# Patient Record
Sex: MALE | Employment: UNEMPLOYED | URBAN - NONMETROPOLITAN AREA
[De-identification: names, ages, dates, MRNs, and addresses within clinical notes are randomized per-mention and may not be internally consistent; named-entity substitution may affect disease eponyms.]

---

## 2020-09-22 ENCOUNTER — TELEPHONE (OUTPATIENT)
Dept: PREADMISSION TESTING | Age: 60
End: 2020-09-22

## 2020-09-22 NOTE — TELEPHONE ENCOUNTER
Spoke with Pt's wife Josue Atwood, not sure pt can take off work to get PAT testing done, will call office to confirm

## 2020-09-25 ENCOUNTER — PRE-PROCEDURE TELEPHONE (OUTPATIENT)
Dept: PREADMISSION TESTING | Age: 60
End: 2020-09-25

## 2020-10-07 ENCOUNTER — OFFICE VISIT (OUTPATIENT)
Dept: PULMONOLOGY | Age: 60
End: 2020-10-07
Payer: OTHER GOVERNMENT

## 2020-10-07 ENCOUNTER — HOSPITAL ENCOUNTER (OUTPATIENT)
Dept: GENERAL RADIOLOGY | Age: 60
Discharge: HOME | End: 2020-10-09
Payer: OTHER GOVERNMENT

## 2020-10-07 ENCOUNTER — HOSPITAL ENCOUNTER (OUTPATIENT)
Dept: PULMONOLOGY | Age: 60
Discharge: HOME | End: 2020-10-07
Payer: OTHER GOVERNMENT

## 2020-10-07 VITALS
SYSTOLIC BLOOD PRESSURE: 118 MMHG | OXYGEN SATURATION: 96 % | BODY MASS INDEX: 15.71 KG/M2 | DIASTOLIC BLOOD PRESSURE: 72 MMHG | WEIGHT: 116 LBS | HEART RATE: 72 BPM | TEMPERATURE: 98 F | HEIGHT: 72 IN

## 2020-10-07 PROBLEM — J98.4 MIXED RESTRICTIVE AND OBSTRUCTIVE LUNG DISEASE (HCC): Status: ACTIVE | Noted: 2020-10-07

## 2020-10-07 PROBLEM — J43.9 MIXED RESTRICTIVE AND OBSTRUCTIVE LUNG DISEASE (HCC): Status: ACTIVE | Noted: 2020-10-07

## 2020-10-07 PROBLEM — F17.200 SMOKER: Status: ACTIVE | Noted: 2020-10-07

## 2020-10-07 PROBLEM — R59.0 AXILLARY LYMPHADENOPATHY: Status: ACTIVE | Noted: 2020-10-07

## 2020-10-07 PROBLEM — J44.9 STAGE 4 VERY SEVERE COPD BY GOLD CLASSIFICATION (HCC): Status: ACTIVE | Noted: 2020-10-07

## 2020-10-07 PROBLEM — J92.0 CALCIFIED PLEURAL PLAQUE DUE TO ASBESTOS EXPOSURE: Status: ACTIVE | Noted: 2020-10-07

## 2020-10-07 PROBLEM — R59.0 MEDIASTINAL LYMPHADENOPATHY: Status: ACTIVE | Noted: 2020-10-07

## 2020-10-07 PROBLEM — J44.9 MIXED RESTRICTIVE AND OBSTRUCTIVE LUNG DISEASE (HCC): Status: ACTIVE | Noted: 2020-10-07

## 2020-10-07 PROBLEM — R64 CACHEXIA (HCC): Status: ACTIVE | Noted: 2020-10-07

## 2020-10-07 PROCEDURE — 94726 PLETHYSMOGRAPHY LUNG VOLUMES: CPT

## 2020-10-07 PROCEDURE — 99205 OFFICE O/P NEW HI 60 MIN: CPT | Performed by: INTERNAL MEDICINE

## 2020-10-07 PROCEDURE — 94060 EVALUATION OF WHEEZING: CPT

## 2020-10-07 PROCEDURE — 94640 AIRWAY INHALATION TREATMENT: CPT

## 2020-10-07 PROCEDURE — 6370000000 HC RX 637 (ALT 250 FOR IP): Performed by: INTERNAL MEDICINE

## 2020-10-07 PROCEDURE — 94729 DIFFUSING CAPACITY: CPT

## 2020-10-07 PROCEDURE — 71046 X-RAY EXAM CHEST 2 VIEWS: CPT

## 2020-10-07 RX ORDER — ALBUTEROL SULFATE 90 UG/1
2 AEROSOL, METERED RESPIRATORY (INHALATION) EVERY 4 HOURS PRN
COMMUNITY
Start: 2020-08-06 | End: 2020-10-12 | Stop reason: SDUPTHER

## 2020-10-07 RX ORDER — LEVALBUTEROL 1.25 MG/.5ML
1.25 SOLUTION, CONCENTRATE RESPIRATORY (INHALATION) 3 TIMES DAILY
COMMUNITY
End: 2020-10-12 | Stop reason: SDUPTHER

## 2020-10-07 RX ORDER — BUDESONIDE AND FORMOTEROL FUMARATE DIHYDRATE 160; 4.5 UG/1; UG/1
2 AEROSOL RESPIRATORY (INHALATION) 2 TIMES DAILY
COMMUNITY
Start: 2020-08-06 | End: 2020-10-12 | Stop reason: SDUPTHER

## 2020-10-07 RX ORDER — ALBUTEROL SULFATE 90 UG/1
4 AEROSOL, METERED RESPIRATORY (INHALATION) ONCE
Status: COMPLETED | OUTPATIENT
Start: 2020-10-07 | End: 2020-10-07

## 2020-10-07 RX ADMIN — ALBUTEROL SULFATE 4 PUFF: 90 AEROSOL, METERED RESPIRATORY (INHALATION) at 14:15

## 2020-10-07 NOTE — PROGRESS NOTES
REASON FOR THE CONSULTATION:  Shortness of breath  HISTORY OF PRESENT ILLNESS:    Tom Lane is a 63y.o. year old male here for evaluation of shortness of breath. Patient has been seen in the [place] they saw pulmonologist and now has been referred to me for evaluation. Patient has been having progressive shortness of breath after walking. Presently he can walk 50 yards but at a slow pace and if he has to climb up a flight of stairs he has to do it at a slow pace and may have to pace himself he does have cough with sputum mostly in the morning which is clear but no hemoptysis he has lost 10 pounds of weight since 1 year he is a thin cachectic looking man. He has had extensive work-up and scanning done has known history of asbestos pleural plaques with loss of left lung volume with pleuroparenchymal scarring according to him previous PET scans did not show any neoplastic process. I will obtain records of the PET CT scan done in 2017 at [facility]. Recently patient has had blood work done and is under evaluation for CLL due to lymphocytosis. He does have lymphadenopathy that he feels under his axilla and in the groin right side more than the left. 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 CRITERIA MET    []     [address]  []      2. CRITERIA NOT MET   [x]      3. REFUSED                    []        REASON CRITERIA NOT MET     1. SMOKING LESS THAN 30 PY  []      2. AGE LESS THAN 55 or GREATER 77 YEARS  []      3.  QUIT SMOKING 15 YEARS OR GREATER   []      4. RECENT CT WITH IN 11 MONTHS    [x]      5. LIFE EXPECTANCY < 5 YEARS   []      6. SIGNS  AND SYMPTOMS OF LUNG CANCER   []         Immunization   Immunization History   Administered Date(s) Administered    Influenza, Triv, 3 Years and older, IM, PF (Afluria 5yrs and older) 09/29/2020    Pneumococcal Polysaccharide ([id number]) 08/29/2018        Pneumococcal Vaccine     [x] Up to date    [] Indicated   [] Refused  [] Contraindicated       Influenza Vaccine   [x] Up to date    [] Indicated   [] Refused  [] Contraindicated        PAST MEDICAL HISTORY:       Diagnosis Date    Asthma     COPD (chronic obstructive pulmonary disease) ([facility]     Emphysema of lung ([facility]          Family History:   History reviewed. No pertinent family history. SURGICAL HISTORY:   Past Surgical History:   Procedure Laterality Date    SKIN GRAFT Bilateral     lower extremities           SOCIAL AND OCCUPATIONAL HEALTH:      There  No history of TB or TB exposure. There  Yes asbestos ,Nosilica dust exposure. The patient reports  No coal mine, [place], [place], [facility] exposure. History of travel outside the country No  There  is use of   marijuana No   VapingNo  IV heroinNo  Crack cocaineNo  There  Nohot tub exposure. Pets -cats No, dogsNo  Birds No        TOBACCO:   reports that he has been smoking. He has a 45.00 pack-year smoking history. He has never used smokeless tobacco.  ETOH:   reports current alcohol use. ALLERGIES:      Allergies   Allergen Reactions    Morphine Other (See Comments)    Propoxyphene Other (See Comments)     Any for of darvon          Home Meds:   Prior to Admission medications    Medication Sig Start Date End Date Taking?  Authorizing Provider   albuterol sulfate  (90 Base) MCG/ACT inhaler Inhale 2 puffs into the lungs every 4 hours as needed 8/6/20 11/4/20 Yes Historical Provider, MD   budesonide-formoterol (SYMBICORT) 160-4.5 MCG/ACT AERO Inhale 2 puffs into the lungs 2 times daily 8/6/20 11/4/20 Yes Historical Provider, MD   levalbuterol ([place]) 1.25 MG/0.5ML nebulizer solution Inhale 1.25 mg into the lungs 3 times daily   Yes Historical Provider, MD   Multiple Vitamins-Minerals (MULTIVITAMIN ADULT PO) Take 1 tablet by mouth daily   Yes Historical Provider, MD              REVIEW OF SYSTEMS:    CONSTITUTIONAL: Night sweats sweats,  weight loss  EYES:  negative for  double vision, blurred vision, dry eyes, eye discharge and redness  HEENT:  negative for  hearing loss, tinnitus, ear drainage, earaches and nasal congestion  RESPIRATORY:  See hpi  CARDIOVASCULAR:  negative for  chest pain,, palpitations, orthopnea, PND  GASTROINTESTINAL:  negative for nausea, vomiting, change in bowel habits, diarrhea, constipation, abdominal pain, pruritus, abdominal mass and abdominal distention  GENITOURINARY:  negative for frequency, dysuria, nocturia, urinary incontinence and hesitancy  INTEGUMENT  negative for rash, skin lesion(s), dryness, skin color change, changes in lesion, pruritus and changes in hair  HEMATOLOGIC/LYMPHATIC:  , lymphadenopathy,   ALLERGIC/IMMUNOLOGIC:  negative for recurrent infections, urticaria and drug reactions  ENDOCRINE:  negative for heat intolerance, cold intolerance, tremor, weight changes and change in bowel habits  MUSCULOSKELETAL:  negative for  myalgias, arthralgias, pain, joint swelling, stiff joints and decreased range of motion  NEUROLOGICAL:  negative for headaches, dizziness, seizures, memory problems, speech problems, visual disturbance and coordination problems  BEHAVIOR/PSYCH:  negative for poor appetite, increased appetite, decreased sleep, increased sleep, decreased energy level, increased energy level and poor concentration  Skin no rash no dermatitis  Vitals:  /72   Pulse 72   Temp 98 °F (36.7 °C)   Ht 6' (1.829 m)   Wt 116 lb (52.6 kg)   SpO2 96%   BMI 15.73 kg/m²     PHYSICAL EXAM:  General Appearance: Alert, cooperative, no distress, appears older than stated age cachectic   Head:    Normocephalic, without obvious abnormality, atraumatic      Eyes:    PERRL, conjunctiva/corneas clear, EOM's intact   Ears:    Normal  external ear canals, both ears   Nose:   Nares normal, septum midline, mucosa normal, no drainage        or sinus tenderness   Throat:   Lips, mucosa, and tongue normal; teeth and gums normal   Neck:   Supple, symmetrical, trachea midline, no adenopathy;     thyroid:   no carotid-enlarged anterior cervical submandibular lymph nodes not tender really mobile half a centimeter    bruit , no JVD   Back:     Symmetric, no curvature, ROM normal, no CVA tenderness   Lungs:    Prolonged expiratory phase chest expansion decreased on the left side air entry decreased on the left side no rales no rhonchi no dullness no bronchial breath sound   Chest Wall:    No tenderness or deformity      Heart:    Regular rate and rhythm, S1 and S2 normal, no murmur, rub        or gallop no rvh                           Abdomen:                                                 Pulses:                              Skin:                  Lymph nodes:                    Neurologic:                  Soft, non-tender, bowel sounds active all four quadrants,     no masses, no organomegaly         2+ and symmetric all extremities     Skin color, texture, turgor normal, no rashes or lesions       Small right supraclavicular lymph nodes freely mobile not matted nontender    Right axillary lymph node are enlarged nontender freely mobile 1.5 cm left axillary lymph node 5 mm nontender     CNII-XII intact, normal strength 5/5 . Clubbing No  Lower ext edema No  Upper ext edema No                                         Xr Chest (2 Vw)    Result Date: 10/7/2020  Loss of volume of the left hemithorax and mediastinal shift to the left.  Calcified pleural plaques in the left upper lung and lung base, most compatible with provided history of asbestosis. Bibasilar pleural-parenchymal scarring versus small bowel pleural effusions. PFT today shows FEV1 32% predicted with the bronchodilator change of 8% to 35% predicted, forced vital capacity 34% predicted with 1% bronchodilator change, FEV1 FVC ratio is 72    Total lung capacity 51% predicted, basilar volume 87% predicted    Diffusion capacity uncorrected 37% predicted, corrected for alveolar volume 79% predicted    Flow volume 46% predicted    Airway resistance increased      IMPRESSION:     Diagnosis Orders   1. Stage 4 very severe COPD by GOLD classification ([facility] [address]  DME Order for (Specify) as OP   2. Mixed restrictive and obstructive lung disease ([address]  DME Order for (Specify) as OP   3. Calcified pleural plaque due to asbestos exposure  PET CT SKULL BASE TO MID THIGH    DME Order for (Specify) as OP   4. Mediastinal lymphadenopathy  PET CT SKULL BASE TO MID THIGH   5. Axillary lymphadenopathy     6. Smoker     7. Cachexia [facility])          :                PLAN:      PFT from March 2019 reviewed and compared to  today. There is significant decline in patient's FEV1 and FVC which is a combined obstructive and restrictive lung disease. His obstructive disease is due to chronic obstructive pulmonary disease and the restrictive disease is due to left lung volume loss and pleuroparenchymal scarring due to calcific left pleural plaques due to his asbestos exposure. We did walk him in the office lower saturation was 93%   so I will check nocturnal oximetry study on room air. Continue albuterol as needed and if needed use Xopenex in  his nebulizer. He had 8% bronchodilator change so I will continue Symbicort. Since he did not have any prescription at home I will give him a sample and if he tolerates it well I will send a prescription to the [place].     He has bilateral axillary right greater than left ,right supraclavicular and cervical lymphadenopathy along with mediastinal and hilar lymphadenopathy seen on the CT chest with IV contrast.    Patient is seeing a hematologist oncologist Dr Johanna Braden and there is a concern he may have CLL which will explain his lymphadenopathy. I would like to get a PET CT scan to further evaluate the lymphadenopathy and also his left pleuroparenchymal scarring and the pleural plaques. Based on the results the decision will be made to do bronchoscopy with endobronchial ultrasound and biopsy of the mediastinal lymph nodes or he will need pleural biopsy by thoracic surgeon or biopsy of the right axillary lymph node. Patient tells me that he prefers least invasive procedure. I shall see him back after PET CT scan    Answered all the questions that patient and his wife had. I spent more than 60 minutes examining, evaluating, reviewing data and counseling the patient. Greater than 50% of time was spent face-to-face with the patient       Requested Prescriptions      No prescriptions requested or ordered in this encounter       There are no discontinued medications. Jeannie Valenzuela received counseling on the following healthy behaviors: nutrition, exercise and medication adherence    Patient given educational materials : see patient instruction       Discussed use, benefit, and side effects of prescribed medications. Barriers to medication compliance addressed. All patient questions answered. Pt voiced understanding. I hope this updates you on my evaluation and clinical thinking. Thank you for allowing me to participate in his care. Sincerely,    Electronically signed by Terry Joya MD on 10/7/2020 at 4:46 PM       Please note that this chart was generated using voice recognition Dragon dictation software. Although every effort was made to ensure the accuracy of this automated transcription, some errors in transcription may have occurred.

## 2020-10-08 NOTE — PROCEDURES
[address]                 [address] [place] [id number]                               PULMONARY FUNCTION    PATIENT NAME: Maria A Mejia                     :        1960  MED REC NO:   [id number]                             ROOM:  ACCOUNT NO:   [de-identified]                           ADMIT DATE: 10/07/2020  PROVIDER:     Tiffanie Laws    DATE OF PROCEDURE:  10/07/2020    The patient's spirometry shows a flow volume loop, which is severely  restricted. FEV1 is 32% predicted with 8% bronchodilator change to 35%  predicted. FVC is 34% predicted with 1% change to 34% predicted. FEV1/FVC ratio is 72. Lung volumes by body box, total lung capacity is 51% predicted and RV  87% predicted, slow vital capacity 34% predicted. Diffusion capacity uncorrected is decreased to 37% predicted, corrected  to alveolar volume is 79% predicted, alveolar volume is significantly  decreased at 46% predicted and airway resistance is increased. FINAL IMPRESSION:  The study shows a very severe ventilatory dysfunction  of a combined obstructive and restrictive nature. The restrictive  disease is of combined intrapulmonic and extrapulmonic variety. Clinical correlation advised.         Jonathan Maxwell    D: 10/07/2020 16:59:28       T: 10/07/2020 18:00:49     [id number]  Job#: [id number]     Doc#: [id number]    CC:  Iveth Ayers

## 2020-10-12 NOTE — TELEPHONE ENCOUNTER
Patient is needing a refill of his medications. I will call these in to the [place] once approved. Thanks.

## 2020-10-13 RX ORDER — ALBUTEROL SULFATE 90 UG/1
2 AEROSOL, METERED RESPIRATORY (INHALATION) EVERY 4 HOURS PRN
Qty: 1 INHALER | Refills: 3
Start: 2020-10-13 | End: 2020-10-14 | Stop reason: SDUPTHER

## 2020-10-13 RX ORDER — LEVALBUTEROL 1.25 MG/.5ML
1.25 SOLUTION, CONCENTRATE RESPIRATORY (INHALATION) 3 TIMES DAILY
Qty: 90 EACH | Refills: 5
Start: 2020-10-13 | End: 2020-10-14 | Stop reason: SDUPTHER

## 2020-10-13 RX ORDER — BUDESONIDE AND FORMOTEROL FUMARATE DIHYDRATE 160; 4.5 UG/1; UG/1
2 AEROSOL RESPIRATORY (INHALATION) 2 TIMES DAILY
Qty: 1 INHALER | Refills: 5
Start: 2020-10-13 | End: 2020-10-14 | Stop reason: SDUPTHER

## 2020-10-14 RX ORDER — ALBUTEROL SULFATE 90 UG/1
2 AEROSOL, METERED RESPIRATORY (INHALATION) EVERY 4 HOURS PRN
Qty: 1 INHALER | Refills: 3 | Status: SHIPPED | OUTPATIENT
Start: 2020-10-14 | End: 2021-06-16 | Stop reason: SDUPTHER

## 2020-10-14 RX ORDER — BUDESONIDE AND FORMOTEROL FUMARATE DIHYDRATE 160; 4.5 UG/1; UG/1
2 AEROSOL RESPIRATORY (INHALATION) 2 TIMES DAILY
Qty: 1 INHALER | Refills: 5 | Status: SHIPPED | OUTPATIENT
Start: 2020-10-14 | End: 2021-06-16 | Stop reason: SDUPTHER

## 2020-10-14 RX ORDER — LEVALBUTEROL 1.25 MG/.5ML
1.25 SOLUTION, CONCENTRATE RESPIRATORY (INHALATION) 3 TIMES DAILY
Qty: 90 EACH | Refills: 5 | Status: SHIPPED | OUTPATIENT
Start: 2020-10-14 | End: 2021-06-16 | Stop reason: SDUPTHER

## 2020-11-18 ENCOUNTER — OFFICE VISIT (OUTPATIENT)
Dept: PULMONOLOGY | Age: 60
End: 2020-11-18
Payer: OTHER GOVERNMENT

## 2020-11-18 VITALS
OXYGEN SATURATION: 96 % | SYSTOLIC BLOOD PRESSURE: 112 MMHG | TEMPERATURE: 99 F | HEART RATE: 57 BPM | BODY MASS INDEX: 15.63 KG/M2 | WEIGHT: 115.4 LBS | HEIGHT: 72 IN | DIASTOLIC BLOOD PRESSURE: 72 MMHG

## 2020-11-18 PROCEDURE — 99214 OFFICE O/P EST MOD 30 MIN: CPT | Performed by: INTERNAL MEDICINE

## 2020-11-18 NOTE — PROGRESS NOTES
REASON FOR follow-up  Pleural plaques due to asbestos  HISTORY OF PRESENT ILLNESS:    Sushma Fuchs is a 63y.o. year old male   PET CT scan reviewed with patient and his wife. Patient does not want invasive procedures at present. He does not even want a colonoscopy. He is doing well with Symbicort  No hemoptysis      Last visit    here for evaluation of shortness of breath. Patient has been seen in the [place] they saw pulmonologist and now has been referred to me for evaluation. Patient has been having progressive shortness of breath after walking. Presently he can walk 50 yards but at a slow pace and if he has to climb up a flight of stairs he has to do it at a slow pace and may have to pace himself he does have cough with sputum mostly in the morning which is clear but no hemoptysis he has lost 10 pounds of weight since 1 year he is a thin cachectic looking man. He has had extensive work-up and scanning done has known history of asbestos pleural plaques with loss of left lung volume with pleuroparenchymal scarring according to him previous PET scans did not show any neoplastic process. I will obtain records of the PET CT scan done in 2017 at [facility]. Recently patient has had blood work done and is under evaluation for CLL due to lymphocytosis. He does have lymphadenopathy that he feels under his axilla and in the groin right side more than the left. 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 CRITERIA MET    []     [address] []      2. CRITERIA NOT MET   [x]      3. REFUSED                    []        REASON CRITERIA NOT MET     1. SMOKING LESS THAN 30 PY  []      2. AGE LESS THAN 55 or GREATER 77 YEARS  []      3. QUIT SMOKING 15 YEARS OR GREATER   []      4. RECENT CT WITH IN 11 MONTHS    [x]      5. LIFE EXPECTANCY < 5 YEARS   []      6. SIGNS  AND SYMPTOMS OF LUNG CANCER   []         Immunization   Immunization History   Administered Date(s) Administered    Influenza, Triv, 3 Years and older, IM, PF (Afluria 5yrs and older) 09/29/2020    Pneumococcal Polysaccharide ([id number]) 08/29/2018        Pneumococcal Vaccine     [x] Up to date    [] Indicated   [] Refused  [] Contraindicated       Influenza Vaccine   [x] Up to date    [] Indicated   [] Refused  [] Contraindicated        PAST MEDICAL HISTORY:       Diagnosis Date    Asthma     COPD (chronic obstructive pulmonary disease) [facility] [address]     Emphysema of lung ([facility] [address]          Family History:   History reviewed. No pertinent family history. SURGICAL HISTORY:   Past Surgical History:   Procedure Laterality Date    SKIN GRAFT Bilateral     lower extremities           SOCIAL AND OCCUPATIONAL HEALTH:      There  No history of TB or TB exposure. There  Yes asbestos ,[employer] dust exposure. The patient reports  No coal mine, [place], [place], [facility] exposure. History of travel outside the country No  There  is use of   marijuana No   VapingNo  IV heroinNo  Crack cocaineNo  There  Nohot tub exposure. Pets -cats No, dogsNo  Birds No        TOBACCO:   reports that he has been smoking. He has a 45.00 pack-year smoking history. He has never used smokeless tobacco.  ETOH:   reports current alcohol use. ALLERGIES:      Allergies   Allergen Reactions    Morphine Other (See Comments)    Propoxyphene Other (See Comments)     Any for of darvon          Home Meds:   Prior to Admission medications    Medication Sig Start Date End Date Taking?  Authorizing Provider budesonide-formoterol (SYMBICORT) 160-4.5 MCG/ACT AERO Inhale 2 puffs into the lungs 2 times daily 10/14/20 1/12/21 Yes Malachi Rosen, DO   albuterol sulfate  (90 Base) MCG/ACT inhaler Inhale 2 puffs into the lungs every 4 hours as needed for Wheezing or Shortness of Breath  Patient not taking: Reported on 11/18/2020 10/14/20 1/12/21  Manuel Rosen, DO   levalbuterol (XOPENEX) 1.25 MG/0.5ML nebulizer solution Take 0.5 mLs by nebulization 3 times daily  Patient not taking: Reported on 11/18/2020 10/14/20   Junie West, DO   Multiple Vitamins-Minerals (MULTIVITAMIN ADULT PO) Take 1 tablet by mouth daily    Historical Provider, MD              Review of Systems -  General ROS: negative for - chills, fatigue, fever or weight loss  ENT ROS: negative for - headaches, oral lesions or sore throat  Cardiovascular ROS: no chest pain , orthopnea or pnd   Gastrointestinal ROS: no abdominal pain, change in bowel habits, or black or bloody stools  Skin - no rash   Neuro - no blurry vision , no loc . No focal weakness   msk - no jt tenderness or swelling    Vascular - no claudication , rest completed and negative   Lymphatic - complete and negative   Hematology - oncology - complete and negative   Allergy immunology - complete and negative    no burning or hematuria    Vitals:  /72   Pulse 57   Temp 99 °F (37.2 °C)   Ht 6' (1.829 m)   Wt 115 lb 6.4 oz (52.3 kg)   SpO2 96%   BMI 15.65 kg/m²     PHYSICAL EXAM:  Head and neck atraumatic, normocephalic    Lymph nodes axillary lymphadenopathy    Neck-no JVP elevation    Lungs - Prolonged expiratory phase chest expansion decreased on the left side air entry decreased on the left side no rales no rhonchi no dullness no bronchial breath sound  CVS- S1, S2 regular. No S3 no S4, no murmurs    Abdomen-nontender, nondistended. Bowel sounds are present.   No organomegaly    Lower extremity-no edema    Upper extremity-no edema    Neurological-grossly normal cranial nerves. No overt motor deficit                                     Xr Chest (2 Vw)    Result Date: 10/7/2020  Loss of volume of the left hemithorax and mediastinal shift to the left. Calcified pleural plaques in the left upper lung and lung base, most compatible with provided history of asbestosis. Bibasilar pleural-parenchymal scarring versus small bowel pleural effusions. PFT today shows FEV1 32% predicted with the bronchodilator change of 8% to 35% predicted, forced vital capacity 34% predicted with 1% bronchodilator change, FEV1 FVC ratio is 72    Total lung capacity 51% predicted, basilar volume 87% predicted    Diffusion capacity uncorrected 37% predicted, corrected for alveolar volume 79% predicted    Flow volume 46% predicted    Airway resistance increased              IMPRESSION:     Diagnosis Orders   1. Stage 4 very severe COPD by GOLD classification ([address]     2. Mixed restrictive and obstructive lung disease ([address]     3. Calcified pleural plaque due to asbestos exposure  CT CHEST WO CONTRAST   4. Pleural effusion on right - ? BAPE  CT CHEST WO CONTRAST   5. CLL (chronic lymphocytic leukemia) (HCC)  CT CHEST WO CONTRAST        :                PLAN:      Mediastinal  lymph nodes are not PET positive neither the axillary lymph nodes. At present no plan for endobronchial ultrasound bronchoscopy. Defer biopsy of the axillary lymph nodes to oncology    He has only small right loculated pleural effusion which is not seen in 2019. He does show mild PET activity. But the effusion is too small for thoracentesis and patient does not want a pleural biopsy . This could be benign asbestos pleural effusion  Will obtain CT chest in 3 months  If prior to this patient symptoms of chest discomfort or shortness of breath worsen then will get CT chest earlier and if the pleural effusion enlarges will obtain thoracentesis. Patient does not want to get colonoscopy.     Continue Symbicort  Use Xopenex as needed    Follow-up in 3 months    Discussed with patient and his wife in detail  Reviewed imaging in detail      Requested Prescriptions      No prescriptions requested or ordered in this encounter       There are no discontinued medications. Chantale Bravo received counseling on the following healthy behaviors: nutrition, exercise and medication adherence    Patient given educational materials : see patient instruction       Discussed use, benefit, and side effects of prescribed medications. Barriers to medication compliance addressed. All patient questions answered. Pt voiced understanding. I hope this updates you on my evaluation and clinical thinking. Thank you for allowing me to participate in his care. Sincerely,    Electronically signed by Quang Blanco MD on 11/18/2020 at 5:12 PM       Please note that this chart was generated using voice recognition Dragon dictation software. Although every effort was made to ensure the accuracy of this automated transcription, some errors in transcription may have occurred.

## 2021-02-17 ENCOUNTER — OFFICE VISIT (OUTPATIENT)
Dept: PULMONOLOGY | Age: 61
End: 2021-02-17
Payer: OTHER GOVERNMENT

## 2021-02-17 VITALS
WEIGHT: 117 LBS | HEIGHT: 72 IN | OXYGEN SATURATION: 95 % | DIASTOLIC BLOOD PRESSURE: 70 MMHG | HEART RATE: 72 BPM | BODY MASS INDEX: 15.85 KG/M2 | SYSTOLIC BLOOD PRESSURE: 114 MMHG | TEMPERATURE: 99.1 F

## 2021-02-17 DIAGNOSIS — J92.0 CALCIFIED PLEURAL PLAQUE DUE TO ASBESTOS EXPOSURE: ICD-10-CM

## 2021-02-17 DIAGNOSIS — F17.200 SMOKER: ICD-10-CM

## 2021-02-17 DIAGNOSIS — C91.10 CLL (CHRONIC LYMPHOCYTIC LEUKEMIA) (HCC): ICD-10-CM

## 2021-02-17 DIAGNOSIS — J90 PLEURAL EFFUSION ON RIGHT: ICD-10-CM

## 2021-02-17 DIAGNOSIS — R59.0 AXILLARY LYMPHADENOPATHY: ICD-10-CM

## 2021-02-17 DIAGNOSIS — J44.9 STAGE 4 VERY SEVERE COPD BY GOLD CLASSIFICATION (HCC): Primary | ICD-10-CM

## 2021-02-17 DIAGNOSIS — R64 CACHEXIA (HCC): ICD-10-CM

## 2021-02-17 DIAGNOSIS — J98.4 MIXED RESTRICTIVE AND OBSTRUCTIVE LUNG DISEASE (HCC): ICD-10-CM

## 2021-02-17 DIAGNOSIS — R59.0 MEDIASTINAL LYMPHADENOPATHY: ICD-10-CM

## 2021-02-17 DIAGNOSIS — J43.9 MIXED RESTRICTIVE AND OBSTRUCTIVE LUNG DISEASE (HCC): ICD-10-CM

## 2021-02-17 PROCEDURE — 99214 OFFICE O/P EST MOD 30 MIN: CPT | Performed by: INTERNAL MEDICINE

## 2021-02-17 PROCEDURE — 99214 OFFICE O/P EST MOD 30 MIN: CPT

## 2021-02-17 NOTE — PROGRESS NOTES
REASON FOR follow-up  Pleural plaques due to asbestos  HISTORY OF PRESENT ILLNESS:    Ashley Omalley is a 63y.o. year old male   Since last visit patient has been losing weight, his appetite is normal and he eats 3 meals a day and takes 1 Ensure shake a day. Breathing is stable with exertion  Denies purulent sputum or hemoptysis  Compliant with his bronchodilator therapy  CT scan reviewed with patient and his wife    Last visit  PET CT scan reviewed with patient and his wife. Patient does not want invasive procedures at present. He does not even want a colonoscopy. He is doing well with Symbicort  No hemoptysis      Last visit    here for evaluation of shortness of breath. Patient has been seen in the [place] they saw pulmonologist and now has been referred to me for evaluation. Patient has been having progressive shortness of breath after walking. Presently he can walk 50 yards but at a slow pace and if he has to climb up a flight of stairs he has to do it at a slow pace and may have to pace himself he does have cough with sputum mostly in the morning which is clear but no hemoptysis he has lost 10 pounds of weight since 1 year he is a thin cachectic looking man. He has had extensive work-up and scanning done has known history of asbestos pleural plaques with loss of left lung volume with pleuroparenchymal scarring according to him previous PET scans did not show any neoplastic process. I will obtain records of the PET CT scan done in 2017 at [facility]. Recently patient has had blood work done and is under evaluation for CLL due to lymphocytosis. He does have lymphadenopathy that he feels under his axilla and in the groin right side more than the left.     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 He worked with asbestos according to patient he would be covered with asbestos when he would finishes work. Then he worked [employer]    Presently working in car [employer]     [address] [employer]     1. CRITERIA MET    []     [address]  []      2. CRITERIA NOT MET   [x]      3. REFUSED                    []        REASON CRITERIA NOT MET     1. SMOKING LESS THAN 30 PY  []      2. AGE LESS THAN 55 or GREATER 77 YEARS  []      3. QUIT SMOKING 15 YEARS OR GREATER   []      4. RECENT CT WITH IN 11 MONTHS    [x]      5. LIFE EXPECTANCY < 5 YEARS   []      6. SIGNS  AND SYMPTOMS OF LUNG CANCER   []         Immunization   Immunization History   Administered Date(s) Administered    Influenza, Triv, 3 Years and older, IM, PF (Afluria 5yrs and older) 09/29/2020    Pneumococcal Polysaccharide ([id number]) 08/29/2018        Pneumococcal Vaccine     [x] Up to date    [] Indicated   [] Refused  [] Contraindicated       Influenza Vaccine   [x] Up to date    [] Indicated   [] Refused  [] Contraindicated        PAST MEDICAL HISTORY:       Diagnosis Date    Asthma     COPD (chronic obstructive pulmonary disease) [facility] [facility]     Emphysema of lung ([facility] [address] [facility]          Family History:   History reviewed. No pertinent family history. SURGICAL HISTORY:   Past Surgical History:   Procedure Laterality Date    SKIN GRAFT Bilateral     lower extremities           SOCIAL AND OCCUPATIONAL HEALTH:      There  No history of TB or TB exposure. There  Yes asbestos ,[place] dust exposure. The patient reports  No coal mine, [place], [place], [facility] exposure. History of travel outside the country No  There  is use of   marijuana No   VapingNo  IV heroinNo  Crack cocaineNo  There  Nohot tub exposure. Pets -cats No, dogsNo  Birds No        TOBACCO:   reports that he has been smoking. He has a 45.00 pack-year smoking history.  He has never used smokeless tobacco.  ETOH:   reports current alcohol use. ALLERGIES:      Allergies   Allergen Reactions    Morphine Other (See Comments)    Propoxyphene Other (See Comments)     Any for of darvon          Home Meds:   Prior to Admission medications    Medication Sig Start Date End Date Taking? Authorizing Provider   albuterol sulfate  (90 Base) MCG/ACT inhaler Inhale 2 puffs into the lungs every 4 hours as needed for Wheezing or Shortness of Breath 10/14/20 2/17/21 Yes Oleg Shabazz, DO   budesonide-formoterol (SYMBICORT) 160-4.5 MCG/ACT AERO Inhale 2 puffs into the lungs 2 times daily 10/14/20 2/17/21 Yes Malachi Rosen, DO   levalbuterol (XOPENEX) 1.25 MG/0.5ML nebulizer solution Take 0.5 mLs by nebulization 3 times daily 10/14/20  Yes Oleg Shabazz, DO   Multiple Vitamins-Minerals (MULTIVITAMIN ADULT PO) Take 1 tablet by mouth daily   Yes Historical Provider, MD              Review of Systems -  General ROS: Weight loss  ENT ROS: negative for - headaches, oral lesions or sore throat  Cardiovascular ROS: no chest pain , orthopnea or pnd   Gastrointestinal ROS: no abdominal pain, change in bowel habits, or black or bloody stools  Skin - no rash   Neuro - no blurry vision , no loc .  No focal weakness   msk - no jt tenderness or swelling    Vascular - no claudication , rest completed and negative   Lymphatic - complete and negative   Hematology - oncology - complete and negative   Allergy immunology - complete and negative    no burning or hematuria    Vitals:  /70   Pulse 72   Temp 99.1 °F (37.3 °C)   Ht 6' (1.829 m)   Wt 117 lb (53.1 kg)   SpO2 95%   BMI 15.87 kg/m²     PHYSICAL EXAM:  Head and neck atraumatic, normocephalic    Lymph nodes axillary lymphadenopathy    Neck-no JVP elevation    Lungs - Prolonged expiratory phase chest expansion decreased on the left side air entry decreased on the left side no rales no dullness no bronchial breath sound, has expiratory rhonchi bilaterally  CVS- S1, S2 regular. No S3 no S4, no murmurs    Abdomen-nontender, nondistended. Bowel sounds are present. No organomegaly  Clubbing  Lower extremity-no edema    Upper extremity-no edema    Neurological-grossly normal cranial nerves. No overt motor deficit                                     Xr Chest (2 Vw)    Result Date: 10/7/2020  Loss of volume of the left hemithorax and mediastinal shift to the left. Calcified pleural plaques in the left upper lung and lung base, most compatible with provided history of asbestosis. Bibasilar pleural-parenchymal scarring versus small bowel pleural effusions. PFT today shows FEV1 32% predicted with the bronchodilator change of 8% to 35% predicted, forced vital capacity 34% predicted with 1% bronchodilator change, FEV1 FVC ratio is 72    Total lung capacity 51% predicted, basilar volume 87% predicted    Diffusion capacity uncorrected 37% predicted, corrected for alveolar volume 79% predicted    Flow volume 46% predicted    Airway resistance increased              21 CT chest without contrast  IMPRESSION:     1. There is pleural thickening and pleural calcification throughout  the left hemithorax, I clearly related to previous history of asbestos  exposure. Areas of pleural parenchymal scarring in the left  hemithorax, similar to prior study. 2. There are areas of pleural parenchymal scarring in the right apex  and right lung base, similar to prior study. There is pleural  thickening and minimal pleural fluid in the right lower chest.  Findings are similar to previous study. 3. Mediastinal and axillary adenopathy again identified. Findings are  similar to slightly more pronounced compared to previous examination. 4. Other findings as above    Professional Interpretation by [address] [place] [address] Radiology    Electronically Signed    By: Priscila Galicia MD    On: 2/9/2021 12:26 PM  IMPRESSION:     Diagnosis Orders   1. Stage 4 very severe COPD by GOLD classification ([address]     2.  Mixed Although every effort was made to ensure the accuracy of this automated transcription, some errors in transcription may have occurred.

## 2021-06-16 DIAGNOSIS — J44.9 STAGE 4 VERY SEVERE COPD BY GOLD CLASSIFICATION (HCC): ICD-10-CM

## 2021-06-30 RX ORDER — ALBUTEROL SULFATE 90 UG/1
2 AEROSOL, METERED RESPIRATORY (INHALATION) EVERY 4 HOURS PRN
Qty: 1 INHALER | Refills: 3 | Status: SHIPPED | OUTPATIENT
Start: 2021-06-30 | End: 2021-08-25

## 2021-06-30 RX ORDER — LEVALBUTEROL 1.25 MG/.5ML
1.25 SOLUTION, CONCENTRATE RESPIRATORY (INHALATION) 3 TIMES DAILY
Qty: 90 EACH | Refills: 5 | Status: SHIPPED | OUTPATIENT
Start: 2021-06-30 | End: 2021-07-07 | Stop reason: CLARIF

## 2021-06-30 RX ORDER — BUDESONIDE AND FORMOTEROL FUMARATE DIHYDRATE 160; 4.5 UG/1; UG/1
2 AEROSOL RESPIRATORY (INHALATION) 2 TIMES DAILY
Qty: 1 INHALER | Refills: 5 | Status: SHIPPED | OUTPATIENT
Start: 2021-06-30 | End: 2022-06-01

## 2021-07-07 DIAGNOSIS — J44.9 STAGE 4 VERY SEVERE COPD BY GOLD CLASSIFICATION (HCC): Primary | ICD-10-CM

## 2021-07-07 RX ORDER — ALBUTEROL SULFATE 2.5 MG/3ML
2.5 SOLUTION RESPIRATORY (INHALATION) EVERY 6 HOURS PRN
Qty: 120 EACH | Refills: 5 | Status: SHIPPED | OUTPATIENT
Start: 2021-07-07 | End: 2021-08-25 | Stop reason: ALTCHOICE

## 2021-07-15 ENCOUNTER — TELEPHONE (OUTPATIENT)
Dept: PULMONOLOGY | Age: 61
End: 2021-07-15

## 2021-07-15 NOTE — TELEPHONE ENCOUNTER
Deshawn Getting last pft in 2020   Stage 4 copd   With  FEV1 35%predicted. FVC 34% predicted.         total lung capacity is 51% predicted      Diffusion capacity 37% predicted

## 2021-07-15 NOTE — TELEPHONE ENCOUNTER
My chart message:    What percentage  of my lung capacity do I have according to last p.f.t.?thank you!!

## 2021-08-25 ENCOUNTER — OFFICE VISIT (OUTPATIENT)
Dept: PULMONOLOGY | Age: 61
End: 2021-08-25
Payer: OTHER GOVERNMENT

## 2021-08-25 VITALS
DIASTOLIC BLOOD PRESSURE: 72 MMHG | WEIGHT: 111.2 LBS | BODY MASS INDEX: 15.06 KG/M2 | HEIGHT: 72 IN | SYSTOLIC BLOOD PRESSURE: 118 MMHG | TEMPERATURE: 97.2 F | OXYGEN SATURATION: 97 % | HEART RATE: 102 BPM

## 2021-08-25 DIAGNOSIS — J98.4 MIXED RESTRICTIVE AND OBSTRUCTIVE LUNG DISEASE (HCC): ICD-10-CM

## 2021-08-25 DIAGNOSIS — J43.9 MIXED RESTRICTIVE AND OBSTRUCTIVE LUNG DISEASE (HCC): ICD-10-CM

## 2021-08-25 DIAGNOSIS — J44.9 STAGE 4 VERY SEVERE COPD BY GOLD CLASSIFICATION (HCC): Primary | ICD-10-CM

## 2021-08-25 DIAGNOSIS — J90 PLEURAL EFFUSION ON RIGHT: ICD-10-CM

## 2021-08-25 DIAGNOSIS — J92.0 CALCIFIED PLEURAL PLAQUE DUE TO ASBESTOS EXPOSURE: ICD-10-CM

## 2021-08-25 DIAGNOSIS — R59.0 MEDIASTINAL LYMPHADENOPATHY: ICD-10-CM

## 2021-08-25 DIAGNOSIS — C91.10 CLL (CHRONIC LYMPHOCYTIC LEUKEMIA) (HCC): ICD-10-CM

## 2021-08-25 DIAGNOSIS — F17.200 SMOKER: ICD-10-CM

## 2021-08-25 DIAGNOSIS — R64 CACHEXIA (HCC): ICD-10-CM

## 2021-08-25 PROCEDURE — 99214 OFFICE O/P EST MOD 30 MIN: CPT | Performed by: INTERNAL MEDICINE

## 2021-08-25 PROCEDURE — 99211 OFF/OP EST MAY X REQ PHY/QHP: CPT

## 2021-08-25 RX ORDER — ALBUTEROL SULFATE 2.5 MG/3ML
2.5 SOLUTION RESPIRATORY (INHALATION) EVERY 6 HOURS PRN
Qty: 120 EACH | Refills: 5 | Status: ON HOLD | OUTPATIENT
Start: 2021-08-25 | End: 2022-10-18 | Stop reason: HOSPADM

## 2021-08-25 NOTE — PROGRESS NOTES
REASON FOR follow-up  Pleural plaques due to asbestos  HISTORY OF PRESENT ILLNESS:    Alpheus Epley is a 57y.o. year old male   Imaging and report of the CT chest reviewed with patient and his wife. He feels that albuterol MDI does not give him enough bronchodilator effect. Will change to Combivent Respimat. Continue nebulized albuterol and Symbicort. Advised patient to stop smoking. He is decided not to pursue any biopsies. Last visit  Since last visit patient has been losing weight, his appetite is normal and he eats 3 meals a day and takes 1 Ensure shake a day. Breathing is stable with exertion  Denies purulent sputum or hemoptysis  Compliant with his bronchodilator therapy  CT scan reviewed with patient and his wife    Last visit  PET CT scan reviewed with patient and his wife. Patient does not want invasive procedures at present. He does not even want a colonoscopy. He is doing well with Symbicort  No hemoptysis      Last visit    here for evaluation of shortness of breath. Patient has been seen in the [place] they saw pulmonologist and now has been referred to me for evaluation. Patient has been having progressive shortness of breath after walking. Presently he can walk 50 yards but at a slow pace and if he has to climb up a flight of stairs he has to do it at a slow pace and may have to pace himself he does have cough with sputum mostly in the morning which is clear but no hemoptysis he has lost 10 pounds of weight since 1 year he is a thin cachectic looking man. He has had extensive work-up and scanning done has known history of asbestos pleural plaques with loss of left lung volume with pleuroparenchymal scarring according to him previous PET scans did not show any neoplastic process. I will obtain records of the PET CT scan done in 2017 at [facility]. Recently patient has had blood work done and is under evaluation for CLL due to lymphocytosis.   He does have lymphadenopathy that he exposure. History of travel outside the country No  There  is use of   marijuana No   VapingNo  IV heroinNo  Crack cocaineNo  There  Nohot tub exposure. Pets -cats No, dogsNo  Birds No        TOBACCO:   reports that he has been smoking. He has a 45.00 pack-year smoking history. He has never used smokeless tobacco.  ETOH:   reports current alcohol use. ALLERGIES:      Allergies   Allergen Reactions    Morphine Other (See Comments)    Propoxyphene Other (See Comments)     Any for of darvon          Home Meds:   Prior to Admission medications    Medication Sig Start Date End Date Taking? Authorizing Provider   albuterol-ipratropium (COMBIVENT RESPIMAT)  MCG/ACT AERS inhaler Inhale 1 puff into the lungs every 6 hours 8/25/21  Yes Tiffanie Laws MD   budesonide-formoterol (SYMBICORT) 160-4.5 MCG/ACT AERO Inhale 2 puffs into the lungs 2 times daily 6/30/21 9/28/21 Yes Tiffanie Laws MD   Multiple Vitamins-Minerals (MULTIVITAMIN ADULT PO) Take 1 tablet by mouth daily   Yes Historical Provider, MD   albuterol sulfate  (90 Base) MCG/ACT inhaler Inhale 2 puffs into the lungs every 4 hours as needed for Wheezing or Shortness of Breath  Patient not taking: Reported on 8/25/2021 6/30/21 9/28/21  Tiffanie Laws MD              Review of Systems -  General ROS: Weight loss  ENT ROS: negative for - headaches, oral lesions or sore throat  Cardiovascular ROS: no chest pain , orthopnea or pnd   Gastrointestinal ROS: no abdominal pain, change in bowel habits, or black or bloody stools  Skin - no rash   Neuro - no blurry vision , no loc .  No focal weakness   msk - no jt tenderness or swelling    Vascular - no claudication , rest completed and negative   Lymphatic - complete and negative   Hematology - oncology - complete and negative   Allergy immunology - complete and negative    no burning or hematuria    Vitals:  /72   Pulse 102   Temp 97.2 °F (36.2 °C)   Ht 6' (1.829 m)   Wt 111 lb 3.2 oz (50.4 fibrothorax on the left side and mediastinal lymph nodes. Advised him to stop smoking-he smoking 1 pack/day   Continue bronchodilator therapy but will add Combivent instead of albuterol MDI   Continue Symbicort  Continue albuterol nebulized as needed  Follow-up 6 months with virtual visit          Requested Prescriptions     Signed Prescriptions Disp Refills    albuterol-ipratropium (COMBIVENT RESPIMAT)  MCG/ACT AERS inhaler 1 Inhaler 6     Sig: Inhale 1 puff into the lungs every 6 hours       Medications Discontinued During This Encounter   Medication Reason    albuterol (PROVENTIL) (2.5 MG/3ML) 0.083% nebulizer solution Alternate therapy       Chelsey Sheppard received counseling on the following healthy behaviors: nutrition, exercise and medication adherence    Patient given educational materials : see patient instruction       Discussed use, benefit, and side effects of prescribed medications. Barriers to medication compliance addressed. All patient questions answered. Pt voiced understanding. I hope this updates you on my evaluation and clinical thinking. Thank you for allowing me to participate in his care. Sincerely,    Electronically signed by Colby Casey MD on 8/25/2021 at 3:55 PM       Please note that this chart was generated using voice recognition Dragon dictation software. Although every effort was made to ensure the accuracy of this automated transcription, some errors in transcription may have occurred.

## 2021-10-04 ENCOUNTER — TELEPHONE (OUTPATIENT)
Dept: PULMONOLOGY | Age: 61
End: 2021-10-04

## 2021-10-04 NOTE — TELEPHONE ENCOUNTER
Please discontinue my combivent respite medication. seems to take my breath away. will never touch this stuff again. thank you so much.       Last Appt:  8/25/2021  Next Appt:   Visit date not found  Med verified in [address] [place] [address] Rd

## 2022-02-14 ENCOUNTER — TELEPHONE (OUTPATIENT)
Dept: PULMONOLOGY | Age: 62
End: 2022-02-14

## 2022-02-14 NOTE — TELEPHONE ENCOUNTER
Pt had a lung CT scan and another scan performed [facility] on 2/10/22. Please request the imaging for his upcoming appointment. Ordered by Dr. Ed Lui.  mm

## 2022-02-21 ENCOUNTER — TELEPHONE (OUTPATIENT)
Dept: PULMONOLOGY | Age: 62
End: 2022-02-21

## 2022-02-21 NOTE — TELEPHONE ENCOUNTER
Patient canceled his virtual visit with you for this Friday, 2/25. Rescheduled to a later date to see you in office. He did have a CT scan done at another facility. Images are in PACS. Please review and let me know results, thanks.

## 2022-06-01 ENCOUNTER — TELEPHONE (OUTPATIENT)
Dept: PULMONOLOGY | Age: 62
End: 2022-06-01

## 2022-06-01 DIAGNOSIS — R59.0 MEDIASTINAL LYMPHADENOPATHY: ICD-10-CM

## 2022-06-01 DIAGNOSIS — J92.0 CALCIFIED PLEURAL PLAQUE DUE TO ASBESTOS EXPOSURE: ICD-10-CM

## 2022-06-01 DIAGNOSIS — J44.9 STAGE 4 VERY SEVERE COPD BY GOLD CLASSIFICATION (HCC): Primary | ICD-10-CM

## 2022-06-01 DIAGNOSIS — C91.10 CLL (CHRONIC LYMPHOCYTIC LEUKEMIA) (HCC): ICD-10-CM

## 2022-06-01 RX ORDER — PREDNISONE 10 MG/1
10 TABLET ORAL DAILY
Qty: 30 TABLET | Refills: 5 | Status: SHIPPED | OUTPATIENT
Start: 2022-06-01 | End: 2022-07-01

## 2022-06-01 NOTE — TELEPHONE ENCOUNTER
Pt called in stating that he spoke to [facility] and they stated that the authorization of [id number] is good until 08/24/2022. Pt did not wish to reschedule at this time.

## 2022-06-01 NOTE — TELEPHONE ENCOUNTER
Patient was unable to keep his appointment today due to his [address] authorization expiring. Patient has had continuous bouts of bronchitis. He currently is on Dulera 100-5, he is asking if the dosage could be increased. He is also asking about starting and every day low dose steroid. Please print prescriptions and I will fax to pharmacy. Patient had CT chest 2/22 and chest xray 5/16. All images are in PACS, please review and comment on any changes, thanks.

## 2022-06-08 DIAGNOSIS — J44.9 STAGE 4 VERY SEVERE COPD BY GOLD CLASSIFICATION (HCC): Primary | ICD-10-CM

## 2022-06-08 NOTE — TELEPHONE ENCOUNTER
Ct chest chronic changes .  Right hilar lymph node is slightly more enlarged , will need follow up ct chest in 3 months

## 2022-07-05 ENCOUNTER — TELEPHONE (OUTPATIENT)
Dept: PULMONOLOGY | Age: 62
End: 2022-07-05

## 2022-07-19 ENCOUNTER — TELEPHONE (OUTPATIENT)
Dept: PULMONOLOGY | Age: 62
End: 2022-07-19

## 2022-07-19 NOTE — TELEPHONE ENCOUNTER
Pt has seen family  And scans are clear. Pt is currently on doxycycline, and med dose phuong. Pt stated that when he starts coughing he has a lot of pain in left side of chest. Pt stated when he stops coughing that the pain subsides. Pt stated that it hurts to cough. [facility] uses [facility] in [place]. Please advise.

## 2022-07-20 RX ORDER — GUAIFENESIN/DEXTROMETHORPHAN 100-10MG/5
5 SYRUP ORAL 3 TIMES DAILY PRN
Qty: 236 ML | Refills: 1 | Status: SHIPPED | OUTPATIENT
Start: 2022-07-20 | End: 2022-07-30

## 2022-08-01 ENCOUNTER — TELEPHONE (OUTPATIENT)
Dept: PULMONOLOGY | Age: 62
End: 2022-08-01

## 2022-08-01 DIAGNOSIS — J44.1 CHRONIC OBSTRUCTIVE PULMONARY DISEASE WITH ACUTE EXACERBATION (HCC): Primary | ICD-10-CM

## 2022-08-01 NOTE — TELEPHONE ENCOUNTER
Patient call he is coughing and has greenish-yellow mucus. He is having trouble sleeping due to coughing. His general Practioner gave him Prednisone and Doxy. He said this helped while on it but 3 day afterwards his issues returned. He has tried Musinex, aspirin and Ibuprofen. He is wanting something to help him get over this.       [facility]  [place]

## 2022-08-05 ENCOUNTER — TELEPHONE (OUTPATIENT)
Dept: PULMONOLOGY | Age: 62
End: 2022-08-05

## 2022-08-05 DIAGNOSIS — J18.9 PNEUMONIA OF RIGHT LOWER LOBE DUE TO INFECTIOUS ORGANISM: Primary | ICD-10-CM

## 2022-08-05 DIAGNOSIS — J44.1 CHRONIC OBSTRUCTIVE PULMONARY DISEASE WITH ACUTE EXACERBATION (HCC): ICD-10-CM

## 2022-08-05 RX ORDER — LEVOFLOXACIN 750 MG/1
750 TABLET ORAL DAILY
Qty: 7 TABLET | Refills: 0 | Status: SHIPPED | OUTPATIENT
Start: 2022-08-05 | End: 2022-08-12

## 2022-08-05 NOTE — TELEPHONE ENCOUNTER
Dr. Angeles Bhakta patient. See telephone encounter from 8/1. Chest xray report scanned under media, also in care everywhere. Images in PACS. Sputum culture also scanned under media. Looks like culture is poor quality. Patient was seen by PCP Tarun Rogers on 8/2/22. That documentation is also in care everywhere. Please advise, thanks.

## 2022-08-31 ENCOUNTER — OFFICE VISIT (OUTPATIENT)
Dept: PULMONOLOGY | Age: 62
End: 2022-08-31
Payer: OTHER GOVERNMENT

## 2022-08-31 VITALS
HEART RATE: 81 BPM | SYSTOLIC BLOOD PRESSURE: 114 MMHG | HEIGHT: 72 IN | WEIGHT: 106.6 LBS | BODY MASS INDEX: 14.44 KG/M2 | DIASTOLIC BLOOD PRESSURE: 78 MMHG | OXYGEN SATURATION: 96 % | TEMPERATURE: 98 F

## 2022-08-31 DIAGNOSIS — R59.0 MEDIASTINAL LYMPHADENOPATHY: ICD-10-CM

## 2022-08-31 DIAGNOSIS — J92.0 CALCIFIED PLEURAL PLAQUE DUE TO ASBESTOS EXPOSURE: ICD-10-CM

## 2022-08-31 DIAGNOSIS — J44.9 STAGE 4 VERY SEVERE COPD BY GOLD CLASSIFICATION (HCC): Primary | ICD-10-CM

## 2022-08-31 DIAGNOSIS — J98.4 MIXED RESTRICTIVE AND OBSTRUCTIVE LUNG DISEASE (HCC): ICD-10-CM

## 2022-08-31 DIAGNOSIS — Z79.52 ON PREDNISONE THERAPY: ICD-10-CM

## 2022-08-31 DIAGNOSIS — J90 PLEURAL EFFUSION ON RIGHT: ICD-10-CM

## 2022-08-31 DIAGNOSIS — C91.10 CLL (CHRONIC LYMPHOCYTIC LEUKEMIA) (HCC): ICD-10-CM

## 2022-08-31 DIAGNOSIS — R64 CACHEXIA (HCC): ICD-10-CM

## 2022-08-31 DIAGNOSIS — J43.9 MIXED RESTRICTIVE AND OBSTRUCTIVE LUNG DISEASE (HCC): ICD-10-CM

## 2022-08-31 DIAGNOSIS — M54.6 THORACIC SPINE PAIN: ICD-10-CM

## 2022-08-31 PROCEDURE — 99214 OFFICE O/P EST MOD 30 MIN: CPT | Performed by: INTERNAL MEDICINE

## 2022-08-31 RX ORDER — PREDNISONE 10 MG/1
10 TABLET ORAL DAILY
COMMUNITY
End: 2022-08-31 | Stop reason: SDUPTHER

## 2022-08-31 RX ORDER — IBUPROFEN 200 MG
200 TABLET ORAL EVERY 6 HOURS PRN
Status: ON HOLD | COMMUNITY

## 2022-08-31 RX ORDER — PREDNISONE 10 MG/1
20 TABLET ORAL DAILY
Qty: 60 TABLET | Refills: 3 | Status: SHIPPED | OUTPATIENT
Start: 2022-08-31 | End: 2022-09-30

## 2022-08-31 RX ORDER — ACETAMINOPHEN 500 MG
500 TABLET ORAL EVERY 6 HOURS PRN
Status: ON HOLD | COMMUNITY
End: 2022-10-18 | Stop reason: HOSPADM

## 2022-08-31 NOTE — PROGRESS NOTES
REASON FOR follow-up  Pleural plaques due to asbestos  HISTORY OF PRESENT ILLNESS:    Peggy Barbosa is a 60y.o. year old male   Has been having recurrent episodes of bronchitis requiring prednisone and antibiotic. He takes chronic prednisone. He has been losing weight. Main complaint is that he is having pain in his mid thoracic spine which radiates anteriorly and upwards. This worsens with coughing. He does not have hemoptysis. Is on chronic prednisone 10 mg a day. Last visit  Imaging and report of the CT chest reviewed with patient and his wife. He feels that albuterol MDI does not give him enough bronchodilator effect. Will change to Combivent Respimat. Continue nebulized albuterol and Symbicort. Advised patient to stop smoking. He is decided not to pursue any biopsies. Last visit  Since last visit patient has been losing weight, his appetite is normal and he eats 3 meals a day and takes 1 Ensure shake a day. Breathing is stable with exertion  Denies purulent sputum or hemoptysis  Compliant with his bronchodilator therapy  CT scan reviewed with patient and his wife    Last visit  PET CT scan reviewed with patient and his wife. Patient does not want invasive procedures at present. He does not even want a colonoscopy. He is doing well with Symbicort  No hemoptysis      Last visit    here for evaluation of shortness of breath. Patient has been seen in the [place] they saw pulmonologist and now has been referred to me for evaluation. Patient has been having progressive shortness of breath after walking. Presently he can walk 50 yards but at a slow pace and if he has to climb up a flight of stairs he has to do it at a slow pace and may have to pace himself he does have cough with sputum mostly in the morning which is clear but no hemoptysis he has lost 10 pounds of weight since 1 year he is a thin cachectic looking man.     He has had extensive work-up and scanning done has known history of History:   History reviewed. No pertinent family history. SURGICAL HISTORY:   Past Surgical History:   Procedure Laterality Date    SKIN GRAFT Bilateral     lower extremities           SOCIAL AND OCCUPATIONAL HEALTH:      There  No history of TB or TB exposure. There  Yes asbestos ,[place]ilica dust exposure. The patient reports  No coal mine, [place], [place], [facility] exposure. History of travel outside the country No  There  is use of   marijuana No   VapingNo  IV heroinNo  Crack cocaineNo  There  Nohot tub exposure. Pets -cats No, dogsNo  Birds No        TOBACCO:   reports that he has been smoking. He has a 45.00 pack-year smoking history. He has never used smokeless tobacco.  ETOH:   reports current alcohol use. ALLERGIES:      Allergies   Allergen Reactions    Morphine Other (See Comments)    Propoxyphene Other (See Comments)     Any for of darvon          Home Meds:   Prior to Admission medications    Medication Sig Start Date End Date Taking?  Authorizing Provider   acetaminophen (TYLENOL) 500 MG tablet Take 500 mg by mouth every 6 hours as needed for Pain   Yes Historical Provider, MD   ibuprofen (ADVIL;MOTRIN) 200 MG tablet Take 200 mg by mouth every 6 hours as needed for Pain Pt taking 2 pills prn   Yes Historical Provider, MD   predniSONE (DELTASONE) 10 MG tablet Take 2 tablets by mouth daily 8/31/22 9/30/22 Yes Ingrid Caceres MD   mometasone-formoterol (DULERA) 200-5 MCG/ACT inhaler Inhale 2 puffs into the lungs 2 times daily 6/1/22  Yes Ingrid Caceres MD   Multiple Vitamins-Minerals (MULTIVITAMIN ADULT PO) Take 1 tablet by mouth daily   Yes Historical Provider, MD   albuterol (PROVENTIL) (2.5 MG/3ML) 0.083% nebulizer solution Take 3 mLs by nebulization every 6 hours as needed for Wheezing  Patient not taking: Reported on 8/31/2022 8/25/21   Ingrid Caceres MD              Review of Systems -  General ROS: Weight loss  ENT ROS: negative for - headaches, oral lesions or sore throat  Cardiovascular ROS: no chest pain , orthopnea or pnd   Gastrointestinal ROS: no abdominal pain, change in bowel habits, or black or bloody stools  Skin - no rash   Neuro - no blurry vision , no loc . No focal weakness   msk -midthoracic back pain which is constant and radiates anteriorly  Vascular - no claudication , rest completed and negative   Lymphatic - complete and negative   Hematology - oncology - complete and negative   Allergy immunology - complete and negative    no burning or hematuria    Vitals:  /78   Pulse 81   Temp 98 °F (36.7 °C)   Ht 6' (1.829 m)   Wt 106 lb 9.6 oz (48.4 kg)   SpO2 96%   BMI 14.46 kg/m²     PHYSICAL EXAM:  Head and neck atraumatic, normocephalic    Lymph nodes axillary lymphadenopathy    Neck-no JVP elevation    Lungs - Prolonged expiratory phase chest expansion decreased on the left side air entry decreased on the left side no rales no dullness no bronchial breath sound, has expiratory rhonchi bilaterally  Back-no point tenderness of the spine  CVS- S1, S2 regular. No S3 no S4, no murmurs    Abdomen-nontender, nondistended. Bowel sounds are present. No organomegaly  Clubbing  Lower extremity-no edema    Upper extremity-no edema    Neurological-grossly normal cranial nerves. No overt motor deficit                                     Xr Chest (2 Vw)    Result Date: 10/7/2020  Loss of volume of the left hemithorax and mediastinal shift to the left. Calcified pleural plaques in the left upper lung and lung base, most compatible with provided history of asbestosis. Bibasilar pleural-parenchymal scarring versus small bowel pleural effusions.               PFT today shows FEV1 32% predicted with the bronchodilator change of 8% to 35% predicted, forced vital capacity 34% predicted with 1% bronchodilator change, FEV1 FVC ratio is 72    Total lung capacity 51% predicted, basilar volume 87% predicted    Diffusion capacity uncorrected 37% predicted, corrected for alveolar volume 79% predicted    Flow volume 46% predicted    Airway resistance increased                IMPRESSION:     Diagnosis Orders   1. Stage 4 very severe COPD by GOLD classification ([address]  CT CHEST WO CONTRAST    predniSONE (DELTASONE) 10 MG tablet      2. Calcified pleural plaque due to asbestos exposure  CT CHEST WO CONTRAST      3. CLL (chronic lymphocytic leukemia) (HCC)  CT CHEST WO CONTRAST      4. Mediastinal lymphadenopathy  CT CHEST WO CONTRAST      5. Pleural effusion on right - ? BAPE  CT CHEST WO CONTRAST      6. Mixed restrictive and obstructive lung disease ([address]  CT CHEST WO CONTRAST      7. Cachexia ([address]        8. Thoracic spine pain  CT THORACIC SPINE WO CONTRAST      9. On prednisone therapy  predniSONE (DELTASONE) 10 MG tablet           :                PLAN:      Patient is having pain in the mid thoracic spine, which is is constant and worsens with coughing. he is on chronic prednisone. He has severe malnutrition. Both malnutrition and chronic prednisone therapy increase risk of osteoporosis. Will order CT of the spine to evaluate for vertebral fractures. He will benefit from DEXA scan to evaluate for osteoporosis and treatment. Will defer this to his primary physician . Obtain CT chest to evaluate for worsening pleural parenchymal disease due to asbestos as the cause of pain. Continue present bronchodilator therapy with Dulera and albuterol     He does want to increase his prednisone to 20 mg a day understanding that this will worsen his osteoporosis.       Follow-up after above testing     Stop smoking     Discussed with patient and his wife        Requested Prescriptions     Signed Prescriptions Disp Refills    predniSONE (DELTASONE) 10 MG tablet 60 tablet 3     Sig: Take 2 tablets by mouth daily       Medications Discontinued During This Encounter   Medication Reason    predniSONE (DELTASONE) 10 MG tablet REORDER       Luis received counseling on the following healthy behaviors: nutrition, exercise and medication adherence    Patient given educational materials : see patient instruction       Discussed use, benefit, and side effects of prescribed medications. Barriers to medication compliance addressed. All patient questions answered. Pt voiced understanding. I hope this updates you on my evaluation and clinical thinking. Thank you for allowing me to participate in his care. Sincerely,    Electronically signed by Rajesh Garner MD on 8/31/2022 at 4:06 PM       Please note that this chart was generated using voice recognition Dragon dictation software. Although every effort was made to ensure the accuracy of this automated transcription, some errors in transcription may have occurred.

## 2022-09-08 ENCOUNTER — OFFICE VISIT (OUTPATIENT)
Dept: PULMONOLOGY | Age: 62
End: 2022-09-08
Payer: OTHER GOVERNMENT

## 2022-09-08 ENCOUNTER — TELEPHONE (OUTPATIENT)
Dept: ORTHOPEDIC SURGERY | Age: 62
End: 2022-09-08

## 2022-09-08 VITALS
SYSTOLIC BLOOD PRESSURE: 108 MMHG | HEIGHT: 72 IN | HEART RATE: 78 BPM | TEMPERATURE: 98.1 F | OXYGEN SATURATION: 97 % | DIASTOLIC BLOOD PRESSURE: 70 MMHG | BODY MASS INDEX: 14.38 KG/M2 | WEIGHT: 106.2 LBS

## 2022-09-08 DIAGNOSIS — J92.0 CALCIFIED PLEURAL PLAQUE DUE TO ASBESTOS EXPOSURE: ICD-10-CM

## 2022-09-08 DIAGNOSIS — Z79.52 ON PREDNISONE THERAPY: ICD-10-CM

## 2022-09-08 DIAGNOSIS — R59.0 MEDIASTINAL LYMPHADENOPATHY: ICD-10-CM

## 2022-09-08 DIAGNOSIS — C91.10 CLL (CHRONIC LYMPHOCYTIC LEUKEMIA) (HCC): ICD-10-CM

## 2022-09-08 DIAGNOSIS — J43.9 MIXED RESTRICTIVE AND OBSTRUCTIVE LUNG DISEASE (HCC): ICD-10-CM

## 2022-09-08 DIAGNOSIS — J44.9 STAGE 4 VERY SEVERE COPD BY GOLD CLASSIFICATION (HCC): ICD-10-CM

## 2022-09-08 DIAGNOSIS — J90 PLEURAL EFFUSION ON RIGHT: ICD-10-CM

## 2022-09-08 DIAGNOSIS — S22.060A COMPRESSION FRACTURE OF T8 VERTEBRA, INITIAL ENCOUNTER (HCC): Primary | ICD-10-CM

## 2022-09-08 DIAGNOSIS — R64 CACHEXIA (HCC): ICD-10-CM

## 2022-09-08 DIAGNOSIS — J98.4 MIXED RESTRICTIVE AND OBSTRUCTIVE LUNG DISEASE (HCC): ICD-10-CM

## 2022-09-08 PROCEDURE — 99214 OFFICE O/P EST MOD 30 MIN: CPT | Performed by: INTERNAL MEDICINE

## 2022-09-08 PROCEDURE — 99213 OFFICE O/P EST LOW 20 MIN: CPT | Performed by: INTERNAL MEDICINE

## 2022-09-08 RX ORDER — TRAMADOL HYDROCHLORIDE 50 MG/1
50 TABLET ORAL EVERY 6 HOURS PRN
Status: ON HOLD | COMMUNITY
Start: 2022-09-07 | End: 2022-10-18 | Stop reason: HOSPADM

## 2022-09-08 RX ORDER — PSEUDOEPHEDRINE HCL 30 MG
100 TABLET ORAL 3 TIMES DAILY PRN
COMMUNITY
Start: 2022-09-02 | End: 2022-10-02

## 2022-09-08 RX ORDER — TIZANIDINE 2 MG/1
2 TABLET ORAL EVERY 6 HOURS PRN
Status: ON HOLD | COMMUNITY
Start: 2022-09-02 | End: 2022-10-18 | Stop reason: HOSPADM

## 2022-09-08 NOTE — TELEPHONE ENCOUNTER
Pulmonology- Dr. Vicenta Cancino sent over referral for T8 compression fx.  Images are in PACS will you see

## 2022-09-08 NOTE — PROGRESS NOTES
REASON FOR follow-up  Pleural plaques due to asbestos  HISTORY OF PRESENT ILLNESS:    Leeanna Rodriguez is a 60y.o. year old male   Continues to have back pain with anterior radiation. Imaging reviewed with patient and his family  Is scheduled for DEXA scan. Last visit  Has been having recurrent episodes of bronchitis requiring prednisone and antibiotic. He takes chronic prednisone. He has been losing weight. Main complaint is that he is having pain in his mid thoracic spine which radiates anteriorly and upwards. This worsens with coughing. He does not have hemoptysis. Is on chronic prednisone 10 mg a day. Last visit  Imaging and report of the CT chest reviewed with patient and his wife. He feels that albuterol MDI does not give him enough bronchodilator effect. Will change to Combivent Respimat. Continue nebulized albuterol and Symbicort. Advised patient to stop smoking. He is decided not to pursue any biopsies. Last visit  Since last visit patient has been losing weight, his appetite is normal and he eats 3 meals a day and takes 1 Ensure shake a day. Breathing is stable with exertion  Denies purulent sputum or hemoptysis  Compliant with his bronchodilator therapy  CT scan reviewed with patient and his wife    Last visit  PET CT scan reviewed with patient and his wife. Patient does not want invasive procedures at present. He does not even want a colonoscopy. He is doing well with Symbicort  No hemoptysis      Last visit    here for evaluation of shortness of breath. Patient has been seen in the [employer] they saw pulmonologist and now has been referred to me for evaluation. Patient has been having progressive shortness of breath after walking.   Presently he can walk 50 yards but at a slow pace and if he has to climb up a flight of stairs he has to do it at a slow pace and may have to pace himself he does have cough with sputum mostly in the morning which is clear but no hemoptysis he has lost 10 pounds of weight since 1 year he is a thin cachectic looking man. He has had extensive work-up and scanning done has known history of asbestos pleural plaques with loss of left lung volume with pleuroparenchymal scarring according to him previous PET scans did not show any neoplastic process. I will obtain records of the PET CT scan done in 2017 at [facility]. Recently patient has had blood work done and is under evaluation for CLL due to lymphocytosis. He does have lymphadenopathy that he feels under his axilla and in the groin right side more than the left. 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 He worked with asbestos according to patient he would be covered with asbestos when he would finishes work.   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     [address] MET    []     [address]  []      CRITERIA NOT MET   [x]      REFUSED                    []        REASON CRITERIA NOT MET     SMOKING LESS THAN 30 PY  []      AGE LESS THAN 55 or GREATER 76 YEARS  []      [address]   []      RECENT CT WITH IN 11 MONTHS    [x]      LIFE EXPECTANCY < 5 YEARS   []      SIGNS  AND SYMPTOMS OF LUNG CANCER   []         Immunization   Immunization History   Administered Date(s) Administered    COVID-19, J&J, (age 18y+), IM, 0.5 mL 11/08/2021    Influenza, Triv, 3 Years and older, IM, PF (Afluria 5yrs and older) 09/29/2020    Pneumococcal Polysaccharide ([id number]) 08/29/2018        Pneumococcal Vaccine     [x] Up to date    [] Indicated   [] Refused  [] Contraindicated       Influenza Vaccine   [x] Up to date    [] Indicated   [] Refused  [] Contraindicated        PAST MEDICAL HISTORY:       Diagnosis Date    Asthma     COPD (chronic obstructive pulmonary disease) ([facility]     Emphysema of lung ([facility]          Family History:   History reviewed. No pertinent family history. SURGICAL HISTORY:   Past Surgical History:   Procedure Laterality Date    SKIN GRAFT Bilateral     lower extremities           SOCIAL AND OCCUPATIONAL HEALTH:      There  No history of TB or TB exposure. There  Yes asbestos ,[place] dust exposure. The patient reports  No coal mine, [place], [place], [facility] exposure. History of travel outside the country No  There  is use of   marijuana No   VapingNo  IV heroinNo  Crack cocaineNo  There  Nohot tub exposure. Pets -cats No, dogsNo  Birds No        TOBACCO:   reports that he has been smoking cigarettes. He has a 45.00 pack-year smoking history. He has never used smokeless tobacco.  ETOH:   reports current alcohol use. ALLERGIES:      Allergies   Allergen Reactions    Morphine Other (See Comments)    Propoxyphene Other (See Comments)     Any for of darvon          Home Meds:   Prior to Admission medications    Medication Sig Start Date End Date Taking? Authorizing Provider   tiZANidine (ZANAFLEX) 2 MG tablet  9/2/22  Yes Historical Provider, MD   traMADol (ULTRAM) 50 MG tablet Take 50 mg by mouth every 6 hours as needed.  9/7/22 10/7/22 Yes Historical Provider, MD   docusate (COLACE, DULCOLAX) 100 MG CAPS Take 100 mg by mouth 3 times daily as needed 9/2/22 10/2/22 Yes Historical Provider, MD   acetaminophen (TYLENOL) 500 MG tablet Take 500 mg by mouth every 6 hours as needed for Pain   Yes Historical Provider, MD   ibuprofen (ADVIL;MOTRIN) 200 MG tablet Take 200 mg by mouth every 6 hours as needed for Pain Pt taking 2 pills prn   Yes Historical Provider, MD   predniSONE (DELTASONE) 10 MG tablet Take 2 tablets by mouth daily 8/31/22 9/30/22 Yes Caden Greenberg MD   mometasone-formoterol (DULERA) 200-5 MCG/ACT inhaler Inhale 2 puffs into the lungs 2 lung base, most compatible with provided history of asbestosis. Bibasilar pleural-parenchymal scarring versus small bowel pleural effusions. PFT today shows FEV1 32% predicted with the bronchodilator change of 8% to 35% predicted, forced vital capacity 34% predicted with 1% bronchodilator change, FEV1 FVC ratio is 72    Total lung capacity 51% predicted, basilar volume 87% predicted    Diffusion capacity uncorrected 37% predicted, corrected for alveolar volume 79% predicted    Flow volume 46% predicted    Airway resistance increased            In 9/6/2022, CT chest and CT thoracic spine  IMPRESSION:     1. CT chest: Stable chronic pleural-parenchymal scarring with cavitary   formation left lung apex, bilateral pleural thickening and   calcifications, left greater than right, and left hemithorax volume   loss. Stable mediastinal adenopathy. No evidence of progressive   disease. See discussion. 2. CT thoracic spine: New smoothly bordered compression fractures of   the thoracic spine with associated sclerosis and no lytic components,   favor posttraumatic changes or sequela of insufficiency fractures. Consider correlation with a thoracic spine MRI to exclude underlying   lesions. There are also sclerotic changes within the anterolateral   left ninth and 10th ribs in a pattern suggesting old/healing   fractures. Professional Interpretation by [address] [place] [address] Radiology     This report was generated entirely using voice recognition software. If you have any questions or concerns, please contact the facility   radiology department. Electronically Signed     By: Colonel Monroe MD     IMPRESSION:     Diagnosis Orders   1. Compression fracture of T8 vertebra, initial encounter [facility])  [facility] Orthopedic Surgery      2. Stage 4 very severe COPD by GOLD classification ([address]        3. Calcified pleural plaque due to asbestos exposure        4. CLL (chronic lymphocytic leukemia) ([address]        5. Mediastinal lymphadenopathy        6. Pleural effusion on right - ? BAPE        7. Mixed restrictive and obstructive lung disease ([address]        8. Cachexia ([address]        9. On prednisone therapy             :                PLAN:      Midthoracic back pain pain due to compression fracture of T6, T8 and T9 vertebra. Will refer to orthopedic spine surgeon Dr. Landen Amaya for evaluation for kyphoplasty . Follow-up with primary care provider to evaluate for osteoporosis . CT chest shows chronic changes . Continue present bronchodilator therapy . He is on chronic prednisone which predisposes to osteoporosis and compression fracture . unfortunately he has advanced lung disease which is dependent on chronic prednisone . Discussed with patient , his wife and his son        Requested Prescriptions      No prescriptions requested or ordered in this encounter       There are no discontinued medications. Carlos Hanson received counseling on the following healthy behaviors: nutrition, exercise and medication adherence    Patient given educational materials : see patient instruction       Discussed use, benefit, and side effects of prescribed medications. Barriers to medication compliance addressed. All patient questions answered. Pt voiced understanding. I hope this updates you on my evaluation and clinical thinking. Thank you for allowing me to participate in his care. Sincerely,    Electronically signed by Amy Rain MD on 9/8/2022 at 2:16 PM       Please note that this chart was generated using voice recognition Dragon dictation software. Although every effort was made to ensure the accuracy of this automated transcription, some errors in transcription may have occurred.

## 2022-09-09 ENCOUNTER — TELEPHONE (OUTPATIENT)
Dept: PULMONOLOGY | Age: 62
End: 2022-09-09

## 2022-09-09 DIAGNOSIS — S22.060A COMPRESSION FRACTURE OF T8 VERTEBRA, INITIAL ENCOUNTER (HCC): Primary | ICD-10-CM

## 2022-09-09 NOTE — TELEPHONE ENCOUNTER
Rock Osorio from Dr. Jania Johnson office called and stated that Dr. Jania Johnson does not see New Compression Fractures. Pt would need to see Dr. Ananth Taylor in Pain management. Would you be able to place a new referral, so that we could get authorization from the [place]. Please advise.

## 2022-10-03 ENCOUNTER — TELEPHONE (OUTPATIENT)
Dept: PAIN MANAGEMENT | Age: 62
End: 2022-10-03

## 2022-10-03 ENCOUNTER — HOSPITAL ENCOUNTER (INPATIENT)
Age: 62
LOS: 3 days | Discharge: PSYCHIATRIC OR SUBSTANCE USE FACILITY | DRG: 885 | End: 2022-10-06
Attending: INTERNAL MEDICINE | Admitting: INTERNAL MEDICINE
Payer: OTHER GOVERNMENT

## 2022-10-03 PROBLEM — R41.0 DELIRIUM: Status: ACTIVE | Noted: 2022-10-03

## 2022-10-03 PROCEDURE — 93005 ELECTROCARDIOGRAM TRACING: CPT | Performed by: NURSE PRACTITIONER

## 2022-10-03 PROCEDURE — 2060000000 HC ICU INTERMEDIATE R&B

## 2022-10-03 NOTE — TELEPHONE ENCOUNTER
Placed call to Morgan Bell, referral/ at [facility] ED. I asked Maged Russell if she had getton patient's Ortho referral updated to pain mgmt instead since Dr. Clary Hassan will not see pt for compression fractures. Kenyatta stated she received a call from the patient stating he did not want to come here to [facility] for pain mgmt and that he would be going elsewhere. Kenyatta stated to cancel his 10/04/22 appt. I called and spoke with Laura Dai who stated she would cancel appt.

## 2022-10-04 LAB
ANION GAP SERPL CALCULATED.3IONS-SCNC: 7 MMOL/L (ref 9–17)
BUN BLDV-MCNC: 12 MG/DL (ref 8–23)
CALCIUM SERPL-MCNC: 8.7 MG/DL (ref 8.6–10.4)
CHLORIDE BLD-SCNC: 104 MMOL/L (ref 98–107)
CO2: 29 MMOL/L (ref 20–31)
CREAT SERPL-MCNC: 0.52 MG/DL (ref 0.7–1.2)
EKG ATRIAL RATE: 67 BPM
EKG P AXIS: 60 DEGREES
EKG P-R INTERVAL: 136 MS
EKG Q-T INTERVAL: 396 MS
EKG QRS DURATION: 88 MS
EKG QTC CALCULATION (BAZETT): 418 MS
EKG R AXIS: 98 DEGREES
EKG T AXIS: 90 DEGREES
EKG VENTRICULAR RATE: 67 BPM
GFR SERPL CREATININE-BSD FRML MDRD: >60 ML/MIN/1.73M2
GLUCOSE BLD-MCNC: 86 MG/DL (ref 70–99)
HCT VFR BLD CALC: 34 % (ref 41–53)
HEMOGLOBIN: 11.5 G/DL (ref 13.5–17.5)
MCH RBC QN AUTO: 33.3 PG (ref 26–34)
MCHC RBC AUTO-ENTMCNC: 33.9 G/DL (ref 31–37)
MCV RBC AUTO: 98.1 FL (ref 80–100)
PDW BLD-RTO: 13.5 % (ref 11.5–14.9)
PLATELET # BLD: 328 K/UL (ref 150–450)
PMV BLD AUTO: 6.8 FL (ref 6–12)
POTASSIUM SERPL-SCNC: 4.1 MMOL/L (ref 3.7–5.3)
RBC # BLD: 3.47 M/UL (ref 4.5–5.9)
SODIUM BLD-SCNC: 140 MMOL/L (ref 135–144)
TROPONIN, HIGH SENSITIVITY: 14 NG/L (ref 0–22)
WBC # BLD: 19.8 K/UL (ref 3.5–11)

## 2022-10-04 PROCEDURE — 97530 THERAPEUTIC ACTIVITIES: CPT

## 2022-10-04 PROCEDURE — 90792 PSYCH DIAG EVAL W/MED SRVCS: CPT | Performed by: PSYCHIATRY & NEUROLOGY

## 2022-10-04 PROCEDURE — 6370000000 HC RX 637 (ALT 250 FOR IP): Performed by: PSYCHIATRY & NEUROLOGY

## 2022-10-04 PROCEDURE — 6360000002 HC RX W HCPCS: Performed by: NURSE PRACTITIONER

## 2022-10-04 PROCEDURE — 2060000000 HC ICU INTERMEDIATE R&B

## 2022-10-04 PROCEDURE — 94761 N-INVAS EAR/PLS OXIMETRY MLT: CPT

## 2022-10-04 PROCEDURE — 97165 OT EVAL LOW COMPLEX 30 MIN: CPT

## 2022-10-04 PROCEDURE — 6370000000 HC RX 637 (ALT 250 FOR IP): Performed by: NURSE PRACTITIONER

## 2022-10-04 PROCEDURE — 94640 AIRWAY INHALATION TREATMENT: CPT

## 2022-10-04 PROCEDURE — 99223 1ST HOSP IP/OBS HIGH 75: CPT | Performed by: INTERNAL MEDICINE

## 2022-10-04 PROCEDURE — 97161 PT EVAL LOW COMPLEX 20 MIN: CPT

## 2022-10-04 PROCEDURE — 93010 ELECTROCARDIOGRAM REPORT: CPT | Performed by: INTERNAL MEDICINE

## 2022-10-04 PROCEDURE — 2580000003 HC RX 258: Performed by: NURSE PRACTITIONER

## 2022-10-04 PROCEDURE — 6360000002 HC RX W HCPCS: Performed by: PSYCHIATRY & NEUROLOGY

## 2022-10-04 PROCEDURE — 84484 ASSAY OF TROPONIN QUANT: CPT

## 2022-10-04 PROCEDURE — 85027 COMPLETE CBC AUTOMATED: CPT

## 2022-10-04 PROCEDURE — 80048 BASIC METABOLIC PNL TOTAL CA: CPT

## 2022-10-04 PROCEDURE — 36415 COLL VENOUS BLD VENIPUNCTURE: CPT

## 2022-10-04 PROCEDURE — 6360000002 HC RX W HCPCS

## 2022-10-04 RX ORDER — DIAZEPAM ORAL SOLUTION (CONCENTRATE) 5 MG/ML
5 SOLUTION ORAL EVERY 8 HOURS PRN
Status: ON HOLD | COMMUNITY
End: 2022-10-18 | Stop reason: HOSPADM

## 2022-10-04 RX ORDER — BUDESONIDE AND FORMOTEROL FUMARATE DIHYDRATE 160; 4.5 UG/1; UG/1
2 AEROSOL RESPIRATORY (INHALATION) 2 TIMES DAILY
Refills: 5 | Status: DISCONTINUED | OUTPATIENT
Start: 2022-10-04 | End: 2022-10-06 | Stop reason: HOSPADM

## 2022-10-04 RX ORDER — L. ACIDOPHILUS/L.BULGARICUS 1MM CELL
4 TABLET ORAL 3 TIMES DAILY
Status: ON HOLD | COMMUNITY
End: 2022-10-18 | Stop reason: HOSPADM

## 2022-10-04 RX ORDER — ONDANSETRON 2 MG/ML
4 INJECTION INTRAMUSCULAR; INTRAVENOUS EVERY 6 HOURS PRN
Status: DISCONTINUED | OUTPATIENT
Start: 2022-10-04 | End: 2022-10-06 | Stop reason: HOSPADM

## 2022-10-04 RX ORDER — SODIUM CHLORIDE 9 MG/ML
INJECTION, SOLUTION INTRAVENOUS PRN
Status: DISCONTINUED | OUTPATIENT
Start: 2022-10-04 | End: 2022-10-06 | Stop reason: HOSPADM

## 2022-10-04 RX ORDER — ONDANSETRON 4 MG/1
4 TABLET, ORALLY DISINTEGRATING ORAL EVERY 8 HOURS PRN
Status: DISCONTINUED | OUTPATIENT
Start: 2022-10-04 | End: 2022-10-06 | Stop reason: HOSPADM

## 2022-10-04 RX ORDER — DOCUSATE SODIUM 100 MG/1
100 CAPSULE, LIQUID FILLED ORAL 3 TIMES DAILY PRN
Status: DISCONTINUED | OUTPATIENT
Start: 2022-10-04 | End: 2022-10-06 | Stop reason: HOSPADM

## 2022-10-04 RX ORDER — OLANZAPINE 10 MG/1
5 TABLET ORAL 2 TIMES DAILY
Status: DISCONTINUED | OUTPATIENT
Start: 2022-10-04 | End: 2022-10-06 | Stop reason: HOSPADM

## 2022-10-04 RX ORDER — ALBUTEROL SULFATE 90 UG/1
2 AEROSOL, METERED RESPIRATORY (INHALATION) EVERY 4 HOURS PRN
Status: ON HOLD | COMMUNITY

## 2022-10-04 RX ORDER — SODIUM CHLORIDE 0.9 % (FLUSH) 0.9 %
10 SYRINGE (ML) INJECTION PRN
Status: DISCONTINUED | OUTPATIENT
Start: 2022-10-04 | End: 2022-10-06 | Stop reason: HOSPADM

## 2022-10-04 RX ORDER — ALBUTEROL SULFATE 90 UG/1
2 AEROSOL, METERED RESPIRATORY (INHALATION) EVERY 4 HOURS PRN
Status: DISCONTINUED | OUTPATIENT
Start: 2022-10-04 | End: 2022-10-06 | Stop reason: HOSPADM

## 2022-10-04 RX ORDER — LACTOBACILLUS ACIDOPHILUS 500MM CELL
1 CAPSULE ORAL DAILY
Status: ON HOLD | COMMUNITY

## 2022-10-04 RX ORDER — HALOPERIDOL 5 MG/ML
2 INJECTION INTRAMUSCULAR 3 TIMES DAILY PRN
Status: DISCONTINUED | OUTPATIENT
Start: 2022-10-04 | End: 2022-10-06 | Stop reason: HOSPADM

## 2022-10-04 RX ORDER — DIAZEPAM 5 MG/1
5 TABLET ORAL EVERY 8 HOURS PRN
Status: DISCONTINUED | OUTPATIENT
Start: 2022-10-04 | End: 2022-10-04

## 2022-10-04 RX ORDER — DOCUSATE SODIUM 100 MG/1
100 CAPSULE, LIQUID FILLED ORAL 3 TIMES DAILY PRN
Status: ON HOLD | COMMUNITY

## 2022-10-04 RX ORDER — SODIUM CHLORIDE 0.9 % (FLUSH) 0.9 %
5-40 SYRINGE (ML) INJECTION EVERY 12 HOURS SCHEDULED
Status: DISCONTINUED | OUTPATIENT
Start: 2022-10-04 | End: 2022-10-06 | Stop reason: HOSPADM

## 2022-10-04 RX ORDER — TIZANIDINE 2 MG/1
2 TABLET ORAL EVERY 6 HOURS PRN
Status: DISCONTINUED | OUTPATIENT
Start: 2022-10-04 | End: 2022-10-04

## 2022-10-04 RX ORDER — ACETAMINOPHEN 650 MG/1
650 SUPPOSITORY RECTAL EVERY 6 HOURS PRN
Status: DISCONTINUED | OUTPATIENT
Start: 2022-10-04 | End: 2022-10-06 | Stop reason: HOSPADM

## 2022-10-04 RX ORDER — ACETAMINOPHEN 325 MG/1
650 TABLET ORAL EVERY 6 HOURS PRN
Status: DISCONTINUED | OUTPATIENT
Start: 2022-10-04 | End: 2022-10-06 | Stop reason: HOSPADM

## 2022-10-04 RX ORDER — ENOXAPARIN SODIUM 100 MG/ML
40 INJECTION SUBCUTANEOUS DAILY
Status: DISCONTINUED | OUTPATIENT
Start: 2022-10-04 | End: 2022-10-06 | Stop reason: HOSPADM

## 2022-10-04 RX ADMIN — IPRATROPIUM BROMIDE 0.5 MG: 0.5 SOLUTION RESPIRATORY (INHALATION) at 07:05

## 2022-10-04 RX ADMIN — ACETAMINOPHEN 650 MG: 325 TABLET, FILM COATED ORAL at 19:47

## 2022-10-04 RX ADMIN — IPRATROPIUM BROMIDE 0.5 MG: 0.5 SOLUTION RESPIRATORY (INHALATION) at 19:20

## 2022-10-04 RX ADMIN — BUDESONIDE AND FORMOTEROL FUMARATE DIHYDRATE 2 PUFF: 160; 4.5 AEROSOL RESPIRATORY (INHALATION) at 07:15

## 2022-10-04 RX ADMIN — IPRATROPIUM BROMIDE 0.5 MG: 0.5 SOLUTION RESPIRATORY (INHALATION) at 14:59

## 2022-10-04 RX ADMIN — OLANZAPINE 5 MG: 10 TABLET ORAL at 19:46

## 2022-10-04 RX ADMIN — HALOPERIDOL LACTATE 2 MG: 5 INJECTION, SOLUTION INTRAMUSCULAR at 16:05

## 2022-10-04 RX ADMIN — SODIUM CHLORIDE, PRESERVATIVE FREE 10 ML: 5 INJECTION INTRAVENOUS at 09:30

## 2022-10-04 RX ADMIN — TIZANIDINE 2 MG: 2 TABLET ORAL at 05:04

## 2022-10-04 RX ADMIN — IPRATROPIUM BROMIDE 0.5 MG: 0.5 SOLUTION RESPIRATORY (INHALATION) at 11:00

## 2022-10-04 RX ADMIN — DIAZEPAM 5 MG: 5 TABLET ORAL at 02:45

## 2022-10-04 RX ADMIN — BUDESONIDE AND FORMOTEROL FUMARATE DIHYDRATE 2 PUFF: 160; 4.5 AEROSOL RESPIRATORY (INHALATION) at 19:31

## 2022-10-04 RX ADMIN — ENOXAPARIN SODIUM 40 MG: 100 INJECTION SUBCUTANEOUS at 09:31

## 2022-10-04 RX ADMIN — SODIUM CHLORIDE, PRESERVATIVE FREE 10 ML: 5 INJECTION INTRAVENOUS at 19:47

## 2022-10-04 RX ADMIN — DIAZEPAM 5 MG: 5 TABLET ORAL at 11:18

## 2022-10-04 ASSESSMENT — PAIN DESCRIPTION - LOCATION
LOCATION: GENERALIZED
LOCATION: BACK

## 2022-10-04 ASSESSMENT — PAIN SCALES - GENERAL
PAINLEVEL_OUTOF10: 10
PAINLEVEL_OUTOF10: 10

## 2022-10-04 ASSESSMENT — PAIN DESCRIPTION - DESCRIPTORS
DESCRIPTORS: ACHING
DESCRIPTORS: ACHING

## 2022-10-04 ASSESSMENT — PAIN DESCRIPTION - ORIENTATION: ORIENTATION: MID

## 2022-10-04 NOTE — CARE COORDINATION
CASE MANAGEMENT NOTE:    Admission Date:  10/3/2022 Tarah Grayson is a 60 y.o.  male    Admitted for : Delirium [R41.0]    Met with:  Patient and Flores-spouse    PCP:  ELISEO Dennis CNP                                Insurance:  [address] [place] [address] notified of admission  [phone]     Is patient alert and oriented at time of discussion:  Yes    Current Residence/ Living Arrangements:  independently at home             Current Services PTA:  No    Does patient go to outpatient dialysis: No  If yes, location and chair time:   Who is their nephrologist?     Is patient agreeable to VNS: No    Freedom of choice provided:  NA    List of [address] provided: NA    VNS chosen:  NA    DME:  none    Home Oxygen: Yes 2L nasal cannula (out of [place], [place] unsure of supplier) does not feel he needs    Nebulizer: No    CPAP/BIPAP: No    Supplier: N/A    Potential Assistance Needed: No    SNF needed: NA    Freedom of choice and list provided: NA    Pharmacy:  [facility] in [facility]       Is patient currently receiving oral anticoagulation therapy? No    Is the Patient an [facility] with Readmission Risk Score greater than 14%? No  If yes, pt needs a follow up appointment made within 7 days.     Family Members/Caregivers that pt would like involved in their care:    Yes    If yes, list name here:  Anusha Powers    Transportation Provider:  Family             Discharge Plan:  10/4/2022 Denzelrafi Severino; independent at home; wife drives; DME Yes 2L nasal cannula (out of [place], [place] unsure of supplier) does not feel he needs (requested hospital bed, electric wc, ramp-advised to go to [place], refuses, but is working with PCP); VNS- denies due to [place]; will follow for needs//KR               Electronically signed by: Lorrie Khalil RN on 10/4/2022 at 2:04 PM

## 2022-10-04 NOTE — PROGRESS NOTES
10/04/22 1402   Encounter Summary   Encounter Overview/Reason  Attempted Encounter   Service Provided For: Patient not available  (patient with medical staff X2)

## 2022-10-04 NOTE — PROGRESS NOTES
Physical Therapy  Facility/Department: [address] [place] [address]  Physical Therapy Initial Assessment    Name: Helga King  : 1960  MRN: [id number]  Date of Service: 10/4/2022    Discharge Recommendations:  No therapy recommended at discharge          Patient Diagnosis(es): There were no encounter diagnoses. Past Medical History:  has a past medical history of Asthma, COPD (chronic obstructive pulmonary disease) ([facility] [address] and Emphysema of lung ([facility] [address] Past Surgical History:  has a past surgical history that includes Skin graft (Bilateral). Assessment   Assessment: Pt shows deficits in balance and strength both lower extremities and needs assistance for transfers and ambulation and will benefit from PT intervention. Treatment Diagnosis: impaired strength both lower extremities and balance  Specific Instructions for Next Treatment: ther exs ans ther activities as tolerated  Therapy Prognosis: Fair  Decision Making: Medium Complexity  Exam: ROM,MMT, sensations, and functional mobility testing  Clinical Presentation: Pt alert, cooperative and motivated. Barriers to Learning: delirium  Requires PT Follow-Up: Yes  Activity Tolerance  Activity Tolerance: Patient tolerated treatment well     Plan   Physcial Therapy Plan  General Plan: 2-3 times per week  Specific Instructions for Next Treatment: ther exs ans ther activities as tolerated  Current Treatment Recommendations: Strengthening, Balance training, Functional mobility training, Transfer training, Gait training  Safety Devices  Type of Devices:  All fall risk precautions in place, Call light within reach, Gait belt, Patient at risk for falls, Left in bed, Sitter present     Restrictions  Restrictions/Precautions  Restrictions/Precautions: Fall Risk, General Precautions  Required Braces or Orthoses?: No  Implants present? :  (denies)     Subjective   Pain: Pt reported 1/10 pain \"in his heart\"  General  Chart Reviewed: Yes  Patient assessed for rehabilitation services?: Yes  Response To Previous Treatment: Not applicable (PT eval done today)  Family / Caregiver Present: No  Referring Practitioner: Dr Ayah Her  Referral Date : 10/03/22  Diagnosis: Delirium  Follows Commands: Within Functional Limits  General Comment  Comments: Pt seen sitting up in bed         Social/Functional History  Social/Functional History  Lives With: Spouse  Type of Home: House  Home Layout: Two level, Able to Live on Main level with bedroom/bathroom  Home Access: Stairs to enter without rails  Entrance Stairs - Number of Steps: 1  Bathroom Shower/Tub: Tub/Shower unit, Curtain, Shower chair without back  Bathroom Toilet: Standard  Bathroom Equipment: Grab bars in shower  Bathroom Accessibility: Not accessible  Home Equipment: Oxygen (per pt 2 L O2 at all times; \"walking stick- well just a stick\")  Has the patient had two or more falls in the past year or any fall with injury in the past year?:  (One fall feeling lightheaded)  Receives Help From:  (per pt he is independent in ambulation, transfers, ADLs and housekeeping)  ADL Assistance: [address] [place] [address] Avenue: Independent  Homemaking Responsibilities: Yes  Ambulation Assistance: Independent (\"walking stick\" for outdoor use)  Transfer Assistance: Independent  Active : No  Patient's  Info: pt's wife  Mode of Transportation: [facility]  Occupation: Retired  Additional Comments: Pt reported that his wife stays upstairs and does not help with anything. Vision/Hearing  Vision  Vision: Impaired  Vision Exceptions: Wears glasses at all times  Hearing  Hearing: Exceptions to [facility]  Hearing Exceptions: Hard of hearing/hearing concerns (pt feels he has hearing issues)    Cognition   Orientation  Overall Orientation Status: Impaired  Orientation Level: Oriented to person;Disoriented to time;Oriented to place;Oriented to situation  Cognition  Overall Cognitive Status: Exceptions  Following Commands:  Follows one step commands with repetition  Attention Span: Attends with cues to redirect  Cognition Comment: Pt needs redirection     Objective   Heart Rate: 81  Heart Rate Source: Monitor  BP: 130/82  BP Location: Right upper arm  BP Method: Automatic  Patient Position: Semi fowlers  MAP (Calculated): 98  Resp: 16  SpO2: 98 %  O2 Device: None (Room air)              AROM RLE (degrees)  RLE AROM: WFL  AROM LLE (degrees)  LLE AROM : WFL  AROM RUE (degrees)  RUE AROM : WFL  AROM LUE (degrees)  LUE AROM : WFL  Strength RLE  Strength RLE: WFL (grossly 4/5)  Strength LLE  Strength LLE: WFL (grossly 4/5)  Strength RUE  Strength RUE:  (see OT eval for details)  Strength LUE  Strength LUE:  (see OT eval for details)     Sensation  Overall Sensation Status: WFL     Bed mobility  Rolling to Right: Modified independent  Supine to Sit: Modified independent  Sit to Supine: Modified independent  Scooting: Modified independent  Bed Mobility Comments: HOB elevated with use of hand rails. Transfers  Sit to Stand: Stand by assistance  Stand to Sit: Stand by assistance  Ambulation  Surface: Level tile  Device: Hand-Held Assist  Assistance: Contact guard assistance  Gait Deviations: Decreased step length;Decreased step height  Distance: 50  Stairs/Curb  Stairs?: No     Balance  Posture: Fair  Sitting - Static: Fair  Sitting - Dynamic: Fair  Standing - Static: Fair  Standing - Dynamic: Fair                                                             AM-PAC Score  AM-PAC Inpatient Mobility Raw Score : 20 (10/04/22 1459)  AM-PAC Inpatient T-Scale Score : 47.67 (10/04/22 1459)  Mobility Inpatient CMS 0-100% Score: 35.83 (10/04/22 1459)  Mobility Inpatient CMS G-Code Modifier : CJ (10/04/22 1459)                 Goals  Short Term Goals  Time Frame for Short Term Goals: 4 sessions  Short Term Goal 1: Improve strength in both LE to 5-/5  Short Term Goal 2:  Independent sit to stand  Short Term Goal 3: Improve sitting and standing balance to fair +  Long Term Goals  Time Frame for Long Term Goals : 6 sessions  Long Term Goal 1: Ambulate independently 150 ft without use of any assistive device  Patient Goals   Patient Goals : return home       Education  Patient Education  Education Given To: Patient  Education Provided: Role of Therapy;Plan of Care  Education Method: Verbal  Barriers to Learning: Cognition  Education Outcome: Verbalized understanding;Continued education needed      Therapy Time   Individual Concurrent Group Co-treatment   Time In 1042         Time Out 1103         Minutes Scottie Osorio 90, PT

## 2022-10-04 NOTE — PROGRESS NOTES
One on one BHI aid informed writer that the patient keeps repeating the phrase \"The trigger the trigger, Just pull the trigger. \"  April Mcdowell notified and one on one aid to be continued for am shift. Marleny Mccracken CNP notified also of above.

## 2022-10-04 NOTE — PROGRESS NOTES
Patient admitted per cart to room 2103 with ambulance personal.   Patient alert and oriented. VS taken and telemetry applied. Bed alarm set. Patient instructed to call nurse if has to get up.

## 2022-10-04 NOTE — PLAN OF CARE
Problem: Discharge Planning  Goal: Discharge to home or other facility with appropriate resources  Outcome: Progressing     Problem: Safety - Adult  Goal: Free from fall injury  Outcome: Progressing  Note: Patient alert and oriented but inappropriate at times. Bed alarm on. One to one sitter at bedside. Problem: ABCDS Injury Assessment  Goal: Absence of physical injury  Outcome: Progressing     Problem: Skin/Tissue Integrity  Goal: Absence of new skin breakdown  Description: 1. Monitor for areas of redness and/or skin breakdown  2. Assess vascular access sites hourly  3. Every 4-6 hours minimum:  Change oxygen saturation probe site  4. Every 4-6 hours:  If on nasal continuous positive airway pressure, respiratory therapy assess nares and determine need for appliance change or resting period. Outcome: Progressing  Note: Coccyx slightly reddened with intact skin. Able to turn and reposition self. Pt very emaciated. Problem: Respiratory - Adult  Goal: Achieves optimal ventilation and oxygenation  Outcome: Progressing  Note: Patient lung sounds clear but diminished. No cough or shortness of breath noted.

## 2022-10-04 NOTE — PROGRESS NOTES
Psych nurse in charge notified of psych consult through [facility] and read message.

## 2022-10-04 NOTE — PROGRESS NOTES
Pt admits that pt has 2 personalities. \"Obed and Claire Calvin. Russell Santana is the crazy one and Claire Calvin is the nice one. \" \"I was saved yesterday and my good friend had me trust fall. \"

## 2022-10-04 NOTE — CONSULTS
Department of Psychiatry   Psychiatric Assessment      Thank you very much for allowing us to participate in the care of this patient. Reason for Consult: Steroid induced psychosis    HISTORY OF PRESENT ILLNESS:      Patient is a 28-year-old male with no significant psychiatric history admitted from [facility] emergency department for worsening delirium and altered mentation. The patient is exhibiting significant thought disorganization and flight of ideas. Was very tangential during the conversation and was unable to hold any linear conversation. Patient was visibly angry and irritable when approached. I had a extensive conversation with his wife and son who were present with him. Wife mentions that he has no mental health issues however around 3 to 4 weeks ago he broke his back when he coughed heart. Reports that he has 3 broken vertebrae. He was prescribed tramadol in early September and he had a prescription filled for 120 pills of tramadol on 9/9/2022. He also had a prescription for Valium that was filled for 20 pills of 5 mg Valium on 9/29/2022. His wife and son mentioned that he also got multiple doses of prednisone for her COPD exacerbation last week. Mentions that this is an acute change in his mentation and patient has not been sleeping for at least 4 days now. Reports that he is making statements that there is another person living in him named Aleja Jefferson and Aleja Jefferson will kill himself. Family is concerned about his safety. Family mentioned that they took him to the emergency department at least 4 times and he did receive multiple doses of Geodon and Ativan at that time. PSYCHIATRIC HISTORY:  Family reports the patient has no psychiatric history. Denies ever trying any other psychotropic medications in the past or any inpatient hospitalizations.   No previous suicide attempts    Lifetime Psychiatric Review of Systems per family       Obsessions and Compulsions: Denies       June or Hypomania: Denies     Hallucinations: Denies     Panic Attacks:  Denies     Delusions:  Denies     Phobias:  Denies     Trauma: Denies    Prior to Admission medications    Medication Sig Start Date End Date Taking? Authorizing Provider   albuterol sulfate HFA (VENTOLIN HFA) 108 (90 Base) MCG/ACT inhaler Inhale 2 puffs into the lungs every 4 hours as needed for Wheezing   Yes Historical Provider, MD   diazePAM (VALIUM) 5 MG/ML solution Take 5 mg by mouth every 8 hours as needed (anxiety or sleep). Yes Historical Provider, MD   docusate sodium (COLACE) 100 MG capsule Take 100 mg by mouth 3 times daily as needed for Constipation   Yes Historical Provider, MD   ipratropium (ATROVENT) 0.02 % nebulizer solution Take 0.5 mg by nebulization 4 times daily   Yes Historical Provider, MD   lactobacillus acidophilus [facility]) Take 4 tablets by mouth 3 times daily   Yes Historical Provider, MD   Acidophilus Lactobacillus CAPS Take 1 capsule by mouth daily   Yes Historical Provider, MD   tiZANidine (ZANAFLEX) 2 MG tablet Take 2 mg by mouth every 6 hours as needed 9/2/22   Historical Provider, MD   traMADol (ULTRAM) 50 MG tablet Take 50 mg by mouth every 6 hours as needed.  9/7/22 10/7/22  Historical Provider, MD   acetaminophen (TYLENOL) 500 MG tablet Take 500 mg by mouth every 6 hours as needed for Pain    Historical Provider, MD   ibuprofen (ADVIL;MOTRIN) 200 MG tablet Take 200 mg by mouth every 6 hours as needed for Pain Pt taking 2 pills prn    Historical Provider, MD   mometasone-formoterol (DULERA) 200-5 MCG/ACT inhaler Inhale 2 puffs into the lungs 2 times daily 6/1/22   Sierra Choi MD   albuterol (PROVENTIL) (2.5 MG/3ML) 0.083% nebulizer solution Take 3 mLs by nebulization every 6 hours as needed for Wheezing  Patient not taking: No sig reported 8/25/21   Sierra Choi MD   Multiple Vitamins-Minerals (MULTIVITAMIN ADULT PO) Take 1 tablet by mouth daily    Historical Provider, MD        Medications:    Current Facility-Administered Medications: sodium chloride flush 0.9 % injection 5-40 mL, 5-40 mL, IntraVENous, 2 times per day  sodium chloride flush 0.9 % injection 10 mL, 10 mL, IntraVENous, PRN  0.9 % sodium chloride infusion, , IntraVENous, PRN  enoxaparin (LOVENOX) injection 40 mg, 40 mg, SubCUTAneous, Daily  ondansetron (ZOFRAN-ODT) disintegrating tablet 4 mg, 4 mg, Oral, Q8H PRN **OR** ondansetron (ZOFRAN) injection 4 mg, 4 mg, IntraVENous, Q6H PRN  magnesium hydroxide (MILK OF MAGNESIA) 400 MG/5ML suspension 30 mL, 30 mL, Oral, Daily PRN  acetaminophen (TYLENOL) tablet 650 mg, 650 mg, Oral, Q6H PRN **OR** acetaminophen (TYLENOL) suppository 650 mg, 650 mg, Rectal, Q6H PRN  albuterol sulfate HFA (PROVENTIL;VENTOLIN;PROAIR) 108 (90 Base) MCG/ACT inhaler 2 puff, 2 puff, Inhalation, Q4H PRN  docusate sodium (COLACE) capsule 100 mg, 100 mg, Oral, TID PRN  ipratropium (ATROVENT) 0.02 % nebulizer solution 0.5 mg, 0.5 mg, Nebulization, 4x Daily  budesonide-formoterol (SYMBICORT) 160-4.5 MCG/ACT inhaler 2 puff, 2 puff, Inhalation, BID  OLANZapine (ZYPREXA) tablet 5 mg, 5 mg, Oral, BID  haloperidol lactate (HALDOL) injection 2 mg, 2 mg, IntraMUSCular, TID PRN     Past Medical History:        Diagnosis Date    Asthma     COPD (chronic obstructive pulmonary disease) (HCC)     Emphysema of lung (HCC)        Past Surgical History:        Procedure Laterality Date    SKIN GRAFT Bilateral     lower extremities       Allergies: Morphine and Propoxyphene      Social History:    Family reports that he was born and raised in [facility] [facility] [place] [facility] [place] area. They currently live in [place]. He has 2 children. Patient worked in [employer] and is a . He had a leg injury from asbestos working in [employer]. Family is very supportive    SUBSTANCE USE HISTORY: Family denies any substance use issues with him.     Family Medical and Psychiatric History:     No significant psychiatric or substance use history in family        Physical  /82   Pulse

## 2022-10-04 NOTE — PROGRESS NOTES
Adelina Garcia is a 60 y.o. Unavailable / unknown male who presents with No chief complaint on file. and is directly admitted from [address] [place] [address] ED for the management of Delirium. According to physician from [facility] ED, patient was to the ED 4 times last week for altered mental status, racing thoughts and behavior which is out of character for him. Patient was recently treated with oral steroids for COPD/asthma exacerbation and pneumonia. 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 While in ED, patient was treated with Geodon and Ativan, in addition to his home dose of Valium. Patient is being directly admitted to this facility for psychiatric evaluation while being treated for delirium. Symptoms are associated with confusion. Patient only oriented to person and place at this time. Denies fever, chills,  cough, abdominal pain, nausea, vomiting, diarrhea, and urinary symptoms. There are no aggravating or alleviating factors. Symptoms are reported as constant moderate to severe; slowly improving with time. Antipsychotic medications. Patient status inpatient in the Progressive Unit/Step down      2. Disposition 3 days      Consultations:   IP CONSULT TO SOCIAL WORK  IP CONSULT TO PSYCHIATRY    Patient is admitted as inpatient status because of co-morbidities listed above, severity of signs and symptoms as outlined, requirement for current medical therapies and most importantly because of direct risk to patient if care not provided in a hospital setting. Expected length of stay > 48 hours.     ELISEO Mckeon CNP  10/4/2022  5:43 AM

## 2022-10-04 NOTE — H&P
History and Physical Service  [facility] Internal Medicine    HISTORY AND PHYSICAL EXAMINATION            Date:   10/4/2022  Patient name:  Nancy Orozco  MRN:   [id number]  Account:  [de-identified]  YOB: 1960  PCP:    Edil Rodriguez NP, APRN - CNP  Code Status:    Full Code    Chief Complaint:     No chief complaint on file. History Obtained From:     Patient, EMR, nursing staff    HPI     This patient is a 60 y.o. 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 A 10 point review of systems was performed and and negative except as mentioned in HPI  Positive and Negative as described in HPI. Past Medical History:     Past Medical History:   Diagnosis Date    Asthma     COPD (chronic obstructive pulmonary disease) ([facility] [address]     Emphysema of lung ([facility] [address]         Past Surgical History:     Past Surgical History:   Procedure Laterality Date    SKIN GRAFT Bilateral     lower extremities        Medications Prior to Admission:     Prior to Admission medications    Medication Sig Start Date End Date Taking? Authorizing Provider   albuterol sulfate HFA (VENTOLIN HFA) 108 (90 Base) MCG/ACT inhaler Inhale 2 puffs into the lungs every 4 hours as needed for Wheezing   Yes Historical Provider, MD   diazePAM (VALIUM) 5 MG/ML solution Take 5 mg by mouth every 8 hours as needed (anxiety or sleep).    Yes Historical Provider, MD   docusate sodium (COLACE) 100 MG capsule Take 100 mg by mouth 3 times daily as needed for Constipation   Yes Historical Provider, MD   ipratropium (ATROVENT) 0.02 % nebulizer solution Take 0.5 mg by nebulization 4 times daily   Yes Historical Provider, MD   lactobacillus acidophilus [facility]) Take 4 tablets by mouth 3 times daily   Yes Historical Provider, MD   Acidophilus Lactobacillus CAPS Take 1 capsule by mouth daily   Yes Historical Provider, MD   tiZANidine (ZANAFLEX) 2 MG tablet Take 2 mg by mouth every 6 hours as needed 9/2/22   Historical Provider, MD   traMADol (ULTRAM) 50 MG tablet Take 50 mg by mouth every 6 hours as needed. 9/7/22 10/7/22  Historical Provider, MD   acetaminophen (TYLENOL) 500 MG tablet Take 500 mg by mouth every 6 hours as needed for Pain    Historical Provider, MD   ibuprofen (ADVIL;MOTRIN) 200 MG tablet Take 200 mg by mouth every 6 hours as needed for Pain Pt taking 2 pills prn    Historical Provider, MD   mometasone-formoterol (DULERA) 200-5 MCG/ACT inhaler Inhale 2 puffs into the lungs 2 times daily 6/1/22   Lex Camp MD   albuterol (PROVENTIL) (2.5 MG/3ML) 0.083% nebulizer solution Take 3 mLs by nebulization every 6 hours as needed for Wheezing  Patient not taking: No sig reported 8/25/21   Lex Camp MD   Multiple Vitamins-Minerals (MULTIVITAMIN ADULT PO) Take 1 tablet by mouth daily    Historical Provider, MD        Allergies:     Morphine and Propoxyphene    Social History:     Tobacco:    reports that he has been smoking cigarettes. He has a 45.00 pack-year smoking history. He has never used smokeless tobacco.  Alcohol:      reports current alcohol use. Drug Use:  reports no history of drug use. Family History:     History reviewed. No pertinent family history. Physical Exam:   BP (!) 136/99   Pulse 81   Temp 97.5 °F (36.4 °C) (Oral)   Resp 16   Wt 119 lb 0.8 oz (54 kg)   SpO2 97%   BMI 16.15 kg/m²   No results for input(s): POCGLU in the last 72 hours.     General Appearance:  alert, well appearing, and in no acute distress  Mental status: oriented to person, place, and time with normal affect  Head:  normocephalic, atraumatic. Eye: no icterus, redness, pupils equal and reactive, extraocular eye movements intact, conjunctiva clear  Ear: normal external ear, no discharge, hearing intact  Nose:  no drainage noted  Mouth: mucous membranes moist  Neck: supple, no carotid bruits, thyroid not palpable  Lungs: Bilateral equal air entry, clear to ausculation, no wheezing, rales or rhonchi, normal effort  Cardiovascular: normal rate, regular rhythm, no murmur, gallop, rub.   Abdomen: Soft, nontender, nondistended, normal bowel sounds, no hepatomegaly or splenomegaly  Neurologic: There are no new focal motor or sensory deficits, normal muscle tone and bulk, no abnormal sensation, normal speech, cranial nerves II through XII grossly intact  Skin: No gross lesions, rashes, bruising or bleeding on exposed skin area  Extremities:  peripheral pulses palpable, no pedal edema or calf pain with palpation  Psych: Appears delusional, still has racing thoughts    Investigations:      Laboratory Testing:  Recent Results (from the past 24 hour(s))   EKG 12 Lead    Collection Time: 10/03/22 11:51 PM   Result Value Ref Range    Ventricular Rate 67 BPM    Atrial Rate 67 BPM    P-R Interval 136 ms    QRS Duration 88 ms    Q-T Interval 396 ms    QTc Calculation (Bazett) 418 ms    P Axis 60 degrees    R Axis 98 degrees    T Axis 90 degrees   Troponin    Collection Time: 10/04/22  1:34 AM   Result Value Ref Range    Troponin, High Sensitivity 14 0 - 22 ng/L   Basic Metabolic Panel w/ Reflex to MG    Collection Time: 10/04/22  5:27 AM   Result Value Ref Range    Glucose 86 70 - 99 mg/dL    BUN 12 8 - 23 mg/dL    Creatinine 0.52 (L) 0.70 - 1.20 mg/dL    Calcium 8.7 8.6 - 10.4 mg/dL    Sodium 140 135 - 144 mmol/L    Potassium 4.1 3.7 - 5.3 mmol/L    Chloride 104 98 - 107 mmol/L    CO2 29 20 - 31 mmol/L    Anion Gap 7 (L) 9 - 17 mmol/L    Est, Glom Filt Rate >60 >60 mL/min/1.73m2       Recent Labs     10/04/22  0527      K 4.1      CO2 29   BUN 12   CREATININE 0.52*   GLUCOSE 86       Hematology:No results for input(s): WBC, RBC, HGB, HCT, MCV, MCH, MCHC, RDW, PLT, MPV, SEDRATE, CRP, INR, DDIMER, [id number], LABABSO in the last 72 hours. Invalid input(s): PT  Chemistry:  Recent Labs     10/04/22  0527      K 4.1      CO2 29   GLUCOSE 86   BUN 12   CREATININE 0.52*   ANIONGAP 7*   LABGLOM >60   CALCIUM 8.7     No results for input(s): PROT, LABALBU, LABA1C, W7DOWKC, D5PLPLO, FT4, TSH, AST, ALT, LDH, GGT, ALKPHOS, LABGGT, BILITOT, BILIDIR, AMMONIA, AMYLASE, LIPASE, LACTATE, CHOL, HDL, LDLCHOLESTEROL, CHOLHDLRATIO, TRIG, VLDL, IAK11FU, PHENYTOIN, PHENYF, URICACID, POCGLU in the last 72 hours. Imaging/Diagnostics:       No results found.      Current Facility-Administered Medications   Medication Dose Route Frequency Provider Last Rate Last Admin    sodium chloride flush 0.9 % injection 5-40 mL  5-40 mL IntraVENous 2 times per day ELISEO Glez - CNP   10 mL at 10/04/22 0930    sodium chloride flush 0.9 % injection 10 mL  10 mL IntraVENous PRN Faisal Smith APRN - CNP        0.9 % sodium chloride infusion   IntraVENous PRN Faisal Smith, APRN - CNP        enoxaparin (LOVENOX) injection 40 mg  40 mg SubCUTAneous Daily Faisal Smith APRN - CNP   40 mg at 10/04/22 0931    ondansetron (ZOFRAN-ODT) disintegrating tablet 4 mg  4 mg Oral Q8H PRN ELISEO Glez - ARMINDA        Or    ondansetron [facility]) injection 4 mg  4 mg IntraVENous Q6H PRN Faisal Smith, APRN - CNP        magnesium hydroxide (MILK OF MAGNESIA) 400 MG/5ML suspension 30 mL  30 mL Oral Daily PRN Faisal Smith APRN - CNP        acetaminophen (TYLENOL) tablet 650 mg  650 mg Oral Q6H PRN ELISEO Glez - CNP        Or    acetaminophen (TYLENOL) suppository 650 mg  650 mg Rectal Q6H PRN ELISEO Glez - CNP        albuterol sulfate HFA (PROVENTIL;VENTOLIN;PROAIR) 108 (90 Base) MCG/ACT inhaler 2 puff  2 puff Inhalation Q4H PRN Jones Eder, APRN - CNP        diazePAM (VALIUM) tablet 5 mg  5 mg Oral Q8H PRN Jones Eder, APRN - CNP   5 mg at 10/04/22 1118    docusate sodium (COLACE) capsule 100 mg  100 mg Oral TID PRN Jones Eder, APRN - CNP        ipratropium (ATROVENT) 0.02 % nebulizer solution 0.5 mg  0.5 mg Nebulization 4x Daily Jones Eder, APRN - CNP   0.5 mg at 10/04/22 1100    budesonide-formoterol (SYMBICORT) 160-4.5 MCG/ACT inhaler 2 puff  2 puff Inhalation BID Jones Eder, APRN - CNP   2 puff at 10/04/22 0715    tiZANidine (ZANAFLEX) tablet 2 mg  2 mg Oral Q6H PRN Jones Eder, APRN - CNP   2 mg at 10/04/22 0018       Impressions :     1. Principal Problem:    Delirium  Resolved Problems:    * No resolved hospital problems. *        2.  has a past medical history of Asthma, COPD (chronic obstructive pulmonary disease) ([facility] [address] and Emphysema of lung ([facility] [address]      Plans:     77-year-old male admitted for acute psychosis possibly related to recent steroid use psychiatry consulted  Not in COPD exacerbation currently-we will continue home inhaler  Awaiting psych review,  UDS positive only for benzodiazepines at outside hospital, MRI brain unremarkable on 9/30 at outside hospital Per Care Everywhere  Will also check urinalysis    DVT pplx-Lovenox      Philip Espinosa MD  10/4/2022  1:57 PM

## 2022-10-04 NOTE — PROGRESS NOTES
[address]    Occupational Therapy Evaluation  Date: 10/4/22  Patient Name: Natalia Toledo       Room: 1405/8683-00  MRN: [id number]  Account: [de-identified]   : 1960  (60 y.o.) Gender: male     Discharge Recommendations: The patient's needs are being met with no further Occupational Therapy recommended at discharge. Referring Practitioner: ELISEO Glez CNP  Diagnosis: Delirium Additional Pertinent Hx: According to physician from [facility] ED, patient was to the ED 4 times last week for altered mental status, racing thoughts and behavior which is out of character for him. Patient was recently treated with oral steroids for COPD/asthma exacerbation and pneumonia. 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 While in ED, patient was treated with Geodon and Ativan, in addition to his home dose of Valium. Patient is being directly admitted to this facility for psychiatric evaluation while being treated for delirium. Symptoms are associated with confusion. Patient only oriented to person and place at this time. Past Medical History:  has a past medical history of Asthma, COPD (chronic obstructive pulmonary disease) ([address] and Emphysema of lung ([facility] [address] Past Surgical History:   has a past surgical history that includes Skin graft (Bilateral). Restrictions  Restrictions/Precautions  Restrictions/Precautions: Fall Risk;General Precautions  Required Braces or Orthoses?: No  Implants present? :  (denies)      Vitals  Vitals  Heart Rate: 81  Heart Rate Source: Monitor  BP: (!) 136/99  BP Location: Right upper arm  BP Method: Automatic  Patient Position: Semi fowlers  MAP (Calculated): 111.33  Resp: 16  SpO2: 97 %  O2 Device: None (Room air)     Subjective  Subjective: \"When I'm Chris, I noticed that my oxygen seems to be trending.  So I self medicated, and I have beers and cigarettes every day\"  Comments: Okay for OT PT eval and treat per RN. Subjective  Pain: Pt reported 1/10 pain \"in his heart\"      Social/Functional History  Social/Functional History  Lives With: Spouse  Type of Home: House  Home Layout: Two level, Able to Live on Main level with bedroom/bathroom  Home Access: Stairs to enter without rails  Entrance Stairs - Number of Steps: 1  Bathroom Shower/Tub: Tub/Shower unit, Curtain, Shower chair without back  Bathroom Toilet: Standard  Bathroom Equipment: Grab bars in shower  Bathroom Accessibility: Not accessible  Home Equipment: Oxygen (per pt 2 L O2 at all times; \"walking stick- well just a stick\")  Has the patient had two or more falls in the past year or any fall with injury in the past year?:  (One fall feeling lightheaded)  Receives Help From:  (per pt he is independent in ambulation, transfers, ADLs and housekeeping)  ADL Assistance: [address] [place] [address] Avenue: Independent  Homemaking Responsibilities: Yes  Ambulation Assistance: Independent (\"walking stick\" for outdoor use)  Transfer Assistance: Independent  Active : No  Patient's  Info: pt's wife  Mode of Transportation: [facility]  Occupation: Retired  Additional Comments: Pt reported that his wife stays upstairs and does not help with anything. Objective  Cognition  Orientation  Overall Orientation Status: Impaired  Orientation Level: Oriented to place, Oriented to situation, Oriented to person, Disoriented to time (\"I'm a crazy man\")  Cognition  Overall Cognitive Status: Exceptions  Following Commands:  Follows multistep commands with repitition  Attention Span: Attends with cues to redirect  Cognition Comment: Pt pleasant and noted to have tangential speech but easily redirectable   Sensation  Overall Sensation Status: WFL (denied)    Activities of Daily Living  ADL  Feeding: Modified independent   Grooming: Modified independent   UE Bathing: Supervision  LE Bathing: Supervision  UE Dressing: Modified independent   LE Dressing: Modified independent   Toileting: Supervision  Additional Comments: ADL scores based on skilled observation and clinical reasoning, unless otherwise noted. Pt donned B socks, bringing BLEs up to trunk. UE Function  LUE AROM (degrees)  LUE AROM : WFL  Left Hand AROM (degrees)  Left Hand AROM: WFL  Tone LUE  LUE Tone: Normotonic  LUE Strength  Gross LUE Strength: WFL  L Hand General: 4+/5    RUE AROM (degrees)  RUE AROM : WFL  Right Hand AROM (degrees)  Right Hand AROM: WFL  Tone RUE  RUE Tone: Normotonic  RUE Strength  Gross RUE Strength: WFL  R Hand General: 4+/5         Fine Motor Skills/Coordination  Coordination  Movements Are Fluid And Coordinated: Yes                Mobility  Bed Mobility  Bed mobility  Rolling to Right: Modified independent  Supine to Sit: Modified independent  Sit to Supine: Modified independent  Scooting: Modified independent  Bed Mobility Comments: HOB elevated with use of hand rails. Balance  Balance  Sitting Balance: Independent  Standing Balance: Contact guard assistance  Standing Balance  Time: 2-3 minutes  Activity: functional transfers, functional mobility  Comment: no device, no LOB noted    Transfers  Transfers  Sit to stand: Stand by assistance  Stand to sit: Stand by assistance    Functional Mobility  Functional - Mobility Device: No device  Activity:  (Throughout hallway)  Assist Level: Contact guard assistance  Functional Mobility Comments: Pt demonstrated ability to complete household distances- no LOB noted. Requested to return back to room. Assessment  Assessment  Assessment: Pt is independent with self care and demonstrated safe completion of functional mobility. PT to further address mobility.  Discontinue occupational therapy as no skilled services are identified  Prognosis: Good  Decision Making: Low Complexity  Discharge Recommendations: 24 hour supervision or assist    Activity Tolerance  Activity Tolerance: Patient Tolerated treatment well    Safety Devices  Type of Devices:  All fall risk precautions in place, Call light within reach, Gait belt, Patient at risk for falls, Left in bed, Sitter present    Patient Education  Patient Education  Education Given To: Patient  Education Provided: Role of Therapy, Plan of Care  Barriers to Learning: None  Education Outcome: Verbalized understanding, Continued education needed      Functional Outcome Measures  AM-PAC Daily Activity Inpatient   How much help for putting on and taking off regular lower body clothing?: None  How much help for Bathing?: A Little  How much help for Toileting?: A Little  How much help for putting on and taking off regular upper body clothing?: None  How much help for taking care of personal grooming?: None  How much help for eating meals?: None  AM-[facility] Inpatient Daily Activity Raw Score: 22  AM-PAC Inpatient ADL T-Scale Score : 47.1  ADL Inpatient CMS 0-100% Score: 25.8  ADL Inpatient CMS G-Code Modifier : CJ       Goals     Short Term Goals  Time Frame for Short Term Goals: D/C OT    Plan         OT Individual Minutes  OT Individual Minutes  Time In: 1043  Time Out: 1102  Minutes: 19           Electronically signed by John Nicole OT on 10/4/22 at 1:02 PM EDT

## 2022-10-04 NOTE — PROGRESS NOTES
Pt pink slipped by Dr Chu Bird, pt is threatening to leave. Pt rips off telemetry and refuses it to be reapplied. Pt still having flight of ideas. Haldol prn given.

## 2022-10-04 NOTE — PROGRESS NOTES
Comprehensive Nutrition Assessment    Type and Reason for Visit:  Initial, Positive Nutrition Screen (wt loss, poor appetite)    Nutrition Recommendations/Plan:   Will provide Regular diet with Ensure Enlive all trays     Malnutrition Assessment:  Malnutrition Status: At risk for malnutrition (Comment) (10/04/22 7102)    Context:  Acute Illness     Findings of the 6 clinical characteristics of malnutrition:  Energy Intake:  50% or less of estimated energy requirements for 5 or more days  Weight Loss:  No significant weight loss     Body Fat Loss:  Unable to assess     Muscle Mass Loss:  Unable to assess    Fluid Accumulation:  No significant fluid accumulation     Strength:  Not Performed    Nutrition Assessment:    Pt was admitted due to dlirium. Review of wt history suggests pt is at his approximate usual body wt. Pt is now advanced to regular diet. Nutrition Related Findings:    no edema, labs/meds: reviewed, PMH: COPD Wound Type: None       Current Nutrition Intake & Therapies:    Average Meal Intake: Unable to assess     ADULT DIET; Regular    Anthropometric Measures:  Height: 6' (182.9 cm)  Ideal Body Weight (IBW): 178 lbs (81 kg)    Admission Body Weight: 119 lb (54 kg)  Current Body Weight: 119 lb (54 kg), 66.9 % IBW.  Weight Source: Bed Scale  Current BMI (kg/m2): 16.1  Usual Body Weight: 117 lb (53.1 kg) (2/2021)  % Weight Change (Calculated): 1.7                    BMI Categories: Underweight (BMI less than 18.5)    Estimated Daily Nutrient Needs:  Energy Requirements Based On: Formula  Weight Used for Energy Requirements: Current  Energy (kcal/day): Leslie x 1.3= 1800 kcal  Weight Used for Protein Requirements: Current  Protein (g/day): 1.5g/kg= 80 g       Nutrition Diagnosis:   Predicted inadequate energy intake related to psychological cause or life stress as evidenced by poor intake prior to admission    Nutrition Interventions:   Food and/or Nutrient Delivery: Continue Current Diet, Start Oral Nutrition Supplement  Nutrition Education/Counseling: No recommendation at this time  Coordination of Nutrition Care: Continue to monitor while inpatient       Goals:     Goals: PO intake 50% or greater       Nutrition Monitoring and Evaluation:      Food/Nutrient Intake Outcomes: Food and Nutrient Intake, Supplement Intake  Physical Signs/Symptoms Outcomes: Biochemical Data, GI Status, Fluid Status or Edema, Skin, Weight    Discharge Planning:    Continue current diet     Carlos Ramirez, [address] [place],   Contact: [phone]

## 2022-10-05 LAB
ANION GAP SERPL CALCULATED.3IONS-SCNC: 9 MMOL/L (ref 9–17)
BILIRUBIN URINE: NEGATIVE
BUN BLDV-MCNC: 7 MG/DL (ref 8–23)
CALCIUM SERPL-MCNC: 9.1 MG/DL (ref 8.6–10.4)
CHLORIDE BLD-SCNC: 102 MMOL/L (ref 98–107)
CO2: 29 MMOL/L (ref 20–31)
COLOR: YELLOW
COMMENT UA: NORMAL
CREAT SERPL-MCNC: 0.74 MG/DL (ref 0.7–1.2)
GFR SERPL CREATININE-BSD FRML MDRD: >60 ML/MIN/1.73M2
GLUCOSE BLD-MCNC: 136 MG/DL (ref 70–99)
GLUCOSE URINE: NEGATIVE
HCT VFR BLD CALC: 40.6 % (ref 41–53)
HEMOGLOBIN: 13.4 G/DL (ref 13.5–17.5)
KETONES, URINE: NEGATIVE
LEUKOCYTE ESTERASE, URINE: NEGATIVE
MCH RBC QN AUTO: 32.7 PG (ref 26–34)
MCHC RBC AUTO-ENTMCNC: 33.1 G/DL (ref 31–37)
MCV RBC AUTO: 98.8 FL (ref 80–100)
NITRITE, URINE: NEGATIVE
PDW BLD-RTO: 13.5 % (ref 11.5–14.9)
PH UA: 7 (ref 5–8)
PLATELET # BLD: 386 K/UL (ref 150–450)
PMV BLD AUTO: 6.8 FL (ref 6–12)
POTASSIUM SERPL-SCNC: 4.2 MMOL/L (ref 3.7–5.3)
PROTEIN UA: NEGATIVE
RBC # BLD: 4.11 M/UL (ref 4.5–5.9)
SODIUM BLD-SCNC: 140 MMOL/L (ref 135–144)
SPECIFIC GRAVITY UA: 1.01 (ref 1–1.03)
TURBIDITY: CLEAR
URINE HGB: NEGATIVE
UROBILINOGEN, URINE: NORMAL
WBC # BLD: 27 K/UL (ref 3.5–11)

## 2022-10-05 PROCEDURE — 36415 COLL VENOUS BLD VENIPUNCTURE: CPT

## 2022-10-05 PROCEDURE — 80048 BASIC METABOLIC PNL TOTAL CA: CPT

## 2022-10-05 PROCEDURE — 2580000003 HC RX 258: Performed by: NURSE PRACTITIONER

## 2022-10-05 PROCEDURE — 85027 COMPLETE CBC AUTOMATED: CPT

## 2022-10-05 PROCEDURE — 6360000002 HC RX W HCPCS: Performed by: NURSE PRACTITIONER

## 2022-10-05 PROCEDURE — 81003 URINALYSIS AUTO W/O SCOPE: CPT

## 2022-10-05 PROCEDURE — 94761 N-INVAS EAR/PLS OXIMETRY MLT: CPT

## 2022-10-05 PROCEDURE — 97116 GAIT TRAINING THERAPY: CPT

## 2022-10-05 PROCEDURE — 99232 SBSQ HOSP IP/OBS MODERATE 35: CPT | Performed by: PSYCHIATRY & NEUROLOGY

## 2022-10-05 PROCEDURE — 6370000000 HC RX 637 (ALT 250 FOR IP): Performed by: NURSE PRACTITIONER

## 2022-10-05 PROCEDURE — 6370000000 HC RX 637 (ALT 250 FOR IP): Performed by: PSYCHIATRY & NEUROLOGY

## 2022-10-05 PROCEDURE — 94640 AIRWAY INHALATION TREATMENT: CPT

## 2022-10-05 PROCEDURE — 2060000000 HC ICU INTERMEDIATE R&B

## 2022-10-05 PROCEDURE — 97530 THERAPEUTIC ACTIVITIES: CPT

## 2022-10-05 PROCEDURE — 99255 IP/OBS CONSLTJ NEW/EST HI 80: CPT | Performed by: INTERNAL MEDICINE

## 2022-10-05 PROCEDURE — 6360000002 HC RX W HCPCS: Performed by: PSYCHIATRY & NEUROLOGY

## 2022-10-05 PROCEDURE — 99233 SBSQ HOSP IP/OBS HIGH 50: CPT | Performed by: INTERNAL MEDICINE

## 2022-10-05 PROCEDURE — 6370000000 HC RX 637 (ALT 250 FOR IP): Performed by: INTERNAL MEDICINE

## 2022-10-05 RX ORDER — LIDOCAINE 4 G/G
1 PATCH TOPICAL DAILY
Status: DISCONTINUED | OUTPATIENT
Start: 2022-10-05 | End: 2022-10-06 | Stop reason: HOSPADM

## 2022-10-05 RX ADMIN — DOCUSATE SODIUM 100 MG: 100 CAPSULE, LIQUID FILLED ORAL at 21:39

## 2022-10-05 RX ADMIN — IPRATROPIUM BROMIDE 0.5 MG: 0.5 SOLUTION RESPIRATORY (INHALATION) at 21:14

## 2022-10-05 RX ADMIN — IPRATROPIUM BROMIDE 0.5 MG: 0.5 SOLUTION RESPIRATORY (INHALATION) at 07:59

## 2022-10-05 RX ADMIN — SODIUM CHLORIDE, PRESERVATIVE FREE 10 ML: 5 INJECTION INTRAVENOUS at 08:56

## 2022-10-05 RX ADMIN — IPRATROPIUM BROMIDE 0.5 MG: 0.5 SOLUTION RESPIRATORY (INHALATION) at 15:37

## 2022-10-05 RX ADMIN — SODIUM CHLORIDE, PRESERVATIVE FREE 10 ML: 5 INJECTION INTRAVENOUS at 21:35

## 2022-10-05 RX ADMIN — HALOPERIDOL LACTATE 2 MG: 5 INJECTION, SOLUTION INTRAMUSCULAR at 23:22

## 2022-10-05 RX ADMIN — BUDESONIDE AND FORMOTEROL FUMARATE DIHYDRATE 2 PUFF: 160; 4.5 AEROSOL RESPIRATORY (INHALATION) at 21:14

## 2022-10-05 RX ADMIN — OLANZAPINE 5 MG: 10 TABLET ORAL at 08:56

## 2022-10-05 RX ADMIN — ACETAMINOPHEN 650 MG: 325 TABLET, FILM COATED ORAL at 14:10

## 2022-10-05 RX ADMIN — OLANZAPINE 5 MG: 10 TABLET ORAL at 21:39

## 2022-10-05 RX ADMIN — BUDESONIDE AND FORMOTEROL FUMARATE DIHYDRATE 2 PUFF: 160; 4.5 AEROSOL RESPIRATORY (INHALATION) at 07:59

## 2022-10-05 RX ADMIN — IPRATROPIUM BROMIDE 0.5 MG: 0.5 SOLUTION RESPIRATORY (INHALATION) at 11:28

## 2022-10-05 RX ADMIN — ACETAMINOPHEN 650 MG: 325 TABLET, FILM COATED ORAL at 21:39

## 2022-10-05 RX ADMIN — ENOXAPARIN SODIUM 40 MG: 100 INJECTION SUBCUTANEOUS at 08:55

## 2022-10-05 ASSESSMENT — PAIN DESCRIPTION - DESCRIPTORS: DESCRIPTORS: ACHING

## 2022-10-05 ASSESSMENT — PAIN DESCRIPTION - ORIENTATION: ORIENTATION: LEFT;UPPER

## 2022-10-05 ASSESSMENT — PAIN SCALES - GENERAL
PAINLEVEL_OUTOF10: 10
PAINLEVEL_OUTOF10: 1
PAINLEVEL_OUTOF10: 10

## 2022-10-05 ASSESSMENT — PAIN DESCRIPTION - LOCATION: LOCATION: BACK

## 2022-10-05 NOTE — PROGRESS NOTES
Department of Psychiatry  Consult Service  Progress Note     Reason for Consult: Acute delirium versus psychosis    SUBJECTIVE:    Staff reports that patient had good night sleep last night. His thought process is little more clear and less intense during the conversation. Continues to make statements that there is another person named Ghazal Linton living in him and saying that he will die soon. Reports that there is evil within him and he has to say \"go to [facility]\" loud to help with that. Mentions this he will sometimes presents as pain. Continues to be hyperverbal with rapid speech at times during the conversation. Has been compliant on his medications and he did receive Haldol emergency medication yesterday evening at around 4:00. Primary team is waiting for heme-onc to evaluate for the high white count.     OBJECTIVE      Medications  Current Facility-Administered Medications: sodium chloride flush 0.9 % injection 5-40 mL, 5-40 mL, IntraVENous, 2 times per day  sodium chloride flush 0.9 % injection 10 mL, 10 mL, IntraVENous, PRN  0.9 % sodium chloride infusion, , IntraVENous, PRN  enoxaparin (LOVENOX) injection 40 mg, 40 mg, SubCUTAneous, Daily  ondansetron (ZOFRAN-ODT) disintegrating tablet 4 mg, 4 mg, Oral, Q8H PRN **OR** ondansetron (ZOFRAN) injection 4 mg, 4 mg, IntraVENous, Q6H PRN  magnesium hydroxide (MILK OF MAGNESIA) 400 MG/5ML suspension 30 mL, 30 mL, Oral, Daily PRN  acetaminophen (TYLENOL) tablet 650 mg, 650 mg, Oral, Q6H PRN **OR** acetaminophen (TYLENOL) suppository 650 mg, 650 mg, Rectal, Q6H PRN  albuterol sulfate HFA (PROVENTIL;VENTOLIN;PROAIR) 108 (90 Base) MCG/ACT inhaler 2 puff, 2 puff, Inhalation, Q4H PRN  docusate sodium (COLACE) capsule 100 mg, 100 mg, Oral, TID PRN  ipratropium (ATROVENT) 0.02 % nebulizer solution 0.5 mg, 0.5 mg, Nebulization, 4x Daily  budesonide-formoterol (SYMBICORT) 160-4.5 MCG/ACT inhaler 2 puff, 2 puff, Inhalation, BID  OLANZapine (ZYPREXA) tablet 5 mg, 5 mg, Oral, BID  haloperidol lactate (HALDOL) injection 2 mg, 2 mg, IntraMUSCular, TID PRN     Physical  /73   Pulse 91   Temp 97.7 °F (36.5 °C) (Oral)   Resp 18   Ht 6' (1.829 m)   Wt 119 lb 0.8 oz (54 kg)   SpO2 96%   BMI 16.15 kg/m²     Mental Status Examination:    Level of consciousness:  Within normal limits  Appearance: good grooming  Behavior/Motor: Psychomotor retardation  Attitude toward examiner:  cooperative  Speech: Somewhat pressured  Mood: Elevated  Affect: Expansive  Thought processes: Some thought disorganization  Thought content: Paranoid delusions with Bahai preoccupations  Memory: age appropriate  Insight & Judgement: improving  Medication side effects:  denies       ASSESSMENT    Acute psychosis  Rule out delirium secondary to benzodiazepines opiates and steroids    Patient Active Problem List   Diagnosis    Stage 4 very severe COPD by GOLD classification ([address]    Mixed restrictive and obstructive lung disease ([address]    Calcified pleural plaque due to asbestos exposure    Mediastinal lymphadenopathy    Axillary lymphadenopathy    Smoker    Cachexia ([address]    CLL (chronic lymphocytic leukemia) (H[facility])    Pleural effusion on right - ? BAPE    Delirium        PLAN  Can restart Zanaflex to help with severe pain. Continue Zyprexa and as needed Haldol.   Can consider admitting to [facility]     Electronically signed by Adilene Perez MD on 10/5/2022 at 1:01 PM

## 2022-10-05 NOTE — CONSULTS
_                         Today's Date: 10/5/2022  Patient Name: Frank Perdue  Date of admission: 10/3/2022 11:45 PM  Patient's age: 60 y.o., 1960  Admission Dx: Delirium [R41.0]      Requesting Physician: Fabien Up MD    CHIEF COMPLAINT: Delirium. 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 Consult for CLL. History Obtained From:  patient, electronic medical record    HISTORY OF PRESENT ILLNESS:      The patient is a 60 y.o.  male who is admitted to the hospital for further management of altered mental status. Patient had delirium. He has COPD. He was on steroids. It was felt that his symptoms could be related to steroid psychosis. Patient is already feeling much better. We have been consulted for leukocytosis. Patient had history of CLL diagnosed several years back. He had follow-up with oncology in [place]. There was no need for any previous treatment for CLL. Patient denies any fever or night sweats. No weight loss or decreased appetite. No enlarged lymph nodes. No repeated infections. Past Medical History:   has a past medical history of Asthma, COPD (chronic obstructive pulmonary disease) ([facility] [address] and Emphysema of lung ([facility] [address] Past Surgical History:   has a past surgical history that includes Skin graft (Bilateral). Family History: family history is not on file. Social History:   reports that he has been smoking cigarettes. He has a 45.00 pack-year smoking history. He has never used smokeless tobacco. He reports current alcohol use. He reports that he does not use drugs. Medications:    Prior to Admission medications    Medication Sig Start Date End Date Taking?  Authorizing Provider   albuterol sulfate HFA (VENTOLIN HFA) 108 (90 Base) MCG/ACT inhaler Inhale 2 puffs into the lungs every 4 hours as needed for Wheezing   Yes Historical Provider, MD   diazePAM (VALIUM) 5 MG/ML solution Take 5 mg by mouth every 8 hours as needed (anxiety or sleep). Yes Historical Provider, MD   docusate sodium (COLACE) 100 MG capsule Take 100 mg by mouth 3 times daily as needed for Constipation   Yes Historical Provider, MD   ipratropium (ATROVENT) 0.02 % nebulizer solution Take 0.5 mg by nebulization 4 times daily   Yes Historical Provider, MD   lactobacillus acidophilus [facility]) Take 4 tablets by mouth 3 times daily   Yes Historical Provider, MD   Acidophilus Lactobacillus CAPS Take 1 capsule by mouth daily   Yes Historical Provider, MD   tiZANidine (ZANAFLEX) 2 MG tablet Take 2 mg by mouth every 6 hours as needed 9/2/22   Historical Provider, MD   traMADol (ULTRAM) 50 MG tablet Take 50 mg by mouth every 6 hours as needed.  9/7/22 10/7/22  Historical Provider, MD   acetaminophen (TYLENOL) 500 MG tablet Take 500 mg by mouth every 6 hours as needed for Pain    Historical Provider, MD   ibuprofen (ADVIL;MOTRIN) 200 MG tablet Take 200 mg by mouth every 6 hours as needed for Pain Pt taking 2 pills prn    Historical Provider, MD   mometasone-formoterol (DULERA) 200-5 MCG/ACT inhaler Inhale 2 puffs into the lungs 2 times daily 6/1/22   Shahida Joel MD   albuterol (PROVENTIL) (2.5 MG/3ML) 0.083% nebulizer solution Take 3 mLs by nebulization every 6 hours as needed for Wheezing  Patient not taking: No sig reported 8/25/21   Shahida Joel MD   Multiple Vitamins-Minerals (MULTIVITAMIN ADULT PO) Take 1 tablet by mouth daily    Historical Provider, MD     Current Facility-Administered Medications   Medication Dose Route Frequency Provider Last Rate Last Admin    lidocaine 4 % external patch 1 patch  1 patch TransDERmal Daily Amadeo Montano MD   1 patch at 10/05/22 1545    sodium chloride flush 0.9 % injection 5-40 mL  5-40 mL IntraVENous 2 times per day ELISEO Glez CNP   10 mL at 10/05/22 0856    sodium chloride flush 0.9 % injection 10 mL  10 mL IntraVENous PRN ELISEO Glez - CNP        0.9 % sodium chloride infusion   IntraVENous PRN Jazmin Pickard ELISEO Gan CNP        enoxaparin (LOVENOX) injection 40 mg  40 mg SubCUTAneous Daily Jojoe Toño APRN - CNP   40 mg at 10/05/22 0855    ondansetron (ZOFRAN-ODT) disintegrating tablet 4 mg  4 mg Oral Q8H PRN ELISEO Cordero - CNP        Or    [facility]) injection 4 mg  4 mg IntraVENous Q6H PRN Jennifer Lundberg, APRN - CNP        magnesium hydroxide (MILK OF MAGNESIA) 400 MG/5ML suspension 30 mL  30 mL Oral Daily PRN Jennifer Lundberg, APRN - CNP        acetaminophen (TYLENOL) tablet 650 mg  650 mg Oral Q6H PRN Jojoe Leonardoal, APRN - CNP   650 mg at 10/05/22 1410    Or    acetaminophen (TYLENOL) suppository 650 mg  650 mg Rectal Q6H PRN Jennifer Lundberg, APRN - CNP        albuterol sulfate HFA (PROVENTIL;VENTOLIN;PROAIR) 108 (90 Base) MCG/ACT inhaler 2 puff  2 puff Inhalation Q4H PRN Jennifer Lundberg APRN - CNP        docusate sodium (COLACE) capsule 100 mg  100 mg Oral TID PRN ELISEO Cordero - ARMINDA        ipratropium (ATROVENT) 0.02 % nebulizer solution 0.5 mg  0.5 mg Nebulization 4x Daily Jojoe Toño APRN - CNP   0.5 mg at 10/05/22 1537    budesonide-formoterol (SYMBICORT) 160-4.5 MCG/ACT inhaler 2 puff  2 puff Inhalation BID Jennifer Lundberg APRN - CNP   2 puff at 10/05/22 0759    OLANZapine (ZYPREXA) tablet 5 mg  5 mg Oral BID Aníbal Diaz MD   5 mg at 10/05/22 0856    haloperidol lactate (HALDOL) injection 2 mg  2 mg IntraMUSCular TID PRN Aníbal Diaz MD   2 mg at 10/04/22 1605       Allergies:  Morphine and Propoxyphene    REVIEW OF SYSTEMS:      General: Positive for weakness and r fatigue. No unanticipated weight loss or decreased appetite. No fever or chills. Eyes: No blurred vision, eye pain or double vision. Ears: No hearing problems or drainage. No tinnitus. Throat: No sore throat, problems with swallowing or dysphagia. Respiratory: No cough, sputum or hemoptysis. No shortness of breath. No pleuritic chest pain. Cardiovascular: No chest pain, orthopnea or PND.  No lower extremity edema. No palpitation. Gastrointestinal: No problems with swallowing. No abdominal pain or bloating. No nausea or vomiting. No diarrhea or constipation. No GI bleeding. Genitourinary: No dysuria, hematuria, frequency or urgency. Musculoskeletal: No muscle aches or pains. No limitation of movement. No back pain. No gait disturbance, No joint complaints. Dermatologic: No skin rashes or pruritus. No skin lesions or discolorations. Psychiatric: No depression, anxiety, or stress or signs of schizophrenia. Hematologic: No history of bleeding tendency. No bruises or ecchymosis. No history of clotting problems. Infectious disease: No fever, chills or frequent infections. Endocrine: No polydipsia or polyuria. No temperature intolerance. Neurologic: No headaches or dizziness. No weakness or numbness of the extremities. No changes in balance, coordination,  memory, . Allergic/Immunologic: No nasal congestion or hives. No repeated infections.        PHYSICAL EXAM:      /84   Pulse 85   Temp 98 °F (36.7 °C) (Oral)   Resp 16   Ht 6' (1.829 m)   Wt 119 lb 0.8 oz (54 kg)   SpO2 98%   BMI 16.15 kg/m²    Temp (24hrs), Av.8 °F (36.6 °C), Min:97 °F (36.1 °C), Max:98.3 °F (36.8 °C)      General appearance - not in pain or distress  Mental status - alert and oriented  Eyes - pupils equal and reactive, extraocular eye movements intact  Ears - bilateral TM's and external ear canals normal  Nose - normal and patent, no erythema, discharge or polyps  Mouth - mucous membranes moist, pharynx normal without lesions  Neck - supple, no significant adenopathy  Lymphatics - no palpable lymphadenopathy, no hepatosplenomegaly  Chest - clear to auscultation, no wheezes, rales or rhonchi, symmetric air entry  Heart - normal rate, regular rhythm, normal S1, S2, no murmurs, rubs, clicks or gallops  Abdomen - soft, nontender, nondistended, no masses or organomegaly  Neurological - alert, oriented, normal speech, no focal findings or movement disorder noted  Musculoskeletal - no joint tenderness, deformity or swelling  Extremities - peripheral pulses normal, no pedal edema, no clubbing or cyanosis  Skin - normal coloration and turgor, no rashes, no suspicious skin lesions noted           DATA:      Labs:       CBC:   Recent Labs     10/04/22  1445 10/05/22  0645   WBC 19.8* 27.0*   HGB 11.5* 13.4*   HCT 34.0* 40.6*    386     BMP:   Recent Labs     10/04/22  0527 10/05/22  0645    140   K 4.1 4.2   CO2 29 29   BUN 12 7*   CREATININE 0.52* 0.74   LABGLOM >60 >60   GLUCOSE 86 136*     PT/INR: No results for input(s): PROTIME, INR in the last 72 hours. APTT:No results for input(s): APTT in the last 72 hours. LIVER PROFILE:No results for input(s): AST, ALT, LABALBU in the last 72 hours. XR CHEST (2 VW)  Narrative: EXAMINATION:  TWO XRAY VIEWS OF THE CHEST    10/7/2020 2:59 pm    COMPARISON:  None. HISTORY:  ORDERING SYSTEM PROVIDED HISTORY: Chronic obstructive pulmonary disease,  unspecified COPD type ([facility] [address]  TECHNOLOGIST PROVIDED HISTORY:  Reason for Exam: COPD; chronic SOB; hx leukemia and asbestos exposure    FINDINGS:  Cardiac silhouette is normal in size. Small bilateral pleural effusions or  chronic pleural/parenchymal scarring. There is loss of volume within the  left hemithorax with mediastinal shift to the left. Calcified juxtapleural  plaques identified within the left lower lung are probably also in the left  lung apex. There is opacification of the left lung apex. Impression: Loss of volume of the left hemithorax and mediastinal shift to the left. Calcified pleural plaques in the left upper lung and lung base, most  compatible with provided history of asbestosis. Bibasilar pleural-parenchymal scarring versus small bowel pleural effusions.             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 Currently patient is doing fine and is almost back to normal mentation. He was hospitalized because of delirium which was felt to be related to steroid psychosis. Clinically improving. Almost back to baseline. Reviewed records related to CLL. Labs were reviewed and explained. Patient had previous follow-up and monitoring by an oncologist in [facility]. He has stage I CLL and there was no indication for treatment. I reviewed with the patient the results of the labs and management plans for CLL. Still I do not see any indications to start treatment. Recommendations for close monitoring. Labs will be repeated every 3 to 6 months and treatment will be considered for any significant changes. Patient's questions were answered to the best of his satisfaction and he verbalized full understanding and agreement. Thank you for allowing us to participate in the care of this pleasant patient. Discussed with patient and Nurse. Jonatan Arteaga MD, MD                            [address] [place] [address] [facility]                            This note is created with the assistance of a speech recognition program.  While intending to generate a document that actually reflects the content of the visit, the document can still have some errors including those of syntax and sound a like substitutions which may escape proof reading. It such instances, actual meaning can be extrapolated by contextual diversion.

## 2022-10-05 NOTE — PLAN OF CARE
Problem: Discharge Planning  Goal: Discharge to home or other facility with appropriate resources  Outcome: Progressing     Problem: Safety - Adult  Goal: Free from fall injury  Outcome: Progressing  Note: Patient continues with one on one. Flight of ideas and restless at times. Rested quietly after seroquel given. Problem: ABCDS Injury Assessment  Goal: Absence of physical injury  Outcome: Progressing  Note: Bed alarm on. One on one continues. Problem: Skin/Tissue Integrity  Goal: Absence of new skin breakdown  Description: 1. Monitor for areas of redness and/or skin breakdown  2. Assess vascular access sites hourly  3. Every 4-6 hours minimum:  Change oxygen saturation probe site  4. Every 4-6 hours:  If on nasal continuous positive airway pressure, respiratory therapy assess nares and determine need for appliance change or resting period. Outcome: Progressing  Note: Able to turn and reposition self. Problem: Respiratory - Adult  Goal: Achieves optimal ventilation and oxygenation  Outcome: Progressing  Note: Lung sounds clear but decreased. Oxygen sat on room air in low 90s.        Problem: Nutrition Deficit:  Goal: Optimize nutritional status  Outcome: Progressing

## 2022-10-05 NOTE — PLAN OF CARE
Problem: Discharge Planning  Goal: Discharge to home or other facility with appropriate resources  Outcome: Progressing  Flowsheets (Taken 10/5/2022 0855)  Discharge to home or other facility with appropriate resources: Identify barriers to discharge with patient and caregiver     Problem: Safety - Adult  Goal: Free from fall injury  Outcome: Progressing     Problem: ABCDS Injury Assessment  Goal: Absence of physical injury  Outcome: Progressing     Problem: Skin/Tissue Integrity  Goal: Absence of new skin breakdown  Description: 1. Monitor for areas of redness and/or skin breakdown  2. Assess vascular access sites hourly  3. Every 4-6 hours minimum:  Change oxygen saturation probe site  4. Every 4-6 hours:  If on nasal continuous positive airway pressure, respiratory therapy assess nares and determine need for appliance change or resting period.   Outcome: Progressing     Problem: Respiratory - Adult  Goal: Achieves optimal ventilation and oxygenation  Outcome: Progressing  Flowsheets (Taken 10/5/2022 0855)  Achieves optimal ventilation and oxygenation:   Assess for changes in respiratory status   Assess for changes in mentation and behavior   Oxygen supplementation based on oxygen saturation or arterial blood gases   Initiate smoking cessation protocol as indicated   Assess and instruct to report shortness of breath or any respiratory difficulty     Problem: Nutrition Deficit:  Goal: Optimize nutritional status  Outcome: Progressing     Problem: Pain  Goal: Verbalizes/displays adequate comfort level or baseline comfort level  Outcome: Progressing

## 2022-10-05 NOTE — PROGRESS NOTES
History and Physical Service  [facility]      Progress note    Date:   10/5/2022  Patient name:  Jennifer Aguirre  MRN:   [id number]  Account:  [de-identified]  YOB: 1960  PCP:    Ranulfo Meraz NP, APRN - CNP  Code Status:    Full Code    Chief Complaint:     No chief complaint on file. History Obtained From:     Patient, EMR, nursing staff    HPI     This patient is a 60 y.o. Unavailable / unknown malewho presents with  58-year-old male admitted for delirium  Presenting from outside ED where he was seen for AMS, racing thoughts's thought to be secondary to steroids that were given for COPD exacerbation and pneumonia suspected steroid-induced psychosis  Admitted for for psych eval  Denies any fevers chills cough abdominal pain nausea vomiting diarrhea or urinary symptoms        Review of Systems:     Denies any shortness of breath or cough  Denies chest pain or palpitations  Denies abdominal pain, diarrhea vomiting  Denies any new numbness tremors or weakness. A 10 point review of systems was performed and and negative except as mentioned in HPI  Positive and Negative as described in HPI. Past Medical History:     Past Medical History:   Diagnosis Date    Asthma     COPD (chronic obstructive pulmonary disease) ([facility] [address]     Emphysema of lung ([facility] [address]         Past Surgical History:     Past Surgical History:   Procedure Laterality Date    SKIN GRAFT Bilateral     lower extremities        Medications Prior to Admission:     Prior to Admission medications    Medication Sig Start Date End Date Taking? Authorizing Provider   albuterol sulfate HFA (VENTOLIN HFA) 108 (90 Base) MCG/ACT inhaler Inhale 2 puffs into the lungs every 4 hours as needed for Wheezing   Yes Historical Provider, MD   diazePAM (VALIUM) 5 MG/ML solution Take 5 mg by mouth every 8 hours as needed (anxiety or sleep).    Yes Historical Provider, MD   docusate sodium (COLACE) 100 MG capsule Take 100 mg by mouth 3 times daily as needed for Constipation   Yes Historical Provider, MD   ipratropium (ATROVENT) 0.02 % nebulizer solution Take 0.5 mg by nebulization 4 times daily   Yes Historical Provider, MD   lactobacillus acidophilus [facility]) Take 4 tablets by mouth 3 times daily   Yes Historical Provider, MD   Acidophilus Lactobacillus CAPS Take 1 capsule by mouth daily   Yes Historical Provider, MD   tiZANidine (ZANAFLEX) 2 MG tablet Take 2 mg by mouth every 6 hours as needed 9/2/22   Historical Provider, MD   traMADol (ULTRAM) 50 MG tablet Take 50 mg by mouth every 6 hours as needed. 9/7/22 10/7/22  Historical Provider, MD   acetaminophen (TYLENOL) 500 MG tablet Take 500 mg by mouth every 6 hours as needed for Pain    Historical Provider, MD   ibuprofen (ADVIL;MOTRIN) 200 MG tablet Take 200 mg by mouth every 6 hours as needed for Pain Pt taking 2 pills prn    Historical Provider, MD   mometasone-formoterol (DULERA) 200-5 MCG/ACT inhaler Inhale 2 puffs into the lungs 2 times daily 6/1/22   Felicity Ahumada, MD   albuterol (PROVENTIL) (2.5 MG/3ML) 0.083% nebulizer solution Take 3 mLs by nebulization every 6 hours as needed for Wheezing  Patient not taking: No sig reported 8/25/21   Felicity Ahumada, MD   Multiple Vitamins-Minerals (MULTIVITAMIN ADULT PO) Take 1 tablet by mouth daily    Historical Provider, MD        Allergies:     Morphine and Propoxyphene    Social History:     Tobacco:    reports that he has been smoking cigarettes. He has a 45.00 pack-year smoking history. He has never used smokeless tobacco.  Alcohol:      reports current alcohol use. Drug Use:  reports no history of drug use. Family History:     History reviewed. No pertinent family history. Physical Exam:   /73   Pulse 91   Temp 97.7 °F (36.5 °C) (Oral)   Resp 18   Ht 6' (1.829 m)   Wt 119 lb 0.8 oz (54 kg)   SpO2 96%   BMI 16.15 kg/m²   No results for input(s): POCGLU in the last 72 hours.     General Appearance:  alert, well appearing, and in no acute distress  Mental status: oriented to person, place, and time with normal affect  Head:  normocephalic, atraumatic. Eye: no icterus, redness, pupils equal and reactive, extraocular eye movements intact, conjunctiva clear  Ear: normal external ear, no discharge, hearing intact  Nose:  no drainage noted  Mouth: mucous membranes moist  Neck: supple, no carotid bruits, thyroid not palpable  Lungs: Bilateral equal air entry, clear to ausculation, no wheezing, rales or rhonchi, normal effort  Cardiovascular: normal rate, regular rhythm, no murmur, gallop, rub.   Abdomen: Soft, nontender, nondistended, normal bowel sounds, no hepatomegaly or splenomegaly  Neurologic: There are no new focal motor or sensory deficits, normal muscle tone and bulk, no abnormal sensation, normal speech, cranial nerves II through XII grossly intact  Skin: No gross lesions, rashes, bruising or bleeding on exposed skin area  Extremities:  peripheral pulses palpable, no pedal edema or calf pain with palpation  Psych: Appears delusional, still has racing thoughts    Investigations:      Laboratory Testing:  Recent Results (from the past 24 hour(s))   CBC    Collection Time: 10/04/22  2:45 PM   Result Value Ref Range    WBC 19.8 (H) 3.5 - 11.0 k/uL    RBC 3.47 (L) 4.5 - 5.9 m/uL    Hemoglobin 11.5 (L) 13.5 - 17.5 g/dL    Hematocrit 34.0 (L) 41 - 53 %    MCV 98.1 80 - 100 fL    MCH 33.3 26 - 34 pg    MCHC 33.9 31 - 37 g/dL    RDW 13.5 11.5 - 14.9 %    Platelets 791 988 - 550 k/uL    MPV 6.8 6.0 - 12.0 fL   Basic Metabolic Panel w/ Reflex to MG    Collection Time: 10/05/22  6:45 AM   Result Value Ref Range    Glucose 136 (H) 70 - 99 mg/dL    BUN 7 (L) 8 - 23 mg/dL    Creatinine 0.74 0.70 - 1.20 mg/dL    Calcium 9.1 8.6 - 10.4 mg/dL    Sodium 140 135 - 144 mmol/L    Potassium 4.2 3.7 - 5.3 mmol/L    Chloride 102 98 - 107 mmol/L    CO2 29 20 - 31 mmol/L    Anion Gap 9 9 - 17 mmol/L    Est, Glom Filt Rate >60 >60 mL/min/1.73m2 CBC    Collection Time: 10/05/22  6:45 AM   Result Value Ref Range    WBC 27.0 (H) 3.5 - 11.0 k/uL    RBC 4.11 (L) 4.5 - 5.9 m/uL    Hemoglobin 13.4 (L) 13.5 - 17.5 g/dL    Hematocrit 40.6 (L) 41 - 53 %    MCV 98.8 80 - 100 fL    MCH 32.7 26 - 34 pg    MCHC 33.1 31 - 37 g/dL    RDW 13.5 11.5 - 14.9 %    Platelets 227 084 - 804 k/uL    MPV 6.8 6.0 - 12.0 fL       Recent Labs     10/05/22  0645   HGB 13.4*   HCT 40.6*   WBC 27.0*   MCV 98.8      K 4.2      CO2 29   BUN 7*   CREATININE 0.74   GLUCOSE 136*       Hematology:  Recent Labs     10/04/22  1445 10/05/22  0645   WBC 19.8* 27.0*   RBC 3.47* 4.11*   HGB 11.5* 13.4*   HCT 34.0* 40.6*   MCV 98.1 98.8   MCH 33.3 32.7   MCHC 33.9 33.1   RDW 13.5 13.5    386   MPV 6.8 6.8     Chemistry:  Recent Labs     10/04/22  0527 10/05/22  0645    140   K 4.1 4.2    102   CO2 29 29   GLUCOSE 86 136*   BUN 12 7*   CREATININE 0.52* 0.74   ANIONGAP 7* 9   LABGLOM >60 >60   CALCIUM 8.7 9.1     No results for input(s): PROT, LABALBU, LABA1C, K1HQJKZ, G3XLHSJ, FT4, TSH, AST, ALT, LDH, GGT, ALKPHOS, LABGGT, BILITOT, BILIDIR, AMMONIA, AMYLASE, LIPASE, LACTATE, CHOL, HDL, LDLCHOLESTEROL, CHOLHDLRATIO, TRIG, VLDL, CHV31VL, PHENYTOIN, PHENYF, URICACID, POCGLU in the last 72 hours. Imaging/Diagnostics:       No results found.      Current Facility-Administered Medications   Medication Dose Route Frequency Provider Last Rate Last Admin    sodium chloride flush 0.9 % injection 5-40 mL  5-40 mL IntraVENous 2 times per day Melvin Angeles APRN - CNP   10 mL at 10/05/22 0856    sodium chloride flush 0.9 % injection 10 mL  10 mL IntraVENous PRN Donnise Chauncey, APRN - CNP        0.9 % sodium chloride infusion   IntraVENous PRN Donnise Chauncey, APRN - CNP        enoxaparin (LOVENOX) injection 40 mg  40 mg SubCUTAneous Daily Donnise Chauncey, APRN - CNP   40 mg at 10/05/22 0855    ondansetron (ZOFRAN-ODT) disintegrating tablet 4 mg  4 mg Oral Q8H PRN Donthanhe Chauncey, APRN - CNP Or    ondansetron (ZOFRAN) injection 4 mg  4 mg IntraVENous Q6H PRN Kaelyn Close, APRN - CNP        magnesium hydroxide (MILK OF MAGNESIA) 400 MG/5ML suspension 30 mL  30 mL Oral Daily PRN Kaelyn Close, APRN - CNP        acetaminophen (TYLENOL) tablet 650 mg  650 mg Oral Q6H PRN Kaelyn Close, APRN - CNP   650 mg at 10/04/22 1947    Or    acetaminophen (TYLENOL) suppository 650 mg  650 mg Rectal Q6H PRN Kaelyn Close, APRN - CNP        albuterol sulfate HFA (PROVENTIL;VENTOLIN;PROAIR) 108 (90 Base) MCG/ACT inhaler 2 puff  2 puff Inhalation Q4H PRN Kaelyn Close, APRN - CNP        docusate sodium (COLACE) capsule 100 mg  100 mg Oral TID PRN Kaelyn Close, APRN - CNP        ipratropium (ATROVENT) 0.02 % nebulizer solution 0.5 mg  0.5 mg Nebulization 4x Daily Kaelyn Close, APRN - CNP   0.5 mg at 10/05/22 1128    budesonide-formoterol (SYMBICORT) 160-4.5 MCG/ACT inhaler 2 puff  2 puff Inhalation BID Kaelyn Close, APRN - CNP   2 puff at 10/05/22 0759    OLANZapine (ZYPREXA) tablet 5 mg  5 mg Oral BID Nancy Saldaña MD   5 mg at 10/05/22 0856    haloperidol lactate (HALDOL) injection 2 mg  2 mg IntraMUSCular TID PRN Nancy Saldaña MD   2 mg at 10/04/22 1605       Impressions :     1. Principal Problem:    Delirium  Resolved Problems:    * No resolved hospital problems. *        2.  has a past medical history of Asthma, COPD (chronic obstructive pulmonary disease) ([facility] [address] and Emphysema of lung ([facility] [address]      Plans:     74-year-old male admitted for acute psychosis possibly related to recent steroid use psychiatry consulted  Not in COPD exacerbation currently-we will continue home inhaler  Awaiting psych review,  UDS positive only for benzodiazepines at outside hospital, MRI brain unremarkable on 9/30 at outside hospital Per Yajaira  Will also check urinalysis      10/5  Worsening leukocytosis note history of investigation for CLL unclear if he was ever diagnosed or treated we will consult heme-onc Patient is still delusional  Plan to transfer to [facility] once medically cleared awaiting oncology opinion also awaiting UA  Lidocaine patch for back pain  DVT pplx-Lovenox      Jenniffer Rowe MD  10/5/2022  1:49 PM

## 2022-10-05 NOTE — PROGRESS NOTES
Physical Therapy  Facility/Department: [facility] PROGRESSIVE CARE  Daily Treatment Note  NAME: Den Ramos  : 1960  MRN: [id number]    Date of Service: 10/5/2022    Discharge Recommendations:  No therapy recommended at discharge        Patient Diagnosis(es): There were no encounter diagnoses. Assessment   Activity Tolerance: Patient tolerated treatment well     Plan    Physcial Therapy Plan  General Plan: 2-3 times per week  Specific Instructions for Next Treatment: ther exs ans ther activities as tolerated  Current Treatment Recommendations: Strengthening;Balance training;Functional mobility training;Transfer training;Gait training     Restrictions  Restrictions/Precautions  Restrictions/Precautions: Fall Risk, General Precautions  Required Braces or Orthoses?: No  Implants present? :  (denies)     Subjective    Subjective  Subjective: Pt is just getting out of the shower upon arrival. Agreeable to PT. 1:1 present in room assisting pt. Pain: PT denies pain at this time. Orientation  Orientation Level: Oriented to place;Oriented to situation;Oriented to person  Cognition  Overall Cognitive Status: Exceptions  Following Commands: Follows multistep commands with repitition  Attention Span: Attends with cues to redirect  Cognition Comment: Pt pleasant and noted to have tangential speech but easily redirectable     Objective   Vitals  O2 Device: None (Room air)  Bed Mobility Training  Bed Mobility Training: Yes  Overall Level of Assistance: Independent  Rolling: Independent  Supine to Sit: Independent  Sit to Supine: Independent  Scooting: Independent  Balance  Sitting: Without support  Standing: Without support  Transfer Training  Transfer Training: Yes  Sit to Stand: Independent  Stand to Sit: Independent  Gait Training  Gait Training: Yes  Gait  Overall Level of Assistance: Supervision (\"I sometimes need my walking stick at home\". )  Interventions: Safety awareness training;Verbal cues  Speed/Penelope: Fluctuations (pt fluctuates sharla purposeful.)  Swing Pattern: Left symmetrical;Right symmetrical  Gait Abnormalities:  (Decrease foot clearance and heel strike.)  Distance (ft): 150 Feet (x2)  Assistive Device:  (no AD; Occasionally unsteady, however no LOB noted. )  Rail Use:  (with and without R rail)  Stairs - Level of Assistance: Contact-guard assistance (Stair training completed per pt request. Pt states \"I have 15 old farm stairs to go up at home\". )  Number of Stairs Trained: 20 (Pt demos an alternating gait pattern. Cues for safety with fair carryover. x1 LOB d/t decrease foot clearance. Pt demos purposful fluctuations in sharla. Pt's HR is 114 post stair training. Edu on proper breathing technique with good carryover.)                 -[facility] Mobility Inpatient   How much difficulty turning over in bed?: None  How much difficulty sitting down on / standing up from a chair with arms?: None  How much difficulty moving from lying on back to sitting on side of bed?: None  How much help from another person moving to and from a bed to a chair?: None  How much help from another person needed to walk in hospital room?: A Little  How much help from another person for climbing 3-5 steps with a railing?: A Little  -[facility] Inpatient Mobility Raw Score : 22  AM-PAC Inpatient T-Scale Score : 53.28  Mobility Inpatient CMS 0-100% Score: 20.91  Mobility Inpatient CMS G-Code Modifier : CJ    Goals  Short Term Goals  Time Frame for Short Term Goals: 4 sessions  Short Term Goal 1: Improve strength in both LE to 5-/5  Short Term Goal 2:  Independent sit to stand  Short Term Goal 3: Improve sitting and standing balance to fair +  Long Term Goals  Time Frame for Long Term Goals : 6 sessions  Long Term Goal 1: Ambulate independently 150 ft without use of any assistive device  Patient Goals   Patient Goals : return home    Education  Patient Education  Education Given To: Patient  Education Provided: Role of Therapy;Plan of Care  Education Provided Comments: Edu on pursed lip breathing, the benefits of continued ther ex, to complete with staff assistance at this time to increase safety/awareness, and other safety awareness techniques.   Education Method: Verbal  Barriers to Learning: Cognition  Education Outcome: Verbalized understanding;Continued education needed    Therapy Time   Individual Concurrent Group Co-treatment   Time In 1449         Time Out 1523         Minutes [id number] [place], [place]

## 2022-10-05 NOTE — CARE COORDINATION
ONGOING DISCHARGE PLAN:    Patient is alert and oriented x4. Spoke with patient regarding discharge plan and patient confirms that plan is still to discharge to [facility] when medically cleared   Hemoc consult  Restart Zanaflex   Prn Haldol      Will continue to follow for additional discharge needs.     Electronically signed by Charbel Gupta RN on 10/5/2022 at 3:34 PM

## 2022-10-06 ENCOUNTER — HOSPITAL ENCOUNTER (INPATIENT)
Age: 62
LOS: 12 days | Discharge: HOME | DRG: 885 | End: 2022-10-18
Attending: PSYCHIATRY & NEUROLOGY | Admitting: PSYCHIATRY & NEUROLOGY
Payer: OTHER GOVERNMENT

## 2022-10-06 VITALS
RESPIRATION RATE: 18 BRPM | WEIGHT: 120.15 LBS | HEART RATE: 79 BPM | OXYGEN SATURATION: 95 % | HEIGHT: 72 IN | BODY MASS INDEX: 16.27 KG/M2 | TEMPERATURE: 97.7 F | DIASTOLIC BLOOD PRESSURE: 85 MMHG | SYSTOLIC BLOOD PRESSURE: 118 MMHG

## 2022-10-06 LAB
ANION GAP SERPL CALCULATED.3IONS-SCNC: 8 MMOL/L (ref 9–17)
BILIRUBIN URINE: NEGATIVE
BUN BLDV-MCNC: 12 MG/DL (ref 8–23)
CALCIUM SERPL-MCNC: 9.2 MG/DL (ref 8.6–10.4)
CHLORIDE BLD-SCNC: 101 MMOL/L (ref 98–107)
CO2: 31 MMOL/L (ref 20–31)
COLOR: YELLOW
COMMENT UA: NORMAL
CREAT SERPL-MCNC: 0.63 MG/DL (ref 0.7–1.2)
GFR SERPL CREATININE-BSD FRML MDRD: >60 ML/MIN/1.73M2
GLUCOSE BLD-MCNC: 99 MG/DL (ref 70–99)
GLUCOSE URINE: NEGATIVE
HCT VFR BLD CALC: 34.4 % (ref 41–53)
HEMOGLOBIN: 11.4 G/DL (ref 13.5–17.5)
KETONES, URINE: NEGATIVE
LEUKOCYTE ESTERASE, URINE: NEGATIVE
MCH RBC QN AUTO: 32.8 PG (ref 26–34)
MCHC RBC AUTO-ENTMCNC: 33.2 G/DL (ref 31–37)
MCV RBC AUTO: 98.6 FL (ref 80–100)
NITRITE, URINE: NEGATIVE
PDW BLD-RTO: 13.5 % (ref 11.5–14.9)
PH UA: 7 (ref 5–8)
PLATELET # BLD: 335 K/UL (ref 150–450)
PMV BLD AUTO: 6.8 FL (ref 6–12)
POTASSIUM SERPL-SCNC: 4 MMOL/L (ref 3.7–5.3)
PROTEIN UA: NEGATIVE
RBC # BLD: 3.49 M/UL (ref 4.5–5.9)
SODIUM BLD-SCNC: 140 MMOL/L (ref 135–144)
SPECIFIC GRAVITY UA: 1.01 (ref 1–1.03)
TURBIDITY: CLEAR
URINE HGB: NEGATIVE
UROBILINOGEN, URINE: NORMAL
WBC # BLD: 17.3 K/UL (ref 3.5–11)

## 2022-10-06 PROCEDURE — 94640 AIRWAY INHALATION TREATMENT: CPT

## 2022-10-06 PROCEDURE — 6370000000 HC RX 637 (ALT 250 FOR IP): Performed by: PSYCHIATRY & NEUROLOGY

## 2022-10-06 PROCEDURE — 6370000000 HC RX 637 (ALT 250 FOR IP): Performed by: INTERNAL MEDICINE

## 2022-10-06 PROCEDURE — 99232 SBSQ HOSP IP/OBS MODERATE 35: CPT | Performed by: INTERNAL MEDICINE

## 2022-10-06 PROCEDURE — 85027 COMPLETE CBC AUTOMATED: CPT

## 2022-10-06 PROCEDURE — 6360000002 HC RX W HCPCS: Performed by: PSYCHIATRY & NEUROLOGY

## 2022-10-06 PROCEDURE — 36415 COLL VENOUS BLD VENIPUNCTURE: CPT

## 2022-10-06 PROCEDURE — 6360000002 HC RX W HCPCS: Performed by: NURSE PRACTITIONER

## 2022-10-06 PROCEDURE — 6370000000 HC RX 637 (ALT 250 FOR IP): Performed by: NURSE PRACTITIONER

## 2022-10-06 PROCEDURE — 1240000000 HC EMOTIONAL WELLNESS R&B

## 2022-10-06 PROCEDURE — 94761 N-INVAS EAR/PLS OXIMETRY MLT: CPT

## 2022-10-06 PROCEDURE — 99223 1ST HOSP IP/OBS HIGH 75: CPT | Performed by: INTERNAL MEDICINE

## 2022-10-06 PROCEDURE — 81003 URINALYSIS AUTO W/O SCOPE: CPT

## 2022-10-06 PROCEDURE — 99239 HOSP IP/OBS DSCHRG MGMT >30: CPT | Performed by: INTERNAL MEDICINE

## 2022-10-06 PROCEDURE — 80048 BASIC METABOLIC PNL TOTAL CA: CPT

## 2022-10-06 PROCEDURE — 2580000003 HC RX 258: Performed by: NURSE PRACTITIONER

## 2022-10-06 RX ORDER — BUDESONIDE AND FORMOTEROL FUMARATE DIHYDRATE 160; 4.5 UG/1; UG/1
2 AEROSOL RESPIRATORY (INHALATION) 2 TIMES DAILY
Status: DISCONTINUED | OUTPATIENT
Start: 2022-10-06 | End: 2022-10-18 | Stop reason: HOSPADM

## 2022-10-06 RX ORDER — HYDROXYZINE 50 MG/1
50 TABLET, FILM COATED ORAL 3 TIMES DAILY PRN
Status: DISCONTINUED | OUTPATIENT
Start: 2022-10-06 | End: 2022-10-18 | Stop reason: HOSPADM

## 2022-10-06 RX ORDER — ACETAMINOPHEN 325 MG/1
650 TABLET ORAL EVERY 6 HOURS PRN
Status: DISCONTINUED | OUTPATIENT
Start: 2022-10-06 | End: 2022-10-18 | Stop reason: HOSPADM

## 2022-10-06 RX ORDER — DIPHENHYDRAMINE HYDROCHLORIDE 50 MG/ML
50 INJECTION INTRAMUSCULAR; INTRAVENOUS EVERY 6 HOURS PRN
Status: DISCONTINUED | OUTPATIENT
Start: 2022-10-06 | End: 2022-10-18 | Stop reason: HOSPADM

## 2022-10-06 RX ORDER — LACTOBACILLUS RHAMNOSUS GG 10B CELL
1 CAPSULE ORAL DAILY
Status: DISCONTINUED | OUTPATIENT
Start: 2022-10-06 | End: 2022-10-18 | Stop reason: HOSPADM

## 2022-10-06 RX ORDER — HALOPERIDOL 5 MG/ML
5 INJECTION INTRAMUSCULAR EVERY 6 HOURS PRN
Status: DISCONTINUED | OUTPATIENT
Start: 2022-10-06 | End: 2022-10-18 | Stop reason: HOSPADM

## 2022-10-06 RX ORDER — LIDOCAINE 4 G/G
1 PATCH TOPICAL DAILY
Status: DISCONTINUED | OUTPATIENT
Start: 2022-10-07 | End: 2022-10-18 | Stop reason: HOSPADM

## 2022-10-06 RX ORDER — LIDOCAINE 4 G/G
1 PATCH TOPICAL DAILY
Status: CANCELLED | OUTPATIENT
Start: 2022-10-07

## 2022-10-06 RX ORDER — TRAZODONE HYDROCHLORIDE 50 MG/1
50 TABLET ORAL NIGHTLY PRN
Status: DISCONTINUED | OUTPATIENT
Start: 2022-10-06 | End: 2022-10-17

## 2022-10-06 RX ORDER — HALOPERIDOL 5 MG
5 TABLET ORAL EVERY 6 HOURS PRN
Status: DISCONTINUED | OUTPATIENT
Start: 2022-10-06 | End: 2022-10-18 | Stop reason: HOSPADM

## 2022-10-06 RX ORDER — BUDESONIDE AND FORMOTEROL FUMARATE DIHYDRATE 160; 4.5 UG/1; UG/1
2 AEROSOL RESPIRATORY (INHALATION) 2 TIMES DAILY
Status: CANCELLED | OUTPATIENT
Start: 2022-10-06

## 2022-10-06 RX ORDER — MAGNESIUM HYDROXIDE/ALUMINUM HYDROXICE/SIMETHICONE 120; 1200; 1200 MG/30ML; MG/30ML; MG/30ML
30 SUSPENSION ORAL EVERY 6 HOURS PRN
Status: DISCONTINUED | OUTPATIENT
Start: 2022-10-06 | End: 2022-10-18 | Stop reason: HOSPADM

## 2022-10-06 RX ORDER — OLANZAPINE 5 MG/1
5 TABLET ORAL 2 TIMES DAILY
Status: DISCONTINUED | OUTPATIENT
Start: 2022-10-06 | End: 2022-10-08

## 2022-10-06 RX ORDER — ALBUTEROL SULFATE 90 UG/1
2 AEROSOL, METERED RESPIRATORY (INHALATION) EVERY 4 HOURS PRN
Status: CANCELLED | OUTPATIENT
Start: 2022-10-06

## 2022-10-06 RX ORDER — DOCUSATE SODIUM 100 MG/1
100 CAPSULE, LIQUID FILLED ORAL 3 TIMES DAILY PRN
Status: DISCONTINUED | OUTPATIENT
Start: 2022-10-06 | End: 2022-10-18 | Stop reason: HOSPADM

## 2022-10-06 RX ORDER — IBUPROFEN 200 MG
200 TABLET ORAL EVERY 6 HOURS PRN
Status: DISCONTINUED | OUTPATIENT
Start: 2022-10-06 | End: 2022-10-18 | Stop reason: HOSPADM

## 2022-10-06 RX ORDER — ALBUTEROL SULFATE 90 UG/1
2 AEROSOL, METERED RESPIRATORY (INHALATION) EVERY 4 HOURS PRN
Status: DISCONTINUED | OUTPATIENT
Start: 2022-10-06 | End: 2022-10-18 | Stop reason: HOSPADM

## 2022-10-06 RX ORDER — POLYETHYLENE GLYCOL 3350 17 G/17G
17 POWDER, FOR SOLUTION ORAL DAILY PRN
Status: DISCONTINUED | OUTPATIENT
Start: 2022-10-06 | End: 2022-10-18 | Stop reason: HOSPADM

## 2022-10-06 RX ORDER — OLANZAPINE 10 MG/1
5 TABLET ORAL 2 TIMES DAILY
Status: CANCELLED | OUTPATIENT
Start: 2022-10-06

## 2022-10-06 RX ADMIN — IPRATROPIUM BROMIDE 0.5 MG: 0.5 SOLUTION RESPIRATORY (INHALATION) at 06:49

## 2022-10-06 RX ADMIN — IPRATROPIUM BROMIDE 0.5 MG: 0.5 SOLUTION RESPIRATORY (INHALATION) at 15:58

## 2022-10-06 RX ADMIN — IPRATROPIUM BROMIDE 0.5 MG: 0.5 SOLUTION RESPIRATORY (INHALATION) at 10:37

## 2022-10-06 RX ADMIN — BUDESONIDE AND FORMOTEROL FUMARATE DIHYDRATE 2 PUFF: 160; 4.5 AEROSOL RESPIRATORY (INHALATION) at 07:00

## 2022-10-06 RX ADMIN — BUDESONIDE AND FORMOTEROL FUMARATE DIHYDRATE 2 PUFF: 160; 4.5 AEROSOL RESPIRATORY (INHALATION) at 21:17

## 2022-10-06 RX ADMIN — ENOXAPARIN SODIUM 40 MG: 100 INJECTION SUBCUTANEOUS at 08:47

## 2022-10-06 RX ADMIN — ACETAMINOPHEN 650 MG: 325 TABLET, FILM COATED ORAL at 04:51

## 2022-10-06 RX ADMIN — TRAZODONE HYDROCHLORIDE 50 MG: 50 TABLET ORAL at 21:17

## 2022-10-06 RX ADMIN — OLANZAPINE 5 MG: 10 TABLET ORAL at 08:45

## 2022-10-06 RX ADMIN — SODIUM CHLORIDE, PRESERVATIVE FREE 10 ML: 5 INJECTION INTRAVENOUS at 08:47

## 2022-10-06 RX ADMIN — HYDROXYZINE HYDROCHLORIDE 50 MG: 50 TABLET ORAL at 21:17

## 2022-10-06 RX ADMIN — OLANZAPINE 5 MG: 5 TABLET, FILM COATED ORAL at 21:17

## 2022-10-06 RX ADMIN — HALOPERIDOL LACTATE 5 MG: 5 INJECTION, SOLUTION INTRAMUSCULAR at 21:16

## 2022-10-06 RX ADMIN — IBUPROFEN 200 MG: 200 TABLET, FILM COATED ORAL at 21:17

## 2022-10-06 RX ADMIN — DIPHENHYDRAMINE HYDROCHLORIDE 50 MG: 50 INJECTION, SOLUTION INTRAMUSCULAR; INTRAVENOUS at 21:16

## 2022-10-06 ASSESSMENT — SLEEP AND FATIGUE QUESTIONNAIRES
DO YOU HAVE DIFFICULTY SLEEPING: YES
SLEEP PATTERN: DIFFICULTY FALLING ASLEEP;DISTURBED/INTERRUPTED SLEEP;RESTLESSNESS
DO YOU USE A SLEEP AID: NO
AVERAGE NUMBER OF SLEEP HOURS: 4

## 2022-10-06 ASSESSMENT — PAIN SCALES - GENERAL
PAINLEVEL_OUTOF10: 0
PAINLEVEL_OUTOF10: 7

## 2022-10-06 ASSESSMENT — LIFESTYLE VARIABLES
HOW OFTEN DO YOU HAVE A DRINK CONTAINING ALCOHOL: 4 OR MORE TIMES A WEEK
HOW MANY STANDARD DRINKS CONTAINING ALCOHOL DO YOU HAVE ON A TYPICAL DAY: 5 OR 6

## 2022-10-06 NOTE — PROGRESS NOTES
*Patient was very excited about learning more about God    *Patient would go to one topic and then go to another    *Patient stated that he will go to different churches with his friends when he gets discharged    *Patient insisted on going to the [address] [place] [address] with writer and he had a sitting  in the room and could not go at this time, he agreed that they would ask the Doctor and get permission, he did not want to get the staff person in trouble. *Spiritual Care will follow as needed       10/06/22 1146   Encounter Summary   Encounter Overview/Reason  Spiritual/Emotional Needs   Service Provided For: Patient   Referral/Consult From: Patient;Nurse   Support System Friends/neighbors   Last Encounter  10/06/22   Complexity of Encounter Moderate   Begin Time 1100   End Time  1115   Total Time Calculated 15 min   Spiritual/Emotional needs   Type Spiritual Distress   Assessment/Intervention/Outcome   Assessment Anxious; Coping; Hopeful   Intervention Active listening;Discussed belief system/Evangelical practices/baron;Discussed relationship with God;Nurtured Hope;Prayer (assurance of)/[place];Sustaining Presence/Ministry of presence   Outcome Receptive; Less anxious, Less agitated;Expressed Gratitude;Expressed feelings, needs, and concerns;Engaged in conversation; Connection/Belonging; Acceptance

## 2022-10-06 NOTE — PROGRESS NOTES
Family called to receive update on Pt. Writer updated caller and answered all questions and concerns.

## 2022-10-06 NOTE — H&P
[facility] Internal Medicine  Sheron Pichardo MD; Jas Vo MD; Bony Andre MD; MD Adelina Boston MD; MD FREDDIE [facility] Internal Medicine   [facility]    HISTORY AND PHYSICAL EXAMINATION            Date:   10/6/2022  Patient name:  Jennifer Aguirre  Date of admission:  10/6/2022  4:47 PM  MRN:   [id number]  Account:  [de-identified]  YOB: 1960  PCP:    Ranulfo Meraz NP, APRN - CNP  Room:   [address] [place] [address]  Code Status:    Full Code    Chief Complaint:     No chief complaint on file. COPD    History Obtained From:     Patient medical record nursing staff    History of Present Illness:     Jennifer Aguirre is a 60 y.o. Unavailable / unknown male who presents with No chief complaint on file. and is admitted to the hospital for the management of <principal problem not specified>. 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 Authorizing Provider   albuterol sulfate HFA (VENTOLIN HFA) 108 (90 Base) MCG/ACT inhaler Inhale 2 puffs into the lungs every 4 hours as needed for Wheezing    Historical Provider, MD   diazePAM (VALIUM) 5 MG/ML solution Take 5 mg by mouth every 8 hours as needed (anxiety or sleep).     Historical Provider, MD docusate sodium (COLACE) 100 MG capsule Take 100 mg by mouth 3 times daily as needed for Constipation    Historical Provider, MD   ipratropium (ATROVENT) 0.02 % nebulizer solution Take 0.5 mg by nebulization 4 times daily    Historical Provider, MD   lactobacillus acidophilus [facility]) Take 4 tablets by mouth 3 times daily    Historical Provider, MD   Acidophilus Lactobacillus CAPS Take 1 capsule by mouth daily    Historical Provider, MD   tiZANidine (ZANAFLEX) 2 MG tablet Take 2 mg by mouth every 6 hours as needed 9/2/22   Historical Provider, MD   traMADol (ULTRAM) 50 MG tablet Take 50 mg by mouth every 6 hours as needed. 9/7/22 10/7/22  Historical Provider, MD   acetaminophen (TYLENOL) 500 MG tablet Take 500 mg by mouth every 6 hours as needed for Pain    Historical Provider, MD   ibuprofen (ADVIL;MOTRIN) 200 MG tablet Take 200 mg by mouth every 6 hours as needed for Pain Pt taking 2 pills prn    Historical Provider, MD   mometasone-formoterol (DULERA) 200-5 MCG/ACT inhaler Inhale 2 puffs into the lungs 2 times daily 6/1/22   Michele Kitchen MD   albuterol (PROVENTIL) (2.5 MG/3ML) 0.083% nebulizer solution Take 3 mLs by nebulization every 6 hours as needed for Wheezing  Patient not taking: No sig reported 8/25/21   Michele Kitchen MD   Multiple Vitamins-Minerals (MULTIVITAMIN ADULT PO) Take 1 tablet by mouth daily    Historical Provider, MD        Allergies:     Prednisone, Morphine, and Propoxyphene    Social History:     Tobacco:    reports that he has been smoking cigarettes. He has a 45.00 pack-year smoking history. He has never used smokeless tobacco.  Alcohol:      reports current alcohol use. Drug Use:  reports no history of drug use. Family History:     No family history on file. Review of Systems:     Positive and Negative as described in HPI.     CONSTITUTIONAL:  negative for fevers, chills, sweats, fatigue, weight loss  HEENT:  negative for vision, hearing changes, runny nose, throat pain  RESPIRATORY:  negative for shortness of breath, cough, congestion, wheezing  CARDIOVASCULAR:  negative for chest pain, palpitations  GASTROINTESTINAL:  negative for nausea, vomiting, diarrhea, constipation, change in bowel habits, abdominal pain   GENITOURINARY:  negative for difficulty of urination, burning with urination, frequency   INTEGUMENT:  negative for rash, skin lesions, easy bruising   HEMATOLOGIC/LYMPHATIC:  negative for swelling/edema   ALLERGIC/IMMUNOLOGIC:  negative for urticaria , itching  ENDOCRINE:  negative increase in drinking, increase in urination, hot or cold intolerance  MUSCULOSKELETAL:  negative joint pains, muscle aches, swelling of joints  NEUROLOGICAL:  negative for headaches, dizziness, lightheadedness, numbness, pain, tingling extremities      Physical Exam:   There were no vitals taken for this visit. Temp (24hrs), Av.9 °F (36.6 °C), Min:97.4 °F (36.3 °C), Max:98.3 °F (36.8 °C)    No results for input(s): POCGLU in the last 72 hours.   No intake or output data in the 24 hours ending 10/06/22 1720  Severe protein calorie malnutrition  Very low body fat  Significant muscle wasting noted COPD  General Appearance: alert, well appearing, and in no acute distress  Mental status: oriented to person, place, and time  Head: normocephalic, atraumatic  Eye: no icterus, redness, pupils equal and reactive, extraocular eye movements intact, conjunctiva clear  Ear: normal external ear, no discharge, hearing intact  Nose: no drainage noted  Mouth: mucous membranes moist  Neck: supple, no carotid bruits, thyroid not palpable  Lungs: Bilateral equal air entry, clear to ausculation, no wheezing, rales or rhonchi, normal effort  Cardiovascular: normal rate, regular rhythm, no murmur, gallop, rub  Abdomen: Soft, nontender, nondistended, normal bowel sounds, no hepatomegaly or splenomegaly  Neurologic: There are no new focal motor or sensory deficits, normal muscle tone and bulk, no abnormal sensation, normal speech, cranial nerves II through XII grossly intact  Skin: No gross lesions, rashes, bruising or bleeding on exposed skin area  Extremities: peripheral pulses palpable, no pedal edema or calf pain with palpation      Investigations:      Laboratory Testing:  Recent Results (from the past 24 hour(s))   Basic Metabolic Panel w/ Reflex to MG    Collection Time: 10/06/22  5:15 AM   Result Value Ref Range    Glucose 99 70 - 99 mg/dL    BUN 12 8 - 23 mg/dL    Creatinine 0.63 (L) 0.70 - 1.20 mg/dL    Est, Glom Filt Rate >60 >60 mL/min/1.73m2    Calcium 9.2 8.6 - 10.4 mg/dL    Sodium 140 135 - 144 mmol/L    Potassium 4.0 3.7 - 5.3 mmol/L    Chloride 101 98 - 107 mmol/L    CO2 31 20 - 31 mmol/L    Anion Gap 8 (L) 9 - 17 mmol/L   CBC    Collection Time: 10/06/22  5:15 AM   Result Value Ref Range    WBC 17.3 (H) 3.5 - 11.0 k/uL    RBC 3.49 (L) 4.5 - 5.9 m/uL    Hemoglobin 11.4 (L) 13.5 - 17.5 g/dL    Hematocrit 34.4 (L) 41 - 53 %    MCV 98.6 80 - 100 fL    MCH 32.8 26 - 34 pg    MCHC 33.2 31 - 37 g/dL    RDW 13.5 11.5 - 14.9 %    Platelets 204 090 - 939 k/uL    MPV 6.8 6.0 - 12.0 fL   Urinalysis    Collection Time: 10/06/22  6:37 AM   Result Value Ref Range    Color, UA Yellow Yellow    Turbidity UA Clear Clear    Glucose, Ur NEGATIVE NEGATIVE    Bilirubin Urine NEGATIVE NEGATIVE    Ketones, Urine NEGATIVE NEGATIVE    Specific Gravity, UA 1.010 1.000 - 1.030    Urine Hgb NEGATIVE NEGATIVE    pH, UA 7.0 5.0 - 8.0    Protein, UA NEGATIVE NEGATIVE    Urobilinogen, Urine Normal Normal    Nitrite, Urine NEGATIVE NEGATIVE    Leukocyte Esterase, Urine NEGATIVE NEGATIVE    Urinalysis Comments       Microscopic exam not performed based on chemical results unless requested in original order. Imaging/Diagnostics:  No results found.     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 Jordan Parrish, [facility], APRN - CNP    Please note that this chart was generated using voice recognition Dragon dictation software. Although every effort was made to ensure the accuracy of this automated transcription, some errors in transcription may have occurred.

## 2022-10-06 NOTE — DISCHARGE SUMMARY
[facility] Internal Medicine    Discharge Summary     Patient ID: Cecelia Essex  :  1960   MRN: [id number]     ACCOUNT:  [de-identified]   Patient's PCP: Sameera Slaughter NP, APRN - CNP  Admit Date: 10/3/2022   Discharge Date: 10/6/22  Length of Stay: 3  Code Status:  Full Code  Admitting Physician: Adams Barrios MD  Discharge Physician: Holley Shone, MD     Active Discharge Diagnoses:     Primary Problem  Delirium      [place] Problems    Diagnosis Date Noted    Delirium [R41.0] 10/03/2022     Priority: Medium       Admission Condition:  fair     Discharged Condition: fair    Hospital Stay:     Hospital Course:  Cecelia Essex is a 60 y.o. male who was admitted for the management of Delirium , presented to ER with No chief complaint on file. 50-year-old male admitted for delirium  Presenting from outside ED where he was seen for AMS, racing thoughts's thought to be secondary to steroids that were given for COPD exacerbation and pneumonia suspected steroid-induced psychosis.   Patient also had significant amounts of Flexeril tramadol and Valium in the few days prior to presentation in addition to antipsychotics that he was given in ER when he presented with agitation  Admitted for for psych eval  Denies any fevers chills cough abdominal pain nausea vomiting diarrhea or urinary symptoms  UDS positive only for benzodiazepines at outside hospital, MRI brain unremarkable on  at outside hospital Per Care Everywhere  Urinalysis negative for infection  Significant leukocytosis-known CLL-heme-onc consulted-for monitoring only not for treatment    Patient was discharged to [facility]    Significant therapeutic interventions: As above    Significant Diagnostic Studies:   Labs / Micro:    Lab Results   Component Value Date    WBC 17.3 (H) 10/06/2022    HGB 11.4 (L) 10/06/2022    HCT 34.4 (L) 10/06/2022    MCV 98.6 10/06/2022     10/06/2022          Lab Results   Component Value Date/Time     10/06/2022 05:15 AM    K 4.0 10/06/2022 05:15 AM     10/06/2022 05:15 AM    CO2 31 10/06/2022 05:15 AM    BUN 12 10/06/2022 05:15 AM    CREATININE 0.63 10/06/2022 05:15 AM    GLUCOSE 99 10/06/2022 05:15 AM    CALCIUM 9.2 10/06/2022 05:15 AM          Radiology:    No results found. Consultations:    Consults:     Final Specialist Recommendations/Findings:   IP CONSULT TO SOCIAL WORK  IP CONSULT TO PSYCHIATRY  IP CONSULT TO HEM/ONC      The patient was seen and examined on day of discharge and this discharge summary is in conjunction with any daily progress note from day of discharge. Discharge plan:     Disposition: bhi      Instructions to Patient: Keep follow-up appointments      Requiring Further Evaluation/Follow Up POST HOSPITALIZATION/Incidental Findings:     Physician Follow Up:     No follow-up provider specified.      Activity: Resume as directed    Discharge Medications:      Medication List        ASK your doctor about these medications      acetaminophen 500 MG tablet  Commonly known as: TYLENOL     * Ventolin  (90 Base) MCG/ACT inhaler  Generic drug: albuterol sulfate HFA     * albuterol (2.5 MG/3ML) 0.083% nebulizer solution  Commonly known as: PROVENTIL  Take 3 mLs by nebulization every 6 hours as needed for Wheezing     diazePAM 5 MG/ML solution  Commonly known as: VALIUM     docusate sodium 100 MG capsule  Commonly known as: COLACE     ibuprofen 200 MG tablet  Commonly known as: ADVIL;MOTRIN     ipratropium 0.02 % nebulizer solution  Commonly known as: ATROVENT     * lactobacillus acidophilus     * Acidophilus Lactobacillus Caps     mometasone-formoterol 200-5 MCG/ACT inhaler  Commonly known as: Dulera  Inhale 2 puffs into the lungs 2 times daily     MULTIVITAMIN ADULT PO     tiZANidine 2 MG tablet  Commonly known as: ZANAFLEX     traMADol 50 MG tablet  Commonly known as: ULTRAM           * This list has 4 medication(s) that are the same as other medications prescribed for you. Read the directions carefully, and ask your doctor or other care provider to review them with you. Time Spent on discharge is  35 mins in patient examination, evaluation, counseling as well as medication reconciliation, prescriptions for required medications, discharge plan and follow up. Electronically signed by   Holley Shone, MD  10/6/2022  12:13 PM      Thank you Dr. Sameera Slaughter NP, APRN - CNP for the opportunity to be involved in this patient's care.

## 2022-10-06 NOTE — PROGRESS NOTES
_                         Today's Date: 10/6/2022  Patient Name: Den Ramos  Date of admission: 10/3/2022 11:45 PM  Patient's age: 60 y.o., 1960  Admission Dx: Delirium [R41.0]      Requesting Physician: Bibiana Conrad MD    CHIEF COMPLAINT: Delirium. 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 Consult for CLL. SUBJECTIVE:  . Patient was seen and examined. Clinically stable. No events overnight. Mentally stable. No confusion. No agitation. No fever. No other events. BRIEF CASE HISTORY:    The patient is a 60 y.o.  male who is admitted to the hospital for further management of altered mental status. Patient had delirium. He has COPD. He was on steroids. It was felt that his symptoms could be related to steroid psychosis. Patient is already feeling much better. We have been consulted for leukocytosis. Patient had history of CLL diagnosed several years back. He had follow-up with oncology in [facility]. There was no need for any previous treatment for CLL. Patient denies any fever or night sweats. No weight loss or decreased appetite. No enlarged lymph nodes. No repeated infections. Past Medical History:   has a past medical history of Asthma, COPD (chronic obstructive pulmonary disease) ([address] and Emphysema of lung ([address] Past Surgical History:   has a past surgical history that includes Skin graft (Bilateral). Family History: family history is not on file. Social History:   reports that he has been smoking cigarettes. He has a 45.00 pack-year smoking history. He has never used smokeless tobacco. He reports current alcohol use. He reports that he does not use drugs. Medications:    Prior to Admission medications    Medication Sig Start Date End Date Taking?  Authorizing Provider   albuterol sulfate HFA (VENTOLIN HFA) 108 (90 Base) MCG/ACT inhaler Inhale 2 puffs into the lungs every 4 hours as needed for Wheezing   Yes Historical Provider, MD   diazePAM (VALIUM) 5 MG/ML solution Take 5 mg by mouth every 8 hours as needed (anxiety or sleep). Yes Historical Provider, MD   docusate sodium (COLACE) 100 MG capsule Take 100 mg by mouth 3 times daily as needed for Constipation   Yes Historical Provider, MD   ipratropium (ATROVENT) 0.02 % nebulizer solution Take 0.5 mg by nebulization 4 times daily   Yes Historical Provider, MD   lactobacillus acidophilus [facility]) Take 4 tablets by mouth 3 times daily   Yes Historical Provider, MD   Acidophilus Lactobacillus CAPS Take 1 capsule by mouth daily   Yes Historical Provider, MD   tiZANidine (ZANAFLEX) 2 MG tablet Take 2 mg by mouth every 6 hours as needed 9/2/22   Historical Provider, MD   traMADol (ULTRAM) 50 MG tablet Take 50 mg by mouth every 6 hours as needed. 9/7/22 10/7/22  Historical Provider, MD   acetaminophen (TYLENOL) 500 MG tablet Take 500 mg by mouth every 6 hours as needed for Pain    Historical Provider, MD   ibuprofen (ADVIL;MOTRIN) 200 MG tablet Take 200 mg by mouth every 6 hours as needed for Pain Pt taking 2 pills prn    Historical Provider, MD   mometasone-formoterol (DULERA) 200-5 MCG/ACT inhaler Inhale 2 puffs into the lungs 2 times daily 6/1/22   Chetan Farmer MD   albuterol (PROVENTIL) (2.5 MG/3ML) 0.083% nebulizer solution Take 3 mLs by nebulization every 6 hours as needed for Wheezing  Patient not taking: No sig reported 8/25/21   Chetan Farmer MD   Multiple Vitamins-Minerals (MULTIVITAMIN ADULT PO) Take 1 tablet by mouth daily    Historical Provider, MD     No current facility-administered medications for this encounter. No current outpatient medications on file.      Facility-Administered Medications Ordered in Other Encounters   Medication Dose Route Frequency Provider Last Rate Last Admin    albuterol sulfate HFA (PROVENTIL;VENTOLIN;PROAIR) 108 (90 Base) MCG/ACT inhaler 2 puff  2 puff Inhalation Q4H PRN Arlette Conroy MD        budesonide-formoterol [facility]) 160-4.5 MCG/ACT inhaler 2 puff  2 puff Inhalation BID Manuel Alejo MD        ipratropium (ATROVENT) 0.02 % nebulizer solution 0.5 mg  0.5 mg Nebulization 4x Daily Manuel Alejo MD        Flaco Hutchins ON 10/7/2022] lidocaine 4 % external patch 1 patch  1 patch TransDERmal Daily Manuel Alejo MD        OLANZapine (ZYPREXA) tablet 5 mg  5 mg Oral BID Manuel Alejo MD        lactobacillus (CULTURELLE) capsule 1 capsule  1 capsule Oral Daily Brett Antoinette Birch MD        docusate sodium (COLACE) capsule 100 mg  100 mg Oral TID PRN Will Peralta MD        ibuprofen (ADVIL;MOTRIN) tablet 200 mg  200 mg Oral Q6H PRN Brett Birch MD           Allergies:  Prednisone, Morphine, and Propoxyphene    REVIEW OF SYSTEMS:      General: Positive for weakness and r fatigue. No unanticipated weight loss or decreased appetite. No fever or chills. Eyes: No blurred vision, eye pain or double vision. Ears: No hearing problems or drainage. No tinnitus. Throat: No sore throat, problems with swallowing or dysphagia. Respiratory: No cough, sputum or hemoptysis. No shortness of breath. No pleuritic chest pain. Cardiovascular: No chest pain, orthopnea or PND. No lower extremity edema. No palpitation. Gastrointestinal: No problems with swallowing. No abdominal pain or bloating. No nausea or vomiting. No diarrhea or constipation. No GI bleeding. Genitourinary: No dysuria, hematuria, frequency or urgency. Musculoskeletal: No muscle aches or pains. No limitation of movement. No back pain. No gait disturbance, No joint complaints. Dermatologic: No skin rashes or pruritus. No skin lesions or discolorations. Psychiatric: No depression, anxiety, or stress or signs of schizophrenia. Hematologic: No history of bleeding tendency. No bruises or ecchymosis. No history of clotting problems. Infectious disease: No fever, chills or frequent infections. Endocrine: No polydipsia or polyuria.  No temperature intolerance. Neurologic: No headaches or dizziness. No weakness or numbness of the extremities. No changes in balance, coordination,  memory, . Allergic/Immunologic: No nasal congestion or hives. No repeated infections. PHYSICAL EXAM:      /85   Pulse 79   Temp 97.7 °F (36.5 °C) (Oral)   Resp 18   Ht 6' (1.829 m)   Wt 120 lb 2.4 oz (54.5 kg)   SpO2 95%   BMI 16.30 kg/m²    Temp (24hrs), Av.7 °F (36.5 °C), Min:97.4 °F (36.3 °C), Max:98.1 °F (36.7 °C)      General appearance - not in pain or distress  Mental status - alert and oriented  Eyes - pupils equal and reactive, extraocular eye movements intact  Ears - bilateral TM's and external ear canals normal  Nose - normal and patent, no erythema, discharge or polyps  Mouth - mucous membranes moist, pharynx normal without lesions  Neck - supple, no significant adenopathy  Lymphatics - no palpable lymphadenopathy, no hepatosplenomegaly  Chest - clear to auscultation, no wheezes, rales or rhonchi, symmetric air entry  Heart - normal rate, regular rhythm, normal S1, S2, no murmurs, rubs, clicks or gallops  Abdomen - soft, nontender, nondistended, no masses or organomegaly  Neurological - alert, oriented, normal speech, no focal findings or movement disorder noted  Musculoskeletal - no joint tenderness, deformity or swelling  Extremities - peripheral pulses normal, no pedal edema, no clubbing or cyanosis  Skin - normal coloration and turgor, no rashes, no suspicious skin lesions noted           DATA:      Labs:       CBC:   Recent Labs     10/05/22  0645 10/06/22  0515   WBC 27.0* 17.3*   HGB 13.4* 11.4*   HCT 40.6* 34.4*    335     BMP:   Recent Labs     10/05/22  0645 10/06/22  0515    140   K 4.2 4.0   CO2 29 31   BUN 7* 12   CREATININE 0.74 0.63*   LABGLOM >60 >60   GLUCOSE 136* 99     PT/INR: No results for input(s): PROTIME, INR in the last 72 hours. APTT:No results for input(s): APTT in the last 72 hours.   LIVER PROFILE:No results for input(s): AST, ALT, LABALBU in the last 72 hours. XR CHEST (2 VW)  Narrative: EXAMINATION:  TWO XRAY VIEWS OF THE CHEST    10/7/2020 2:59 pm    COMPARISON:  None. HISTORY:  ORDERING SYSTEM PROVIDED HISTORY: Chronic obstructive pulmonary disease,  unspecified COPD type ([address]  TECHNOLOGIST PROVIDED HISTORY:  Reason for Exam: COPD; chronic SOB; hx leukemia and asbestos exposure    FINDINGS:  Cardiac silhouette is normal in size. Small bilateral pleural effusions or  chronic pleural/parenchymal scarring. There is loss of volume within the  left hemithorax with mediastinal shift to the left. Calcified juxtapleural  plaques identified within the left lower lung are probably also in the left  lung apex. There is opacification of the left lung apex. Impression: Loss of volume of the left hemithorax and mediastinal shift to the left. Calcified pleural plaques in the left upper lung and lung base, most  compatible with provided history of asbestosis. Bibasilar pleural-parenchymal scarring versus small bowel pleural effusions. 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 Currently patient is doing fine and is almost back to normal mentation. He was hospitalized because of delirium which was felt to be related to steroid psychosis. Clinically improving. Almost back to baseline. Reviewed records related to CLL. Labs were reviewed and explained. Patient had previous follow-up and monitoring by an oncologist in [facility]. He has stage I CLL and there was no indication for treatment. I reviewed with the patient the results of the labs and management plans for CLL.   Still I do not see any indications to start treatment. Recommendations for close monitoring. Labs will be repeated every 3 to 6 months and treatment will be considered for any significant changes. Patient will be discharged today. He will be followed as outpatient. Altagracia Delvalle MD, MD                            [address] [place] [address] [facility]                            This note is created with the assistance of a speech recognition program.  While intending to generate a document that actually reflects the content of the visit, the document can still have some errors including those of syntax and sound a like substitutions which may escape proof reading. It such instances, actual meaning can be extrapolated by contextual diversion.

## 2022-10-06 NOTE — PLAN OF CARE
Problem: Discharge Planning  Goal: Discharge to home or other facility with appropriate resources  10/6/2022 0430 by Rick Cullen RN  Outcome: Progressing     Problem: Safety - Adult  Goal: Free from fall injury  10/6/2022 0430 by Rick Cullen RN  Outcome: Progressing     Problem: Skin/Tissue Integrity  Goal: Absence of new skin breakdown  Description: 1. Monitor for areas of redness and/or skin breakdown  2. Assess vascular access sites hourly  3. Every 4-6 hours minimum:  Change oxygen saturation probe site  4. Every 4-6 hours:  If on nasal continuous positive airway pressure, respiratory therapy assess nares and determine need for appliance change or resting period.   10/6/2022 0430 by Rick Cullen RN  Outcome: Progressing     Problem: Respiratory - Adult  Goal: Achieves optimal ventilation and oxygenation  10/6/2022 0430 by Rick Cullen RN  Outcome: Progressing     Problem: Pain  Goal: Verbalizes/displays adequate comfort level or baseline comfort level  10/6/2022 0430 by Rick Cullen RN  Outcome: Progressing

## 2022-10-06 NOTE — PROGRESS NOTES
Chart amended to reflect the patient has and had a , not a constant observer.  1:1 for unpredictable behaviors

## 2022-10-06 NOTE — BH NOTE
[address] [facility]  Admission Note     Admission Type:   Admission Type: Involuntary    Reason for admission:  Reason for Admission: delierum related to steroid psychosis      Addictive Behavior:   Addictive Behavior  In the Past 3 Months, Have You Felt or Has Someone Told You That You Have a Problem With  : None    Medical Problems:   Past Medical History:   Diagnosis Date    Asthma     COPD (chronic obstructive pulmonary disease) ([facility])     Emphysema of lung ([facility])        Status EXAM:  Mental Status and Behavioral Exam  Normal: No  Level of Assistance: Independent/Self  Facial Expression: Exaggerated  Affect: Congruent  Level of Consciousness: Alert  Frequency of Checks: 4 times per hour, close  Mood:Normal: No  Mood: Anxious  Motor Activity:Normal: Yes  Eye Contact: Good  Observed Behavior: Cooperative, Friendly, Preoccupied  Sexual Misconduct History: Current - no  Preception: [place] to person, [place] to time, [place] to place, [place] to situation  Attention:Normal: No  Attention: Unable to concentrate  Thought Processes: Circumstantial  Thought Content:Normal: No  Thought Content: Delusions, Ideas of influence  Depression Symptoms: Impaired concentration  Anxiety Symptoms: Generalized  June Symptoms: Flight of ideas, Less need to sleep  Hallucinations: Auditory (comment), Visual (comment)  Delusions: Yes  Delusions: Controlling  Memory:Normal: Yes  Insight and Judgment: No  Insight and Judgment: Poor judgment, Poor insight    Tobacco Screening:  Practical Counseling, on admission, dillon X, if applicable and completed (first 3 are required if patient doesn't refuse):             ( X) Recognizing danger situations (included triggers and roadblocks)                    ( X) Coping skills (new ways to manage stress,relaxation techniques, changing routine, distraction)                                                           ( X) Basic information about quitting (benefits of quitting, techniques in how to quit, available resources  ( ) Referral for counseling faxed to [facility]                                                                                                                   ( X) Patient refused counseling  ( ) Patient has not smoked in the last 30 days    Metabolic Screening:    No results found for: LABA1C    No results found for: CHOL  No results found for: TRIG  No results found for: HDL  No components found for: LDLCAL  No results found for: LABVLDL      Body mass index is 15.93 kg/m². BP Readings from Last 2 Encounters:   10/06/22 126/88   10/06/22 118/85           Pt admitted with followings belongings:  Dental Appliances: None  Vision - Corrective Lenses: Eyeglasses  Hearing Aid: None  Jewelry: None  Body Piercings Removed: N/A  Clothing: Pants, Shirt, Undergarments, Socks, Slippers, Jacket/Coat  Other Valuables: At home    Patient admitted to room 107 bed 1 Stepdown Unit at 1625 pm.   Patient changed into hospital attire, scanned with metal detector. Food and beverages provided to patient. Patient currently denies thoughts of self harm.            Ernesto Banda RN

## 2022-10-06 NOTE — BH NOTE
Patient given tobacco quitline number 97060448148 at this time, refusing to call at this time, states \" I just dont want to quit now\"- patient given information as to the dangers of long term tobacco use. Continue to reinforce the importance of tobacco cessation.

## 2022-10-06 NOTE — FLOWSHEET NOTE
Patient discharged to [facility], IV discontinued. Report called to [facility]. Taken per wheelchair with all belongings to  [facility] per staff.

## 2022-10-07 PROBLEM — F23 ACUTE PSYCHOSIS (HCC): Status: ACTIVE | Noted: 2022-10-07

## 2022-10-07 PROBLEM — E43 SEVERE MALNUTRITION (HCC): Status: ACTIVE | Noted: 2022-10-07

## 2022-10-07 PROCEDURE — 6370000000 HC RX 637 (ALT 250 FOR IP): Performed by: INTERNAL MEDICINE

## 2022-10-07 PROCEDURE — 6360000002 HC RX W HCPCS: Performed by: INTERNAL MEDICINE

## 2022-10-07 PROCEDURE — 99223 1ST HOSP IP/OBS HIGH 75: CPT | Performed by: PSYCHIATRY & NEUROLOGY

## 2022-10-07 PROCEDURE — APPSS60 APP SPLIT SHARED TIME 46-60 MINUTES: Performed by: NURSE PRACTITIONER

## 2022-10-07 PROCEDURE — 6370000000 HC RX 637 (ALT 250 FOR IP): Performed by: PSYCHIATRY & NEUROLOGY

## 2022-10-07 PROCEDURE — 94640 AIRWAY INHALATION TREATMENT: CPT

## 2022-10-07 PROCEDURE — 1240000000 HC EMOTIONAL WELLNESS R&B

## 2022-10-07 RX ADMIN — BUDESONIDE AND FORMOTEROL FUMARATE DIHYDRATE 2 PUFF: 160; 4.5 AEROSOL RESPIRATORY (INHALATION) at 08:31

## 2022-10-07 RX ADMIN — OLANZAPINE 5 MG: 5 TABLET, FILM COATED ORAL at 08:32

## 2022-10-07 RX ADMIN — ACETAMINOPHEN 650 MG: 325 TABLET, FILM COATED ORAL at 15:16

## 2022-10-07 RX ADMIN — Medication 1 CAPSULE: at 08:31

## 2022-10-07 RX ADMIN — HALOPERIDOL 5 MG: 5 TABLET ORAL at 19:51

## 2022-10-07 RX ADMIN — NICOTINE POLACRILEX 2 MG: 2 GUM, CHEWING BUCCAL at 11:49

## 2022-10-07 RX ADMIN — HALOPERIDOL 5 MG: 5 TABLET ORAL at 15:16

## 2022-10-07 RX ADMIN — IBUPROFEN 200 MG: 200 TABLET, FILM COATED ORAL at 19:51

## 2022-10-07 RX ADMIN — HYDROXYZINE HYDROCHLORIDE 50 MG: 50 TABLET ORAL at 19:50

## 2022-10-07 RX ADMIN — TRAZODONE HYDROCHLORIDE 50 MG: 50 TABLET ORAL at 19:50

## 2022-10-07 RX ADMIN — BUDESONIDE AND FORMOTEROL FUMARATE DIHYDRATE 2 PUFF: 160; 4.5 AEROSOL RESPIRATORY (INHALATION) at 19:50

## 2022-10-07 RX ADMIN — OLANZAPINE 5 MG: 5 TABLET, FILM COATED ORAL at 19:51

## 2022-10-07 RX ADMIN — ACETAMINOPHEN 650 MG: 325 TABLET, FILM COATED ORAL at 22:19

## 2022-10-07 RX ADMIN — HYDROXYZINE HYDROCHLORIDE 50 MG: 50 TABLET ORAL at 08:31

## 2022-10-07 RX ADMIN — DOCUSATE SODIUM 100 MG: 100 CAPSULE, LIQUID FILLED ORAL at 08:34

## 2022-10-07 RX ADMIN — IPRATROPIUM BROMIDE 0.5 MG: 0.5 SOLUTION RESPIRATORY (INHALATION) at 21:03

## 2022-10-07 RX ADMIN — DOCUSATE SODIUM 100 MG: 100 CAPSULE, LIQUID FILLED ORAL at 20:58

## 2022-10-07 RX ADMIN — POLYETHYLENE GLYCOL 3350 17 G: 17 POWDER, FOR SOLUTION ORAL at 20:58

## 2022-10-07 ASSESSMENT — PAIN - FUNCTIONAL ASSESSMENT
PAIN_FUNCTIONAL_ASSESSMENT: ACTIVITIES ARE NOT PREVENTED
PAIN_FUNCTIONAL_ASSESSMENT: ACTIVITIES ARE NOT PREVENTED

## 2022-10-07 ASSESSMENT — PAIN DESCRIPTION - ONSET: ONSET: ON-GOING

## 2022-10-07 ASSESSMENT — PAIN DESCRIPTION - DESCRIPTORS
DESCRIPTORS: SPASM
DESCRIPTORS: ACHING;DISCOMFORT
DESCRIPTORS: ACHING;DISCOMFORT;SORE;TENDER

## 2022-10-07 ASSESSMENT — PAIN SCALES - GENERAL
PAINLEVEL_OUTOF10: 3
PAINLEVEL_OUTOF10: 5
PAINLEVEL_OUTOF10: 6

## 2022-10-07 ASSESSMENT — PAIN DESCRIPTION - LOCATION
LOCATION: GENERALIZED
LOCATION: GENERALIZED
LOCATION: LEG

## 2022-10-07 ASSESSMENT — PAIN DESCRIPTION - FREQUENCY: FREQUENCY: CONTINUOUS

## 2022-10-07 ASSESSMENT — PAIN DESCRIPTION - ORIENTATION: ORIENTATION: RIGHT;LEFT

## 2022-10-07 ASSESSMENT — PAIN DESCRIPTION - PAIN TYPE: TYPE: CHRONIC PAIN

## 2022-10-07 NOTE — PROGRESS NOTES
Patient redirectable but continues to approach desk with demands to fill out grievances if needs weren't met. Patient encouraged to get rest and was accepting. Safety checks maintained. Will continue to monitor and provide support and reassurance as needed.

## 2022-10-07 NOTE — BH NOTE
[id number]  Initial Interdisciplinary Treatment Plan NO      Original treatment plan Date & Time: 10/7/22 0900    Admission Type:  Admission Type:  Involuntary    Reason for admission:   Reason for Admission: delierum related to steroid psychosis    Estimated Length of Stay:  5-7days  Estimated Discharge Date: to be determined by physician    PATIENT STRENGTHS:  Patient Strengths:   Patient Strengths and Limitations:   Addictive Behavior: Addictive Behavior  In the Past 3 Months, Have You Felt or Has Someone Told You That You Have a Problem With  : None  Medical Problems:  Past Medical History:   Diagnosis Date    Asthma     COPD (chronic obstructive pulmonary disease) ([facility] [address]     Emphysema of lung ([facility]      Status EXAM:Mental Status and Behavioral Exam  Normal: No  Level of Assistance: Independent/Self  Facial Expression: Hostile, Exaggerated  Affect: Inappropriate  Level of Consciousness: Alert  Frequency of Checks: 4 times per hour, close  Mood:Normal: No  Mood: Anxious, Labile, Irritable  Motor Activity:Normal: No  Motor Activity: Increased  Eye Contact: Fair  Observed Behavior: Preoccupied, Compulsive, Agitated  Sexual Misconduct History: Current - no  Preception: [place] to person, [place] to time, [place] to place, [place] to situation  Attention:Normal: No  Attention: Distractible, Unable to concentrate  Thought Processes: Circumstantial  Thought Content:Normal: No  Thought Content: Delusions, Ideas of influence, Preoccupations  Depression Symptoms: Impaired concentration, Increased irritability  Anxiety Symptoms: Generalized  June Symptoms: Flight of ideas, Increased energy, Labile, Poor judgment  Hallucinations: Unable to assess  Delusions: Yes  Delusions: Controlling, Paranoid, Somatic  Memory:Normal: Yes  Insight and Judgment: No  Insight and Judgment: Poor judgment, Poor insight, Unrealistic    EDUCATION:   Learner Progress Toward Treatment Goals: reviewed group plans and strategies for care    Method:group therapy, medication compliance, individualized assessments and care planning    Outcome: needs reinforcement    PATIENT GOALS: to save as many people as I can    PLAN/TREATMENT RECOMMENDATIONS:     continue group therapy , medications compliance, goal setting, individualized assessments and care, continue to monitor pt on unit      SHORT-TERM GOALS:   Time frame for Short-Term Goals: 5-7 days    LONG-TERM GOALS:  Time frame for Long-Term Goals: 6 months  Members Present in Team Meeting: See Signature Sheet    Shahrzad Hawkins RN

## 2022-10-07 NOTE — PLAN OF CARE
Problem: Safety - Adult  Goal: Free from fall injury  Outcome: Progressing  Flowsheets (Taken 10/7/2022 1740)  Free From Fall Injury: Instruct family/caregiver on patient safety  Note: Patient remains free from falls and verbalizes understanding of individual fall risks. Patient is wearing non-skid footwear and encouraged to see out staff for any assistance needed. Problem: ABCDS Injury Assessment  Goal: Absence of physical injury  Outcome: Progressing  Note: No injury noted this shift. Safe environment maintained. Safety checks continued Q 15 minutes and intermittently. Problem: Confusion  Goal: Confusion, delirium, dementia, or psychosis is improved or at baseline  Description: INTERVENTIONS:  1. Assess for possible contributors to thought disturbance, including medications, impaired vision or hearing, underlying metabolic abnormalities, dehydration, psychiatric diagnoses, and notify attending LIP  2. [place] high risk fall precautions, as indicated  3. Provide frequent short contacts to provide reality reorientation, refocusing and direction  4. Decrease environmental stimuli, including noise as appropriate  5. Monitor and intervene to maintain adequate nutrition, hydration, elimination, sleep and activity  6. If unable to ensure safety without constant attention obtain sitter and review sitter guidelines with assigned personnel  7. Initiate Psychosocial CNS and Spiritual Care consult, as indicated  Outcome: Not Progressing  Flowsheets (Taken 10/7/2022 1740)  Effect of thought disturbance (confusion, delirium, dementia, or psychosis) are managed with adequate functional status:   Provide frequent short contacts to provide reality reorientation, refocusing and direction   [place] high risk fall precautions, as indicated  Note: Patient denies any thoughts of harm to self or others and denies hallucinations. Patient is tangential, with flight of ideas and is religiously preoccupied.  Patient is impulsive and labile. Patient was given PRN Haldol 5mg PO x1 this shift with effective results. Patient is compliant with meds other than his breathing treatments and eating meals. Safe environment maintained. Safety checks continued Q 15 minutes and intermittently. Problem: Confusion  Goal: Confusion, delirium, dementia, or psychosis is improved or at baseline  Description: INTERVENTIONS:  1. Assess for possible contributors to thought disturbance, including medications, impaired vision or hearing, underlying metabolic abnormalities, dehydration, psychiatric diagnoses, and notify attending LIP  2. [place] high risk fall precautions, as indicated  3. Provide frequent short contacts to provide reality reorientation, refocusing and direction  4. Decrease environmental stimuli, including noise as appropriate  5. Monitor and intervene to maintain adequate nutrition, hydration, elimination, sleep and activity  6. If unable to ensure safety without constant attention obtain sitter and review sitter guidelines with assigned personnel  7. Initiate Psychosocial CNS and Spiritual Care consult, as indicated  Outcome: Not Progressing  Flowsheets (Taken 10/7/2022 1740)  Effect of thought disturbance (confusion, delirium, dementia, or psychosis) are managed with adequate functional status:   Provide frequent short contacts to provide reality reorientation, refocusing and direction   [place] high risk fall precautions, as indicated  Note: Patient denies any thoughts of harm to self or others and denies hallucinations. Patient is tangential, with flight of ideas and is religiously preoccupied. Patient is impulsive and labile. Patient was given PRN Haldol 5mg PO x1 this shift with effective results. Patient is compliant with meds other than his breathing treatments and eating meals. Safe environment maintained. Safety checks continued Q 15 minutes and intermittently.

## 2022-10-07 NOTE — PROGRESS NOTES
Behavioral Services  Medicare Certification Upon Admission    I certify that this patient's inpatient psychiatric hospital admission is medically necessary for:    [x] (1) Treatment which could reasonably be expected to improve this patient's condition,       [x] (2) Or for diagnostic study;     AND     [x](2) The inpatient psychiatric services are provided while the individual is under the care of a physician and are included in the individualized plan of care.     Estimated length of stay/service 2-9 days    Plan for post-hospital care -outpatient care    Electronically signed by Cecilio Gregg MD on 10/7/2022 at 10:21 AM

## 2022-10-07 NOTE — GROUP NOTE
Group Therapy Note    Date: 10/7/2022    Group Start Time: 0800  Group End Time: 0820  Group Topic: Community Meeting    BRAYDEN Banda RN        Group Therapy Note    Attendees: 7    Patient attended and participated in [employer] group. Patient identified goals for the day. Staff will continue to monitor.          Signature:  Ernesto Banda RN
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
## 2022-10-07 NOTE — PROGRESS NOTES
Pt is agitated as evidence by pacing, fidgeting, shaking fists at staff, cursing, demanding his needs be met immediately. Staff attempted to find and relieve the distress by talking to pt, refocusing on new activity, offering suggestions, checking for undiagnosed pain, etc. Pt offered prn IM medications (haldol and Benadryl) and pt was accepting. Pt currently in behavioral control. Patient redirectable at this time. Safety checks maintained. Will continue to monitor and provide support and reassurance as needed.

## 2022-10-07 NOTE — BH NOTE
Emergency Medication Follow-Up Note:    PRN medication of Haldol 5mg PO was effective as evidence by Patient regain behavioral control, absence of behavior and socializing with peers. Patient denies medication side effects. Will continue to monitor and provide support as needed.

## 2022-10-07 NOTE — PROGRESS NOTES
Nutrition Diagnosis:   Severe malnutrition related to inadequate protein-energy intake as evidenced by Criteria as identified in malnutrition assessment    Nutrition Interventions:   Food and/or Nutrient Delivery: Continue Current Diet, Continue Oral Nutrition Supplement  Nutrition Education/Counseling: No recommendation at this time  Coordination of Nutrition Care: Continue to monitor while inpatient       Goals:     Goals: PO intake 75% or greater       Nutrition Monitoring and Evaluation:      Food/Nutrient Intake Outcomes: Food and Nutrient Intake, Supplement Intake  Physical Signs/Symptoms Outcomes: Biochemical Data, GI Status, Fluid Status or Edema, Skin, Weight    Discharge Planning:    Continue current diet     Remi Pena, [address] [place] [address],   Contact: [phone]

## 2022-10-07 NOTE — CARE COORDINATION
BHI Biopsychosocial Assessment    Current Level of Psychosocial Functioning     Independent   Dependent    Minimal Assist  x    Comments:    Psychosocial High Risk Factors (check all that apply)    Unable to obtain meds   Chronic illness/pain  x  Substance abuse   Lack of Family Support   Financial stress   Isolation   Inadequate Community Resources x  Suicide attempt(s)  Not taking medications   Victim of crime   Developmental Delay  Unable to manage personal needs    Age 71 or older   Homeless  No transportation   Readmission within 30 days  Unemployment  Traumatic Event    Comments:   Psychiatric Advanced Directives:  Not known. Wife is supportive    Family to [facility] in Treatment:   Libby West [phone]. Patient gave verbal permission for contact and she provided information for this assessment. Sexual Orientation:     to wife for 44 years. Patient Strengths:  Housing, supportive wife, [place] connection, family support. Patient Barriers:   Recent mental status changes, physical health concerns. Opiate Education Provided:    N/A based on information provided at this time. CMHC/mental health history:  None per wife. Plan of Care   medication management, group/individual therapies, family meetings, psycho -education, treatment team meetings to assist with stabilization    Initial Discharge Plan:    Stabilization of symptoms, comply with treatment plan recommendations. Clinical Summary:    Information for assessment provided by wife Hakeem West per verbal permission from patient. Patient identified that the reason that he is here is that he is \"crazy\". He is quick to tell writer that he has not seen Dot Mortimer since he got here. Information that he provided is not reliable at times to writer, therefore information obtained from wife. Patient states he and his wife do have some relationship issues, she denies this states that get along well.    Patient reports that he has a history of using drugs and alcohol and wife identified patient may drink 1-2 beers a day but no history of drug use. Patient had been admitted to the hospital after he had presented to [facility] ED 4 times in the past week for altered mental status. He had altered mental status, confusion with racing thoughts. He was admitted medically cleared and then transferred to [facility]. Patient had broken vertabra about 3-4 weeks ago when coughing. According to patients wife, this is when he began to experience mental decompensation. He had been identifying that \"[employer]\" lived in him and that there is evil in him and he has been religiously pre occupied. Wife identified that the patient is very even tempered and never agitated or angry and this behavior is new to him. She also identified that his great grand son Toya John is a source of happiness for him and they have a good relationship. Patient needs a medical bed at home but the [place] has been difficult to deal with in order to get one. The plan is for the patient to return at home at discharge. Wife does not like to drive in the city and patients son has been bringing her to visit the patient.

## 2022-10-07 NOTE — BH NOTE
Emergency PRN Medication Administration Note:      Patient is Agitated and Disruptive as evidence by yelling and cursing at peers and staff and invading peers space. Staff attempted to find and relieve the distress by Talking to patient, Offering suggestions, Checking for undiagnosed pain, and Administer PRN medications Patient is currently  accepted PRN medications. Medication Administered as prescribed: Haldol 5mg PO. Patient Tolerated medication administration. Will continue to monitor, offer support, and reassess.

## 2022-10-07 NOTE — PROGRESS NOTES
RT ASSESSMENT TREATMENT GOALS    []Pt Goal: Pt will identify 1-2 positive coping skills by time of discharge. []Pt Goal: Pt will identify 1-2 positive aspects of self by time of discharge. [x]Pt Goal: Pt will remain on task/topic for 15-30 minutes during group by time of discharge. []Pt Goal: Pt will identify 1-2 aspects of relapse prevention plan by time of discharge. [x]Pt Goal: Pt will join in harmonious conversation with peers 1-2 times per group by time of discharge. []Pt Goal: Pt will identify 1-2 new leisure interests by time of discharge. []Pt Goal: Pt will not voice any delusional content by time of discharge.

## 2022-10-07 NOTE — PLAN OF CARE
Problem: ABCDS Injury Assessment  Goal: Absence of physical injury  Outcome: Progressing     Problem: Anxiety  Goal: Will report anxiety at manageable levels  Description: INTERVENTIONS:  1. Administer medication as ordered  2. Teach and rehearse alternative coping skills  3. Provide emotional support with 1:1 interaction with staff  Outcome: Progressing   Patient is out in dayroom and frequently at desk with complaints and concerns. Patient cursing at staff frequently and demanding needs be met immediately. Patient following peers and acting inappropriately because he is \"healing them\". Patient was medication compliant after receiving IM as needed medications and patient verbalized no concerns. Safety checks maintained. Will continue to monitor and provide support and reassurance as needed. Problem: Coping  Goal: Pt/Family able to verbalize concerns and demonstrate effective coping strategies  Description: INTERVENTIONS:  1. Assist patient/family to identify coping skills, available support systems and cultural and spiritual values  2. Provide emotional support, including active listening and acknowledgement of concerns of patient and caregivers  3. Reduce environmental stimuli, as able  4. Instruct patient/family in relaxation techniques, as appropriate  5. Assess for spiritual pain/suffering and initiate Spiritual Care, Psychosocial Clinical Specialist consults as needed  Outcome: Progressing     Problem: Confusion  Goal: Confusion, delirium, dementia, or psychosis is improved or at baseline  Description: INTERVENTIONS:  1. Assess for possible contributors to thought disturbance, including medications, impaired vision or hearing, underlying metabolic abnormalities, dehydration, psychiatric diagnoses, and notify attending LIP  2. [place] high risk fall precautions, as indicated  3. Provide frequent short contacts to provide reality reorientation, refocusing and direction  4.  Decrease environmental stimuli, including noise as appropriate  5. Monitor and intervene to maintain adequate nutrition, hydration, elimination, sleep and activity  6. If unable to ensure safety without constant attention obtain sitter and review sitter guidelines with assigned personnel  7.  Initiate Psychosocial CNS and Spiritual Care consult, as indicated  Outcome: Progressing

## 2022-10-08 PROCEDURE — 6360000002 HC RX W HCPCS: Performed by: PSYCHIATRY & NEUROLOGY

## 2022-10-08 PROCEDURE — 6370000000 HC RX 637 (ALT 250 FOR IP): Performed by: PSYCHIATRY & NEUROLOGY

## 2022-10-08 PROCEDURE — APPSS30 APP SPLIT SHARED TIME 16-30 MINUTES: Performed by: NURSE PRACTITIONER

## 2022-10-08 PROCEDURE — 6360000002 HC RX W HCPCS: Performed by: NURSE PRACTITIONER

## 2022-10-08 PROCEDURE — 99232 SBSQ HOSP IP/OBS MODERATE 35: CPT | Performed by: INTERNAL MEDICINE

## 2022-10-08 PROCEDURE — 1240000000 HC EMOTIONAL WELLNESS R&B

## 2022-10-08 PROCEDURE — 99232 SBSQ HOSP IP/OBS MODERATE 35: CPT | Performed by: PSYCHIATRY & NEUROLOGY

## 2022-10-08 PROCEDURE — 6370000000 HC RX 637 (ALT 250 FOR IP): Performed by: INTERNAL MEDICINE

## 2022-10-08 PROCEDURE — 2580000003 HC RX 258: Performed by: NURSE PRACTITIONER

## 2022-10-08 RX ORDER — OLANZAPINE 7.5 MG/1
7.5 TABLET ORAL 2 TIMES DAILY
Status: DISCONTINUED | OUTPATIENT
Start: 2022-10-08 | End: 2022-10-09

## 2022-10-08 RX ADMIN — WATER 10 MG: 1 INJECTION INTRAMUSCULAR; INTRAVENOUS; SUBCUTANEOUS at 14:12

## 2022-10-08 RX ADMIN — NICOTINE POLACRILEX 2 MG: 2 GUM, CHEWING BUCCAL at 11:05

## 2022-10-08 RX ADMIN — HALOPERIDOL 5 MG: 5 TABLET ORAL at 12:22

## 2022-10-08 RX ADMIN — TRAZODONE HYDROCHLORIDE 50 MG: 50 TABLET ORAL at 21:00

## 2022-10-08 RX ADMIN — ACETAMINOPHEN 650 MG: 325 TABLET, FILM COATED ORAL at 21:00

## 2022-10-08 RX ADMIN — ALUMINUM HYDROXIDE, MAGNESIUM HYDROXIDE, AND SIMETHICONE 30 ML: 200; 200; 20 SUSPENSION ORAL at 17:17

## 2022-10-08 RX ADMIN — BUDESONIDE AND FORMOTEROL FUMARATE DIHYDRATE 2 PUFF: 160; 4.5 AEROSOL RESPIRATORY (INHALATION) at 21:00

## 2022-10-08 RX ADMIN — DIPHENHYDRAMINE HYDROCHLORIDE 50 MG: 50 INJECTION, SOLUTION INTRAMUSCULAR; INTRAVENOUS at 00:00

## 2022-10-08 RX ADMIN — HALOPERIDOL LACTATE 5 MG: 5 INJECTION, SOLUTION INTRAMUSCULAR at 00:00

## 2022-10-08 RX ADMIN — HYDROXYZINE HYDROCHLORIDE 50 MG: 50 TABLET ORAL at 11:05

## 2022-10-08 RX ADMIN — OLANZAPINE 7.5 MG: 7.5 TABLET, FILM COATED ORAL at 21:00

## 2022-10-08 RX ADMIN — BUDESONIDE AND FORMOTEROL FUMARATE DIHYDRATE 2 PUFF: 160; 4.5 AEROSOL RESPIRATORY (INHALATION) at 08:42

## 2022-10-08 RX ADMIN — OLANZAPINE 5 MG: 5 TABLET, FILM COATED ORAL at 08:41

## 2022-10-08 RX ADMIN — Medication 1 CAPSULE: at 08:41

## 2022-10-08 ASSESSMENT — PAIN SCALES - GENERAL
PAINLEVEL_OUTOF10: 3
PAINLEVEL_OUTOF10: 0

## 2022-10-08 ASSESSMENT — PAIN DESCRIPTION - LOCATION
LOCATION: ABDOMEN
LOCATION: GENERALIZED

## 2022-10-08 ASSESSMENT — PAIN DESCRIPTION - DESCRIPTORS
DESCRIPTORS: DISCOMFORT
DESCRIPTORS: ACHING

## 2022-10-08 ASSESSMENT — PAIN DESCRIPTION - ORIENTATION: ORIENTATION: INNER;ANTERIOR

## 2022-10-08 NOTE — BH NOTE
Atarax 50mg PRN oral ineffective as evidence by increased pacing,  agitation, anxiousness, and inability to sit still. Unable to redirect.   Will offer Haldol 5mg PRN oral.

## 2022-10-08 NOTE — PROGRESS NOTES
Daily Progress Note  10/8/2022    Patient Name: Shadia Man    CHIEF COMPLAINT: Acute psychosis         SUBJECTIVE:   Patient was seen for follow-up assessment today. He has been medication adherent but continues to require emergency medications for agitation and restlessness in which he is unable to be redirected. Patient received IM Haldol and Benadryl at midnight today, oral Haldol at 1222 today, then received IM Geodon 10 mg at 2:12 PM due to hyperactivity, complaints of racing thoughts, and agitation. Patient needed constant redirection and reminders of appropriate behavior. He has been intrusive with peers. Medication this afternoon had a therapeutic effect and he was able to rest in his room briefly but continued to get up out of bed and presented with unsteady gait. Patient's room was moved closer to nurses' station for increased fall risk. Patient was standing in the day area on approach then walked over to the TV area and sat down. He was initially irritable and held up a sign at writer that said \"shut up! \". He presented with rapid, pressured speech. He was agreeable to relocate conversation to privacy of his room. Patient presents with grandiose delusions and states that he feels like he is a prophet, \"like Arthur\". He described arranging a \"last supper\" in the day area last night and was recruiting other patients to be like his disciples. Patient was hyperverbal with flight of ideas. He reports getting about 1 hour of sleep last night. He is discharge focused and believes that he will be discharged today. Writer attempts to tell him that there is no plan for his discharge at this time and he needs more time for medication adjustments and therapeutic milieu. Patient is denying homicidal and suicidal ideation. He is evasive regarding perceptual disturbances.  At this time patient has yet to demonstrate stability and warrants further hospitalization for safety and stabilization. Appetite:  [] Normal/Adequate/Unchanged  [] Increased  [x] Decreased      Sleep:       [] Normal/Adequate/Unchanged  [] Fair  [x] Poor      Group Attendance:   [x] Yes  [] Selectively    [] No    Medication Side Effects: Somnolence         Mental Status Exam  Level of consciousness: Alert and awake   Appearance: Appropriate attire for setting, seated on bed, with fair  grooming and hygiene   Behavior/Motor: Approachable, psychomotor agitation  Attitude toward examiner: Cooperative, easily distracted, good eye contact   Speech: rapid, hyperverbal, pressured, and high productivity   Mood: Anxious  Affect: Mood congruent  Thought processes: rapid, overabundance of ideas, flight of ideas, and perservative   Thought content: Denies homicidal ideation  Suicidal Ideation: Denies suicidal ideation  Delusions: Paranoid, bizarre, grandiose  Perceptual Disturbance: Possible auditory and visual hallucinations  Cognition: Oriented to self, location, time, and situation  Memory: intact  Insight & Judgement: poor     Data   height is 5' 10\" (1.778 m) and weight is 111 lb (50.3 kg). His oral temperature is 97.4 °F (36.3 °C). His blood pressure is 120/75 and his pulse is 77. His respiration is 14 and oxygen saturation is 96%. Labs:   No visits with results within 2 Day(s) from this visit.    Latest known visit with results is:   Admission on 10/03/2022, Discharged on 10/06/2022   Component Date Value Ref Range Status    Ventricular Rate 10/03/2022 67  BPM Final    Atrial Rate 10/03/2022 67  BPM Final    P-R Interval 10/03/2022 136  ms Final    QRS Duration 10/03/2022 88  ms Final    Q-T Interval 10/03/2022 396  ms Final    QTc Calculation (Bazett) 10/03/2022 418  ms Final    P Axis 10/03/2022 60  degrees Final    R Axis 10/03/2022 98  degrees Final    T Axis 10/03/2022 90  degrees Final    Troponin, High Sensitivity 10/04/2022 14  0 - 22 ng/L Final    Comment:       High Sensitivity Troponin values cannot be compared with other Troponin methodologies. Patients with high levels of Biotin oral intake (i.e >5mg/day) may have falsely decreased   Troponin levels. Samples collected within 8 hours of biotin intake may require additional   information for diagnosis. Glucose 10/04/2022 86  70 - 99 mg/dL Final    BUN 10/04/2022 12  8 - 23 mg/dL Final    Creatinine 10/04/2022 0.52 (A)  0.70 - 1.20 mg/dL Final    Calcium 10/04/2022 8.7  8.6 - 10.4 mg/dL Final    Sodium 10/04/2022 140  135 - 144 mmol/L Final    Potassium 10/04/2022 4.1  3.7 - 5.3 mmol/L Final    Chloride 10/04/2022 104  98 - 107 mmol/L Final    CO2 10/04/2022 29  20 - 31 mmol/L Final    Anion Gap 10/04/2022 7 (A)  9 - 17 mmol/L Final    Est, Glom Filt Rate 10/04/2022 >60  >60 mL/min/1.73m2 Final    Comment:       Effective Oct 3, 2022        These results are not intended for use in patients <25years of age. eGFR results are calculated without a race factor using the 2021 CKD-EPI equation. Careful clinical correlation is recommended, particularly when comparing to results   calculated using previous equations. The CKD-EPI equation is less accurate in patients with extremes of muscle mass, extra-renal   metabolism of creatine, excessive creatine ingestion, or following therapy that affects   renal tubular secretion.       WBC 10/04/2022 19.8 (A)  3.5 - 11.0 k/uL Final    RBC 10/04/2022 3.47 (A)  4.5 - 5.9 m/uL Final    Hemoglobin 10/04/2022 11.5 (A)  13.5 - 17.5 g/dL Final    Hematocrit 10/04/2022 34.0 (A)  41 - 53 % Final    MCV 10/04/2022 98.1  80 - 100 fL Final    MCH 10/04/2022 33.3  26 - 34 pg Final    MCHC 10/04/2022 33.9  31 - 37 g/dL Final    RDW 10/04/2022 13.5  11.5 - 14.9 % Final    Platelets 10/41/0679 328  150 - 450 k/uL Final    MPV 10/04/2022 6.8  6.0 - 12.0 fL Final    Color, UA 10/05/2022 Yellow  Yellow Final    Turbidity UA 10/05/2022 Clear  Clear Final    Glucose, Ur 10/05/2022 NEGATIVE  NEGATIVE Final    Bilirubin Urine 10/05/2022 NEGATIVE  NEGATIVE Final    Ketones, Urine 10/05/2022 NEGATIVE  NEGATIVE Final    Specific Gravity, UA 10/05/2022 1.014  1.000 - 1.030 Final    Urine Hgb 10/05/2022 NEGATIVE  NEGATIVE Final    pH, UA 10/05/2022 7.0  5.0 - 8.0 Final    Protein, UA 10/05/2022 NEGATIVE  NEGATIVE Final    Urobilinogen, Urine 10/05/2022 Normal  Normal Final    Nitrite, Urine 10/05/2022 NEGATIVE  NEGATIVE Final    Leukocyte Esterase, Urine 10/05/2022 NEGATIVE  NEGATIVE Final    Urinalysis Comments 10/05/2022 Microscopic exam not performed based on chemical results unless requested in original order. Final    Glucose 10/05/2022 136 (A)  70 - 99 mg/dL Final    BUN 10/05/2022 7 (A)  8 - 23 mg/dL Final    Creatinine 10/05/2022 0.74  0.70 - 1.20 mg/dL Final    Calcium 10/05/2022 9.1  8.6 - 10.4 mg/dL Final    Sodium 10/05/2022 140  135 - 144 mmol/L Final    Potassium 10/05/2022 4.2  3.7 - 5.3 mmol/L Final    Chloride 10/05/2022 102  98 - 107 mmol/L Final    CO2 10/05/2022 29  20 - 31 mmol/L Final    Anion Gap 10/05/2022 9  9 - 17 mmol/L Final    Est, Glom Filt Rate 10/05/2022 >60  >60 mL/min/1.73m2 Final    Comment:       Effective Oct 3, 2022        These results are not intended for use in patients <25years of age. eGFR results are calculated without a race factor using the 2021 CKD-EPI equation. Careful clinical correlation is recommended, particularly when comparing to results   calculated using previous equations. The CKD-EPI equation is less accurate in patients with extremes of muscle mass, extra-renal   metabolism of creatine, excessive creatine ingestion, or following therapy that affects   renal tubular secretion.       WBC 10/05/2022 27.0 (A)  3.5 - 11.0 k/uL Final    RBC 10/05/2022 4.11 (A)  4.5 - 5.9 m/uL Final    Hemoglobin 10/05/2022 13.4 (A)  13.5 - 17.5 g/dL Final    Hematocrit 10/05/2022 40.6 (A)  41 - 53 % Final    MCV 10/05/2022 98.8  80 - 100 fL Final    MCH 10/05/2022 32.7  26 - 34 pg Final    MCHC 10/05/2022 33.1  31 - 37 g/dL Final    RDW 10/05/2022 13.5  11.5 - 14.9 % Final    Platelets 85/15/6887 386  150 - 450 k/uL Final    MPV 10/05/2022 6.8  6.0 - 12.0 fL Final    Glucose 10/06/2022 99  70 - 99 mg/dL Final    BUN 10/06/2022 12  8 - 23 mg/dL Final    Creatinine 10/06/2022 0.63 (A)  0.70 - 1.20 mg/dL Final    Est, Glom Filt Rate 10/06/2022 >60  >60 mL/min/1.73m2 Final    Comment:       Effective Oct 3, 2022        These results are not intended for use in patients <25years of age. eGFR results are calculated without a race factor using the 2021 CKD-EPI equation. Careful clinical correlation is recommended, particularly when comparing to results   calculated using previous equations. The CKD-EPI equation is less accurate in patients with extremes of muscle mass, extra-renal   metabolism of creatine, excessive creatine ingestion, or following therapy that affects   renal tubular secretion.       Calcium 10/06/2022 9.2  8.6 - 10.4 mg/dL Final    Sodium 10/06/2022 140  135 - 144 mmol/L Final    Potassium 10/06/2022 4.0  3.7 - 5.3 mmol/L Final    Chloride 10/06/2022 101  98 - 107 mmol/L Final    CO2 10/06/2022 31  20 - 31 mmol/L Final    Anion Gap 10/06/2022 8 (A)  9 - 17 mmol/L Final    WBC 10/06/2022 17.3 (A)  3.5 - 11.0 k/uL Final    RBC 10/06/2022 3.49 (A)  4.5 - 5.9 m/uL Final    Hemoglobin 10/06/2022 11.4 (A)  13.5 - 17.5 g/dL Final    Hematocrit 10/06/2022 34.4 (A)  41 - 53 % Final    MCV 10/06/2022 98.6  80 - 100 fL Final    MCH 10/06/2022 32.8  26 - 34 pg Final    MCHC 10/06/2022 33.2  31 - 37 g/dL Final    RDW 10/06/2022 13.5  11.5 - 14.9 % Final    Platelets 14/07/1703 335  150 - 450 k/uL Final    MPV 10/06/2022 6.8  6.0 - 12.0 fL Final    Color, UA 10/06/2022 Yellow  Yellow Final    Turbidity UA 10/06/2022 Clear  Clear Final    Glucose, Ur 10/06/2022 NEGATIVE  NEGATIVE Final    Bilirubin Urine 10/06/2022 NEGATIVE  NEGATIVE Final    Ketones, Urine 10/06/2022 NEGATIVE  NEGATIVE Final Specific Gravity, UA 10/06/2022 1.010  1.000 - 1.030 Final    Urine Hgb 10/06/2022 NEGATIVE  NEGATIVE Final    pH, UA 10/06/2022 7.0  5.0 - 8.0 Final    Protein, UA 10/06/2022 NEGATIVE  NEGATIVE Final    Urobilinogen, Urine 10/06/2022 Normal  Normal Final    Nitrite, Urine 10/06/2022 NEGATIVE  NEGATIVE Final    Leukocyte Esterase, Urine 10/06/2022 NEGATIVE  NEGATIVE Final    Urinalysis Comments 10/06/2022 Microscopic exam not performed based on chemical results unless requested in original order. Final         Reviewed patient's current plan of care and vital signs with nursing staff.     Labs reviewed: [x] Yes  Last EKG in EMR reviewed: [x] Yes    Medications  Current Facility-Administered Medications: ipratropium (ATROVENT) 0.02 % nebulizer solution 0.5 mg, 0.5 mg, Nebulization, 4x Daily PRN  albuterol sulfate HFA (PROVENTIL;VENTOLIN;PROAIR) 108 (90 Base) MCG/ACT inhaler 2 puff, 2 puff, Inhalation, Q4H PRN  budesonide-formoterol (SYMBICORT) 160-4.5 MCG/ACT inhaler 2 puff, 2 puff, Inhalation, BID  lidocaine 4 % external patch 1 patch, 1 patch, TransDERmal, Daily  OLANZapine (ZYPREXA) tablet 5 mg, 5 mg, Oral, BID  lactobacillus (CULTURELLE) capsule 1 capsule, 1 capsule, Oral, Daily  docusate sodium (COLACE) capsule 100 mg, 100 mg, Oral, TID PRN  ibuprofen (ADVIL;MOTRIN) tablet 200 mg, 200 mg, Oral, Q6H PRN  acetaminophen (TYLENOL) tablet 650 mg, 650 mg, Oral, Q6H PRN  hydrOXYzine HCl (ATARAX) tablet 50 mg, 50 mg, Oral, TID PRN  traZODone (DESYREL) tablet 50 mg, 50 mg, Oral, Nightly PRN  polyethylene glycol (GLYCOLAX) packet 17 g, 17 g, Oral, Daily PRN  aluminum & magnesium hydroxide-simethicone (MAALOX) 200-200-20 MG/5ML suspension 30 mL, 30 mL, Oral, Q6H PRN  nicotine polacrilex (NICORETTE) gum 2 mg, 2 mg, Oral, Q2H PRN  haloperidol lactate (HALDOL) injection 5 mg, 5 mg, IntraMUSCular, Q6H PRN **AND** diphenhydrAMINE (BENADRYL) injection 50 mg, 50 mg, IntraMUSCular, Q6H PRN  haloperidol (HALDOL) tablet 5 mg, 5 mg, Oral, Q6H PRN    ASSESSMENT  Acute psychosis ([facility] [address]         HANDOFF  Patient symptoms: Show no improvement  Ordered now dose, 1 time of Geodon 10 mg IM for agitation per discussion with attending physician  Medications as determined by attending physician  Encourage participation in groups and milieu. Probable discharge is to be determined by MD    Electronically signed by ELISEO Mccurdy CNP on 10/8/2022 at 3:54 PM    **This report has been created using voice recognition software. It may contain minor errors which are inherent in voice recognition technology. **   I independently saw and evaluated the patient. I reviewed the nurse practioner's documentation above. Any additional comments or changes to the    documentation are stated below otherwise agree with assessment.      -I have discussed the patient with the nurse practitioner and with the patient's nurse. He has been agitated and has not responded well to as needed medication. IM Geodon was ordered. The patient was seen at bedside. He is somnolent. He is mumbling in his speech been broken. PLAN the patient's Zydis has been increased to 7.5 mg twice daily  Attempt to develop insight  Expected Length of Stay is 5-9 days. Psycho-education conducted. Supportive Therapy conducted.   Follow-up daily while on inpatient unit    Electronically signed by Javier Low MD on 10/8/22 at 5:35 PM EDT

## 2022-10-08 NOTE — GROUP NOTE
Group Therapy Note    Date: 10/7/2022    Group Start Time: 2000  Group End Time: 2030  Group Topic: Wrap-Up    BRAYDEN Soto        Group Therapy Note    Attendees: 6       Patient's Goal:  arguing with my wife brought me here    Notes:  I need to save souls    Status After Intervention:  Unchanged    Participation Level: Interactive and Monopolizing    Participation Quality: Inappropriate and Intrusive      Speech:  pressured and loud      Thought Process/Content: Flight of ideas  Perseverating      Affective Functioning: Exaggerated      Mood: anxious      Level of consciousness:  Alert and Preoccupied      Response to Learning: Resistant      Endings: None Reported    Modes of Intervention: Problem-solving      Discipline Responsible: Behavorial Health Tech      Signature:  Karin Soto

## 2022-10-08 NOTE — PROGRESS NOTES
[facility] Internal Medicine  Marleny Roman MD; Ramo Astorga MD; Devon Simon MD; MD Amadeo Oshea MD; MD FREDDIE [facility] Internal Medicine   [facility]    HISTORY AND PHYSICAL EXAMINATION            Date:   10/8/2022  Patient name:  Natalia Toledo  Date of admission:  10/6/2022  4:47 PM  MRN:   [id number]  Account:  [de-identified]  YOB: 1960  PCP:    Keri Douglas NP, APRN - CNP  Room:   [address] [place] [address]  Code Status:    Full Code    Chief Complaint:     No chief complaint on file. COPD    History Obtained From:     Patient medical record nursing staff    History of Present Illness:     Natalia Toledo is a 60 y.o. Unavailable / unknown male who presents with No chief complaint on file. and is admitted to the hospital for the management of Acute [facility] 74-year-old gentleman with a history of for 2pack a day smoking history with severe COPD patient claims stage IV on home oxygen as needed  Was found in public with acute psychosis admitted to medical floor for possible steroid-induced psychosis which she was given for COPD treatment  Patient denies any chest pain no dyspnea      Past Medical History:     Past Medical History:   Diagnosis Date    Asthma     COPD (chronic obstructive pulmonary disease) (HCC)     Emphysema of lung ([facility] [address]         Past Surgical History:     Past Surgical History:   Procedure Laterality Date    SKIN GRAFT Bilateral     lower extremities        Medications Prior to Admission:     Prior to Admission medications    Medication Sig Start Date End Date Taking? Authorizing Provider   albuterol sulfate HFA (VENTOLIN HFA) 108 (90 Base) MCG/ACT inhaler Inhale 2 puffs into the lungs every 4 hours as needed for Wheezing    Historical Provider, MD   diazePAM (VALIUM) 5 MG/ML solution Take 5 mg by mouth every 8 hours as needed (anxiety or sleep).     Historical Provider, MD   docusate sodium (COLACE) 100 MG capsule Take 100 mg by mouth 3 times daily as needed for Constipation    Historical Provider, MD   ipratropium (ATROVENT) 0.02 % nebulizer solution Take 0.5 mg by nebulization 4 times daily    Historical Provider, MD   lactobacillus acidophilus [facility]) Take 4 tablets by mouth 3 times daily    Historical Provider, MD   Acidophilus Lactobacillus CAPS Take 1 capsule by mouth daily    Historical Provider, MD   tiZANidine (ZANAFLEX) 2 MG tablet Take 2 mg by mouth every 6 hours as needed 9/2/22   Historical Provider, MD   acetaminophen (TYLENOL) 500 MG tablet Take 500 mg by mouth every 6 hours as needed for Pain    Historical Provider, MD   ibuprofen (ADVIL;MOTRIN) 200 MG tablet Take 200 mg by mouth every 6 hours as needed for Pain Pt taking 2 pills prn    Historical Provider, MD   mometasone-formoterol (DULERA) 200-5 MCG/ACT inhaler Inhale 2 puffs into the lungs 2 times daily 6/1/22   Manjula Engel MD   albuterol (PROVENTIL) (2.5 MG/3ML) 0.083% nebulizer solution Take 3 mLs by nebulization every 6 hours as needed for Wheezing  Patient not taking: No sig reported 8/25/21   Manjula Engel MD   Multiple Vitamins-Minerals (MULTIVITAMIN ADULT PO) Take 1 tablet by mouth daily    Historical Provider, MD        Allergies:     Prednisone, Morphine, and Propoxyphene    Social History:     Tobacco:    reports that he has been smoking cigarettes. He has a 45.00 pack-year smoking history. He has never used smokeless tobacco.  Alcohol:      reports current alcohol use. Drug Use:  reports no history of drug use. Family History:     No family history on file. Review of Systems:     Positive and Negative as described in HPI.     CONSTITUTIONAL:  negative for fevers, chills, sweats, fatigue, weight loss  HEENT:  negative for vision, hearing changes, runny nose, throat pain  RESPIRATORY:  negative for shortness of breath, cough, congestion, wheezing  CARDIOVASCULAR:  negative for chest pain, palpitations  GASTROINTESTINAL:  negative for nausea, vomiting, diarrhea, constipation, change in bowel habits, abdominal pain   GENITOURINARY:  negative for difficulty of urination, burning with urination, frequency   INTEGUMENT:  negative for rash, skin lesions, easy bruising   HEMATOLOGIC/LYMPHATIC:  negative for swelling/edema   ALLERGIC/IMMUNOLOGIC:  negative for urticaria , itching  ENDOCRINE:  negative increase in drinking, increase in urination, hot or cold intolerance  MUSCULOSKELETAL:  negative joint pains, muscle aches, swelling of joints  NEUROLOGICAL:  negative for headaches, dizziness, lightheadedness, numbness, pain, tingling extremities      Physical Exam:   /75   Pulse 77   Temp 97.4 °F (36.3 °C) (Oral)   Resp 14   Ht 5' 10\" (1.778 m)   Wt 111 lb (50.3 kg)   SpO2 96%   BMI 15.93 kg/m²   Temp (24hrs), Av.4 °F (36.3 °C), Min:97.4 °F (36.3 °C), Max:97.4 °F (36.3 °C)    No results for input(s): POCGLU in the last 72 hours.   No intake or output data in the 24 hours ending 10/08/22 1339  Severe protein calorie malnutrition  Very low body fat  Significant muscle wasting noted COPD  General Appearance: alert, well appearing, and in no acute distress  Mental status: oriented to person, place, and time  Head: normocephalic, atraumatic  Eye: no icterus, redness, pupils equal and reactive, extraocular eye movements intact, conjunctiva clear  Ear: normal external ear, no discharge, hearing intact  Nose: no drainage noted  Mouth: mucous membranes moist  Neck: supple, no carotid bruits, thyroid not palpable  Lungs: Bilateral equal air entry, clear to ausculation, no wheezing, rales or rhonchi, normal effort  Cardiovascular: normal rate, regular rhythm, no murmur, gallop, rub  Abdomen: Soft, nontender, nondistended, normal bowel sounds, no hepatomegaly or splenomegaly  Neurologic: There are no new focal motor or sensory deficits, normal muscle tone and bulk, no abnormal sensation, normal speech, cranial nerves II through XII grossly intact  Skin: No gross lesions, rashes, bruising or bleeding on exposed skin area  Extremities: peripheral pulses palpable, no pedal edema or calf pain with palpation      Investigations:      Laboratory Testing:  No results found for this or any previous visit (from the past 24 hour(s)). Imaging/Diagnostics:  No results found. Assessment :      Hospital Problems             Last Modified POA    * (Principal) Acute psychosis ([facility] [address] 10/7/2022 Yes    Severe malnutrition ([facility] [address] 10/7/2022 Yes     Plan:     69-year-old gentleman  Severe protein calorie malnutrition BMI 16.3 is  Severe COPD Gold stage IV per patient  On home oxygen as needed  History of asbestosis  Active smoker 2 packs a day  Nicotine patch  Was admitted to medical floor with acute psychosis thought to be due to steroid-induced psychosis for COPD  Dietary consult for severe malnutrition  Start Symbicort 160/4.5  As needed albuterol              Jonatan Arellano MD  10/8/2022  1:39 PM    Copy sent to Dr. Lieutenant Shields, [facility], APRN - CNP    Please note that this chart was generated using voice recognition Dragon dictation software. Although every effort was made to ensure the accuracy of this automated transcription, some errors in transcription may have occurred.

## 2022-10-08 NOTE — BH NOTE
Haldol 5mg PO PRN given for increased agitation AEB pacing, racing thoughts and needs constant redirection. Patient states \"I feel panicky\". Patient was offerded1:1 talk time, quiet time and diversions. Patient is accepting of medication.

## 2022-10-08 NOTE — BH NOTE
Patient in room, laying in bed resting quietly without evidence of restlessness or agitation. Geodon 10 mg in 0.5ml sterile water IM effective.

## 2022-10-08 NOTE — BH NOTE
Haldol 5mg oral PRN ineffective as evidence by patient is unable to sit still, patient continues to have racing thoughts, agitation, and continues to need constant redirection.

## 2022-10-08 NOTE — PROGRESS NOTES
IM Haldol and Benadryl effective. Patient redirectable and attempting to rest in bed. Safety checks maintained for patient safety. Will continue to monitor and provide support and reassurance as needed.

## 2022-10-08 NOTE — BH NOTE
One time dose Geodon 10mg in  sterile water 0.5 mL IM ordered as previous medical interventions has been ineffective as patient continues being restless, anxious, irritable, pacing, and unable to be redirected. Patient accepting of dose.  Administer IM in right ventrogluteal.

## 2022-10-08 NOTE — PLAN OF CARE
Problem: Anxiety  Goal: Will report anxiety at manageable levels  Description: INTERVENTIONS:  1. Administer medication as ordered  2. Teach and rehearse alternative coping skills  3. Provide emotional support with 1:1 interaction with staff  Outcome: Progressing     Problem: Confusion  Goal: Confusion, delirium, dementia, or psychosis is improved or at baseline  Description: INTERVENTIONS:  1. Assess for possible contributors to thought disturbance, including medications, impaired vision or hearing, underlying metabolic abnormalities, dehydration, psychiatric diagnoses, and notify attending LIP  2. [place] high risk fall precautions, as indicated  3. Provide frequent short contacts to provide reality reorientation, refocusing and direction  4. Decrease environmental stimuli, including noise as appropriate  5. Monitor and intervene to maintain adequate nutrition, hydration, elimination, sleep and activity  6. If unable to ensure safety without constant attention obtain sitter and review sitter guidelines with assigned personnel  7. Initiate Psychosocial CNS and Spiritual Care consult, as indicated  10/7/2022 2120 by Abner Navarro  Outcome: Adequate for Discharge  10/7/2022 1740 by Evelyn James LPN  Outcome: Not Progressing  Flowsheets (Taken 10/7/2022 1740)  Effect of thought disturbance (confusion, delirium, dementia, or psychosis) are managed with adequate functional status:   Provide frequent short contacts to provide reality reorientation, refocusing and direction   [place] high risk fall precautions, as indicated  Note: Patient denies any thoughts of harm to self or others and denies hallucinations. Patient is tangential, with flight of ideas and is religiously preoccupied. Patient is impulsive and labile. Patient was given PRN Haldol 5mg PO x1 this shift with effective results. Patient is compliant with meds other than his breathing treatments and eating meals. Safe environment maintained.  Safety checks continued Q 15 minutes and intermittently. Pt is oriented to person place, accepts orientation to time, & situation. Pt remains anxious & hyper-verbal. Pt has numerous request & needs appropriate limits & redirection to which he is cooperative. Pt lacks insight into his behaviors. Problem: Coping  Goal: Pt/Family able to verbalize concerns and demonstrate effective coping strategies  Description: INTERVENTIONS:  1. Assist patient/family to identify coping skills, available support systems and cultural and spiritual values  2. Provide emotional support, including active listening and acknowledgement of concerns of patient and caregivers  3. Reduce environmental stimuli, as able  4. Instruct patient/family in relaxation techniques, as appropriate  5. Assess for spiritual pain/suffering and initiate Spiritual Care, Psychosocial Clinical Specialist consults as needed  Outcome: Progressing  Pt attended group & was inappropriate relating his stressors. Pt was profane describing his issues with his wife. . pt does redirect. Pt is loud & makes derogatory comments regarding women. Pt is closed to suggestions on positive coping skills. He becomes religiously preoccupied. Problem: Pain  Goal: Verbalizes/displays adequate comfort level or baseline comfort level  Outcome: Progressing  Pt is preoccupied with c/o pain all over. Med seeking regarding his pain medication    Problem: Safety - Adult  Goal: Free from fall injury  10/7/2022 2120 by Kena Yoo  Outcome: Adequate for Discharge  Problem: ABCDS Injury Assessment  Goal: Absence of physical injury  10/7/2022 2120 by Kena Yoo  Outcome: Adequate for Discharge  Pt remains safe & free from falls & physical injury. He has a steady gait. Problem: Nutrition Deficit:  Goal: Optimize nutritional status  Outcome: Adequate for Discharge  Pt is compliant iw medicine, & has a good appetite.

## 2022-10-08 NOTE — PLAN OF CARE
Problem: Safety - Adult  Goal: Free from fall injury  10/8/2022 1014 by Shane Dunbar LPN  Outcome: Progressing  Flowsheets (Taken 10/8/2022 1014)  Free From Fall Injury: Instruct family/caregiver on patient safety  Note: Patient is pleasantly confused and disorientated and needs redirection. Patient denies thoughts of harming self and others. Patient denies auditory and visual hallucinations. Patient is depressed, anxious, and tearful at times. Emotional support, reorientation and redirection provided. Patient denies pain/discomfort. Patient has scattered bilateral bruising and skin tears on arms. Dressing applied to open areas on left arms. Cleansed with soap and water and applied dressing. Zero redness/drainage noted. Non-skid socks on patients feet. Patient is observed walking throughout unit with a slow and steady gait. Patient has been out in the dayroom watching television. Every 15 minute safety checks maintained. Problem: ABCDS Injury Assessment  Goal: Absence of physical injury  10/8/2022 1014 by Shane Dunbar LPN  Outcome: Progressing  Note: Patient is pleasantly confused and disorientated and needs redirection. Patient denies thoughts of harming self and others. Patient denies auditory and visual hallucinations. Patient is depressed, anxious, and tearful at times. Emotional support, reorientation and redirection provided. Patient denies pain/discomfort. Patient has scattered bilateral bruising and skin tears on arms. Dressing applied to open areas on left arms. Cleansed with soap and water and applied dressing. Zero redness/drainage noted. Non-skid socks on patients feet. Patient is observed walking throughout unit with a slow and steady gait. Patient has been out in the dayroom watching television. Every 15 minute safety checks maintained. Problem: Anxiety  Goal: Will report anxiety at manageable levels  Description: INTERVENTIONS:  1. Administer medication as ordered  2.  Teach and rehearse alternative coping skills  3. Provide emotional support with 1:1 interaction with staff  10/8/2022 1014 by Percy Mcclure LPN  Outcome: Progressing  Flowsheets (Taken 10/8/2022 1014)  Will report anxiety at manageable levels:   Administer medication as ordered   Provide emotional support with 1:1 interaction with staff  Note: Patient is pleasantly confused and disorientated and needs redirection. Patient denies thoughts of harming self and others. Patient denies auditory and visual hallucinations. Patient is depressed, anxious, and tearful at times. Emotional support, reorientation and redirection provided. Patient denies pain/discomfort. Patient has scattered bilateral bruising and skin tears on arms. Dressing applied to open areas on left arms. Cleansed with soap and water and applied dressing. Zero redness/drainage noted. Non-skid socks on patients feet. Patient is observed walking throughout unit with a slow and steady gait. Patient has been out in the dayroom watching television. Every 15 minute safety checks maintained. Problem: Coping  Goal: Pt/Family able to verbalize concerns and demonstrate effective coping strategies  Description: INTERVENTIONS:  1. Assist patient/family to identify coping skills, available support systems and cultural and spiritual values  2. Provide emotional support, including active listening and acknowledgement of concerns of patient and caregivers  3. Reduce environmental stimuli, as able  4. Instruct patient/family in relaxation techniques, as appropriate  5.  Assess for spiritual pain/suffering and initiate Spiritual Care, Psychosocial Clinical Specialist consults as needed  10/8/2022 1014 by Percy Mcclure LPN  Outcome: Progressing  Flowsheets (Taken 10/8/2022 1014)  Patient/family able to verbalize anxieties, fears, and concerns, and demonstrate effective coping: Assist patient/family to identify coping skills, available support systems and cultural and spiritual values  Note: Patient is pleasantly confused and disorientated and needs redirection. Patient denies thoughts of harming self and others. Patient denies auditory and visual hallucinations. Patient is depressed, anxious, and tearful at times. Emotional support, reorientation and redirection provided. Patient denies pain/discomfort. Patient has scattered bilateral bruising and skin tears on arms. Dressing applied to open areas on left arms. Cleansed with soap and water and applied dressing. Zero redness/drainage noted. Non-skid socks on patients feet. Patient is observed walking throughout unit with a slow and steady gait. Patient has been out in the dayroom watching television. Every 15 minute safety checks maintained. Problem: Nutrition Deficit:  Goal: Optimize nutritional status  10/8/2022 1014 by Bailey Dias LPN  Outcome: Progressing  Note: Patient is pleasantly confused and disorientated and needs redirection. Patient denies thoughts of harming self and others. Patient denies auditory and visual hallucinations. Patient is depressed, anxious, and tearful at times. Emotional support, reorientation and redirection provided. Patient denies pain/discomfort. Patient has scattered bilateral bruising and skin tears on arms. Dressing applied to open areas on left arms. Cleansed with soap and water and applied dressing. Zero redness/drainage noted. Non-skid socks on patients feet. Patient is observed walking throughout unit with a slow and steady gait. Patient has been out in the dayroom watching television. Every 15 minute safety checks maintained.       Problem: Pain  Goal: Verbalizes/displays adequate comfort level or baseline comfort level  10/8/2022 1014 by Bailey Dias LPN  Outcome: Progressing  Flowsheets (Taken 10/8/2022 1014)  Verbalizes/displays adequate comfort level or baseline comfort level:   Assess pain using appropriate pain scale   Encourage patient to monitor pain and request assistance  Note: Patient is pleasantly confused and disorientated and needs redirection. Patient denies thoughts of harming self and others. Patient denies auditory and visual hallucinations. Patient is depressed, anxious, and tearful at times. Emotional support, reorientation and redirection provided. Patient denies pain/discomfort. Patient has scattered bilateral bruising and skin tears on arms. Dressing applied to open areas on left arms. Cleansed with soap and water and applied dressing. Zero redness/drainage noted. Non-skid socks on patients feet. Patient is observed walking throughout unit with a slow and steady gait. Patient has been out in the dayroom watching television. Every 15 minute safety checks maintained.

## 2022-10-08 NOTE — PROGRESS NOTES
Pt is agitated as evidence by pacing, approaching nurse's station constantly after being redirected to room by staff, making inappropriate comments. Staff attempted to find and relieve the distress by talking to pt, refocusing on new activity, offering suggestions, and checking for undiagnosed pain. Pt offered IM Haldol and Benadryl. Pt is currently in room awake. Safety checks maintained. Will continue to monitor and provide support and reassurance as needed.

## 2022-10-08 NOTE — BH NOTE
Patient reports to nurses station and states, \"I'm having a panic attack, I'm having a panic attack. \"  Writer sitting next to patient providing emotional support and distraction. Writer instructs patient to take slow and steady deep breaths. Panic attack last apporoximately one minute. Writer offers Atarax 50mg PRN oral and nicorette gum to patient. Patient accepts. Will reassess.

## 2022-10-08 NOTE — BH NOTE
Patient gait is unsteady and continues to get up without assistance. Patient moved to  close nurse station to Room 101-2 and  Line of [place] Initiated Per Physician order.

## 2022-10-09 PROBLEM — J43.1 PANLOBULAR EMPHYSEMA (HCC): Status: ACTIVE | Noted: 2022-10-09

## 2022-10-09 PROCEDURE — 6370000000 HC RX 637 (ALT 250 FOR IP): Performed by: PSYCHIATRY & NEUROLOGY

## 2022-10-09 PROCEDURE — 6370000000 HC RX 637 (ALT 250 FOR IP): Performed by: INTERNAL MEDICINE

## 2022-10-09 PROCEDURE — APPSS30 APP SPLIT SHARED TIME 16-30 MINUTES: Performed by: NURSE PRACTITIONER

## 2022-10-09 PROCEDURE — 99232 SBSQ HOSP IP/OBS MODERATE 35: CPT | Performed by: PSYCHIATRY & NEUROLOGY

## 2022-10-09 PROCEDURE — 99232 SBSQ HOSP IP/OBS MODERATE 35: CPT | Performed by: INTERNAL MEDICINE

## 2022-10-09 PROCEDURE — 1240000000 HC EMOTIONAL WELLNESS R&B

## 2022-10-09 RX ORDER — OLANZAPINE 10 MG/1
10 TABLET ORAL 2 TIMES DAILY
Status: DISCONTINUED | OUTPATIENT
Start: 2022-10-09 | End: 2022-10-11

## 2022-10-09 RX ADMIN — NICOTINE POLACRILEX 2 MG: 2 GUM, CHEWING BUCCAL at 17:01

## 2022-10-09 RX ADMIN — DOCUSATE SODIUM 100 MG: 100 CAPSULE, LIQUID FILLED ORAL at 21:39

## 2022-10-09 RX ADMIN — HYDROXYZINE HYDROCHLORIDE 50 MG: 50 TABLET ORAL at 21:39

## 2022-10-09 RX ADMIN — OLANZAPINE 10 MG: 10 TABLET, FILM COATED ORAL at 21:39

## 2022-10-09 RX ADMIN — HYDROXYZINE HYDROCHLORIDE 50 MG: 50 TABLET ORAL at 01:55

## 2022-10-09 RX ADMIN — BUDESONIDE AND FORMOTEROL FUMARATE DIHYDRATE 2 PUFF: 160; 4.5 AEROSOL RESPIRATORY (INHALATION) at 09:00

## 2022-10-09 RX ADMIN — Medication 1 CAPSULE: at 09:00

## 2022-10-09 RX ADMIN — TRAZODONE HYDROCHLORIDE 50 MG: 50 TABLET ORAL at 21:39

## 2022-10-09 RX ADMIN — NICOTINE POLACRILEX 2 MG: 2 GUM, CHEWING BUCCAL at 09:01

## 2022-10-09 RX ADMIN — ACETAMINOPHEN 650 MG: 325 TABLET, FILM COATED ORAL at 01:55

## 2022-10-09 RX ADMIN — NICOTINE POLACRILEX 2 MG: 2 GUM, CHEWING BUCCAL at 22:17

## 2022-10-09 RX ADMIN — OLANZAPINE 7.5 MG: 7.5 TABLET, FILM COATED ORAL at 09:01

## 2022-10-09 ASSESSMENT — PAIN SCALES - GENERAL
PAINLEVEL_OUTOF10: 10
PAINLEVEL_OUTOF10: 3

## 2022-10-09 ASSESSMENT — PAIN DESCRIPTION - LOCATION
LOCATION: GENERALIZED
LOCATION: GENERALIZED

## 2022-10-09 ASSESSMENT — PAIN - FUNCTIONAL ASSESSMENT: PAIN_FUNCTIONAL_ASSESSMENT: ACTIVITIES ARE NOT PREVENTED

## 2022-10-09 ASSESSMENT — PAIN DESCRIPTION - DESCRIPTORS: DESCRIPTORS: ACHING

## 2022-10-09 NOTE — CARE COORDINATION
SOCIAL SERVICE NOTE: FRANCES speaks with Pt wife Anitra Meng on this date to schedule a family meeting as she and PT requested. [phone]. FRANCES scheduled family meeting for Tuesday October 11th at 3:30 pm with PT wife Anitra Meng and she reports that her son would also like to come to the meeting. Anitra Meng reports that the plan is for PT to still return home at discharge if possible . Anitra Meng reports that PT has not had home health care services in the home in the past. She reports that Oxygen was recently set up in their home through [address] [place] [address] [phone]. Anitra Meng reports that she has been attempting to work with the [place] to get PT a hospital bed  and wheel chair for use in  their home for the last 3 months, but has not been successful. Anitra Meng reports that pt was going to the [place] in [facility], but since it is so far away from their home, he was given approval to follow up with medical care through [facility] [address], [address] [place] [address], [phone]. Pt wife reports that she came to visit pt yesterday and he asked her to leave after she was there for approximately 5 minutes. She reports that she is unable to visit pt again until Tuesday and reports she will arrive at 3:30 to meet with SW for meeting prior to visitation.

## 2022-10-09 NOTE — PROGRESS NOTES
Daily Progress Note  10/9/2022    Patient Name: Bethany Guzmán    CHIEF COMPLAINT: Acute psychosis         SUBJECTIVE:   Patient was seen for follow-up assessment today. He has been medication adherent and has not required any emergency medications today. Nursing staff report he continues to require frequent redirection and is very preoccupied with rapid thoughts and ideas. Patient was resting in bed on approach and was fairly calm at time of assessment. He woke up to engage in conversation with writer. Patient is reporting that he is sleeping a little better and he is thankful for the rest.  Patient is asked about his visit with his wife yesterday when he reports that it did not go well because he accused her of \"just being there for Obed\" and that at one point he told her to leave. He mentions having \"multiple personalities\". He continues to present with grandiose delusions and describes himself as a Savior and that he has \"saved 10 people already today\". He then stated \"I will say what ever I need to say to get out of here\". Patient is denying suicidal and homicidal ideation. He reports that his mood is decent but he is frustrated with ongoing admission. He expresses wanting to get better yet has no insight into his illness. Patient has yet to demonstrate sustained stability and it is not yet appropriate for him to be discharged to community and therefore warrants further hospitalization for safety and stabilization.     Appetite:  [] Normal/Adequate/Unchanged  [] Increased  [x] Decreased      Sleep:       [] Normal/Adequate/Unchanged  [x] Fair  [] Poor      Group Attendance:   [x] Yes  [] Selectively    [] No    Medication Side Effects: Somnolence         Mental Status Exam  Level of consciousness: Alert and awake, somnolent initially  Appearance: Appropriate attire for setting, resting in bed, with good grooming and hygiene   Behavior/Motor: Approachable, calm  Attitude toward examiner: Cooperative, easily distracted, good eye contact   Speech: A little less rapid, hyperverbal, less pressured, today and high productivity   Mood: Less anxious  Affect: Mood congruent  Thought processes: rapid, overabundance of ideas, flight of ideas, and perservative   Thought content: Denies homicidal ideation  Suicidal Ideation: Denies suicidal ideation  Delusions: Paranoid, bizarre, grandiose, no improvement  Perceptual Disturbance: Possible auditory and visual hallucinations  Cognition: Oriented to self, location, time, and situation  Memory: intact  Insight & Judgement: poor     Data   height is 5' 10\" (1.778 m) and weight is 111 lb (50.3 kg). His oral temperature is 97.5 °F (36.4 °C). His blood pressure is 114/79 and his pulse is 75. His respiration is 14 and oxygen saturation is 96%. Labs:   No visits with results within 2 Day(s) from this visit. Latest known visit with results is:   Admission on 10/03/2022, Discharged on 10/06/2022   Component Date Value Ref Range Status    Ventricular Rate 10/03/2022 67  BPM Final    Atrial Rate 10/03/2022 67  BPM Final    P-R Interval 10/03/2022 136  ms Final    QRS Duration 10/03/2022 88  ms Final    Q-T Interval 10/03/2022 396  ms Final    QTc Calculation (Bazett) 10/03/2022 418  ms Final    P Axis 10/03/2022 60  degrees Final    R Axis 10/03/2022 98  degrees Final    T Axis 10/03/2022 90  degrees Final    Troponin, High Sensitivity 10/04/2022 14  0 - 22 ng/L Final    Comment:       High Sensitivity Troponin values cannot be compared with other Troponin methodologies. Patients with high levels of Biotin oral intake (i.e >5mg/day) may have falsely decreased   Troponin levels. Samples collected within 8 hours of biotin intake may require additional   information for diagnosis.       Glucose 10/04/2022 86  70 - 99 mg/dL Final    BUN 10/04/2022 12  8 - 23 mg/dL Final    Creatinine 10/04/2022 0.52 (A)  0.70 - 1.20 mg/dL Final    Calcium 10/04/2022 8.7  8.6 - 10.4 mg/dL Final Sodium 10/04/2022 140  135 - 144 mmol/L Final    Potassium 10/04/2022 4.1  3.7 - 5.3 mmol/L Final    Chloride 10/04/2022 104  98 - 107 mmol/L Final    CO2 10/04/2022 29  20 - 31 mmol/L Final    Anion Gap 10/04/2022 7 (A)  9 - 17 mmol/L Final    Est, Glom Filt Rate 10/04/2022 >60  >60 mL/min/1.73m2 Final    Comment:       Effective Oct 3, 2022        These results are not intended for use in patients <25years of age. eGFR results are calculated without a race factor using the 2021 CKD-EPI equation. Careful clinical correlation is recommended, particularly when comparing to results   calculated using previous equations. The CKD-EPI equation is less accurate in patients with extremes of muscle mass, extra-renal   metabolism of creatine, excessive creatine ingestion, or following therapy that affects   renal tubular secretion.       WBC 10/04/2022 19.8 (A)  3.5 - 11.0 k/uL Final    RBC 10/04/2022 3.47 (A)  4.5 - 5.9 m/uL Final    Hemoglobin 10/04/2022 11.5 (A)  13.5 - 17.5 g/dL Final    Hematocrit 10/04/2022 34.0 (A)  41 - 53 % Final    MCV 10/04/2022 98.1  80 - 100 fL Final    MCH 10/04/2022 33.3  26 - 34 pg Final    MCHC 10/04/2022 33.9  31 - 37 g/dL Final    RDW 10/04/2022 13.5  11.5 - 14.9 % Final    Platelets 59/17/8093 328  150 - 450 k/uL Final    MPV 10/04/2022 6.8  6.0 - 12.0 fL Final    Color, UA 10/05/2022 Yellow  Yellow Final    Turbidity UA 10/05/2022 Clear  Clear Final    Glucose, Ur 10/05/2022 NEGATIVE  NEGATIVE Final    Bilirubin Urine 10/05/2022 NEGATIVE  NEGATIVE Final    Ketones, Urine 10/05/2022 NEGATIVE  NEGATIVE Final    Specific Gravity, UA 10/05/2022 1.014  1.000 - 1.030 Final    Urine Hgb 10/05/2022 NEGATIVE  NEGATIVE Final    pH, UA 10/05/2022 7.0  5.0 - 8.0 Final    Protein, UA 10/05/2022 NEGATIVE  NEGATIVE Final    Urobilinogen, Urine 10/05/2022 Normal  Normal Final    Nitrite, Urine 10/05/2022 NEGATIVE  NEGATIVE Final    Leukocyte Esterase, Urine 10/05/2022 NEGATIVE  NEGATIVE Final Urinalysis Comments 10/05/2022 Microscopic exam not performed based on chemical results unless requested in original order. Final    Glucose 10/05/2022 136 (A)  70 - 99 mg/dL Final    BUN 10/05/2022 7 (A)  8 - 23 mg/dL Final    Creatinine 10/05/2022 0.74  0.70 - 1.20 mg/dL Final    Calcium 10/05/2022 9.1  8.6 - 10.4 mg/dL Final    Sodium 10/05/2022 140  135 - 144 mmol/L Final    Potassium 10/05/2022 4.2  3.7 - 5.3 mmol/L Final    Chloride 10/05/2022 102  98 - 107 mmol/L Final    CO2 10/05/2022 29  20 - 31 mmol/L Final    Anion Gap 10/05/2022 9  9 - 17 mmol/L Final    Est, Glom Filt Rate 10/05/2022 >60  >60 mL/min/1.73m2 Final    Comment:       Effective Oct 3, 2022        These results are not intended for use in patients <25years of age. eGFR results are calculated without a race factor using the 2021 CKD-EPI equation. Careful clinical correlation is recommended, particularly when comparing to results   calculated using previous equations. The CKD-EPI equation is less accurate in patients with extremes of muscle mass, extra-renal   metabolism of creatine, excessive creatine ingestion, or following therapy that affects   renal tubular secretion.       WBC 10/05/2022 27.0 (A)  3.5 - 11.0 k/uL Final    RBC 10/05/2022 4.11 (A)  4.5 - 5.9 m/uL Final    Hemoglobin 10/05/2022 13.4 (A)  13.5 - 17.5 g/dL Final    Hematocrit 10/05/2022 40.6 (A)  41 - 53 % Final    MCV 10/05/2022 98.8  80 - 100 fL Final    MCH 10/05/2022 32.7  26 - 34 pg Final    MCHC 10/05/2022 33.1  31 - 37 g/dL Final    RDW 10/05/2022 13.5  11.5 - 14.9 % Final    Platelets 79/80/3557 386  150 - 450 k/uL Final    MPV 10/05/2022 6.8  6.0 - 12.0 fL Final    Glucose 10/06/2022 99  70 - 99 mg/dL Final    BUN 10/06/2022 12  8 - 23 mg/dL Final    Creatinine 10/06/2022 0.63 (A)  0.70 - 1.20 mg/dL Final    Est, Glom Filt Rate 10/06/2022 >60  >60 mL/min/1.73m2 Final    Comment:       Effective Oct 3, 2022        These results are not intended for use in patients <25years of age. eGFR results are calculated without a race factor using the 2021 CKD-EPI equation. Careful clinical correlation is recommended, particularly when comparing to results   calculated using previous equations. The CKD-EPI equation is less accurate in patients with extremes of muscle mass, extra-renal   metabolism of creatine, excessive creatine ingestion, or following therapy that affects   renal tubular secretion.       Calcium 10/06/2022 9.2  8.6 - 10.4 mg/dL Final    Sodium 10/06/2022 140  135 - 144 mmol/L Final    Potassium 10/06/2022 4.0  3.7 - 5.3 mmol/L Final    Chloride 10/06/2022 101  98 - 107 mmol/L Final    CO2 10/06/2022 31  20 - 31 mmol/L Final    Anion Gap 10/06/2022 8 (A)  9 - 17 mmol/L Final    WBC 10/06/2022 17.3 (A)  3.5 - 11.0 k/uL Final    RBC 10/06/2022 3.49 (A)  4.5 - 5.9 m/uL Final    Hemoglobin 10/06/2022 11.4 (A)  13.5 - 17.5 g/dL Final    Hematocrit 10/06/2022 34.4 (A)  41 - 53 % Final    MCV 10/06/2022 98.6  80 - 100 fL Final    MCH 10/06/2022 32.8  26 - 34 pg Final    MCHC 10/06/2022 33.2  31 - 37 g/dL Final    RDW 10/06/2022 13.5  11.5 - 14.9 % Final    Platelets 11/77/1792 335  150 - 450 k/uL Final    MPV 10/06/2022 6.8  6.0 - 12.0 fL Final    Color, UA 10/06/2022 Yellow  Yellow Final    Turbidity UA 10/06/2022 Clear  Clear Final    Glucose, Ur 10/06/2022 NEGATIVE  NEGATIVE Final    Bilirubin Urine 10/06/2022 NEGATIVE  NEGATIVE Final    Ketones, Urine 10/06/2022 NEGATIVE  NEGATIVE Final    Specific Gravity, UA 10/06/2022 1.010  1.000 - 1.030 Final    Urine Hgb 10/06/2022 NEGATIVE  NEGATIVE Final    pH, UA 10/06/2022 7.0  5.0 - 8.0 Final    Protein, UA 10/06/2022 NEGATIVE  NEGATIVE Final    Urobilinogen, Urine 10/06/2022 Normal  Normal Final    Nitrite, Urine 10/06/2022 NEGATIVE  NEGATIVE Final    Leukocyte Esterase, Urine 10/06/2022 NEGATIVE  NEGATIVE Final    Urinalysis Comments 10/06/2022 Microscopic exam not performed based on chemical results unless requested in original order. Final         Reviewed patient's current plan of care and vital signs with nursing staff. Labs reviewed: [x] Yes  Last EKG in EMR reviewed: [x] Yes    Medications  Current Facility-Administered Medications: ipratropium (ATROVENT) 0.02 % nebulizer solution 0.5 mg, 0.5 mg, Nebulization, 4x Daily PRN  OLANZapine (ZYPREXA) tablet 7.5 mg, 7.5 mg, Oral, BID  albuterol sulfate HFA (PROVENTIL;VENTOLIN;PROAIR) 108 (90 Base) MCG/ACT inhaler 2 puff, 2 puff, Inhalation, Q4H PRN  budesonide-formoterol (SYMBICORT) 160-4.5 MCG/ACT inhaler 2 puff, 2 puff, Inhalation, BID  lidocaine 4 % external patch 1 patch, 1 patch, TransDERmal, Daily  lactobacillus (CULTURELLE) capsule 1 capsule, 1 capsule, Oral, Daily  docusate sodium (COLACE) capsule 100 mg, 100 mg, Oral, TID PRN  ibuprofen (ADVIL;MOTRIN) tablet 200 mg, 200 mg, Oral, Q6H PRN  acetaminophen (TYLENOL) tablet 650 mg, 650 mg, Oral, Q6H PRN  hydrOXYzine HCl (ATARAX) tablet 50 mg, 50 mg, Oral, TID PRN  traZODone (DESYREL) tablet 50 mg, 50 mg, Oral, Nightly PRN  polyethylene glycol (GLYCOLAX) packet 17 g, 17 g, Oral, Daily PRN  aluminum & magnesium hydroxide-simethicone (MAALOX) 200-200-20 MG/5ML suspension 30 mL, 30 mL, Oral, Q6H PRN  nicotine polacrilex (NICORETTE) gum 2 mg, 2 mg, Oral, Q2H PRN  haloperidol lactate (HALDOL) injection 5 mg, 5 mg, IntraMUSCular, Q6H PRN **AND** diphenhydrAMINE (BENADRYL) injection 50 mg, 50 mg, IntraMUSCular, Q6H PRN  haloperidol (HALDOL) tablet 5 mg, 5 mg, Oral, Q6H PRN    ASSESSMENT  Acute psychosis (HCC)         HANDOFF  Patient symptoms: Showing modest improvement   Medications as determined by attending physician  Encourage participation in groups and milieu. Probable discharge is to be determined by MD    Electronically signed by ELISEO Howell CNP on 10/9/2022 at 2:05 PM    **This report has been created using voice recognition software.  It may contain minor errors which are inherent in voice recognition technology. **   I independently saw and evaluated the patient. I reviewed the nurse practioner's documentation above. Any additional comments or changes to the    documentation are stated below otherwise agree with assessment. The patient remains grandiose. He is religiously preoccupied and believes he has been staying stable. He acknowledges that he was not doing well but believes he is perfectly fine now. The patient's olanzapine has been increased to 10 mg twice daily      PLAN  Medications as noted above  Attempt to develop insight  Expected Length of Stay is 3-9 days. Psycho-education conducted. Supportive Therapy conducted.   Follow-up daily while on inpatient unit    Electronically signed by Roberto Sandra MD on 10/9/22 at 6:11 PM EDT

## 2022-10-09 NOTE — PROGRESS NOTES
[facility]  Jasper Sheets MD; Terese Hernandez MD; Blayne Rodriges MD; Clarke Capers, MD Brendolyn Rinne, MD; MD FREDDIE [facility]   [facility]    HISTORY AND PHYSICAL EXAMINATION            Date:   10/9/2022  Patient name:  Rosemary Diaz  Date of admission:  10/6/2022  4:47 PM  MRN:   [id number]  Account:  [de-identified]  YOB: 1960  PCP:    Orlando Deutsch NP, APRN - CNP  Room:   [address] [place] [address]  Code Status:    Full Code    Chief Complaint:     No chief complaint on file. COPD    History Obtained From:     Patient medical record nursing staff    History of Present Illness:     Rosemary Diaz is a 60 y.o. Unavailable / unknown male who presents with No chief complaint on file. and is admitted to the hospital for the management of Acute psychosis ([facility] 58-year-old gentleman with a history of for 2pack a day smoking history with severe COPD patient claims stage IV on home oxygen as needed  Was found in public with acute psychosis admitted to medical floor for possible steroid-induced psychosis which she was given for COPD treatment  Patient denies any chest pain no dyspnea      Past Medical History:     Past Medical History:   Diagnosis Date    Asthma     COPD (chronic obstructive pulmonary disease) (HCC)     Emphysema of lung ([facility]         Past Surgical History:     Past Surgical History:   Procedure Laterality Date    SKIN GRAFT Bilateral     lower extremities        Medications Prior to Admission:     Prior to Admission medications    Medication Sig Start Date End Date Taking? Authorizing Provider   albuterol sulfate HFA (VENTOLIN HFA) 108 (90 Base) MCG/ACT inhaler Inhale 2 puffs into the lungs every 4 hours as needed for Wheezing    Historical Provider, MD   diazePAM (VALIUM) 5 MG/ML solution Take 5 mg by mouth every 8 hours as needed (anxiety or sleep).     Historical Provider, MD   docusate sodium (COLACE) 100 MG capsule Take 100 mg by mouth 3 times daily as needed for Constipation    Historical Provider, MD   ipratropium (ATROVENT) 0.02 % nebulizer solution Take 0.5 mg by nebulization 4 times daily    Historical Provider, MD   lactobacillus acidophilus [facility]) Take 4 tablets by mouth 3 times daily    Historical Provider, MD   Acidophilus Lactobacillus CAPS Take 1 capsule by mouth daily    Historical Provider, MD   tiZANidine (ZANAFLEX) 2 MG tablet Take 2 mg by mouth every 6 hours as needed 9/2/22   Historical Provider, MD   acetaminophen (TYLENOL) 500 MG tablet Take 500 mg by mouth every 6 hours as needed for Pain    Historical Provider, MD   ibuprofen (ADVIL;MOTRIN) 200 MG tablet Take 200 mg by mouth every 6 hours as needed for Pain Pt taking 2 pills prn    Historical Provider, MD   mometasone-formoterol (DULERA) 200-5 MCG/ACT inhaler Inhale 2 puffs into the lungs 2 times daily 6/1/22   Jeane Armenta MD   albuterol (PROVENTIL) (2.5 MG/3ML) 0.083% nebulizer solution Take 3 mLs by nebulization every 6 hours as needed for Wheezing  Patient not taking: No sig reported 8/25/21   Jeane Armenta MD   Multiple Vitamins-Minerals (MULTIVITAMIN ADULT PO) Take 1 tablet by mouth daily    Historical Provider, MD        Allergies:     Prednisone, Morphine, and Propoxyphene    Social History:     Tobacco:    reports that he has been smoking cigarettes. He has a 45.00 pack-year smoking history. He has never used smokeless tobacco.  Alcohol:      reports current alcohol use. Drug Use:  reports no history of drug use. Family History:     No family history on file. Review of Systems:     Positive and Negative as described in HPI.     CONSTITUTIONAL:  negative for fevers, chills, sweats, fatigue, weight loss  HEENT:  negative for vision, hearing changes, runny nose, throat pain  RESPIRATORY:  negative for shortness of breath, cough, congestion, wheezing  CARDIOVASCULAR:  negative for chest pain, palpitations  GASTROINTESTINAL:  negative for nausea, vomiting, diarrhea, constipation, change in bowel habits, abdominal pain   GENITOURINARY:  negative for difficulty of urination, burning with urination, frequency   INTEGUMENT:  negative for rash, skin lesions, easy bruising   HEMATOLOGIC/LYMPHATIC:  negative for swelling/edema   ALLERGIC/IMMUNOLOGIC:  negative for urticaria , itching  ENDOCRINE:  negative increase in drinking, increase in urination, hot or cold intolerance  MUSCULOSKELETAL:  negative joint pains, muscle aches, swelling of joints  NEUROLOGICAL:  negative for headaches, dizziness, lightheadedness, numbness, pain, tingling extremities      Physical Exam:   /79   Pulse 75   Temp 97.5 °F (36.4 °C) (Oral)   Resp 14   Ht 5' 10\" (1.778 m)   Wt 111 lb (50.3 kg)   SpO2 96%   BMI 15.93 kg/m²   Temp (24hrs), Av.8 °F (36.6 °C), Min:97.5 °F (36.4 °C), Max:98.1 °F (36.7 °C)    No results for input(s): POCGLU in the last 72 hours.   No intake or output data in the 24 hours ending 10/09/22 1324  Severe protein calorie malnutrition  Very low body fat  Significant muscle wasting noted COPD  General Appearance: alert, well appearing, and in no acute distress  Mental status: oriented to person, place, and time  Head: normocephalic, atraumatic  Eye: no icterus, redness, pupils equal and reactive, extraocular eye movements intact, conjunctiva clear  Ear: normal external ear, no discharge, hearing intact  Nose: no drainage noted  Mouth: mucous membranes moist  Neck: supple, no carotid bruits, thyroid not palpable  Lungs: Bilateral equal air entry, clear to ausculation, no wheezing, rales or rhonchi, normal effort  Cardiovascular: normal rate, regular rhythm, no murmur, gallop, rub  Abdomen: Soft, nontender, nondistended, normal bowel sounds, no hepatomegaly or splenomegaly  Neurologic: There are no new focal motor or sensory deficits, normal muscle tone and bulk, no abnormal sensation, normal speech, cranial nerves II through XII grossly intact  Skin: No gross lesions, rashes, bruising or bleeding on exposed skin area  Extremities: peripheral pulses palpable, no pedal edema or calf pain with palpation      Investigations:      Laboratory Testing:  No results found for this or any previous visit (from the past 24 hour(s)). Imaging/Diagnostics:  No results found. Assessment :      Hospital Problems             Last Modified POA    * (Principal) Acute psychosis ([facility] 10/7/2022 Yes    Severe malnutrition ([facility] 10/7/2022 Yes     Plan:     79-year-old gentleman  Severe protein calorie malnutrition BMI 16.3 is  Severe COPD Gold stage IV per patient  On home oxygen as needed  History of asbestosis  Active smoker 2 packs a day  Nicotine patch  Was admitted to medical floor with acute psychosis thought to be due to steroid-induced psychosis for COPD  Dietary consult for severe malnutrition  Start Symbicort 160/4.5  As needed albuterol                Salma Wallace MD  10/9/2022  1:24 PM    Copy sent to Dr. Haydee Blake, [facility], APRN - CNP    Please note that this chart was generated using voice recognition Dragon dictation software. Although every effort was made to ensure the accuracy of this automated transcription, some errors in transcription may have occurred.

## 2022-10-09 NOTE — PLAN OF CARE
Problem: Safety - Adult  Goal: Free from fall injury  10/9/2022 1103 by Kerry Hennessy LPN  Outcome: Progressing  Flowsheets (Taken 10/8/2022 1014)  Free From Fall Injury: Instruct family/caregiver on patient safety  Note: Patient is alert and orientated to self and place, but is disorientated to time and situation. Patient is pleasantly confused, but is  acceptable of redirection. Patient is depressed and anxious. Patient denies thoughts of harming self or others. Patient denies auditory and visual hallucination. Patient is delusional and manic as he is Roman Catholic focused as patient states, Shaheed Alarcon is saving people and has 9 different souls. \" Patient is discharged focused. Line of sight in place, fall precautions in place, and 15 minute safety checks in place. Patient is medication compliant. Problem: ABCDS Injury Assessment  Goal: Absence of physical injury  10/9/2022 1103 by Kerry Hennessy LPN  Outcome: Progressing  Note: Patient is alert and orientated to self and place, but is disorientated to time and situation. Patient is pleasantly confused, but is  acceptable of redirection. Patient is depressed and anxious. Patient denies thoughts of harming self or others. Patient denies auditory and visual hallucination. Patient is delusional and manic as he is Roman Catholic focused as patient states, Shaheed Alarcon is saving people and has 9 different souls. \" Patient is discharged focused. Line of sight in place, fall precautions in place, and 15 minute safety checks in place. Patient is medication compliant. Problem: Anxiety  Goal: Will report anxiety at manageable levels  Description: INTERVENTIONS:  1. Administer medication as ordered  2. Teach and rehearse alternative coping skills  3.  Provide emotional support with 1:1 interaction with staff  10/9/2022 1103 by Kerry Hennessy LPN  Outcome: Progressing  Flowsheets (Taken 10/9/2022 1103)  Will report anxiety at manageable levels: Provide emotional support with 1:1 interaction with staff  Note: Patient is alert and orientated to self and place, but is disorientated to time and situation. Patient is pleasantly confused, but is  acceptable of redirection. Patient is depressed and anxious. Patient denies thoughts of harming self or others. Patient denies auditory and visual hallucination. Patient is delusional and manic as he is Latter day focused as patient statesKey is saving people and has 9 different souls. \" Patient is discharged focused. Line of sight in place, fall precautions in place, and 15 minute safety checks in place. Patient is medication compliant. Problem: Coping  Goal: Pt/Family able to verbalize concerns and demonstrate effective coping strategies  Description: INTERVENTIONS:  1. Assist patient/family to identify coping skills, available support systems and cultural and spiritual values  2. Provide emotional support, including active listening and acknowledgement of concerns of patient and caregivers  3. Reduce environmental stimuli, as able  4. Instruct patient/family in relaxation techniques, as appropriate  5. Assess for spiritual pain/suffering and initiate Spiritual Care, Psychosocial Clinical Specialist consults as needed  10/9/2022 1103 by Davion Jalloh LPN  Outcome: Progressing  Flowsheets (Taken 10/9/2022 1103)  Patient/family able to verbalize anxieties, fears, and concerns, and demonstrate effective coping: Assist patient/family to identify coping skills, available support systems and cultural and spiritual values  Note: Patient is alert and orientated to self and place, but is disorientated to time and situation. Patient is pleasantly confused, but is  acceptable of redirection. Patient is depressed and anxious. Patient denies thoughts of harming self or others. Patient denies auditory and visual hallucination. Patient is delusional and manic as he is Latter day focused as patient states, Key Jaramillo is saving people and has 9 different souls. \" Patient is discharged focused. Line of sight in place, fall precautions in place, and 15 minute safety checks in place. Patient is medication compliant. Problem: Confusion  Goal: Confusion, delirium, dementia, or psychosis is improved or at baseline  Description: INTERVENTIONS:  1. Assess for possible contributors to thought disturbance, including medications, impaired vision or hearing, underlying metabolic abnormalities, dehydration, psychiatric diagnoses, and notify attending LIP  2. [place] high risk fall precautions, as indicated  3. Provide frequent short contacts to provide reality reorientation, refocusing and direction  4. Decrease environmental stimuli, including noise as appropriate  5. Monitor and intervene to maintain adequate nutrition, hydration, elimination, sleep and activity  6. If unable to ensure safety without constant attention obtain sitter and review sitter guidelines with assigned personnel  7. Initiate Psychosocial CNS and Spiritual Care consult, as indicated  10/9/2022 1103 by Sterling Morales LPN  Outcome: Progressing  Flowsheets (Taken 10/9/2022 1103)  Effect of thought disturbance (confusion, delirium, dementia, or psychosis) are managed with adequate functional status: Decrease environmental stimuli, including noise as appropriate  Note: Patient is alert and orientated to self and place, but is disorientated to time and situation. Patient is pleasantly confused, but is  acceptable of redirection. Patient is depressed and anxious. Patient denies thoughts of harming self or others. Patient denies auditory and visual hallucination. Patient is delusional and manic as he is Jainism focused as patient states, Alex Pierson is saving people and has 9 different souls. \" Patient is discharged focused. Line of sight in place, fall precautions in place, and 15 minute safety checks in place. Patient is medication compliant.       Problem: Nutrition Deficit:  Goal: Optimize nutritional status  10/9/2022 1103 by Марина Bhatia LPN  Outcome: Progressing  Flowsheets (Taken 10/9/2022 1103)  Nutrient intake appropriate for improving, restoring, or maintaining nutritional needs: Assess nutritional status and recommend course of action  Note: Patient is alert and orientated to self and place, but is disorientated to time and situation. Patient is pleasantly confused, but is  acceptable of redirection. Patient is depressed and anxious. Patient denies thoughts of harming self or others. Patient denies auditory and visual hallucination. Patient is delusional and manic as he is Jehovah's witness focused as patient states, Dianna Hansen is saving people and has 9 different souls. \" Patient is discharged focused. Line of sight in place, fall precautions in place, and 15 minute safety checks in place. Patient is medication compliant.

## 2022-10-09 NOTE — PROGRESS NOTES
LOS NOTE:  Up in dayroom with steady gait. Irritable but receptive to support/reassurance & redirection. Continue increased monitoring for safety.

## 2022-10-09 NOTE — GROUP NOTE
Group Therapy Note    Date: 10/9/2022    Group Start Time: 0900  Group End Time: 0915  Group Topic: Group Documentation    [facility]    Hanane Garcia, RN        Group Therapy Note    Attendees: 4/12    Community Meeting Group Note        Date: October 9, 2022 Start Time: 9am  End Time:  9:15am      Number of Participants in Group & Unit Census:  4/12    Topic: Goals group    Goal of Group: To establish attainable daily goal(s)      Comments:     Patient did not participate in [employer] group, despite staff encouragement and explanation of benefits. Patient remain seclusive to self. Q15 minute safety checks maintained for patient safety and will continue to encourage patient to attend unit programming.

## 2022-10-09 NOTE — PLAN OF CARE
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  Estimated Length of Stay:  5-7 days  Estimated Discharge Date Update:   to be determined by physician    PATIENT STRENGTHS:  Patient Strengths:   Patient Strengths and Limitations:Limitations: Multiple barriers to leisure interests, Unrealistic self-view, Inappropriate/potentially harmful leisure interests, Difficulty problem solving/relies on others to help solve problems, External locus of control  Addictive Behavior:Addictive Behavior  In the Past 3 Months, Have You Felt or Has Someone Told You That You Have a Problem With  : None  Medical Problems:  Past Medical History:   Diagnosis Date    Asthma     COPD (chronic obstructive pulmonary disease) ([facility] [address]     [facility]        Risk:  Fall Risk   Leroy Scale Leroy Scale Score: 21  BVC    Change in scores:  No Changes to plan of Care:  No    Status EXAM:   Mental Status and Behavioral Exam  Normal: No  Level of Assistance: Independent/Self  Facial Expression: Brightened  Affect: Congruent  Level of Consciousness: Alert  Frequency of Checks: 4 times per hour, close  Mood:Normal: No  Mood: Depressed, Anxious  Motor Activity:Normal: Yes  Motor Activity: Decreased  Eye Contact: Good  Observed Behavior: Impulsive, Cooperative, Friendly, Preoccupied  Sexual Misconduct History: Current - no  Preception: [place] to situation, [place] to time  Attention:Normal: No  Attention: Distractible  Thought Processes: Circumstantial, Flight of ideas, Tangential  Thought Content:Normal: No  Thought Content: Delusions, Paranoia, Preoccupations  Depression Symptoms: Impaired concentration, Increased irritability  Anxiety Symptoms: Generalized  June Symptoms: Flight of ideas, Grandiosity, Labile, Poor judgment  Hallucinations: None  Delusions: Yes  Delusions: Grandeur, Paranoid, Quaker  Memory:Normal: No  Memory: Confabulation  Insight and Judgment: No  Insight and Judgment: Poor judgment, Poor insight, Unrealistic    Daily Assessment Last Entry:                Daily Nutrition (WDL): Within Defined Limits  Level of Assistance: Independent/Self    Patient Monitoring:  Frequency of Checks: 4 times per hour, close    Psychiatric Symptoms:   Depression Symptoms  Depression Symptoms: Impaired concentration, Increased irritability  Anxiety Symptoms  Anxiety Symptoms: Generalized  June Symptoms  June Symptoms: Flight of ideas, Grandiosity, Labile, Poor judgment          Suicide Risk CSSR-S:  1) Within the past month, have you wished you were dead or wished you could go to sleep and not wake up? : No  2) Have you actually had any thoughts of killing yourself? : No  6) Have you ever done anything, started to do anything, or prepared to do anything to end your life?: No  Change in Result:   Change in Plan of care:        EDUCATION:   Learner Progress Toward Treatment Goals:   Reviewed results and recommendations of this team, Reviewed group plan and strategies, Reviewed signs, symptoms and risk of self harm and violent behavior, Reviewed goals and plan of care    Method:  small group, individual verbal education    Outcome:   Verbalized by patient but needs reinforcement to obtain goals    PATIENT GOALS:  Short term:  Patient unable to participate in treatment team  Long term:  Patient unable to participate in treatment team    PLAN/TREATMENT RECOMMENDATIONS UPDATE:  continue with group therapies, increased socialization, continue planning for after discharge goals, continue with medication compliance    SHORT-TERM GOALS UPDATE:   Time frame for Short-Term Goals:  5-7 days    LONG-TERM GOALS UPDATE:   Time frame for Long-Term Goals:  6 months    Members Present in Team Meeting:   See signature sheet  David Snell RN

## 2022-10-09 NOTE — PLAN OF CARE
Problem: Safety - Adult  Goal: Free from fall injury  10/8/2022 2133 by aPrish Frost RN  Outcome: Progressing   Gait is steady, no falls noted. Problem: ABCDS Injury Assessment  Goal: Absence of physical injury  10/8/2022 2133 by Parish Frost RN  Outcome: Progressing   No new injuries noted. Problem: Anxiety  Goal: Will report anxiety at manageable levels  Description: INTERVENTIONS:  1. Administer medication as ordered  2. Teach and rehearse alternative coping skills  3. Provide emotional support with 1:1 interaction with staff  10/8/2022 2133 by Parish Frost RN  Outcome: Progressing   Anxiety persists but responds to support/reassurance. Problem: Coping  Goal: Pt/Family able to verbalize concerns and demonstrate effective coping strategies  Description: INTERVENTIONS:  1. Assist patient/family to identify coping skills, available support systems and cultural and spiritual values  2. Provide emotional support, including active listening and acknowledgement of concerns of patient and caregivers  3. Reduce environmental stimuli, as able  4. Instruct patient/family in relaxation techniques, as appropriate  5. Assess for spiritual pain/suffering and initiate Spiritual Care, Psychosocial Clinical Specialist consults as needed  10/8/2022 2133 by Parish Frost RN  Outcome: Progressing   Does not relate coping strategy. Hyperverbal with delusional thoughts without +effect from injection of doubt. Problem: Confusion  Goal: Confusion, delirium, dementia, or psychosis is improved or at baseline  Description: INTERVENTIONS:  1. Assess for possible contributors to thought disturbance, including medications, impaired vision or hearing, underlying metabolic abnormalities, dehydration, psychiatric diagnoses, and notify attending LIP  2. [place] high risk fall precautions, as indicated  3. Provide frequent short contacts to provide reality reorientation, refocusing and direction  4.  Decrease environmental stimuli, including noise as appropriate  5. Monitor and intervene to maintain adequate nutrition, hydration, elimination, sleep and activity  6. If unable to ensure safety without constant attention obtain sitter and review sitter guidelines with assigned personnel  7. Initiate Psychosocial CNS and Spiritual Care consult, as indicated  10/8/2022 2133 by Sandra Fajardo RN  Outcome: Progressing    Hyperverbal with delusional thoughts without +effect from injection of doubt. Problem: Nutrition Deficit:  Goal: Optimize nutritional status  10/8/2022 2133 by Sandra Fajardo RN  Outcome: Progressing   Ate snack/drank Ensure+  Problem: Pain  Goal: Verbalizes/displays adequate comfort level or baseline comfort level  10/8/2022 2133 by Sandra Fajardo RN  Outcome: Progressing   Satisfied with current interventions.

## 2022-10-10 PROCEDURE — 6370000000 HC RX 637 (ALT 250 FOR IP): Performed by: PSYCHIATRY & NEUROLOGY

## 2022-10-10 PROCEDURE — 6370000000 HC RX 637 (ALT 250 FOR IP): Performed by: INTERNAL MEDICINE

## 2022-10-10 PROCEDURE — 6360000002 HC RX W HCPCS: Performed by: PSYCHIATRY & NEUROLOGY

## 2022-10-10 PROCEDURE — 1240000000 HC EMOTIONAL WELLNESS R&B

## 2022-10-10 PROCEDURE — APPSS30 APP SPLIT SHARED TIME 16-30 MINUTES

## 2022-10-10 PROCEDURE — 99232 SBSQ HOSP IP/OBS MODERATE 35: CPT | Performed by: PSYCHIATRY & NEUROLOGY

## 2022-10-10 RX ADMIN — TRAZODONE HYDROCHLORIDE 50 MG: 50 TABLET ORAL at 21:54

## 2022-10-10 RX ADMIN — BUDESONIDE AND FORMOTEROL FUMARATE DIHYDRATE 2 PUFF: 160; 4.5 AEROSOL RESPIRATORY (INHALATION) at 21:53

## 2022-10-10 RX ADMIN — DIPHENHYDRAMINE HYDROCHLORIDE 50 MG: 50 INJECTION, SOLUTION INTRAMUSCULAR; INTRAVENOUS at 01:18

## 2022-10-10 RX ADMIN — DOCUSATE SODIUM 100 MG: 100 CAPSULE, LIQUID FILLED ORAL at 08:49

## 2022-10-10 RX ADMIN — Medication 1 CAPSULE: at 08:44

## 2022-10-10 RX ADMIN — BUDESONIDE AND FORMOTEROL FUMARATE DIHYDRATE 2 PUFF: 160; 4.5 AEROSOL RESPIRATORY (INHALATION) at 08:45

## 2022-10-10 RX ADMIN — OLANZAPINE 10 MG: 10 TABLET, FILM COATED ORAL at 08:44

## 2022-10-10 RX ADMIN — HALOPERIDOL LACTATE 5 MG: 5 INJECTION, SOLUTION INTRAMUSCULAR at 01:18

## 2022-10-10 RX ADMIN — HYDROXYZINE HYDROCHLORIDE 50 MG: 50 TABLET ORAL at 21:54

## 2022-10-10 RX ADMIN — HALOPERIDOL 5 MG: 5 TABLET ORAL at 14:28

## 2022-10-10 RX ADMIN — OLANZAPINE 10 MG: 10 TABLET, FILM COATED ORAL at 21:54

## 2022-10-10 ASSESSMENT — PAIN DESCRIPTION - LOCATION: LOCATION: GENERALIZED

## 2022-10-10 ASSESSMENT — PAIN SCALES - GENERAL: PAINLEVEL_OUTOF10: 3

## 2022-10-10 NOTE — BH NOTE
Im prns given as ordered  at this time. Pt is continuously putting up and down his bed rails and climbing   over them, coming out into the craig and demanding to be touched . Pt states touch me just , touch me.  Unable to be redirected and is accepting of medications without issue

## 2022-10-10 NOTE — PLAN OF CARE
Problem: Safety - Adult  Goal: Free from fall injury  10/10/2022 0015 by Jona Way LPN  Outcome: Progressing     Problem: ABCDS Injury Assessment  Goal: Absence of physical injury  10/10/2022 0015 by Jona Way LPN  Outcome: Progressing     Problem: Anxiety  Goal: Will report anxiety at manageable levels  Description: INTERVENTIONS:  1. Administer medication as ordered  2. Teach and rehearse alternative coping skills  3. Provide emotional support with 1:1 interaction with staff  10/10/2022 0015 by Jona Way LPN  Outcome: Progressing  Flowsheets (Taken 10/9/2022 1210 by Saintclair Rounds, LPN)  Will report anxiety at manageable levels:   Teach and rehearse alternative coping skills   Provide emotional support with 1:1 interaction with staff   Administer medication as ordered   Reports continued feelings of anxiety. Remains free from fall or of injury. pt calls wife and tells her to pick him up because he is discharged.

## 2022-10-10 NOTE — BH NOTE
Emergency Medication Follow-Up Note:    PRN medication of Haldol 5 mg oral was effective as evidence by Patient regain behavioral control, absence of behavior warranting emergency medication, socializing with peers). Patient denies medication side effects. Will continue to monitor and provide support as needed.

## 2022-10-10 NOTE — PROGRESS NOTES
Daily Progress Note  10/10/2022    Patient Name: Mundo Kenney    CHIEF COMPLAINT:  Acute psychosis          SUBJECTIVE:      Patient is seen today for a follow up assessment and on room. Patient reports having some anxiety and depression due to family planning on visiting from today. Is compliant with scheduled medication. He received emergency medicine including Benadryl 50 mg IM, and Haldol 5 mg IM due to increased agitation. Per EMR documentation, \"Pt is continuously putting up and down his bed rails and climbing   over them, coming out into the craig and demanding to be touched . Pt states touch me just , touch me. Unable to be redirected and is accepting of medications without issue\". Patient has been in behavioral control ever since. Patient continues to display grandiose delusions and is be religiously preoccupied. He refers himself as \"the Savior \" and he reports \"saving 18 souls so far\". He is discharge focused and reports improvement in auditory hallucinations, she states, \"I'm just much more aware of life and spirit of God\". Patient continues to engage in group programing. He is bizarre and tearful which he mentions being joyful tears as he talks about grandmother who he hopes to have seen him doing the work of God. He shows no insight to own mental illness. Patient is not appropriate for lower level of care due to decompensation and patient warrants further hospitalization due to safety and stabilization.     Appetite:  [] Adequate/Unchanged  [x] Increased  [] Decreased      Sleep:       [] Adequate/Unchanged  [x] Fair  [] Poor      Group Attendance on Unit:   [x] Yes   [] Selectively    [] No    Compliant with scheduled medications: [x] Yes  [] No    Received emergency medications in past 24 hrs: [x] Yes   [] No    Medication Side Effects: Denies          Mental Status Exam  Level of consciousness: Alert and awake   Appearance: Appropriate attire for setting, seated in chair, with good  grooming and hygiene   Behavior/Motor: Approachable, engages with interviewer  Attitude toward examiner: Cooperative, easily distracted, good eye contact  Speech: Somewhat rapid speech, hyperverbal  Mood: Patient reports \"great\"  Affect: Mood congruent  Thought processes: rapid, flight of ideas, and perservative   Thought content: Denies homicidal ideation  Suicidal Ideation: Denies suicidal ideations, contracts for safety on the unit. Delusions: Paranoid, bizarre, grandiose  Perceptual Disturbance:  Patient reports improvement in auditory hallucinations  Cognition: Oriented to self,, location,, and time, and general situation  Memory: intact  Insight: poor   Judgement: poor     Data   height is 5' 10\" (1.778 m) and weight is 111 lb (50.3 kg). His oral temperature is 97.9 °F (36.6 °C). His blood pressure is 106/79 and his pulse is 90. His respiration is 14 and oxygen saturation is 96%. Labs:   No visits with results within 2 Day(s) from this visit. Latest known visit with results is:   Admission on 10/03/2022, Discharged on 10/06/2022   Component Date Value Ref Range Status    Ventricular Rate 10/03/2022 67  BPM Final    Atrial Rate 10/03/2022 67  BPM Final    P-R Interval 10/03/2022 136  ms Final    QRS Duration 10/03/2022 88  ms Final    Q-T Interval 10/03/2022 396  ms Final    QTc Calculation (Bazett) 10/03/2022 418  ms Final    P Axis 10/03/2022 60  degrees Final    R Axis 10/03/2022 98  degrees Final    T Axis 10/03/2022 90  degrees Final    Troponin, High Sensitivity 10/04/2022 14  0 - 22 ng/L Final    Comment:       High Sensitivity Troponin values cannot be compared with other Troponin methodologies. Patients with high levels of Biotin oral intake (i.e >5mg/day) may have falsely decreased   Troponin levels. Samples collected within 8 hours of biotin intake may require additional   information for diagnosis.       Glucose 10/04/2022 86  70 - 99 mg/dL Final    BUN 10/04/2022 12  8 - 23 mg/dL Final Creatinine 10/04/2022 0.52 (A)  0.70 - 1.20 mg/dL Final    Calcium 10/04/2022 8.7  8.6 - 10.4 mg/dL Final    Sodium 10/04/2022 140  135 - 144 mmol/L Final    Potassium 10/04/2022 4.1  3.7 - 5.3 mmol/L Final    Chloride 10/04/2022 104  98 - 107 mmol/L Final    CO2 10/04/2022 29  20 - 31 mmol/L Final    Anion Gap 10/04/2022 7 (A)  9 - 17 mmol/L Final    Est, Glom Filt Rate 10/04/2022 >60  >60 mL/min/1.73m2 Final    Comment:       Effective Oct 3, 2022        These results are not intended for use in patients <25years of age. eGFR results are calculated without a race factor using the 2021 CKD-EPI equation. Careful clinical correlation is recommended, particularly when comparing to results   calculated using previous equations. The CKD-EPI equation is less accurate in patients with extremes of muscle mass, extra-renal   metabolism of creatine, excessive creatine ingestion, or following therapy that affects   renal tubular secretion.       WBC 10/04/2022 19.8 (A)  3.5 - 11.0 k/uL Final    RBC 10/04/2022 3.47 (A)  4.5 - 5.9 m/uL Final    Hemoglobin 10/04/2022 11.5 (A)  13.5 - 17.5 g/dL Final    Hematocrit 10/04/2022 34.0 (A)  41 - 53 % Final    MCV 10/04/2022 98.1  80 - 100 fL Final    MCH 10/04/2022 33.3  26 - 34 pg Final    MCHC 10/04/2022 33.9  31 - 37 g/dL Final    RDW 10/04/2022 13.5  11.5 - 14.9 % Final    Platelets 83/04/2811 328  150 - 450 k/uL Final    MPV 10/04/2022 6.8  6.0 - 12.0 fL Final    Color, UA 10/05/2022 Yellow  Yellow Final    Turbidity UA 10/05/2022 Clear  Clear Final    Glucose, Ur 10/05/2022 NEGATIVE  NEGATIVE Final    Bilirubin Urine 10/05/2022 NEGATIVE  NEGATIVE Final    Ketones, Urine 10/05/2022 NEGATIVE  NEGATIVE Final    Specific Gravity, UA 10/05/2022 1.014  1.000 - 1.030 Final    Urine Hgb 10/05/2022 NEGATIVE  NEGATIVE Final    pH, UA 10/05/2022 7.0  5.0 - 8.0 Final    Protein, UA 10/05/2022 NEGATIVE  NEGATIVE Final    Urobilinogen, Urine 10/05/2022 Normal  Normal Final    Nitrite, Urine 10/05/2022 NEGATIVE  NEGATIVE Final    Leukocyte Esterase, Urine 10/05/2022 NEGATIVE  NEGATIVE Final    Urinalysis Comments 10/05/2022 Microscopic exam not performed based on chemical results unless requested in original order. Final    Glucose 10/05/2022 136 (A)  70 - 99 mg/dL Final    BUN 10/05/2022 7 (A)  8 - 23 mg/dL Final    Creatinine 10/05/2022 0.74  0.70 - 1.20 mg/dL Final    Calcium 10/05/2022 9.1  8.6 - 10.4 mg/dL Final    Sodium 10/05/2022 140  135 - 144 mmol/L Final    Potassium 10/05/2022 4.2  3.7 - 5.3 mmol/L Final    Chloride 10/05/2022 102  98 - 107 mmol/L Final    CO2 10/05/2022 29  20 - 31 mmol/L Final    Anion Gap 10/05/2022 9  9 - 17 mmol/L Final    Est, Glom Filt Rate 10/05/2022 >60  >60 mL/min/1.73m2 Final    Comment:       Effective Oct 3, 2022        These results are not intended for use in patients <25years of age. eGFR results are calculated without a race factor using the 2021 CKD-EPI equation. Careful clinical correlation is recommended, particularly when comparing to results   calculated using previous equations. The CKD-EPI equation is less accurate in patients with extremes of muscle mass, extra-renal   metabolism of creatine, excessive creatine ingestion, or following therapy that affects   renal tubular secretion.       WBC 10/05/2022 27.0 (A)  3.5 - 11.0 k/uL Final    RBC 10/05/2022 4.11 (A)  4.5 - 5.9 m/uL Final    Hemoglobin 10/05/2022 13.4 (A)  13.5 - 17.5 g/dL Final    Hematocrit 10/05/2022 40.6 (A)  41 - 53 % Final    MCV 10/05/2022 98.8  80 - 100 fL Final    MCH 10/05/2022 32.7  26 - 34 pg Final    MCHC 10/05/2022 33.1  31 - 37 g/dL Final    RDW 10/05/2022 13.5  11.5 - 14.9 % Final    Platelets 75/60/2783 386  150 - 450 k/uL Final    MPV 10/05/2022 6.8  6.0 - 12.0 fL Final    Glucose 10/06/2022 99  70 - 99 mg/dL Final    BUN 10/06/2022 12  8 - 23 mg/dL Final    Creatinine 10/06/2022 0.63 (A)  0.70 - 1.20 mg/dL Final    Est, Glom Filt Rate 10/06/2022 >60  >60 mL/min/1.73m2 Final    Comment:       Effective Oct 3, 2022        These results are not intended for use in patients <25years of age. eGFR results are calculated without a race factor using the 2021 CKD-EPI equation. Careful clinical correlation is recommended, particularly when comparing to results   calculated using previous equations. The CKD-EPI equation is less accurate in patients with extremes of muscle mass, extra-renal   metabolism of creatine, excessive creatine ingestion, or following therapy that affects   renal tubular secretion.       Calcium 10/06/2022 9.2  8.6 - 10.4 mg/dL Final    Sodium 10/06/2022 140  135 - 144 mmol/L Final    Potassium 10/06/2022 4.0  3.7 - 5.3 mmol/L Final    Chloride 10/06/2022 101  98 - 107 mmol/L Final    CO2 10/06/2022 31  20 - 31 mmol/L Final    Anion Gap 10/06/2022 8 (A)  9 - 17 mmol/L Final    WBC 10/06/2022 17.3 (A)  3.5 - 11.0 k/uL Final    RBC 10/06/2022 3.49 (A)  4.5 - 5.9 m/uL Final    Hemoglobin 10/06/2022 11.4 (A)  13.5 - 17.5 g/dL Final    Hematocrit 10/06/2022 34.4 (A)  41 - 53 % Final    MCV 10/06/2022 98.6  80 - 100 fL Final    MCH 10/06/2022 32.8  26 - 34 pg Final    MCHC 10/06/2022 33.2  31 - 37 g/dL Final    RDW 10/06/2022 13.5  11.5 - 14.9 % Final    Platelets 66/71/9968 335  150 - 450 k/uL Final    MPV 10/06/2022 6.8  6.0 - 12.0 fL Final    Color, UA 10/06/2022 Yellow  Yellow Final    Turbidity UA 10/06/2022 Clear  Clear Final    Glucose, Ur 10/06/2022 NEGATIVE  NEGATIVE Final    Bilirubin Urine 10/06/2022 NEGATIVE  NEGATIVE Final    Ketones, Urine 10/06/2022 NEGATIVE  NEGATIVE Final    Specific Gravity, UA 10/06/2022 1.010  1.000 - 1.030 Final    Urine Hgb 10/06/2022 NEGATIVE  NEGATIVE Final    pH, UA 10/06/2022 7.0  5.0 - 8.0 Final    Protein, UA 10/06/2022 NEGATIVE  NEGATIVE Final    Urobilinogen, Urine 10/06/2022 Normal  Normal Final    Nitrite, Urine 10/06/2022 NEGATIVE  NEGATIVE Final    Leukocyte Esterase, Urine 10/06/2022 NEGATIVE NEGATIVE Final    Urinalysis Comments 10/06/2022 Microscopic exam not performed based on chemical results unless requested in original order. Final         Reviewed patient's current plan of care and vital signs with nursing staff. Labs reviewed: [x] Yes    Medications  Current Facility-Administered Medications: OLANZapine (ZYPREXA) tablet 10 mg, 10 mg, Oral, BID  ipratropium (ATROVENT) 0.02 % nebulizer solution 0.5 mg, 0.5 mg, Nebulization, 4x Daily PRN  albuterol sulfate HFA (PROVENTIL;VENTOLIN;PROAIR) 108 (90 Base) MCG/ACT inhaler 2 puff, 2 puff, Inhalation, Q4H PRN  budesonide-formoterol (SYMBICORT) 160-4.5 MCG/ACT inhaler 2 puff, 2 puff, Inhalation, BID  lidocaine 4 % external patch 1 patch, 1 patch, TransDERmal, Daily  lactobacillus (CULTURELLE) capsule 1 capsule, 1 capsule, Oral, Daily  docusate sodium (COLACE) capsule 100 mg, 100 mg, Oral, TID PRN  ibuprofen (ADVIL;MOTRIN) tablet 200 mg, 200 mg, Oral, Q6H PRN  acetaminophen (TYLENOL) tablet 650 mg, 650 mg, Oral, Q6H PRN  hydrOXYzine HCl (ATARAX) tablet 50 mg, 50 mg, Oral, TID PRN  traZODone (DESYREL) tablet 50 mg, 50 mg, Oral, Nightly PRN  polyethylene glycol (GLYCOLAX) packet 17 g, 17 g, Oral, Daily PRN  aluminum & magnesium hydroxide-simethicone (MAALOX) 200-200-20 MG/5ML suspension 30 mL, 30 mL, Oral, Q6H PRN  nicotine polacrilex (NICORETTE) gum 2 mg, 2 mg, Oral, Q2H PRN  haloperidol lactate (HALDOL) injection 5 mg, 5 mg, IntraMUSCular, Q6H PRN **AND** diphenhydrAMINE (BENADRYL) injection 50 mg, 50 mg, IntraMUSCular, Q6H PRN  haloperidol (HALDOL) tablet 5 mg, 5 mg, Oral, Q6H PRN    ASSESSMENT  Acute psychosis (HCC)         HANDOFF  Patient symptoms are: Unstable  Medications as determined by attending physician: Olanzapine recently increased to 10BID, continue to monitor. Encourage participation in groups and milieu.   Probable discharge is to be determined by MD    Electronically signed by ELISEO Love CNP on 10/10/2022 at 8:46 AM    **This report has been created using voice recognition software. It may contain minor errors which are inherent in voice recognition technology. **   I independently saw and evaluated the patient. I reviewed the nurse practioner's documentation above. Any additional comments or changes to the    documentation are stated below otherwise agree with assessment. Patient continues to be grandiose. He continues to be religiously preoccupied. He believes he is failing school. Noted that he has required as needed injectable medications yesterday. We will continue with the current dose of olanzapine 10 mg twice daily      PLAN  Medications as noted above  Attempt to develop insight  Expected Length of Stay is 3-5 days. Psycho-education conducted. Supportive Therapy conducted.   Follow-up daily while on inpatient unit    Electronically signed by Cecilio Gregg MD on 10/10/22 at 7:07 PM EDT

## 2022-10-10 NOTE — GROUP NOTE
Group Therapy Note    Date: 10/10/2022    Group Start Time: 1100  Group End Time: 7414  Group Topic: Cognitive Skills    BRAYDEN Teran, CTRS        Group Therapy Note    Attendees: 6/12       Patient's Goal:  To increase cognitive thinking through decision making, concentration and focus to task. To increase turn taking abilities and communication with peers    Notes:  Patient attended group and actively participated in the task at hand. Patient was pleasant throughout group. Patient was focused on talking about \"Luis the archangel\". Patient was able to be redirected to focus on group. Patient communicated with peers.     Status After Intervention:  Improved    Participation Level: Interactive    Participation Quality: Attentive      Speech:  loud      Thought Process/Content: Flight of ideas      Affective Functioning: Flat      Mood: elevated      Level of consciousness:  Alert and Attentive      Response to Learning: Progressing to goal      Endings: None Reported    Modes of Intervention: Socialization, Problem-solving, and Reality-testing      Discipline Responsible: Psychoeducational Specialist      Signature:  Jing Rodriguez

## 2022-10-10 NOTE — PROGRESS NOTES
10/10/22 1324   Encounter Summary   Encounter Overview/Reason  Spiritual/Emotional Needs   Service Provided For: Patient   Referral/Consult From: Rounding   Last Encounter  10/10/22   Complexity of Encounter Moderate   Spiritual/Emotional needs   Type Spiritual Support   Behavioral Health    Type  Initial Encounter   Assessment/Intervention/Outcome   Assessment Anxious; Coping; Interrupted family processes   Intervention Active listening;Nurtured Hope;Prayer (assurance of)/[place];Sustaining Presence/Ministry of presence   Outcome Receptive; Expressed Gratitude;Engaged in conversation

## 2022-10-10 NOTE — PLAN OF CARE
Problem: Safety - Adult  Goal: Free from fall injury  Outcome: Progressing  Note: Patient remains free from falls. Safety checks maintained every 15 minutes and as needed      Problem: Anxiety  Goal: Will report anxiety at manageable levels  Description: INTERVENTIONS:  1. Administer medication as ordered  2. Teach and rehearse alternative coping skills  3. Provide emotional support with 1:1 interaction with staff  Outcome: Progressing  Note: Patient denies suicidal/homicidal ideations. Patient agreeable to seek out staff should thoughts of self harm/harming others should arise. Patient denies hallucinations. Patient is positive for anxiety, reporting an 8/10, patient is somewhat redirectable. Patient became agitated and required PRN Haldol with good effect. Patient is positive for depression, manageable. Patient is medication compliant. Patient is discharge focused. Comfort and reassurance provided. Safety checks maintained every 15 minutes and as needed.

## 2022-10-10 NOTE — BH NOTE
Emergency PRN Medication Administration Note:      Patient is Agitated as evidence by patient stated he was going to lose it, pt exhibited increased irratability and impatience. Staff attempted to find and relieve the distress by Talking to patient Patient is currently  continuing to escalate, Pt accepted PRN medications). Medication Administered as prescribed:Haldol 5 mg oral. Patient Tolerated medication administration. Will continue to monitor, offer support, and reassess.

## 2022-10-10 NOTE — PROGRESS NOTES
Pharmacy Med Education Group Note    Date: 10/10/22  Start Time: 1330  End Time: 3157    Number Participants in Group:  6    Goal:  Patient will demonstrate an understanding of the medications intended purpose and possible adverse effects  Topic: [place] for Pharmacy Med Ed Group    Discipline Responsible:     OT  AT  [facility].  RT     X Other       Participation Level:     None  Minimal      X Active Listener    X Interactive    Monopolizing         Participation Quality:    X Appropriate  Inappropriate     X       Attentive        Intrusive          Sharing        Resistant          Supportive        Lethargic       Affective:     X Congruent  Incongruent  Blunted  Flat    Constricted  Anxious  Elated  Angry    Labile  Depressed  Other         Cognitive:    X Alert  Oriented PPTP     Concentration   X G  F  P   Attention Span   X G  F  P   Short-Term Memory   X G  F  P   Long-Term Memory  G  F  P   ProblemSolving/  Decision Making  G  F  P   Ability to Process  Information   X G  F  P      Contributing Factors             Delusional             Hallucinating             Flight of Ideas             Other:       Modes of Intervention:    X Education   X Support  Exploration    Clarifying  Problem Solving  Confrontation    Socialization  Limit Setting  Reality Testing    Activity  Movement  Media    Other:            Response to Learning:    X Able to verbalize current knowledge/experience    Able to verbalize/acknowledge new learning    Able to retain information    Capable of insight    Able to change behavior    Progressing to goal    Other:        Comments:  Pt needed redirecting. Pt had questions as well as other comments about both related and unrelated topics.

## 2022-10-10 NOTE — GROUP NOTE
Group Therapy Note    Date: 10/10/2022    Group Start Time: 0900  Group End Time: 0920  Group Topic: Community Meeting    BRAYDEN Monsalve        Group Therapy Note    Attendees: 5/12       Patient's Goal:  \" Say God's name and save others\"    Notes:  controlled    Status After Intervention:  Unchanged    Participation Level: Active Listener    Participation Quality: Appropriate      Speech:  normal      Thought Process/Content: Logical      Affective Functioning: Congruent      Mood: anxious      Level of consciousness:  Alert      Response to Learning: Able to verbalize current knowledge/experience and Progressing to goal      Endings: None Reported    Modes of Intervention: Education, Support, and Socialization      Discipline Responsible: Behavorial Health Tech      Signature:   Phiilp Monsalve

## 2022-10-11 PROCEDURE — 6370000000 HC RX 637 (ALT 250 FOR IP): Performed by: INTERNAL MEDICINE

## 2022-10-11 PROCEDURE — 99232 SBSQ HOSP IP/OBS MODERATE 35: CPT | Performed by: PSYCHIATRY & NEUROLOGY

## 2022-10-11 PROCEDURE — 1240000000 HC EMOTIONAL WELLNESS R&B

## 2022-10-11 PROCEDURE — APPSS30 APP SPLIT SHARED TIME 16-30 MINUTES: Performed by: PSYCHIATRY & NEUROLOGY

## 2022-10-11 PROCEDURE — 6370000000 HC RX 637 (ALT 250 FOR IP): Performed by: PSYCHIATRY & NEUROLOGY

## 2022-10-11 RX ADMIN — Medication 1 CAPSULE: at 08:30

## 2022-10-11 RX ADMIN — ACETAMINOPHEN 650 MG: 325 TABLET, FILM COATED ORAL at 17:48

## 2022-10-11 RX ADMIN — BUDESONIDE AND FORMOTEROL FUMARATE DIHYDRATE 2 PUFF: 160; 4.5 AEROSOL RESPIRATORY (INHALATION) at 08:30

## 2022-10-11 RX ADMIN — OLANZAPINE 10 MG: 10 TABLET, FILM COATED ORAL at 08:30

## 2022-10-11 ASSESSMENT — PAIN DESCRIPTION - LOCATION: LOCATION: NOSE

## 2022-10-11 ASSESSMENT — PAIN SCALES - GENERAL: PAINLEVEL_OUTOF10: 10

## 2022-10-11 NOTE — PROGRESS NOTES
10/11/22 1518   Encounter Summary   Encounter Overview/Reason  Spiritual/Emotional Needs   Service Provided For: Patient   Referral/Consult From: Phillip   Last Encounter  10/11/22   Complexity of Encounter Moderate   Begin Time 0230   End Time  0300   Total Time Calculated 30 min   Spiritual/Emotional needs   Type Spiritual Support   Behavioral Health    Type  Spirituality Group   Assessment/Intervention/Outcome   Assessment Calm   Intervention Active listening   Outcome Receptive; Expressed Gratitude;Engaged in conversation

## 2022-10-11 NOTE — PLAN OF CARE
Problem: Safety - Adult  Goal: Free from fall injury  10/10/2022 2052 by Em Yeager RN  Outcome: Progressing   Gait is steady, no falls noted. Problem: ABCDS Injury Assessment  Goal: Absence of physical injury  Outcome: Progressing   No injuries noted. Problem: Anxiety  Goal: Will report anxiety at manageable levels  Description: INTERVENTIONS:  1. Administer medication as ordered  2. Teach and rehearse alternative coping skills  3. Provide emotional support with 1:1 interaction with staff  10/10/2022 2052 by Em Yeager RN  Outcome: Progressing   Mood is labile being extremely anxious/agitated & then pleasant/cooperative. Problem: Coping  Goal: Pt/Family able to verbalize concerns and demonstrate effective coping strategies  Description: INTERVENTIONS:  1. Assist patient/family to identify coping skills, available support systems and cultural and spiritual values  2. Provide emotional support, including active listening and acknowledgement of concerns of patient and caregivers  3. Reduce environmental stimuli, as able  4. Instruct patient/family in relaxation techniques, as appropriate  5. Assess for spiritual pain/suffering and initiate Spiritual Care, Psychosocial Clinical Specialist consults as needed  Outcome: Progressing   Does well with support/reassurance/limits but unable/unwilling to share coping plan. Problem: Confusion  Goal: Confusion, delirium, dementia, or psychosis is improved or at baseline  Description: INTERVENTIONS:  1. Assess for possible contributors to thought disturbance, including medications, impaired vision or hearing, underlying metabolic abnormalities, dehydration, psychiatric diagnoses, and notify attending LIP  2. [place] high risk fall precautions, as indicated  3. Provide frequent short contacts to provide reality reorientation, refocusing and direction  4. Decrease environmental stimuli, including noise as appropriate  5.  Monitor and intervene to maintain adequate nutrition, hydration, elimination, sleep and activity  6. If unable to ensure safety without constant attention obtain sitter and review sitter guidelines with assigned personnel  7. Initiate Psychosocial CNS and Spiritual Care consult, as indicated  Outcome: Progressing   Continues to be delusional with L[employer]-day preoccupation. Problem: Nutrition Deficit:  Goal: Optimize nutritional status  Outcome: Progressing   Eating well with Ensure supplements.

## 2022-10-11 NOTE — GROUP NOTE
Group Therapy Note    Date: 10/10/2022    Group Start Time: 2000  Group End Time: 2045  Group Topic: Wrap-Up    BRAYDEN Richardson        Group Therapy Note    Attendees: 10       Patient's Goal:  20    Notes:  confused, talks off topic    Status After Intervention:  Unchanged    Participation Level: Monopolizing    Participation Quality: Intrusive and Resistant      Speech:  pressured      Thought Process/Content: Flight of ideas      Affective Functioning: Exaggerated      Mood: anxious      Level of consciousness:  Alert and Preoccupied      Response to Learning: Resistant      Endings: None Reported    Modes of Intervention: Problem-solving      Discipline Responsible: Behavorial Health Tech      Signature:  Tonya Hemphlil

## 2022-10-11 NOTE — GROUP NOTE
Group Therapy Note    Date: 10/11/2022    Group Start Time: 8764  Group End Time: 0744  Group Topic: Psychotherapy    [facility] BHJACK A    MARY Payne, PEEWEE        Group Therapy Note    Attendees: 6/12       Patient was offered group therapy today but declined to participate despite encouragement from staff. 1:1 was offered.       Signature:  MARY Payne, PEEWEE

## 2022-10-11 NOTE — PLAN OF CARE
Problem: Pain  Goal: Verbalizes/displays adequate comfort level or baseline comfort level  Outcome: Progressing  Flowsheets (Taken 10/11/2022 1048)  Verbalizes/displays adequate comfort level or baseline comfort level:   Encourage patient to monitor pain and request assistance   Assess pain using appropriate pain scale  Note: Patient denies pain at this time. Patient agreeable to seek out staff for pain control as needed. Problem: Anxiety  Goal: Will report anxiety at manageable levels  Description: INTERVENTIONS:  1. Administer medication as ordered  2. Teach and rehearse alternative coping skills  3. Provide emotional support with 1:1 interaction with staff  10/11/2022 1048 by Yisel Mcduffie LPN  Outcome: Progressing  Note:   Patient denies suicidal/homicidal ideations. Patient agreeable to seek out staff should thoughts of self harm/harming others should arise. Patient denies hallucinations. Patient is positive for anxiety, reporting an 8/10, patient is somewhat redirectable. Patient is positive for depression, manageable. Patient is medication compliant. Patient is discharge focused. Comfort and reassurance provided. Safety checks maintained every 15 minutes and as needed.

## 2022-10-11 NOTE — CARE COORDINATION
SOCIAL SERVICE NOTE: SW meets with Pt wife Sai Payne and son Meghan Trotter on this date and then PT was brought into family meeting to discuss discharge planning. Pt is linked with the [facility], but follows up with [facility] at [address] [place] [address] [phone].

## 2022-10-11 NOTE — GROUP NOTE
Group Therapy Note    Date: 10/11/2022    Group Start Time: 1330  Group End Time: 7846  Group Topic: Recreational    BRAYDEN DE LOS SANTOS    Washington Sacks        Group Therapy Note    Attendees: 6/11   Patient's Goal:  Patients shared music and engaged in card games and conversations with goals to increase socialization; demonstrate positive use of time; Normalization of the environment    Notes:  Paulo attended group, sitting in front of tv, requesting and singing along to music. Often talking to peers about music and expressing enjoying the songs they requested. Continuously stated how good he felt being able to listen to music    Status After Intervention:  Improved    Participation Level:  Active Listener and Interactive    Participation Quality: Appropriate and Attentive      Speech:  normal      Thought Process/Content: Logical  Linear      Affective Functioning: Congruent      Mood: Elevated      Level of consciousness:  Alert and Attentive      Response to Learning: Able to verbalize current knowledge/experience and Progressing to goal      Endings: None Reported    Modes of Intervention: Socialization, Activity, and Reality-testing      Discipline Responsible: Psychoeducational Specialist      Signature:  Washington Sacks
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
## 2022-10-12 PROCEDURE — 1240000000 HC EMOTIONAL WELLNESS R&B

## 2022-10-12 PROCEDURE — 6370000000 HC RX 637 (ALT 250 FOR IP): Performed by: PSYCHIATRY & NEUROLOGY

## 2022-10-12 PROCEDURE — 99232 SBSQ HOSP IP/OBS MODERATE 35: CPT | Performed by: PSYCHIATRY & NEUROLOGY

## 2022-10-12 PROCEDURE — 6370000000 HC RX 637 (ALT 250 FOR IP): Performed by: INTERNAL MEDICINE

## 2022-10-12 PROCEDURE — APPSS30 APP SPLIT SHARED TIME 16-30 MINUTES: Performed by: PSYCHIATRY & NEUROLOGY

## 2022-10-12 PROCEDURE — 6360000002 HC RX W HCPCS: Performed by: PSYCHIATRY & NEUROLOGY

## 2022-10-12 RX ADMIN — HALOPERIDOL LACTATE 5 MG: 5 INJECTION, SOLUTION INTRAMUSCULAR at 15:36

## 2022-10-12 RX ADMIN — OLANZAPINE 12.5 MG: 7.5 TABLET, FILM COATED ORAL at 08:54

## 2022-10-12 RX ADMIN — HALOPERIDOL 5 MG: 5 TABLET ORAL at 12:18

## 2022-10-12 RX ADMIN — DIPHENHYDRAMINE HYDROCHLORIDE 50 MG: 50 INJECTION, SOLUTION INTRAMUSCULAR; INTRAVENOUS at 15:36

## 2022-10-12 RX ADMIN — BUDESONIDE AND FORMOTEROL FUMARATE DIHYDRATE 2 PUFF: 160; 4.5 AEROSOL RESPIRATORY (INHALATION) at 09:04

## 2022-10-12 RX ADMIN — HYDROXYZINE HYDROCHLORIDE 50 MG: 50 TABLET ORAL at 10:00

## 2022-10-12 RX ADMIN — Medication 1 CAPSULE: at 08:54

## 2022-10-12 ASSESSMENT — PAIN SCALES - GENERAL: PAINLEVEL_OUTOF10: 0

## 2022-10-12 NOTE — PLAN OF CARE
Problem: Safety - Adult  Goal: Free from fall injury  10/12/2022 0947 by Lexi Montoya RN  Outcome: Progressing     Problem: Nutrition Deficit:  Goal: Optimize nutritional status  10/12/2022 0947 by Lexi Montoya RN  Outcome: Progressing     Problem: Anxiety  Goal: Will report anxiety at manageable levels  Description: INTERVENTIONS:  1. Administer medication as ordered  2. Teach and rehearse alternative coping skills  3. Provide emotional support with 1:1 interaction with staff  10/12/2022 0947 by Lexi Montoya RN  Outcome: Progressing   Pt remains free from falls and is aware of own abilities. Pt is wearing non-slip socks and room is free from clutter. Pt is eating meals appropriately. Pt is intrusive at times and requires frequent redirection to remain in own personal space. Pt reports auditory hallucinations stating \"It is the voice of God\". Pt is medication compliant. Pt. Remains on q15 min checks and frequent spontaneous checks throughout shift. Pt. Safety maintained.

## 2022-10-12 NOTE — BH NOTE
PRN medication of Atarax 50 mg tablet PO was effective aeb Pt is less anxious and not requiring frequent redirection currently.
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
## 2022-10-12 NOTE — BH NOTE
Emergency PRN Medication Administration Note:      Patient is Agitated and Disruptive as evidence by pacing, intrusive to staff/peers, and slamming bedroom door. Pt upset his wife is not visiting today. Staff attempted to find and relieve the distress by Talking to patient, Refocusing on new activity, Offering suggestions, and Administer PRN medications Patient accepted PRN medications. Medication Administered as prescribed: Haldol 5 mg injection IM and Benadryl 50 mg injection IM. Patient Tolerated medication administration. Will continue to monitor, offer support, and reassess.

## 2022-10-12 NOTE — GROUP NOTE
Group Therapy Note    Date: 10/11/2022    Group Start Time: 2030  Group End Time: 2050  Group Topic: Relaxation    BRAYDEN Fowler RN        Group Therapy Note    Attendees: 8/10       Status After Intervention:  Improved    Participation Level:  Active Listener    Participation Quality: Appropriate      Speech:  normal      Thought Process/Content: Delusional      Affective Functioning: Exaggerated      Mood: elevated      Level of consciousness:  Alert      Response to Learning: Able to verbalize current knowledge/experience      Endings: None Reported    Modes of Intervention: Education      Discipline Responsible: Behavorial Health Tech      Signature:  Archie Fowler RN

## 2022-10-12 NOTE — GROUP NOTE
Group Therapy Note    Date: 10/12/2022    Group Start Time: 1400  Group End Time: 1520  Group Topic: Recreational    [email or web address]        Group Therapy Note    Attendees: 6/8       Patient's Goal:   Open recreation group where patients were offered opportunities to share and listen to music, engage in art, and engage in games. Patient goals to increase sense of community; demonstrate positive use of time; Increase socialization    Notes:  Patient joined peers at game table during last 15 minutes of group. Patient was becoming agitated and swearing (appeared to be indirect and not towards any specific situation occurring) but redirectable. Engaged in game and receptive to peer assisting patient in the game. Immediately upon group ending, as this writer was cleaning. Patient agitation began to increase and was repeatedly stating \"enough. I've had enough\" in an angry voice and unreceptive when this writer attempted to speak with patient 1:1    Status After Intervention:  Unchanged    Participation Level:  Active Listener and Interactive    Participation Quality: Inappropriate      Speech:  normal      Thought Process/Content: Difficult to assess at this time      Affective Functioning: Congruent      Mood: irritable      Level of consciousness:  Alert      Response to Learning: Able to change behavior and Progressing to goal      Endings: None Reported    Modes of Intervention: Socialization, Activity, Media, and Reality-testing      Discipline Responsible: Psychoeducational Specialist      Signature:  Shashi Vaughn

## 2022-10-12 NOTE — GROUP NOTE
Group Therapy Note    Date: 10/12/2022    Group Start Time: 0900  Group End Time: 0930  Group Topic: Community Meeting    BRAYDEN Edwards        Group Therapy Note    Attendees: 7/11       Patient's Goal:  \" Silence\"     Notes:  Restless, intrusive    Status After Intervention:  Decompensated    Participation Level: Monopolizing    Participation Quality: Intrusive      Speech:  normal      Thought Process/Content: Logical      Affective Functioning: Congruent      Mood: anxious and irritable      Level of consciousness:  Oriented x4      Response to Learning: Able to verbalize current knowledge/experience      Endings: None Reported    Modes of Intervention: Education, Support, and Socialization      Discipline Responsible: Behavorial Health Tech      Signature:   Zulma Edwards

## 2022-10-12 NOTE — PLAN OF CARE
Problem: Safety - Adult  Goal: Free from fall injury  Outcome: Progressing     Gait is steady, no falls noted. Problem: ABCDS Injury Assessment  Goal: Absence of physical injury  Outcome: Progressing   No injuries noted. Problem: Anxiety  Goal: Will report anxiety at manageable levels  Description: INTERVENTIONS:  1. Administer medication as ordered  2. Teach and rehearse alternative coping skills  3. Provide emotional support with 1:1 interaction with staff  10/11/2022 2206 by Kanchan Leos RN  Outcome: Progressing   States his anxiety is manageable. Problem: Coping  Goal: Pt/Family able to verbalize concerns and demonstrate effective coping strategies  Description: INTERVENTIONS:  1. Assist patient/family to identify coping skills, available support systems and cultural and spiritual values  2. Provide emotional support, including active listening and acknowledgement of concerns of patient and caregivers  3. Reduce environmental stimuli, as able  4. Instruct patient/family in relaxation techniques, as appropriate  5. Assess for spiritual pain/suffering and initiate Spiritual Care, Psychosocial Clinical Specialist consults as needed  Outcome: Progressing   Denies current concerns. Does not share coping strategy. Problem: Confusion  Goal: Confusion, delirium, dementia, or psychosis is improved or at baseline  Description: INTERVENTIONS:  1. Assess for possible contributors to thought disturbance, including medications, impaired vision or hearing, underlying metabolic abnormalities, dehydration, psychiatric diagnoses, and notify attending LIP  2. [place] high risk fall precautions, as indicated  3. Provide frequent short contacts to provide reality reorientation, refocusing and direction  4. Decrease environmental stimuli, including noise as appropriate  5. Monitor and intervene to maintain adequate nutrition, hydration, elimination, sleep and activity  6.  If unable to ensure safety without constant attention obtain sitter and review sitter guidelines with assigned personnel  7. Initiate Psychosocial CNS and Spiritual Care consult, as indicated  Outcome: Progressing   Thoughts are still racing with grandiose delusions & he is hyperverbal but less agitated tonight. Problem: Nutrition Deficit:  Goal: Optimize nutritional status  Outcome: Progressing   Ate snack/drank Ensure+  Problem: Pain  Goal: Verbalizes/displays adequate comfort level or baseline comfort level  10/11/2022 2206 by Marissa Elam RN  Outcome: Progressing   Denies discomfort.

## 2022-10-12 NOTE — PROGRESS NOTES
Daily Progress Note  10/12/2022    Patient Name: Victoriano Chavira    CHIEF COMPLAINT: Acute psychosis         SUBJECTIVE:    Nursing staff report the patient has maintained medication adherence and has required emergency medications including Haldol 5 mg at [id number] this afternoon due to intrusive behaviors. He is agreeable to assessment the privacy of his room where he drawn substantial notations across the wall in an arch like fashion making reference to \"the superior officers \"and noting that the cause of his difficulty is \"[place] \". Patient reports that he did not sleep throughout the night and wrote consistently. He estimates 1 hour of sleep only. He is disorganized in thought and unable to participate in a meaningful conversation. At times he is pacing and requires redirection regarding personal boundaries. Patient has limited insight into his mental health symptoms an at this time, the patient is not appropriate for a lower level of care. There is risk of decompensation and patient warrants further hospitalization for safety and stabilization. Appetite:  [] Normal/Adequate/Unchanged  [x] Increased-patient continues to utilize Ensure nutritional supplementation [] Decreased      Sleep:       [] Normal/Adequate/showing improvement [] Fair  [x] Poor      Group Attendance on Unit:   [] Yes  [] Selectively    [x] No    Medication Side Effects: Patient denies any medication side effects at the time of assessment.          Mental Status Exam     Level of consciousness: Alert and awake   Appearance: Appropriate attire for setting, seated in chair, with good  grooming and hygiene   Behavior/Motor: Approachable, bizarre and requires redirection, psychomotor agitation  Attitude toward examiner: Somewhat cooperative, easily distracted, intense eye contact  Speech: rapid speech, hyperverbal  Mood: Patient reports \"terrible\"  Affect: Congruent, anxious  Thought processes: rapid, flight of ideas, and perservative Thought content: Denies homicidal ideation  Suicidal Ideation: Denies suicidal ideations, contracts for safety on the unit. Delusions: Paranoid, bizarre, grandiose  Perceptual Disturbance:  Patient reports improvement in auditory hallucinations  Cognition: Oriented to self and general situation  Memory: intact  Insight: poor   Judgement: poor   Data   height is 5' 10\" (1.778 m) and weight is 111 lb (50.3 kg). His oral temperature is 97.7 °F (36.5 °C). His blood pressure is 114/78 and his pulse is 75. His respiration is 14 and oxygen saturation is 96%. Labs:   No visits with results within 2 Day(s) from this visit. Latest known visit with results is:   Admission on 10/03/2022, Discharged on 10/06/2022   Component Date Value Ref Range Status    Ventricular Rate 10/03/2022 67  BPM Final    Atrial Rate 10/03/2022 67  BPM Final    P-R Interval 10/03/2022 136  ms Final    QRS Duration 10/03/2022 88  ms Final    Q-T Interval 10/03/2022 396  ms Final    QTc Calculation (Bazett) 10/03/2022 418  ms Final    P Axis 10/03/2022 60  degrees Final    R Axis 10/03/2022 98  degrees Final    T Axis 10/03/2022 90  degrees Final    Troponin, High Sensitivity 10/04/2022 14  0 - 22 ng/L Final    Comment:       High Sensitivity Troponin values cannot be compared with other Troponin methodologies. Patients with high levels of Biotin oral intake (i.e >5mg/day) may have falsely decreased   Troponin levels. Samples collected within 8 hours of biotin intake may require additional   information for diagnosis.       Glucose 10/04/2022 86  70 - 99 mg/dL Final    BUN 10/04/2022 12  8 - 23 mg/dL Final    Creatinine 10/04/2022 0.52 (A)  0.70 - 1.20 mg/dL Final    Calcium 10/04/2022 8.7  8.6 - 10.4 mg/dL Final    Sodium 10/04/2022 140  135 - 144 mmol/L Final    Potassium 10/04/2022 4.1  3.7 - 5.3 mmol/L Final    Chloride 10/04/2022 104  98 - 107 mmol/L Final    CO2 10/04/2022 29  20 - 31 mmol/L Final    Anion Gap 10/04/2022 7 (A)  9 - 17 mmol/L Final    Est, Glom Filt Rate 10/04/2022 >60  >60 mL/min/1.73m2 Final    Comment:       Effective Oct 3, 2022        These results are not intended for use in patients <25years of age. eGFR results are calculated without a race factor using the 2021 CKD-EPI equation. Careful clinical correlation is recommended, particularly when comparing to results   calculated using previous equations. The CKD-EPI equation is less accurate in patients with extremes of muscle mass, extra-renal   metabolism of creatine, excessive creatine ingestion, or following therapy that affects   renal tubular secretion. WBC 10/04/2022 19.8 (A)  3.5 - 11.0 k/uL Final    RBC 10/04/2022 3.47 (A)  4.5 - 5.9 m/uL Final    Hemoglobin 10/04/2022 11.5 (A)  13.5 - 17.5 g/dL Final    Hematocrit 10/04/2022 34.0 (A)  41 - 53 % Final    MCV 10/04/2022 98.1  80 - 100 fL Final    MCH 10/04/2022 33.3  26 - 34 pg Final    MCHC 10/04/2022 33.9  31 - 37 g/dL Final    RDW 10/04/2022 13.5  11.5 - 14.9 % Final    Platelets 57/60/5863 328  150 - 450 k/uL Final    MPV 10/04/2022 6.8  6.0 - 12.0 fL Final    Color, UA 10/05/2022 Yellow  Yellow Final    Turbidity UA 10/05/2022 Clear  Clear Final    Glucose, Ur 10/05/2022 NEGATIVE  NEGATIVE Final    Bilirubin Urine 10/05/2022 NEGATIVE  NEGATIVE Final    Ketones, Urine 10/05/2022 NEGATIVE  NEGATIVE Final    Specific Gravity, UA 10/05/2022 1.014  1.000 - 1.030 Final    Urine Hgb 10/05/2022 NEGATIVE  NEGATIVE Final    pH, UA 10/05/2022 7.0  5.0 - 8.0 Final    Protein, UA 10/05/2022 NEGATIVE  NEGATIVE Final    Urobilinogen, Urine 10/05/2022 Normal  Normal Final    Nitrite, Urine 10/05/2022 NEGATIVE  NEGATIVE Final    Leukocyte Esterase, Urine 10/05/2022 NEGATIVE  NEGATIVE Final    Urinalysis Comments 10/05/2022 Microscopic exam not performed based on chemical results unless requested in original order.    Final    Glucose 10/05/2022 136 (A)  70 - 99 mg/dL Final    BUN 10/05/2022 7 (A)  8 - 23 mg/dL Final    Creatinine 10/05/2022 0.74  0.70 - 1.20 mg/dL Final    Calcium 10/05/2022 9.1  8.6 - 10.4 mg/dL Final    Sodium 10/05/2022 140  135 - 144 mmol/L Final    Potassium 10/05/2022 4.2  3.7 - 5.3 mmol/L Final    Chloride 10/05/2022 102  98 - 107 mmol/L Final    CO2 10/05/2022 29  20 - 31 mmol/L Final    Anion Gap 10/05/2022 9  9 - 17 mmol/L Final    Est, Glom Filt Rate 10/05/2022 >60  >60 mL/min/1.73m2 Final    Comment:       Effective Oct 3, 2022        These results are not intended for use in patients <25years of age. eGFR results are calculated without a race factor using the 2021 CKD-EPI equation. Careful clinical correlation is recommended, particularly when comparing to results   calculated using previous equations. The CKD-EPI equation is less accurate in patients with extremes of muscle mass, extra-renal   metabolism of creatine, excessive creatine ingestion, or following therapy that affects   renal tubular secretion. WBC 10/05/2022 27.0 (A)  3.5 - 11.0 k/uL Final    RBC 10/05/2022 4.11 (A)  4.5 - 5.9 m/uL Final    Hemoglobin 10/05/2022 13.4 (A)  13.5 - 17.5 g/dL Final    Hematocrit 10/05/2022 40.6 (A)  41 - 53 % Final    MCV 10/05/2022 98.8  80 - 100 fL Final    MCH 10/05/2022 32.7  26 - 34 pg Final    MCHC 10/05/2022 33.1  31 - 37 g/dL Final    RDW 10/05/2022 13.5  11.5 - 14.9 % Final    Platelets 87/73/9121 386  150 - 450 k/uL Final    MPV 10/05/2022 6.8  6.0 - 12.0 fL Final    Glucose 10/06/2022 99  70 - 99 mg/dL Final    BUN 10/06/2022 12  8 - 23 mg/dL Final    Creatinine 10/06/2022 0.63 (A)  0.70 - 1.20 mg/dL Final    Est, Glom Filt Rate 10/06/2022 >60  >60 mL/min/1.73m2 Final    Comment:       Effective Oct 3, 2022        These results are not intended for use in patients <25years of age. eGFR results are calculated without a race factor using the 2021 CKD-EPI equation.   Careful clinical correlation is recommended, particularly when comparing to results   calculated using previous equations. The CKD-EPI equation is less accurate in patients with extremes of muscle mass, extra-renal   metabolism of creatine, excessive creatine ingestion, or following therapy that affects   renal tubular secretion. Calcium 10/06/2022 9.2  8.6 - 10.4 mg/dL Final    Sodium 10/06/2022 140  135 - 144 mmol/L Final    Potassium 10/06/2022 4.0  3.7 - 5.3 mmol/L Final    Chloride 10/06/2022 101  98 - 107 mmol/L Final    CO2 10/06/2022 31  20 - 31 mmol/L Final    Anion Gap 10/06/2022 8 (A)  9 - 17 mmol/L Final    WBC 10/06/2022 17.3 (A)  3.5 - 11.0 k/uL Final    RBC 10/06/2022 3.49 (A)  4.5 - 5.9 m/uL Final    Hemoglobin 10/06/2022 11.4 (A)  13.5 - 17.5 g/dL Final    Hematocrit 10/06/2022 34.4 (A)  41 - 53 % Final    MCV 10/06/2022 98.6  80 - 100 fL Final    MCH 10/06/2022 32.8  26 - 34 pg Final    MCHC 10/06/2022 33.2  31 - 37 g/dL Final    RDW 10/06/2022 13.5  11.5 - 14.9 % Final    Platelets 94/55/1542 335  150 - 450 k/uL Final    MPV 10/06/2022 6.8  6.0 - 12.0 fL Final    Color, UA 10/06/2022 Yellow  Yellow Final    Turbidity UA 10/06/2022 Clear  Clear Final    Glucose, Ur 10/06/2022 NEGATIVE  NEGATIVE Final    Bilirubin Urine 10/06/2022 NEGATIVE  NEGATIVE Final    Ketones, Urine 10/06/2022 NEGATIVE  NEGATIVE Final    Specific Gravity, UA 10/06/2022 1.010  1.000 - 1.030 Final    Urine Hgb 10/06/2022 NEGATIVE  NEGATIVE Final    pH, UA 10/06/2022 7.0  5.0 - 8.0 Final    Protein, UA 10/06/2022 NEGATIVE  NEGATIVE Final    Urobilinogen, Urine 10/06/2022 Normal  Normal Final    Nitrite, Urine 10/06/2022 NEGATIVE  NEGATIVE Final    Leukocyte Esterase, Urine 10/06/2022 NEGATIVE  NEGATIVE Final    Urinalysis Comments 10/06/2022 Microscopic exam not performed based on chemical results unless requested in original order. Final         Reviewed patient's current plan of care and vital signs with nursing staff.     Labs reviewed: [x] Yes  Last EKG in EMR reviewed: [x] Yes  QTc: 418    Medications  Current Facility-Administered Medications: OLANZapine (ZYPREXA) tablet 12.5 mg, 12.5 mg, Oral, BID  ipratropium (ATROVENT) 0.02 % nebulizer solution 0.5 mg, 0.5 mg, Nebulization, 4x Daily PRN  albuterol sulfate HFA (PROVENTIL;VENTOLIN;PROAIR) 108 (90 Base) MCG/ACT inhaler 2 puff, 2 puff, Inhalation, Q4H PRN  budesonide-formoterol (SYMBICORT) 160-4.5 MCG/ACT inhaler 2 puff, 2 puff, Inhalation, BID  lidocaine 4 % external patch 1 patch, 1 patch, TransDERmal, Daily  lactobacillus (CULTURELLE) capsule 1 capsule, 1 capsule, Oral, Daily  docusate sodium (COLACE) capsule 100 mg, 100 mg, Oral, TID PRN  ibuprofen (ADVIL;MOTRIN) tablet 200 mg, 200 mg, Oral, Q6H PRN  acetaminophen (TYLENOL) tablet 650 mg, 650 mg, Oral, Q6H PRN  hydrOXYzine HCl (ATARAX) tablet 50 mg, 50 mg, Oral, TID PRN  traZODone (DESYREL) tablet 50 mg, 50 mg, Oral, Nightly PRN  polyethylene glycol (GLYCOLAX) packet 17 g, 17 g, Oral, Daily PRN  aluminum & magnesium hydroxide-simethicone (MAALOX) 200-200-20 MG/5ML suspension 30 mL, 30 mL, Oral, Q6H PRN  nicotine polacrilex (NICORETTE) gum 2 mg, 2 mg, Oral, Q2H PRN  haloperidol lactate (HALDOL) injection 5 mg, 5 mg, IntraMUSCular, Q6H PRN **AND** diphenhydrAMINE (BENADRYL) injection 50 mg, 50 mg, IntraMUSCular, Q6H PRN  haloperidol (HALDOL) tablet 5 mg, 5 mg, Oral, Q6H PRN    ASSESSMENT  Acute psychosis (HCC)         HANDOFF  Patient symptoms remain unstable as he has required emergency medications this afternoon which warrants his current care on psychiatric stepdown unit  Medications as determined by attending physician  Encourage participation in groups and milieu. Probable discharge is to be determined by MD    Electronically signed by ELISEO Bazan CNP on 10/12/2022 at 1:55 PM    **This report has been created using voice recognition software. It may contain minor errors which are inherent in voice recognition technology. **    I independently saw and evaluated the patient.   I reviewed the nurse practioner's documentation above. Any additional comments or changes to the    documentation are stated below otherwise agree with assessment. The patient's mood remained euphoric. He is sleeping very little at night. He has continued to require as needed medications. His olanzapine is at a total dose of 25 mg daily. The patient is reluctant to increase it and we will hold the dose at this level today    Expected length of stay is indeterminate      PLAN  Medications as noted above  Attempt to develop insight   Psycho-education conducted. Supportive Therapy conducted.   Follow-up daily while on inpatient unit    Electronically signed by Merlin Adie, MD on 10/12/22 at 8:20 PM EDT        Electronically signed by Merlin Adie, MD on 10/12/2022 at 8:20 PM

## 2022-10-12 NOTE — BH NOTE
Emergency Medication Follow-Up Note:    PRN medication of  Haldol 5 mg injection IM and Ativan 2 mg injection IM was effective as evidence by Patient able to regain behavioral control. Patient denies medication side effects. Will continue to monitor and provide support as needed.

## 2022-10-12 NOTE — GROUP NOTE
Group Therapy Note    Date: 10/12/2022    Group Start Time: 1100  Group End Time: 1150  Group Topic: Music Therapy    BRAYDEN DE LOS SANTOS    Arbutus Simmonds        Group Therapy Note    Attendees: 9/10       Patient's Goal:  Patients shared music and analyzed songs for themes, then shared general advice with peers based on the theme of their song. Goals to engage in peer support;increase sense of community; Increase socialization; Increase self-expression    Notes:  Patient was pleasant, but frequently requesting music in the middle of others songs, and required redirection. Asked patient for advice relating to his song. Patient repeatedly stated \"head like a hole. \"  Asked patient what this statement meant and patient just repeated the statement multiple times without clarification. Attempted to ask patient in a different way, asking him Parag Larson was your song about\" and patient just continued to make the statement \"head like a hole. \" (Of note Head Like Yenni Merchant is the name of a different song but patient refused to provide clarifications).  Switched seats multiple times during group    Status After Intervention:  Unchanged    Participation Level: Monopolizing    Participation Quality: Sharing and Intrusive      Speech:  normal      Thought Process/Content: Flight of ideas      Affective Functioning: Congruent      Mood: dysphoric      Level of consciousness:  Alert and Attentive      Response to Learning: Resistant      Endings: None Reported    Modes of Intervention: Support, Socialization, Exploration, Activity, Media, and Reality-testing      Discipline Responsible: Psychoeducational Specialist      Signature:  Arbutus Simmonds

## 2022-10-12 NOTE — BH NOTE
Emergency PRN Medication Administration Note:      Patient is Agitated and Disruptive as evidence by restlessness, physical intrusiveness to peers and pushing a peer around in a chair. Staff attempted to find and relieve the distress by Talking to patient, Refocusing on new activity, Offering suggestions, and Administer PRN medications Patient accepted PRN medications. Medication Administered as prescribed:Haldol 5 mg tablet PO. Patient Tolerated medication administration. Will continue to monitor, offer support, and reassess.

## 2022-10-13 PROCEDURE — 6370000000 HC RX 637 (ALT 250 FOR IP): Performed by: PSYCHIATRY & NEUROLOGY

## 2022-10-13 PROCEDURE — 1240000000 HC EMOTIONAL WELLNESS R&B

## 2022-10-13 PROCEDURE — 6370000000 HC RX 637 (ALT 250 FOR IP): Performed by: INTERNAL MEDICINE

## 2022-10-13 PROCEDURE — 99232 SBSQ HOSP IP/OBS MODERATE 35: CPT | Performed by: PSYCHIATRY & NEUROLOGY

## 2022-10-13 RX ADMIN — ACETAMINOPHEN 650 MG: 325 TABLET, FILM COATED ORAL at 21:17

## 2022-10-13 RX ADMIN — BUDESONIDE AND FORMOTEROL FUMARATE DIHYDRATE 2 PUFF: 160; 4.5 AEROSOL RESPIRATORY (INHALATION) at 08:51

## 2022-10-13 RX ADMIN — OLANZAPINE 12.5 MG: 7.5 TABLET, FILM COATED ORAL at 08:51

## 2022-10-13 RX ADMIN — BUDESONIDE AND FORMOTEROL FUMARATE DIHYDRATE 2 PUFF: 160; 4.5 AEROSOL RESPIRATORY (INHALATION) at 20:40

## 2022-10-13 RX ADMIN — HYDROXYZINE HYDROCHLORIDE 50 MG: 50 TABLET ORAL at 20:37

## 2022-10-13 RX ADMIN — HYDROXYZINE HYDROCHLORIDE 50 MG: 50 TABLET ORAL at 00:45

## 2022-10-13 RX ADMIN — DOCUSATE SODIUM 100 MG: 100 CAPSULE, LIQUID FILLED ORAL at 14:26

## 2022-10-13 RX ADMIN — OLANZAPINE 12.5 MG: 7.5 TABLET, FILM COATED ORAL at 00:43

## 2022-10-13 RX ADMIN — DOCUSATE SODIUM 100 MG: 100 CAPSULE, LIQUID FILLED ORAL at 08:56

## 2022-10-13 RX ADMIN — Medication 1 CAPSULE: at 08:51

## 2022-10-13 RX ADMIN — TRAZODONE HYDROCHLORIDE 50 MG: 50 TABLET ORAL at 20:37

## 2022-10-13 RX ADMIN — HYDROXYZINE HYDROCHLORIDE 50 MG: 50 TABLET ORAL at 08:56

## 2022-10-13 RX ADMIN — OLANZAPINE 12.5 MG: 7.5 TABLET, FILM COATED ORAL at 20:37

## 2022-10-13 RX ADMIN — TRAZODONE HYDROCHLORIDE 50 MG: 50 TABLET ORAL at 00:45

## 2022-10-13 RX ADMIN — HYDROXYZINE HYDROCHLORIDE 50 MG: 50 TABLET ORAL at 14:26

## 2022-10-13 ASSESSMENT — PAIN SCALES - GENERAL
PAINLEVEL_OUTOF10: 3
PAINLEVEL_OUTOF10: 10

## 2022-10-13 ASSESSMENT — PAIN SCALES - WONG BAKER: WONGBAKER_NUMERICALRESPONSE: 2

## 2022-10-13 ASSESSMENT — PAIN DESCRIPTION - LOCATION: LOCATION: BACK

## 2022-10-13 ASSESSMENT — PAIN DESCRIPTION - DESCRIPTORS: DESCRIPTORS: ACHING
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
## 2022-10-13 NOTE — PROGRESS NOTES
10/13/22 1519   Encounter Summary   Encounter Overview/Reason  Spiritual/Emotional Needs   Service Provided For: Patient   Referral/Consult From: Phillip   Last Encounter  10/13/22   Complexity of Encounter Moderate   Begin Time 1330   End Time  1400   Total Time Calculated 30 min   Spiritual/Emotional needs   Type Spiritual Support   Behavioral Health    Type  [facility]   Assessment/Intervention/Outcome   Assessment Calm   Intervention Active listening;Sustaining Presence/Ministry of presence;Prayer (assurance of)/[place];Nurtured Hope   Outcome Receptive; Expressed Gratitude;Engaged in conversation

## 2022-10-13 NOTE — PROGRESS NOTES
Daily Progress Note  10/13/2022    Patient Name: Mundo Kenney    CHIEF COMPLAINT: Acute psychosis         SUBJECTIVE:    Nursing staff report the patient has maintained medication adherence and has not required emergency medications since yesterday afternoon and evening. He reports improved sleep pattern and is more logical and able to maintain linear conversation. Per nursing team he has maintained personal boundaries throughout the day and has requested transfer to a unit where he may look out the window. Patient has greater insight into his mental health symptoms. He denies any current questions or concerns. The patient is not appropriate for a lower level of care although showing modest improvement from yesterday. There is risk of decompensation and patient warrants further hospitalization for safety and stabilization. Appetite:  [] Normal/Adequate/Unchanged  [x] Increased-patient continues to utilize Ensure nutritional supplementation [] Decreased      Sleep:       [] Normal/Adequate/showing improvement [] Fair  [x] Poor      Group Attendance on Unit:   [] Yes  [] Selectively    [x] No    Medication Side Effects: Patient denies any medication side effects at the time of assessment. Mental Status Exam     Level of consciousness: Alert and awake   Appearance: Appropriate attire for setting, seated on bed, with good  grooming and hygiene   Behavior/Motor: Approachable, pleasant, no psychomotor abnormalities  Attitude toward examiner:  cooperative, attentive, good eye contact  Speech: Normal rate volume and tone  Mood: Patient reports \"I slept, better\"  Affect: Congruent  Thought processes: Linear, remains perseverative  Thought content: Denies homicidal ideation  Suicidal Ideation: Denies suicidal ideations, contracts for safety on the unit.    Delusions: Less paranoid, bizarre, grandiose  Perceptual Disturbance:  Patient reports improvement in auditory hallucinations  Cognition: Oriented to self and general situation  Memory: intact  Insight: poor   Judgement: poor   Data   height is 5' 10\" (1.778 m) and weight is 111 lb (50.3 kg). His oral temperature is 98 °F (36.7 °C). His blood pressure is 98/63 and his pulse is 76. His respiration is 14 and oxygen saturation is 96%. Labs:   No visits with results within 2 Day(s) from this visit. Latest known visit with results is:   Admission on 10/03/2022, Discharged on 10/06/2022   Component Date Value Ref Range Status    Ventricular Rate 10/03/2022 67  BPM Final    Atrial Rate 10/03/2022 67  BPM Final    P-R Interval 10/03/2022 136  ms Final    QRS Duration 10/03/2022 88  ms Final    Q-T Interval 10/03/2022 396  ms Final    QTc Calculation (Bazett) 10/03/2022 418  ms Final    P Axis 10/03/2022 60  degrees Final    R Axis 10/03/2022 98  degrees Final    T Axis 10/03/2022 90  degrees Final    Troponin, High Sensitivity 10/04/2022 14  0 - 22 ng/L Final    Comment:       High Sensitivity Troponin values cannot be compared with other Troponin methodologies. Patients with high levels of Biotin oral intake (i.e >5mg/day) may have falsely decreased   Troponin levels. Samples collected within 8 hours of biotin intake may require additional   information for diagnosis. Glucose 10/04/2022 86  70 - 99 mg/dL Final    BUN 10/04/2022 12  8 - 23 mg/dL Final    Creatinine 10/04/2022 0.52 (A)  0.70 - 1.20 mg/dL Final    Calcium 10/04/2022 8.7  8.6 - 10.4 mg/dL Final    Sodium 10/04/2022 140  135 - 144 mmol/L Final    Potassium 10/04/2022 4.1  3.7 - 5.3 mmol/L Final    Chloride 10/04/2022 104  98 - 107 mmol/L Final    CO2 10/04/2022 29  20 - 31 mmol/L Final    Anion Gap 10/04/2022 7 (A)  9 - 17 mmol/L Final    Est, Glom Filt Rate 10/04/2022 >60  >60 mL/min/1.73m2 Final    Comment:       Effective Oct 3, 2022        These results are not intended for use in patients <25years of age.         eGFR results are calculated without a race factor using the 2021 CKD-EPI 10/05/2022 29  20 - 31 mmol/L Final    Anion Gap 10/05/2022 9  9 - 17 mmol/L Final    Est, Glom Filt Rate 10/05/2022 >60  >60 mL/min/1.73m2 Final    Comment:       Effective Oct 3, 2022        These results are not intended for use in patients <25years of age. eGFR results are calculated without a race factor using the 2021 CKD-EPI equation. Careful clinical correlation is recommended, particularly when comparing to results   calculated using previous equations. The CKD-EPI equation is less accurate in patients with extremes of muscle mass, extra-renal   metabolism of creatine, excessive creatine ingestion, or following therapy that affects   renal tubular secretion. WBC 10/05/2022 27.0 (A)  3.5 - 11.0 k/uL Final    RBC 10/05/2022 4.11 (A)  4.5 - 5.9 m/uL Final    Hemoglobin 10/05/2022 13.4 (A)  13.5 - 17.5 g/dL Final    Hematocrit 10/05/2022 40.6 (A)  41 - 53 % Final    MCV 10/05/2022 98.8  80 - 100 fL Final    MCH 10/05/2022 32.7  26 - 34 pg Final    MCHC 10/05/2022 33.1  31 - 37 g/dL Final    RDW 10/05/2022 13.5  11.5 - 14.9 % Final    Platelets 76/65/6188 386  150 - 450 k/uL Final    MPV 10/05/2022 6.8  6.0 - 12.0 fL Final    Glucose 10/06/2022 99  70 - 99 mg/dL Final    BUN 10/06/2022 12  8 - 23 mg/dL Final    Creatinine 10/06/2022 0.63 (A)  0.70 - 1.20 mg/dL Final    Est, Glom Filt Rate 10/06/2022 >60  >60 mL/min/1.73m2 Final    Comment:       Effective Oct 3, 2022        These results are not intended for use in patients <25years of age. eGFR results are calculated without a race factor using the 2021 CKD-EPI equation. Careful clinical correlation is recommended, particularly when comparing to results   calculated using previous equations. The CKD-EPI equation is less accurate in patients with extremes of muscle mass, extra-renal   metabolism of creatine, excessive creatine ingestion, or following therapy that affects   renal tubular secretion.       Calcium 10/06/2022 9.2  8.6 - 10.4 mg/dL Final    Sodium 10/06/2022 140  135 - 144 mmol/L Final    Potassium 10/06/2022 4.0  3.7 - 5.3 mmol/L Final    Chloride 10/06/2022 101  98 - 107 mmol/L Final    CO2 10/06/2022 31  20 - 31 mmol/L Final    Anion Gap 10/06/2022 8 (A)  9 - 17 mmol/L Final    WBC 10/06/2022 17.3 (A)  3.5 - 11.0 k/uL Final    RBC 10/06/2022 3.49 (A)  4.5 - 5.9 m/uL Final    Hemoglobin 10/06/2022 11.4 (A)  13.5 - 17.5 g/dL Final    Hematocrit 10/06/2022 34.4 (A)  41 - 53 % Final    MCV 10/06/2022 98.6  80 - 100 fL Final    MCH 10/06/2022 32.8  26 - 34 pg Final    MCHC 10/06/2022 33.2  31 - 37 g/dL Final    RDW 10/06/2022 13.5  11.5 - 14.9 % Final    Platelets 40/74/6818 335  150 - 450 k/uL Final    MPV 10/06/2022 6.8  6.0 - 12.0 fL Final    Color, UA 10/06/2022 Yellow  Yellow Final    Turbidity UA 10/06/2022 Clear  Clear Final    Glucose, Ur 10/06/2022 NEGATIVE  NEGATIVE Final    Bilirubin Urine 10/06/2022 NEGATIVE  NEGATIVE Final    Ketones, Urine 10/06/2022 NEGATIVE  NEGATIVE Final    Specific Gravity, UA 10/06/2022 1.010  1.000 - 1.030 Final    Urine Hgb 10/06/2022 NEGATIVE  NEGATIVE Final    pH, UA 10/06/2022 7.0  5.0 - 8.0 Final    Protein, UA 10/06/2022 NEGATIVE  NEGATIVE Final    Urobilinogen, Urine 10/06/2022 Normal  Normal Final    Nitrite, Urine 10/06/2022 NEGATIVE  NEGATIVE Final    Leukocyte Esterase, Urine 10/06/2022 NEGATIVE  NEGATIVE Final    Urinalysis Comments 10/06/2022 Microscopic exam not performed based on chemical results unless requested in original order. Final         Reviewed patient's current plan of care and vital signs with nursing staff.     Labs reviewed: [x] Yes  Last EKG in EMR reviewed: [x] Yes  QTc: 418    Medications  Current Facility-Administered Medications: OLANZapine (ZYPREXA) tablet 12.5 mg, 12.5 mg, Oral, BID  ipratropium (ATROVENT) 0.02 % nebulizer solution 0.5 mg, 0.5 mg, Nebulization, 4x Daily PRN  albuterol sulfate HFA (PROVENTIL;VENTOLIN;PROAIR) 108 (90 Base) MCG/ACT inhaler 2 puff, 2 puff, Inhalation, Q4H PRN  budesonide-formoterol (SYMBICORT) 160-4.5 MCG/ACT inhaler 2 puff, 2 puff, Inhalation, BID  lidocaine 4 % external patch 1 patch, 1 patch, TransDERmal, Daily  lactobacillus (CULTURELLE) capsule 1 capsule, 1 capsule, Oral, Daily  docusate sodium (COLACE) capsule 100 mg, 100 mg, Oral, TID PRN  ibuprofen (ADVIL;MOTRIN) tablet 200 mg, 200 mg, Oral, Q6H PRN  acetaminophen (TYLENOL) tablet 650 mg, 650 mg, Oral, Q6H PRN  hydrOXYzine HCl (ATARAX) tablet 50 mg, 50 mg, Oral, TID PRN  traZODone (DESYREL) tablet 50 mg, 50 mg, Oral, Nightly PRN  polyethylene glycol (GLYCOLAX) packet 17 g, 17 g, Oral, Daily PRN  aluminum & magnesium hydroxide-simethicone (MAALOX) 200-200-20 MG/5ML suspension 30 mL, 30 mL, Oral, Q6H PRN  nicotine polacrilex (NICORETTE) gum 2 mg, 2 mg, Oral, Q2H PRN  haloperidol lactate (HALDOL) injection 5 mg, 5 mg, IntraMUSCular, Q6H PRN **AND** diphenhydrAMINE (BENADRYL) injection 50 mg, 50 mg, IntraMUSCular, Q6H PRN  haloperidol (HALDOL) tablet 5 mg, 5 mg, Oral, Q6H PRN    ASSESSMENT  Acute psychosis (HCC)         HANDOFF  Patient symptoms are showing modest improvement and this stability warrants transfer off psychiatric stepdown unit  Medications as determined by attending physician  Encourage participation in groups and milieu. Probable discharge is to be determined by MD    Electronically signed by ELISEO Dobson CNP on 10/13/2022 at 3:42 PM    **This report has been created using voice recognition software. It may contain minor errors which are inherent in voice recognition technology. **

## 2022-10-13 NOTE — PLAN OF CARE
Problem: Safety - Adult  Goal: Free from fall injury  10/12/2022 2159 by Sallie Hollins RN  Outcome: Progressing   Gait is steady, no falls noted. Problem: ABCDS Injury Assessment  Goal: Absence of physical injury  10/12/2022 2159 by Sallie Hollins RN  Outcome: Progressing   No injuries noted. Problem: Anxiety  Goal: Will report anxiety at manageable levels  Description: INTERVENTIONS:  1. Administer medication as ordered  2. Teach and rehearse alternative coping skills  3. Provide emotional support with 1:1 interaction with staff  10/12/2022 2159 by Sallie Hollins RN  Outcome: Progressing   Reports anxiety is manageable with the help of god. Problem: Coping  Goal: Pt/Family able to verbalize concerns and demonstrate effective coping strategies  Description: INTERVENTIONS:  1. Assist patient/family to identify coping skills, available support systems and cultural and spiritual values  2. Provide emotional support, including active listening and acknowledgement of concerns of patient and caregivers  3. Reduce environmental stimuli, as able  4. Instruct patient/family in relaxation techniques, as appropriate  5. Assess for spiritual pain/suffering and initiate Spiritual Care, Psychosocial Clinical Specialist consults as needed  10/12/2022 2159 by Sallie Hollins RN  Outcome: Progressing   Able to verbalize concerns but doesn't relate coping plan. Problem: Confusion  Goal: Confusion, delirium, dementia, or psychosis is improved or at baseline  Description: INTERVENTIONS:  1. Assess for possible contributors to thought disturbance, including medications, impaired vision or hearing, underlying metabolic abnormalities, dehydration, psychiatric diagnoses, and notify attending LIP  2. [place] high risk fall precautions, as indicated  3. Provide frequent short contacts to provide reality reorientation, refocusing and direction  4. Decrease environmental stimuli, including noise as appropriate  5.  Monitor and intervene to maintain adequate nutrition, hydration, elimination, sleep and activity  6. If unable to ensure safety without constant attention obtain sitter and review sitter guidelines with assigned personnel  7. Initiate Psychosocial CNS and Spiritual Care consult, as indicated  10/12/2022 2159 by Nathanael Bang RN  Outcome: Progressing   Grandiose/religiously preoccupied delusions persist with no +effect from injection of doubt. Problem: Nutrition Deficit:  Goal: Optimize nutritional status  10/12/2022 2159 by Nathanael Bang RN  Outcome: Progressing   Ate snack & Ensure+  Problem: Pain  Goal: Verbalizes/displays adequate comfort level or baseline comfort level  10/12/2022 2159 by Nathanael Bang RN  Outcome: Progressing   Denies discomfort.

## 2022-10-13 NOTE — GROUP NOTE
Group Therapy Note    Date: 10/13/2022    Group Start Time: 1105  Group End Time: 7357  Group Topic: Psychoeducation    BRAYDEN Abel        Group Therapy Note    Attendees: 6/10  Psychoeducation  Group Note        Date: 10/13/2022 Start Time: 1105 End Time: 8108      Number of Participants in Group & Unit Census:  6/10    Topic: morning psychoeducation group    Goal of Group:receive information on positive psychology practices      Comments:     Patient did not participate in  Psychoeducation  group, despite staff encouragement and explanation of benefits. Patient remain seclusive to self. Q15 minute safety checks maintained for patient safety and will continue to encourage patient to attend unit programming.          Modes of Intervention: Education and Support      Discipline Responsible: Behavorial Health Tech      Signature:  Joleen Buerger

## 2022-10-13 NOTE — BH NOTE
Transfer Note:   Pt transferred to [facility] unit . Belongings sent with pt. Report called to RN, staff. Pt oriented to unit policies and procedures. Pt denies thoughts of self harm at time of transfer and verbalizes understanding of transfer.

## 2022-10-13 NOTE — PLAN OF CARE
Problem: Safety - Adult  Goal: Free from fall injury  Outcome: Progressing  Note: Pt remains free of falls and verbalizes understanding of individual fall risks. Pt wearing non skid footwear and encouraged to seek out staff for any assistance needed. Problem: ABCDS Injury Assessment  Goal: Absence of physical injury  Outcome: Progressing  Note: Patient has been free from physical injures at this time. Patient encouraged to seek help from staff if such thoughts were to arise. Problem: Anxiety  Goal: Will report anxiety at manageable levels  Description: INTERVENTIONS:  1. Administer medication as ordered  2. Teach and rehearse alternative coping skills  3. Provide emotional support with 1:1 interaction with staff  Outcome: Progressing  Note: Patient admit to high anxiety. He has requested Atarax to help with his anxiety. Patient states the Atarax has been very helpful and has been keeping him more calm. Problem: Coping  Goal: Pt/Family able to verbalize concerns and demonstrate effective coping strategies  Description: INTERVENTIONS:  1. Assist patient/family to identify coping skills, available support systems and cultural and spiritual values  2. Provide emotional support, including active listening and acknowledgement of concerns of patient and caregivers  3. Reduce environmental stimuli, as able  4. Instruct patient/family in relaxation techniques, as appropriate  5. Assess for spiritual pain/suffering and initiate Spiritual Care, Psychosocial Clinical Specialist consults as needed  Outcome: Progressing     Problem: Confusion  Goal: Confusion, delirium, dementia, or psychosis is improved or at baseline  Description: INTERVENTIONS:  1. Assess for possible contributors to thought disturbance, including medications, impaired vision or hearing, underlying metabolic abnormalities, dehydration, psychiatric diagnoses, and notify attending LIP  2. [place] high risk fall precautions, as indicated  3. Provide frequent short contacts to provide reality reorientation, refocusing and direction  4. Decrease environmental stimuli, including noise as appropriate  5. Monitor and intervene to maintain adequate nutrition, hydration, elimination, sleep and activity  6. If unable to ensure safety without constant attention obtain sitter and review sitter guidelines with assigned personnel  7. Initiate Psychosocial CNS and Spiritual Care consult, as indicated  Outcome: Progressing  Note: Patient states he got much needed sleep last night. He states he doesn't feel as confused today. Patient continues to be isolative. He is encouraged to come out of room to attend unit programming. Patient has been eating his meals. Problem: Nutrition Deficit:  Goal: Optimize nutritional status  Outcome: Progressing     Problem: Pain  Goal: Verbalizes/displays adequate comfort level or baseline comfort level  Outcome: Progressing  Flowsheets (Taken 10/11/2022 1048 by Yocasta Jordan LPN)  Verbalizes/displays adequate comfort level or baseline comfort level:   Encourage patient to monitor pain and request assistance   Assess pain using appropriate pain scale  Note: Patient complains of lower back pain. He utilizes a lidocaine patch that has been helpful.

## 2022-10-13 NOTE — BH NOTE
Group Therapy Note     Date: 10/12/2022     Group Start Time: 1945  Group End Time: 2015  Group Topic: Wrap Up     BRAYDEN Lopez RN           Group Therapy Note     Attendees: 7/10     Wrap Up Group Note           Date: October 12, 2022          Start Time: 7:45 pm                   End Time: 8:15 pm        Number of Participants in Group & Unit Census:  7/10     Topic: Problem-solving, collaboration and communication skills. Goal of Group: To improve problem-solving and communication skills through collaborating with peers to reach their goals. Comments:      Patient did not participate in [place] group, despite staff encouragement and explanation of benefits. Patient remain seclusive to self. Q15 minute safety checks maintained for patient safety and will continue to encourage patient to attend unit programming.

## 2022-10-13 NOTE — GROUP NOTE
Group Therapy Note    Date: 10/13/2022    Group Start Time: 4733  Group End Time: 1148  Group Topic: Music Therapy    [facility]    [facility] Therapy Group Note        Date: 10/13/2022   Start Time: 8853  End Time: 5284      Number of Participants in Group & Unit Census:  5/10    Topic: Patients engaged in education about DBT skill Positive Affirmations. Patients then shared music that they thought reflected something positive about themselves, skills they have, or goals they were working on, and phrased them in the form of a positive affirmation. Goal of Group:Patients goals to engage in education on use of Positive Affirmations as a coping skill; Increase self-esteem; Increase self-expression; Normalization of the environment; Increase sense of community; Comments:     Patient did not participate in Music therapy group, despite staff encouragement and explanation of benefits. Patient remain seclusive to self. Q15 minute safety checks maintained for patient safety and will continue to encourage patient to attend unit programming.

## 2022-10-13 NOTE — PROGRESS NOTES
LOS:  Increased monitoring for safety as he experienced an unsteady gait after PRN med's. His gait is steady @ this x.

## 2022-10-13 NOTE — PROGRESS NOTES
Behavioral Services                                              Medicare Re-Certification    I certify that the inpatient psychiatric hospital services furnished since the previous certification/re-certification were, and continue to be, medically necessary for;    [x] (1) Treatment which could reasonably be expected to improve the patient's condition,    [x] (2) Or for diagnostic study. Estimated length of stay/service 2-5 days    Plan for post-hospital care -outpatient care    This patient continues to need, on a daily basis, active treatment furnished directly by or requiring the supervision of inpatient psychiatric personnel.     Electronically signed by Shelli Jung MD on 10/13/2022 at 5:39 PM

## 2022-10-14 PROCEDURE — 6370000000 HC RX 637 (ALT 250 FOR IP): Performed by: INTERNAL MEDICINE

## 2022-10-14 PROCEDURE — 6370000000 HC RX 637 (ALT 250 FOR IP): Performed by: PSYCHIATRY & NEUROLOGY

## 2022-10-14 PROCEDURE — APPSS45 APP SPLIT SHARED TIME 31-45 MINUTES: Performed by: PSYCHIATRY & NEUROLOGY

## 2022-10-14 PROCEDURE — 1240000000 HC EMOTIONAL WELLNESS R&B

## 2022-10-14 PROCEDURE — 99232 SBSQ HOSP IP/OBS MODERATE 35: CPT | Performed by: PSYCHIATRY & NEUROLOGY

## 2022-10-14 RX ORDER — OLANZAPINE 15 MG/1
15 TABLET ORAL 2 TIMES DAILY
Status: DISCONTINUED | OUTPATIENT
Start: 2022-10-14 | End: 2022-10-18 | Stop reason: HOSPADM

## 2022-10-14 RX ADMIN — DOCUSATE SODIUM 100 MG: 100 CAPSULE, LIQUID FILLED ORAL at 09:04

## 2022-10-14 RX ADMIN — OLANZAPINE 15 MG: 15 TABLET, FILM COATED ORAL at 20:50

## 2022-10-14 RX ADMIN — OLANZAPINE 12.5 MG: 7.5 TABLET, FILM COATED ORAL at 09:00

## 2022-10-14 RX ADMIN — DOCUSATE SODIUM 100 MG: 100 CAPSULE, LIQUID FILLED ORAL at 16:25

## 2022-10-14 RX ADMIN — BUDESONIDE AND FORMOTEROL FUMARATE DIHYDRATE 2 PUFF: 160; 4.5 AEROSOL RESPIRATORY (INHALATION) at 09:00

## 2022-10-14 RX ADMIN — BUDESONIDE AND FORMOTEROL FUMARATE DIHYDRATE 2 PUFF: 160; 4.5 AEROSOL RESPIRATORY (INHALATION) at 20:52

## 2022-10-14 RX ADMIN — ALBUTEROL SULFATE 2 PUFF: 90 AEROSOL, METERED RESPIRATORY (INHALATION) at 16:30

## 2022-10-14 RX ADMIN — HALOPERIDOL 5 MG: 5 TABLET ORAL at 16:23

## 2022-10-14 RX ADMIN — HYDROXYZINE HYDROCHLORIDE 50 MG: 50 TABLET ORAL at 09:01

## 2022-10-14 RX ADMIN — Medication 1 CAPSULE: at 09:21

## 2022-10-14 RX ADMIN — DOCUSATE SODIUM 100 MG: 100 CAPSULE, LIQUID FILLED ORAL at 22:35

## 2022-10-14 RX ADMIN — HYDROXYZINE HYDROCHLORIDE 50 MG: 50 TABLET ORAL at 22:35

## 2022-10-14 RX ADMIN — HYDROXYZINE HYDROCHLORIDE 50 MG: 50 TABLET ORAL at 16:23

## 2022-10-14 RX ADMIN — TRAZODONE HYDROCHLORIDE 50 MG: 50 TABLET ORAL at 20:50

## 2022-10-14 RX ADMIN — HALOPERIDOL 5 MG: 5 TABLET ORAL at 10:18

## 2022-10-14 NOTE — BH NOTE
Emergency Medication Follow-Up Note:    PRN medication of  Haldol 5 mg, oral was effective as evidence by patient regain behavioral control, absence of behavior warranting emergency medication, socializing with peers. Patient denies medication side effects. Will continue to monitor and provide support as needed.

## 2022-10-14 NOTE — GROUP NOTE
Group Therapy Note    Date: 10/14/2022    Group Start Time: 1430  Group End Time: 0615  Group Topic: Cognitive Skills    BRAYDEN BHJACK Mccall, CTRS        Group Therapy Note    Attendees: 12/20       Patient's Goal:  To improve emotional identification and reminiscing by collaborating with peers and listening to music. Notes:  Patient attended and participated in group. Patient was able to collaborate with peers. Status After Intervention:  Improved    Participation Level:  Active Listener and Interactive, patient had to be redirected at times, talking over patients    Participation Quality: Appropriate, Attentive, Sharing, and Supportive      Speech:  normal      Thought Process/Content: Logical  Linear      Affective Functioning: Congruent      Mood: euthymic      Level of consciousness:  Alert, Oriented x4, and Attentive      Response to Learning: Able to verbalize/acknowledge new learning, Able to retain information, Capable of insight, and Progressing to goal      Endings: None Reported    Modes of Intervention: Support, Socialization, Exploration, and Activity      Discipline Responsible: Psychoeducational Specialist      Signature:  Nitin Mccall, [address]

## 2022-10-14 NOTE — BH NOTE
Emergency PRN Medication Administration Note:        Patient is Agitated as evidence by pacing, being intrusive,. Staff attempted to find and relieve the distress by Talking to patient, Refocusing on new activity, Offering suggestions, Checking for undiagnosed pain, and Administer PRN medications Patient is currently needing frequently redirection and unable to calm self down, accepted PRN medications. Medication Administered as prescribed: Haldol 5 mg, oral. Patient Tolerated medication administration. Will continue to monitor, offer support, and reassess.

## 2022-10-14 NOTE — PROGRESS NOTES
Daily Progress Note  10/14/2022    Patient Name: Melina Mckeon: Acute psychosis         SUBJECTIVE:    Nursing staff report the patient has maintained medication adherence and has utilized emergency medications as it is difficult for him to maintain appropriate boundaries with peers and staff. He is tearful during assessment as he is focused on it being his son's birthday tomorrow. He has requested this author to contact his wife and she explains that it has been difficult understanding these mental health changes and additionally she is very stressed because the [address] is requiring lab work to verify his leukemia. She has been instructed to please have the [address] telephone the social work team and we can arrange required tests. Patient has greater insight into his mental health symptoms and was redirectable today when he began to write on the walls. He remains religiously preoccupied and denies any side effects related to his medications or having current questions or concerns. The patient is not appropriate for a lower level of care although showing modest improvement from yesterday. There is risk of decompensation and patient warrants further hospitalization for safety and stabilization. Appetite:  [] Normal/Adequate/Unchanged  [x] Increased-patient continues to utilize Ensure nutritional supplementation [] Decreased      Sleep:       [] Normal/Adequate/showing improvement [] Fair  [x] Poor      Group Attendance on Unit:   [] Yes  [] Selectively    [x] No    Medication Side Effects: Patient denies any medication side effects at the time of assessment.          Mental Status Exam     Level of consciousness: Alert and awake   Appearance: Appropriate attire for setting, seated on bed, with good  grooming and hygiene   Behavior/Motor: Approachable, pleasant, no psychomotor abnormalities  Attitude toward examiner:  cooperative, attentive, good eye contact  Speech: Normal rate volume and tone  Mood: Patient reports \"not sure really  Affect: Congruent  Thought processes: Linear, remains perseverative  Thought content: Denies homicidal ideation religiously preoccupied  Suicidal Ideation: Denies suicidal ideations, contracts for safety on the unit. Delusions: Less paranoid, bizarre, grandiose  Perceptual Disturbance:  Patient reports improvement in auditory hallucinations  Cognition: Oriented to self and general situation  Memory: intact  Insight: poor   Judgement: poor   Data   height is 5' 10\" (1.778 m) and weight is 111 lb (50.3 kg). His temperature is 98.2 °F (36.8 °C). His blood pressure is 111/89 and his pulse is 80. His respiration is 14 and oxygen saturation is 96%. Labs:   No visits with results within 2 Day(s) from this visit. Latest known visit with results is:   Admission on 10/03/2022, Discharged on 10/06/2022   Component Date Value Ref Range Status    Ventricular Rate 10/03/2022 67  BPM Final    Atrial Rate 10/03/2022 67  BPM Final    P-R Interval 10/03/2022 136  ms Final    QRS Duration 10/03/2022 88  ms Final    Q-T Interval 10/03/2022 396  ms Final    QTc Calculation (Bazett) 10/03/2022 418  ms Final    P Axis 10/03/2022 60  degrees Final    R Axis 10/03/2022 98  degrees Final    T Axis 10/03/2022 90  degrees Final    Troponin, High Sensitivity 10/04/2022 14  0 - 22 ng/L Final    Comment:       High Sensitivity Troponin values cannot be compared with other Troponin methodologies. Patients with high levels of Biotin oral intake (i.e >5mg/day) may have falsely decreased   Troponin levels. Samples collected within 8 hours of biotin intake may require additional   information for diagnosis.       Glucose 10/04/2022 86  70 - 99 mg/dL Final    BUN 10/04/2022 12  8 - 23 mg/dL Final    Creatinine 10/04/2022 0.52 (A)  0.70 - 1.20 mg/dL Final    Calcium 10/04/2022 8.7  8.6 - 10.4 mg/dL Final    Sodium 10/04/2022 140  135 - 144 mmol/L Final    Potassium 10/04/2022 4.1  3.7 - 5.3 mmol/L Final Chloride 10/04/2022 104  98 - 107 mmol/L Final    CO2 10/04/2022 29  20 - 31 mmol/L Final    Anion Gap 10/04/2022 7 (A)  9 - 17 mmol/L Final    Est, Glom Filt Rate 10/04/2022 >60  >60 mL/min/1.73m2 Final    Comment:       Effective Oct 3, 2022        These results are not intended for use in patients <25years of age. eGFR results are calculated without a race factor using the 2021 CKD-EPI equation. Careful clinical correlation is recommended, particularly when comparing to results   calculated using previous equations. The CKD-EPI equation is less accurate in patients with extremes of muscle mass, extra-renal   metabolism of creatine, excessive creatine ingestion, or following therapy that affects   renal tubular secretion.       WBC 10/04/2022 19.8 (A)  3.5 - 11.0 k/uL Final    RBC 10/04/2022 3.47 (A)  4.5 - 5.9 m/uL Final    Hemoglobin 10/04/2022 11.5 (A)  13.5 - 17.5 g/dL Final    Hematocrit 10/04/2022 34.0 (A)  41 - 53 % Final    MCV 10/04/2022 98.1  80 - 100 fL Final    MCH 10/04/2022 33.3  26 - 34 pg Final    MCHC 10/04/2022 33.9  31 - 37 g/dL Final    RDW 10/04/2022 13.5  11.5 - 14.9 % Final    Platelets 87/56/1023 328  150 - 450 k/uL Final    MPV 10/04/2022 6.8  6.0 - 12.0 fL Final    Color, UA 10/05/2022 Yellow  Yellow Final    Turbidity UA 10/05/2022 Clear  Clear Final    Glucose, Ur 10/05/2022 NEGATIVE  NEGATIVE Final    Bilirubin Urine 10/05/2022 NEGATIVE  NEGATIVE Final    Ketones, Urine 10/05/2022 NEGATIVE  NEGATIVE Final    Specific Gravity, UA 10/05/2022 1.014  1.000 - 1.030 Final    Urine Hgb 10/05/2022 NEGATIVE  NEGATIVE Final    pH, UA 10/05/2022 7.0  5.0 - 8.0 Final    Protein, UA 10/05/2022 NEGATIVE  NEGATIVE Final    Urobilinogen, Urine 10/05/2022 Normal  Normal Final    Nitrite, Urine 10/05/2022 NEGATIVE  NEGATIVE Final    Leukocyte Esterase, Urine 10/05/2022 NEGATIVE  NEGATIVE Final    Urinalysis Comments 10/05/2022 Microscopic exam not performed based on chemical results unless requested in original order. Final    Glucose 10/05/2022 136 (A)  70 - 99 mg/dL Final    BUN 10/05/2022 7 (A)  8 - 23 mg/dL Final    Creatinine 10/05/2022 0.74  0.70 - 1.20 mg/dL Final    Calcium 10/05/2022 9.1  8.6 - 10.4 mg/dL Final    Sodium 10/05/2022 140  135 - 144 mmol/L Final    Potassium 10/05/2022 4.2  3.7 - 5.3 mmol/L Final    Chloride 10/05/2022 102  98 - 107 mmol/L Final    CO2 10/05/2022 29  20 - 31 mmol/L Final    Anion Gap 10/05/2022 9  9 - 17 mmol/L Final    Est, Glom Filt Rate 10/05/2022 >60  >60 mL/min/1.73m2 Final    Comment:       Effective Oct 3, 2022        These results are not intended for use in patients <25years of age. eGFR results are calculated without a race factor using the 2021 CKD-EPI equation. Careful clinical correlation is recommended, particularly when comparing to results   calculated using previous equations. The CKD-EPI equation is less accurate in patients with extremes of muscle mass, extra-renal   metabolism of creatine, excessive creatine ingestion, or following therapy that affects   renal tubular secretion. WBC 10/05/2022 27.0 (A)  3.5 - 11.0 k/uL Final    RBC 10/05/2022 4.11 (A)  4.5 - 5.9 m/uL Final    Hemoglobin 10/05/2022 13.4 (A)  13.5 - 17.5 g/dL Final    Hematocrit 10/05/2022 40.6 (A)  41 - 53 % Final    MCV 10/05/2022 98.8  80 - 100 fL Final    MCH 10/05/2022 32.7  26 - 34 pg Final    MCHC 10/05/2022 33.1  31 - 37 g/dL Final    RDW 10/05/2022 13.5  11.5 - 14.9 % Final    Platelets 55/23/4055 386  150 - 450 k/uL Final    MPV 10/05/2022 6.8  6.0 - 12.0 fL Final    Glucose 10/06/2022 99  70 - 99 mg/dL Final    BUN 10/06/2022 12  8 - 23 mg/dL Final    Creatinine 10/06/2022 0.63 (A)  0.70 - 1.20 mg/dL Final    Est, Glom Filt Rate 10/06/2022 >60  >60 mL/min/1.73m2 Final    Comment:       Effective Oct 3, 2022        These results are not intended for use in patients <25years of age.         eGFR results are calculated without a race factor using the signs with nursing staff. Labs reviewed: [x] Yes  Last EKG in EMR reviewed: [x] Yes  QTc: 418    Medications  Current Facility-Administered Medications: OLANZapine (ZYPREXA) tablet 12.5 mg, 12.5 mg, Oral, BID  ipratropium (ATROVENT) 0.02 % nebulizer solution 0.5 mg, 0.5 mg, Nebulization, 4x Daily PRN  albuterol sulfate HFA (PROVENTIL;VENTOLIN;PROAIR) 108 (90 Base) MCG/ACT inhaler 2 puff, 2 puff, Inhalation, Q4H PRN  budesonide-formoterol (SYMBICORT) 160-4.5 MCG/ACT inhaler 2 puff, 2 puff, Inhalation, BID  lidocaine 4 % external patch 1 patch, 1 patch, TransDERmal, Daily  lactobacillus (CULTURELLE) capsule 1 capsule, 1 capsule, Oral, Daily  docusate sodium (COLACE) capsule 100 mg, 100 mg, Oral, TID PRN  ibuprofen (ADVIL;MOTRIN) tablet 200 mg, 200 mg, Oral, Q6H PRN  acetaminophen (TYLENOL) tablet 650 mg, 650 mg, Oral, Q6H PRN  hydrOXYzine HCl (ATARAX) tablet 50 mg, 50 mg, Oral, TID PRN  traZODone (DESYREL) tablet 50 mg, 50 mg, Oral, Nightly PRN  polyethylene glycol (GLYCOLAX) packet 17 g, 17 g, Oral, Daily PRN  aluminum & magnesium hydroxide-simethicone (MAALOX) 200-200-20 MG/5ML suspension 30 mL, 30 mL, Oral, Q6H PRN  nicotine polacrilex (NICORETTE) gum 2 mg, 2 mg, Oral, Q2H PRN  haloperidol lactate (HALDOL) injection 5 mg, 5 mg, IntraMUSCular, Q6H PRN **AND** diphenhydrAMINE (BENADRYL) injection 50 mg, 50 mg, IntraMUSCular, Q6H PRN  haloperidol (HALDOL) tablet 5 mg, 5 mg, Oral, Q6H PRN    ASSESSMENT  Acute psychosis (HCC)         HANDOFF  Patient symptoms are showing modest   Medications as determined by attending physician  Encourage participation in groups and milieu. Probable discharge is to be determined by MD    Electronically signed by ELISEO Parry CNP on 10/14/2022 at 5:11 PM    **This report has been created using voice recognition software. It may contain minor errors which are inherent in voice recognition technology. **    I independently saw and evaluated the patient.   I reviewed the nurse practioner's documentation above. Any additional comments or changes to the    documentation are stated below otherwise agree with assessment. The patient continues to have grandiose delusional beliefs and euphoria though it is improving. The patient states he did not sleep more than an hour and believes he has not slept well for years. The patient wants me to double his medications to help him sleep. His olanzapine will be increased to 15 mg twice daily. The patient could have Depakote added over the weekend and continued to have dysphoria and difficulty sleeping      PLAN  Medications as noted above  Attempt to develop insight  Expected Length of Stay is 3-15 days. Psycho-education conducted. Supportive Therapy conducted.   Follow-up daily while on inpatient unit    Electronically signed by Phil Arango MD on 10/14/22 at 7:15 PM EDT

## 2022-10-14 NOTE — BH NOTE
Wrap-Up Group Note        Date: October 13, 2022 Start Time: 2025  End Time: 2045      Number of Participants in [address]:  10/20    Topic: Wrap Up     Goal of Group:Goal discussion      Comments:     Patient did not participate in Wrap-Up group, despite staff encouragement and explanation of benefits. Patient remain seclusive to self. Q15 minute safety checks maintained for patient safety and will continue to encourage patient to attend unit programming.

## 2022-10-14 NOTE — PLAN OF CARE
Problem: Safety - Adult  Goal: Free from fall injury  10/13/2022 2152 by Peter Martinez RN  Outcome: Progressing     Problem: ABCDS Injury Assessment  Goal: Absence of physical injury  10/13/2022 2152 by Peter Martinez RN  Outcome: Progressing     Problem: Anxiety  Goal: Will report anxiety at manageable levels  Description: INTERVENTIONS:  1. Administer medication as ordered  2. Teach and rehearse alternative coping skills  3. Provide emotional support with 1:1 interaction with staff  10/13/2022 2152 by Peter Martinez RN  Outcome: Progressing  Note: Patient reports anxiety as manageable, is very discharge focused. Repeatedly asking about how he might get out, repeating same questions, often at the nurse's station. Problem: Coping  Goal: Pt/Family able to verbalize concerns and demonstrate effective coping strategies  Description: INTERVENTIONS:  1. Assist patient/family to identify coping skills, available support systems and cultural and spiritual values  2. Provide emotional support, including active listening and acknowledgement of concerns of patient and caregivers  3. Reduce environmental stimuli, as able  4. Instruct patient/family in relaxation techniques, as appropriate  5. Assess for spiritual pain/suffering and initiate Spiritual Care, Psychosocial Clinical Specialist consults as needed  10/13/2022 2152 by Peter Martinez RN  Outcome: Progressing     Problem: Confusion  Goal: Confusion, delirium, dementia, or psychosis is improved or at baseline  Description: INTERVENTIONS:  1. Assess for possible contributors to thought disturbance, including medications, impaired vision or hearing, underlying metabolic abnormalities, dehydration, psychiatric diagnoses, and notify attending LIP  2. [place] high risk fall precautions, as indicated  3. Provide frequent short contacts to provide reality reorientation, refocusing and direction  4.  Decrease environmental stimuli, including noise as appropriate  5. Monitor and intervene to maintain adequate nutrition, hydration, elimination, sleep and activity  6. If unable to ensure safety without constant attention obtain sitter and review sitter guidelines with assigned personnel  7.  Initiate Psychosocial CNS and Spiritual Care consult, as indicated  10/13/2022 2152 by Kasie Cisneros RN  Outcome: Progressing

## 2022-10-14 NOTE — PLAN OF CARE
Problem: Safety - Adult  Goal: Free from fall injury  Outcome: Progressing  Note: Patient did not sleep well last night, has been hyper-verbal with flight of ideas today about discharge and doing the right thing. Denies all hallucinations and depression, rates anxiety as a 10. Denies suicidal ideation at this time, increased appetite. Patient frequently redirected throughout the day to focus on himself. Intrusive with staff and peers. Problem: ABCDS Injury Assessment  Goal: Absence of physical injury  Outcome: Progressing  Note: Patient did not sleep well last night, has been hyper-verbal with flight of ideas today about discharge and doing the right thing. Denies all hallucinations and depression, rates anxiety as a 10. Denies suicidal ideation at this time, increased appetite. Patient frequently redirected throughout the day to focus on himself. Intrusive with staff and peers. Problem: Anxiety  Goal: Will report anxiety at manageable levels  Description: INTERVENTIONS:  1. Administer medication as ordered  2. Teach and rehearse alternative coping skills  3. Provide emotional support with 1:1 interaction with staff  Outcome: Progressing  Note: Patient did not sleep well last night, has been hyper-verbal with flight of ideas today about discharge and doing the right thing. Denies all hallucinations and depression, rates anxiety as a 10. Denies suicidal ideation at this time, increased appetite. Patient frequently redirected throughout the day to focus on himself. Intrusive with staff and peers. Problem: Coping  Goal: Pt/Family able to verbalize concerns and demonstrate effective coping strategies  Description: INTERVENTIONS:  1. Assist patient/family to identify coping skills, available support systems and cultural and spiritual values  2. Provide emotional support, including active listening and acknowledgement of concerns of patient and caregivers  3. Reduce environmental stimuli, as able  4. Instruct patient/family in relaxation techniques, as appropriate  5. Assess for spiritual pain/suffering and initiate Spiritual Care, Psychosocial Clinical Specialist consults as needed  Outcome: Progressing  Note: Patient did not sleep well last night, has been hyper-verbal with flight of ideas today about discharge and doing the right thing. Denies all hallucinations and depression, rates anxiety as a 10. Denies suicidal ideation at this time, increased appetite. Patient frequently redirected throughout the day to focus on himself. Intrusive with staff and peers. Problem: Confusion  Goal: Confusion, delirium, dementia, or psychosis is improved or at baseline  Description: INTERVENTIONS:  1. Assess for possible contributors to thought disturbance, including medications, impaired vision or hearing, underlying metabolic abnormalities, dehydration, psychiatric diagnoses, and notify attending LIP  2. [place] high risk fall precautions, as indicated  3. Provide frequent short contacts to provide reality reorientation, refocusing and direction  4. Decrease environmental stimuli, including noise as appropriate  5. Monitor and intervene to maintain adequate nutrition, hydration, elimination, sleep and activity  6. If unable to ensure safety without constant attention obtain sitter and review sitter guidelines with assigned personnel  7. Initiate Psychosocial CNS and Spiritual Care consult, as indicated  Outcome: Progressing  Note: Patient did not sleep well last night, has been hyper-verbal with flight of ideas today about discharge and doing the right thing. Denies all hallucinations and depression, rates anxiety as a 10. Denies suicidal ideation at this time, increased appetite. Patient frequently redirected throughout the day to focus on himself. Intrusive with staff and peers.       Problem: Nutrition Deficit:  Goal: Optimize nutritional status  Outcome: Progressing  Note: Patient did not sleep well last night, has been hyper-verbal with flight of ideas today about discharge and doing the right thing. Denies all hallucinations and depression, rates anxiety as a 10. Denies suicidal ideation at this time, increased appetite. Patient frequently redirected throughout the day to focus on himself. Intrusive with staff and peers. Problem: Pain  Goal: Verbalizes/displays adequate comfort level or baseline comfort level  Outcome: Progressing  Note: Patient did not sleep well last night, has been hyper-verbal with flight of ideas today about discharge and doing the right thing. Denies all hallucinations and depression, rates anxiety as a 10. Denies suicidal ideation at this time, increased appetite. Patient frequently redirected throughout the day to focus on himself. Intrusive with staff and peers.

## 2022-10-14 NOTE — GROUP NOTE
Group Therapy Note    Date: 10/14/2022    Group Start Time: 7442  Group End Time: 3726  Group Topic: Group Documentation    [facility]    Lyndsey George LPN        Group Therapy Note    Attendees: 7/20       Patient's Goal:  Goal setting    Notes:   Inspire    Status After Intervention:  Unchanged    Participation Level: Interactive    Participation Quality: Sharing      Speech:  normal      Thought Process/Content: Flight of ideas      Affective Functioning: Blunted      Mood: depressed      Level of consciousness:  Alert      Response to Learning: Progressing to goal      Endings: None Reported    Modes of Intervention: Education      Discipline Responsible: Licensed Practical Nurse      Signature:  Lyndsey George LPN

## 2022-10-15 PROCEDURE — 99232 SBSQ HOSP IP/OBS MODERATE 35: CPT

## 2022-10-15 PROCEDURE — 6370000000 HC RX 637 (ALT 250 FOR IP): Performed by: PSYCHIATRY & NEUROLOGY

## 2022-10-15 PROCEDURE — 6370000000 HC RX 637 (ALT 250 FOR IP): Performed by: INTERNAL MEDICINE

## 2022-10-15 PROCEDURE — 1240000000 HC EMOTIONAL WELLNESS R&B

## 2022-10-15 PROCEDURE — 6360000002 HC RX W HCPCS: Performed by: INTERNAL MEDICINE

## 2022-10-15 RX ADMIN — BUDESONIDE AND FORMOTEROL FUMARATE DIHYDRATE 2 PUFF: 160; 4.5 AEROSOL RESPIRATORY (INHALATION) at 20:03

## 2022-10-15 RX ADMIN — DOCUSATE SODIUM 100 MG: 100 CAPSULE, LIQUID FILLED ORAL at 16:16

## 2022-10-15 RX ADMIN — Medication 1 CAPSULE: at 08:36

## 2022-10-15 RX ADMIN — OLANZAPINE 15 MG: 15 TABLET, FILM COATED ORAL at 08:36

## 2022-10-15 RX ADMIN — OLANZAPINE 15 MG: 15 TABLET, FILM COATED ORAL at 20:31

## 2022-10-15 RX ADMIN — ALUMINUM HYDROXIDE, MAGNESIUM HYDROXIDE, AND SIMETHICONE 30 ML: 200; 200; 20 SUSPENSION ORAL at 11:40

## 2022-10-15 RX ADMIN — TRAZODONE HYDROCHLORIDE 50 MG: 50 TABLET ORAL at 20:32

## 2022-10-15 RX ADMIN — IPRATROPIUM BROMIDE 0.5 MG: 0.5 SOLUTION RESPIRATORY (INHALATION) at 00:08

## 2022-10-15 RX ADMIN — IPRATROPIUM BROMIDE 0.5 MG: 0.5 SOLUTION RESPIRATORY (INHALATION) at 06:30

## 2022-10-15 RX ADMIN — DOCUSATE SODIUM 100 MG: 100 CAPSULE, LIQUID FILLED ORAL at 20:34

## 2022-10-15 RX ADMIN — ALBUTEROL SULFATE 2 PUFF: 90 AEROSOL, METERED RESPIRATORY (INHALATION) at 10:52

## 2022-10-15 RX ADMIN — HYDROXYZINE HYDROCHLORIDE 50 MG: 50 TABLET ORAL at 20:31

## 2022-10-15 RX ADMIN — DOCUSATE SODIUM 100 MG: 100 CAPSULE, LIQUID FILLED ORAL at 09:44

## 2022-10-15 RX ADMIN — BUDESONIDE AND FORMOTEROL FUMARATE DIHYDRATE 2 PUFF: 160; 4.5 AEROSOL RESPIRATORY (INHALATION) at 08:36

## 2022-10-15 NOTE — GROUP NOTE
Group Therapy Note    Date: 10/15/2022    Group Start Time: 0900  Group End Time: 0915  Group Topic: Community Meeting    [facility]    [id number] [address] [facility] [address] Meeting Group Note        Date: October 15, 2022 Start Time: 9am  End Time: 10am      Number of Participants in Group & Unit Census:  10/14    Topic: Goals Group    Goal of Group: To establish a goal for a day      Comments:     Patient did not participate in [employer] group, despite staff encouragement and explanation of benefits. Patient remain seclusive to self. Q15 minute safety checks maintained for patient safety and will continue to encourage patient to attend unit programming.         Group Therapy Note    Attendees: 10/14 normal...

## 2022-10-15 NOTE — PLAN OF CARE
Problem: Safety - Adult  Goal: Free from fall injury  10/15/2022 1039 by Nhi Hameed RN  Outcome: Progressing   Patient has remained free from falls during this shift. Patient gait is steady. Patient agrees to push call button before leaving bed if he needs help with tasks. Problem: Anxiety  Goal: Will report anxiety at manageable levels  Description: INTERVENTIONS:  1. Administer medication as ordered  2. Teach and rehearse alternative coping skills  3. Provide emotional support with 1:1 interaction with staff  10/15/2022 1039 by Nhi Hameed RN  Outcome: Progressing   Patient reports his anxiety is a 8/10. Patient states that he is anxious about discharge planning and states he is ready to be discharged. Writer provided patient 1:1 emotional support to discuss concerns and try to relieve anxiety. Patient encouraged to ask staff for PRN medications to help relieve anxiety. Problem: Nutrition Deficit:  Goal: Optimize nutritional status  10/15/2022 1039 by Nhi Hameed RN  Outcome: Progressing   Patient is observed to be eating his meals. Filled out a meal menu for tomorrow and is reporting that his appetite is adequate. Problem: Pain  Goal: Verbalizes/displays adequate comfort level or baseline comfort level  10/15/2022 1039 by Nhi Hameed RN  Outcome: Progressing   Patient states he has some pain in his abdomen, and requested colace to help patient with constipation issues. Patient encouraged to seek out staff for pain mediations to help relieve pain. Problem: Confusion  Goal: Confusion, delirium, dementia, or psychosis is improved or at baseline  Description: INTERVENTIONS:  1. Assess for possible contributors to thought disturbance, including medications, impaired vision or hearing, underlying metabolic abnormalities, dehydration, psychiatric diagnoses, and notify attending LIP  2. [place] high risk fall precautions, as indicated  3.  Provide frequent short contacts to provide reality reorientation, refocusing and direction  4. Decrease environmental stimuli, including noise as appropriate  5. Monitor and intervene to maintain adequate nutrition, hydration, elimination, sleep and activity  6. If unable to ensure safety without constant attention obtain sitter and review sitter guidelines with assigned personnel  7. Initiate Psychosocial CNS and Spiritual Care consult, as indicated  10/15/2022 1039 by Dinorah Keyes RN  Outcome: Progressing   Patient denied suicidal ideations. Safe environment maintained. Safety checks every 15 minutes continued per unit policy. Patient denied depression. Patient denied delusions. Patient is religiously preoccupied. Patient denied auditory, visual, tactile, olfactory, and gustatory hallucinations. Patient needs frequent redirection.

## 2022-10-15 NOTE — GROUP NOTE
Group Therapy Note    Date: 10/15/2022    Group Start Time: 1400  Group End Time: 1500  Group Topic: Recreational    [facility]    FREDI Escudero    Recreation and Communication Group Note        Date: October 15, 2022 Start Time: 2pm  End Time: 3pm      Number of Participants in Group & Unit Census:  7/14    Topic: communication skills, interpersonal interactions, recreation/leisure    Goal of Group: To increase interpersonal interactions with peers. To increase communication skills. To increase problem solving and turn taking abilities. Comments:     Patient did not participate in Recreation and Communication group, despite staff encouragement and explanation of benefits. Patient remain seclusive to self. Q15 minute safety checks maintained for patient safety and will continue to encourage patient to attend unit programming.         Signature:  aJreth Escudero

## 2022-10-15 NOTE — GROUP NOTE
Group Therapy Note    Date: 10/15/2022    Group Start Time: 2938  Group End Time: 2012  Group Topic: Psychotherapy    [facility]    MARY Mueller, PEEWEE        Group Therapy Note    Attendees: 3/14       patient refused to attend psychotherapy group at [phone] [id number] after encouragement from staff.   1:1 talk time provided as alternative to group session     Signature:  MARY Mueller, Kedar Stiles

## 2022-10-15 NOTE — PLAN OF CARE
Problem: Safety - Adult  Goal: Free from fall injury  10/14/2022 2237 by Emely Garay RN  Outcome: Progressing     Problem: ABCDS Injury Assessment  Goal: Absence of physical injury  10/14/2022 2237 by Emely Garay RN  Outcome: Progressing     Problem: Anxiety  Goal: Will report anxiety at manageable levels  Description: INTERVENTIONS:  1. Administer medication as ordered  2. Teach and rehearse alternative coping skills  3. Provide emotional support with 1:1 interaction with staff  10/14/2022 2237 by Emely Garay RN  Outcome: Progressing     Problem: Coping  Goal: Pt/Family able to verbalize concerns and demonstrate effective coping strategies  Description: INTERVENTIONS:  1. Assist patient/family to identify coping skills, available support systems and cultural and spiritual values  2. Provide emotional support, including active listening and acknowledgement of concerns of patient and caregivers  3. Reduce environmental stimuli, as able  4. Instruct patient/family in relaxation techniques, as appropriate  5. Assess for spiritual pain/suffering and initiate Spiritual Care, Psychosocial Clinical Specialist consults as needed  10/14/2022 2237 by Emely Garay RN  Outcome: Progressing     Problem: Confusion  Goal: Confusion, delirium, dementia, or psychosis is improved or at baseline  Description: INTERVENTIONS:  1. Assess for possible contributors to thought disturbance, including medications, impaired vision or hearing, underlying metabolic abnormalities, dehydration, psychiatric diagnoses, and notify attending LIP  2. [place] high risk fall precautions, as indicated  3. Provide frequent short contacts to provide reality reorientation, refocusing and direction  4. Decrease environmental stimuli, including noise as appropriate  5. Monitor and intervene to maintain adequate nutrition, hydration, elimination, sleep and activity  6.  If unable to ensure safety without constant attention obtain sitter and review sitter guidelines with assigned personnel  7. Initiate Psychosocial CNS and Spiritual Care consult, as indicated  10/14/2022 2237 by Gini Cristobal RN  Outcome: Progressing     Problem: Nutrition Deficit:  Goal: Optimize nutritional status  10/14/2022 2237 by Gini Cristobal RN  Outcome: Progressing     Problem: Pain  Goal: Verbalizes/displays adequate comfort level or baseline comfort level  10/14/2022 2237 by Gini Cristobal RN  Outcome: Progressing   Patient denies suicidal or homicidal ideations during this shift. He approached staff saying he felt shortness of breath. Patient used his inhaler, and afterwards stated he felt much better. He reports that he gets pneumonia once a month, and that he feels it coming on. Patient is preoccupied during assessment and interview. When writer went to get patients medications, patient stated \"Okay I will sit down with my family and wait\" referring to his peers in the day room. He denies auditory or visual hallucinations. He reports adequate sleep and appetite. Q15 minute checks maintained for patient safety. Patient remains free from falls and has had steady gait through out this shift.

## 2022-10-16 PROCEDURE — 94640 AIRWAY INHALATION TREATMENT: CPT

## 2022-10-16 PROCEDURE — 6370000000 HC RX 637 (ALT 250 FOR IP): Performed by: INTERNAL MEDICINE

## 2022-10-16 PROCEDURE — 6360000002 HC RX W HCPCS: Performed by: INTERNAL MEDICINE

## 2022-10-16 PROCEDURE — 6370000000 HC RX 637 (ALT 250 FOR IP): Performed by: PSYCHIATRY & NEUROLOGY

## 2022-10-16 PROCEDURE — 99232 SBSQ HOSP IP/OBS MODERATE 35: CPT

## 2022-10-16 PROCEDURE — 94761 N-INVAS EAR/PLS OXIMETRY MLT: CPT

## 2022-10-16 PROCEDURE — 1240000000 HC EMOTIONAL WELLNESS R&B

## 2022-10-16 RX ADMIN — Medication 1 CAPSULE: at 08:38

## 2022-10-16 RX ADMIN — HYDROXYZINE HYDROCHLORIDE 50 MG: 50 TABLET ORAL at 20:08

## 2022-10-16 RX ADMIN — IPRATROPIUM BROMIDE 0.5 MG: 0.5 SOLUTION RESPIRATORY (INHALATION) at 22:22

## 2022-10-16 RX ADMIN — IPRATROPIUM BROMIDE 0.5 MG: 0.5 SOLUTION RESPIRATORY (INHALATION) at 10:24

## 2022-10-16 RX ADMIN — IPRATROPIUM BROMIDE 0.5 MG: 0.5 SOLUTION RESPIRATORY (INHALATION) at 02:58

## 2022-10-16 RX ADMIN — ALBUTEROL SULFATE 2 PUFF: 90 AEROSOL, METERED RESPIRATORY (INHALATION) at 12:14

## 2022-10-16 RX ADMIN — HYDROXYZINE HYDROCHLORIDE 50 MG: 50 TABLET ORAL at 14:18

## 2022-10-16 RX ADMIN — BUDESONIDE AND FORMOTEROL FUMARATE DIHYDRATE 2 PUFF: 160; 4.5 AEROSOL RESPIRATORY (INHALATION) at 08:38

## 2022-10-16 RX ADMIN — TRAZODONE HYDROCHLORIDE 50 MG: 50 TABLET ORAL at 20:08

## 2022-10-16 RX ADMIN — NICOTINE POLACRILEX 2 MG: 2 GUM, CHEWING BUCCAL at 20:09

## 2022-10-16 RX ADMIN — HALOPERIDOL 5 MG: 5 TABLET ORAL at 14:18

## 2022-10-16 RX ADMIN — OLANZAPINE 15 MG: 15 TABLET, FILM COATED ORAL at 20:08

## 2022-10-16 RX ADMIN — BUDESONIDE AND FORMOTEROL FUMARATE DIHYDRATE 2 PUFF: 160; 4.5 AEROSOL RESPIRATORY (INHALATION) at 20:07

## 2022-10-16 RX ADMIN — OLANZAPINE 15 MG: 15 TABLET, FILM COATED ORAL at 08:38

## 2022-10-16 ASSESSMENT — PAIN SCALES - GENERAL: PAINLEVEL_OUTOF10: 0

## 2022-10-16 NOTE — GROUP NOTE
Group Therapy Note    Date: 10/16/2022    Group Start Time: 1030  Group End Time: 1120  Group Topic: Psychotherapy    [facility]    MARY Berg, [facility]        Group Therapy Note    Attendees: 7/15    patient refused to attend psychotherapy group at 1030a after encouragement from staff.   1:1 talk time provided as alternative to group session            Signature:  MARY eBrg, [place]

## 2022-10-16 NOTE — GROUP NOTE
Group Therapy Note    Date: 10/16/2022    Group Start Time: 1400  Group End Time: 1500  Group Topic: Cognitive Skills    [facility]    Jareth Browne    Cognitive Skills Group Note        Date: October 16, 2022 Start Time: 2pm  End Time: 3pm      Number of Participants in Group & Unit Census:      Topic: Cognitive skill, decision making, concentration    Goal of Group: To increase cognitive thinking. To improve decision making abilities, concentration and turn taking abilities      Comments:     Patient did not participate in Cognitive Skills group, despite staff encouragement and explanation of benefits. Patient remain seclusive to self. Q15 minute safety checks maintained for patient safety and will continue to encourage patient to attend unit programming.         Signature:  Jareth Browne

## 2022-10-16 NOTE — PROGRESS NOTES
Daily Progress Note  10/15/2022    Patient Name: Adelina Garcia    CHIEF COMPLAINT: Acute psychosis         SUBJECTIVE:    Patient was met with Anum Fraser for follow-up interview. Patient has been compliant with scheduled medications at this time and has not required emergency medications in the past 24 hours. Approached for interview patient states that he is feeling depressed and feels like he let his son down. Some of patient's responses were bizarre and appeared to be preoccupied with internal stimuli at times. Patient states that he continues to feel hopeless and significantly depressed. Patient is denying suicidal ideation. Patient endorses \"I just need to stop it\" when discussing his depression. Patient then went on to state that he tries to be honest with people about how he is feeling however continues to redirect his train of thought to him not being able to trust staff. Patient stated \"depending how I answer this will it screw me over\". Patient was reassured of therapeutic alliance and importance of trust and honesty. Patient appeared to be receptive. Patient endorses continued auditory hallucinations however states that they are \"good voices of my wife\". Patient denies visual hallucinations. Patient is denying medication side effects and reports that they have been improving his sleep and somewhat improving his mood. There is risk of decompensation and patient warrants further hospitalization for safety and stabilization. Appetite:  [] Normal/Adequate/Unchanged  [x] Increased-patient continues to utilize Ensure nutritional supplementation [] Decreased      Sleep:       [] Normal/Adequate/showing improvement [x] Fair  [] Poor      Group Attendance on Unit:   [] Yes  [] Selectively    [x] No    Medication Side Effects: Patient denies any medication side effects at the time of assessment.          Mental Status Exam     Level of consciousness: Alert and awake   Appearance: Appropriate attire for setting, seated on bed, with good  grooming and hygiene   Behavior/Motor: Approachable, pleasant, no psychomotor abnormalities  Attitude toward examiner:  cooperative, attentive, good eye contact  Speech: Normal rate volume and tone  Mood: Patient reports \"depressed\"  Affect: Congruent  Thought processes: Linear, remains perseverative  Thought content: Denies homicidal ideation, religiously preoccupied  Suicidal Ideation: Denies suicidal ideations, contracts for safety on the unit. Delusions: paranoid, bizarre, grandiose  Perceptual Disturbance:  Patient reports auditory hallucinations  Cognition: Oriented to self and general situation  Memory: intact  Insight: poor   Judgement: poor   Data   height is 5' 10\" (1.778 m) and weight is 111 lb (50.3 kg). His oral temperature is 98.8 °F (37.1 °C). His blood pressure is 106/74 and his pulse is 82. His respiration is 16 and oxygen saturation is 100%. Labs:   No visits with results within 2 Day(s) from this visit. Latest known visit with results is:   Admission on 10/03/2022, Discharged on 10/06/2022   Component Date Value Ref Range Status    Ventricular Rate 10/03/2022 67  BPM Final    Atrial Rate 10/03/2022 67  BPM Final    P-R Interval 10/03/2022 136  ms Final    QRS Duration 10/03/2022 88  ms Final    Q-T Interval 10/03/2022 396  ms Final    QTc Calculation (Bazett) 10/03/2022 418  ms Final    P Axis 10/03/2022 60  degrees Final    R Axis 10/03/2022 98  degrees Final    T Axis 10/03/2022 90  degrees Final    Troponin, High Sensitivity 10/04/2022 14  0 - 22 ng/L Final    Comment:       High Sensitivity Troponin values cannot be compared with other Troponin methodologies. Patients with high levels of Biotin oral intake (i.e >5mg/day) may have falsely decreased   Troponin levels. Samples collected within 8 hours of biotin intake may require additional   information for diagnosis.       Glucose 10/04/2022 86  70 - 99 mg/dL Final    BUN 10/04/2022 12  8 - 23 mg/dL Final    Creatinine 10/04/2022 0.52 (A)  0.70 - 1.20 mg/dL Final    Calcium 10/04/2022 8.7  8.6 - 10.4 mg/dL Final    Sodium 10/04/2022 140  135 - 144 mmol/L Final    Potassium 10/04/2022 4.1  3.7 - 5.3 mmol/L Final    Chloride 10/04/2022 104  98 - 107 mmol/L Final    CO2 10/04/2022 29  20 - 31 mmol/L Final    Anion Gap 10/04/2022 7 (A)  9 - 17 mmol/L Final    Est, Glom Filt Rate 10/04/2022 >60  >60 mL/min/1.73m2 Final    Comment:       Effective Oct 3, 2022        These results are not intended for use in patients <25years of age. eGFR results are calculated without a race factor using the 2021 CKD-EPI equation. Careful clinical correlation is recommended, particularly when comparing to results   calculated using previous equations. The CKD-EPI equation is less accurate in patients with extremes of muscle mass, extra-renal   metabolism of creatine, excessive creatine ingestion, or following therapy that affects   renal tubular secretion.       WBC 10/04/2022 19.8 (A)  3.5 - 11.0 k/uL Final    RBC 10/04/2022 3.47 (A)  4.5 - 5.9 m/uL Final    Hemoglobin 10/04/2022 11.5 (A)  13.5 - 17.5 g/dL Final    Hematocrit 10/04/2022 34.0 (A)  41 - 53 % Final    MCV 10/04/2022 98.1  80 - 100 fL Final    MCH 10/04/2022 33.3  26 - 34 pg Final    MCHC 10/04/2022 33.9  31 - 37 g/dL Final    RDW 10/04/2022 13.5  11.5 - 14.9 % Final    Platelets 91/84/0387 328  150 - 450 k/uL Final    MPV 10/04/2022 6.8  6.0 - 12.0 fL Final    Color, UA 10/05/2022 Yellow  Yellow Final    Turbidity UA 10/05/2022 Clear  Clear Final    Glucose, Ur 10/05/2022 NEGATIVE  NEGATIVE Final    Bilirubin Urine 10/05/2022 NEGATIVE  NEGATIVE Final    Ketones, Urine 10/05/2022 NEGATIVE  NEGATIVE Final    Specific Gravity, UA 10/05/2022 1.014  1.000 - 1.030 Final    Urine Hgb 10/05/2022 NEGATIVE  NEGATIVE Final    pH, UA 10/05/2022 7.0  5.0 - 8.0 Final    Protein, UA 10/05/2022 NEGATIVE  NEGATIVE Final    Urobilinogen, Urine 10/05/2022 Normal  Normal Final    Nitrite, Urine 10/05/2022 NEGATIVE  NEGATIVE Final    Leukocyte Esterase, Urine 10/05/2022 NEGATIVE  NEGATIVE Final    Urinalysis Comments 10/05/2022 Microscopic exam not performed based on chemical results unless requested in original order. Final    Glucose 10/05/2022 136 (A)  70 - 99 mg/dL Final    BUN 10/05/2022 7 (A)  8 - 23 mg/dL Final    Creatinine 10/05/2022 0.74  0.70 - 1.20 mg/dL Final    Calcium 10/05/2022 9.1  8.6 - 10.4 mg/dL Final    Sodium 10/05/2022 140  135 - 144 mmol/L Final    Potassium 10/05/2022 4.2  3.7 - 5.3 mmol/L Final    Chloride 10/05/2022 102  98 - 107 mmol/L Final    CO2 10/05/2022 29  20 - 31 mmol/L Final    Anion Gap 10/05/2022 9  9 - 17 mmol/L Final    Est, Glom Filt Rate 10/05/2022 >60  >60 mL/min/1.73m2 Final    Comment:       Effective Oct 3, 2022        These results are not intended for use in patients <25years of age. eGFR results are calculated without a race factor using the 2021 CKD-EPI equation. Careful clinical correlation is recommended, particularly when comparing to results   calculated using previous equations. The CKD-EPI equation is less accurate in patients with extremes of muscle mass, extra-renal   metabolism of creatine, excessive creatine ingestion, or following therapy that affects   renal tubular secretion.       WBC 10/05/2022 27.0 (A)  3.5 - 11.0 k/uL Final    RBC 10/05/2022 4.11 (A)  4.5 - 5.9 m/uL Final    Hemoglobin 10/05/2022 13.4 (A)  13.5 - 17.5 g/dL Final    Hematocrit 10/05/2022 40.6 (A)  41 - 53 % Final    MCV 10/05/2022 98.8  80 - 100 fL Final    MCH 10/05/2022 32.7  26 - 34 pg Final    MCHC 10/05/2022 33.1  31 - 37 g/dL Final    RDW 10/05/2022 13.5  11.5 - 14.9 % Final    Platelets 72/61/5587 386  150 - 450 k/uL Final    MPV 10/05/2022 6.8  6.0 - 12.0 fL Final    Glucose 10/06/2022 99  70 - 99 mg/dL Final    BUN 10/06/2022 12  8 - 23 mg/dL Final    Creatinine 10/06/2022 0.63 (A)  0.70 - 1.20 mg/dL Final    Est, Glom Filt Rate 10/06/2022 >60  >60 mL/min/1.73m2 Final    Comment:       Effective Oct 3, 2022        These results are not intended for use in patients <25years of age. eGFR results are calculated without a race factor using the 2021 CKD-EPI equation. Careful clinical correlation is recommended, particularly when comparing to results   calculated using previous equations. The CKD-EPI equation is less accurate in patients with extremes of muscle mass, extra-renal   metabolism of creatine, excessive creatine ingestion, or following therapy that affects   renal tubular secretion.       Calcium 10/06/2022 9.2  8.6 - 10.4 mg/dL Final    Sodium 10/06/2022 140  135 - 144 mmol/L Final    Potassium 10/06/2022 4.0  3.7 - 5.3 mmol/L Final    Chloride 10/06/2022 101  98 - 107 mmol/L Final    CO2 10/06/2022 31  20 - 31 mmol/L Final    Anion Gap 10/06/2022 8 (A)  9 - 17 mmol/L Final    WBC 10/06/2022 17.3 (A)  3.5 - 11.0 k/uL Final    RBC 10/06/2022 3.49 (A)  4.5 - 5.9 m/uL Final    Hemoglobin 10/06/2022 11.4 (A)  13.5 - 17.5 g/dL Final    Hematocrit 10/06/2022 34.4 (A)  41 - 53 % Final    MCV 10/06/2022 98.6  80 - 100 fL Final    MCH 10/06/2022 32.8  26 - 34 pg Final    MCHC 10/06/2022 33.2  31 - 37 g/dL Final    RDW 10/06/2022 13.5  11.5 - 14.9 % Final    Platelets 10/28/8230 335  150 - 450 k/uL Final    MPV 10/06/2022 6.8  6.0 - 12.0 fL Final    Color, UA 10/06/2022 Yellow  Yellow Final    Turbidity UA 10/06/2022 Clear  Clear Final    Glucose, Ur 10/06/2022 NEGATIVE  NEGATIVE Final    Bilirubin Urine 10/06/2022 NEGATIVE  NEGATIVE Final    Ketones, Urine 10/06/2022 NEGATIVE  NEGATIVE Final    Specific Gravity, UA 10/06/2022 1.010  1.000 - 1.030 Final    Urine Hgb 10/06/2022 NEGATIVE  NEGATIVE Final    pH, UA 10/06/2022 7.0  5.0 - 8.0 Final    Protein, UA 10/06/2022 NEGATIVE  NEGATIVE Final    Urobilinogen, Urine 10/06/2022 Normal  Normal Final    Nitrite, Urine 10/06/2022 NEGATIVE  NEGATIVE Final    Leukocyte Esterase, Urine 10/06/2022 NEGATIVE  NEGATIVE Final    Urinalysis Comments 10/06/2022 Microscopic exam not performed based on chemical results unless requested in original order. Final         Reviewed patient's current plan of care and vital signs with nursing staff. Labs reviewed: [x] Yes  Last EKG in EMR reviewed: [x] Yes  QTc: 418    Medications  Current Facility-Administered Medications: OLANZapine (ZYPREXA) tablet 15 mg, 15 mg, Oral, BID  ipratropium (ATROVENT) 0.02 % nebulizer solution 0.5 mg, 0.5 mg, Nebulization, 4x Daily PRN  albuterol sulfate HFA (PROVENTIL;VENTOLIN;PROAIR) 108 (90 Base) MCG/ACT inhaler 2 puff, 2 puff, Inhalation, Q4H PRN  budesonide-formoterol (SYMBICORT) 160-4.5 MCG/ACT inhaler 2 puff, 2 puff, Inhalation, BID  lidocaine 4 % external patch 1 patch, 1 patch, TransDERmal, Daily  lactobacillus (CULTURELLE) capsule 1 capsule, 1 capsule, Oral, Daily  docusate sodium (COLACE) capsule 100 mg, 100 mg, Oral, TID PRN  ibuprofen (ADVIL;MOTRIN) tablet 200 mg, 200 mg, Oral, Q6H PRN  acetaminophen (TYLENOL) tablet 650 mg, 650 mg, Oral, Q6H PRN  hydrOXYzine HCl (ATARAX) tablet 50 mg, 50 mg, Oral, TID PRN  traZODone (DESYREL) tablet 50 mg, 50 mg, Oral, Nightly PRN  polyethylene glycol (GLYCOLAX) packet 17 g, 17 g, Oral, Daily PRN  aluminum & magnesium hydroxide-simethicone (MAALOX) 200-200-20 MG/5ML suspension 30 mL, 30 mL, Oral, Q6H PRN  nicotine polacrilex (NICORETTE) gum 2 mg, 2 mg, Oral, Q2H PRN  haloperidol lactate (HALDOL) injection 5 mg, 5 mg, IntraMUSCular, Q6H PRN **AND** diphenhydrAMINE (BENADRYL) injection 50 mg, 50 mg, IntraMUSCular, Q6H PRN  haloperidol (HALDOL) tablet 5 mg, 5 mg, Oral, Q6H PRN    ASSESSMENT  Acute psychosis (HCC)         PLAN  Patient symptoms: Remain unstable  Continue current medication regimen. Monitor need and frequency of PRN medications. Encourage participation in groups and milieu. Attempt to develop insight. Psycho-education conducted.   Supportive Therapy conducted. Probable discharge is per attending physician. Follow-up daily while inpatient. Patient continues to be monitored in the inpatient psychiatric facility at [facility] for safety and stabilization. Patient continues to need, on a daily basis, active treatment furnished directly by or requiring the supervision of inpatient psychiatric personnel. Electronically signed by ELISEO Mac CNP on 10/15/2022 at 10:15 PM    **This report has been created using voice recognition software. It may contain minor errors which are inherent in voice recognition technology. **

## 2022-10-16 NOTE — PLAN OF CARE
Problem: Safety - Adult  Goal: Free from fall injury  10/16/2022 1056 by Kailey Díaz RN  Outcome: Progressing   Patient remains free from falls during shift. Patient is educated on fall precautions. Problem: Anxiety  Goal: Will report anxiety at manageable levels  Description: INTERVENTIONS:  1. Administer medication as ordered  2. Teach and rehearse alternative coping skills  3. Provide emotional support with 1:1 interaction with staff  10/16/2022 1056 by Kailey Díaz RN  Outcome: Progressing   Patient stated \"oh my anxiety, that's high nurse, really high\". Patient was unable to provide writer a rating on a scale of 0-10, just reported that it was high. Patient states he is anxious to be discharged and would like to go see his family members. Writer provided 1:1 emotional support regarding concerns about discharge and feeling isolated from family members. Problem: Confusion  Goal: Confusion, delirium, dementia, or psychosis is improved or at baseline  Description: INTERVENTIONS:  1. Assess for possible contributors to thought disturbance, including medications, impaired vision or hearing, underlying metabolic abnormalities, dehydration, psychiatric diagnoses, and notify attending LIP  2. [place] high risk fall precautions, as indicated  3. Provide frequent short contacts to provide reality reorientation, refocusing and direction  4. Decrease environmental stimuli, including noise as appropriate  5. Monitor and intervene to maintain adequate nutrition, hydration, elimination, sleep and activity  6. If unable to ensure safety without constant attention obtain sitter and review sitter guidelines with assigned personnel  7. Initiate Psychosocial CNS and Spiritual Care consult, as indicated  10/16/2022 1056 by Kailey Díaz RN  Outcome: Progressing   Patient denied delusions. Patient denied auditory, visual, tactile, olfactory, and gustatory hallucinations.  Patient reports to writer that he felt a presence when he was sleeping and stated \"the power of my prayers made that visitor go away\". Patient also reports that sometimes at night he hears his wife voice and reports that he talks back to her. Problem: Nutrition Deficit:  Goal: Optimize nutritional status  10/16/2022 1056 by Benji Pelayo RN  Outcome: Progressing   Patient is encouraged to eat all meals, and is provided a snack. Patient is ordered Ensures and is observed drinking Ensures throughout the day.

## 2022-10-16 NOTE — GROUP NOTE
Group Therapy Note    Date: 10/16/2022    Group Start Time: 1700  Group End Time: 9868  Group Topic: Healthy Living/Wellness    BRAYDEN Rao Mai, RN; Torey Cordero LPN        Group Therapy Note    Attendees: 14/14     Safety checks completed on this date and time. Patient tolerated safety checks. Food, drinks, utensils, and 7 pencils removed from patients room.        Signature:  Jalen Perrin RN

## 2022-10-16 NOTE — GROUP NOTE
Group Therapy Note    Date: 10/16/2022    Group Start Time: 0900  Group End Time: 0930  Group Topic: Community Meeting    BRAYDEN GARCIA    Howie Taylor LPN        Group Therapy Note    Attendees: 8/10       Patient's Goal:  to go home    Notes:      Status After Intervention:  Unchanged    Participation Level:  Active Listener    Participation Quality: Sharing      Speech:  pressured      Thought Process/Content: Logical      Affective Functioning: Congruent      Mood: anxious      Level of consciousness:  Alert, Oriented x4, and Attentive      Response to Learning: Progressing to goal      Endings: None Reported    Modes of Intervention: Support      Discipline Responsible: Licensed Practical Nurse      Signature:  Howie Taylor LPN

## 2022-10-16 NOTE — PLAN OF CARE
Problem: Safety - Adult  Goal: Free from fall injury  10/15/2022 2206 by Byron Shaw RN  Outcome: Progressing  Patient remains free of falls, line of sight monitoring maintained. Problem: ABCDS Injury Assessment  Goal: Absence of physical injury  10/15/2022 2206 by Byron Shaw RN  Outcome: Progressing  Patient remains free of injury, line of sight monitoring maintained. Problem: Anxiety  Goal: Will report anxiety at manageable levels  Description: INTERVENTIONS:  1. Administer medication as ordered  2. Teach and rehearse alternative coping skills  3. Provide emotional support with 1:1 interaction with staff  10/15/2022 2206 by Byron Shaw RN  Outcome: Progressing  Patient remains anxious and preoccupied. Accepting to staff/peer support. Verbalizes that family is strong support system. Mediation complaint, requested medication for anxiety as needed. Problem: Coping  Goal: Pt/Family able to verbalize concerns and demonstrate effective coping strategies  Description: INTERVENTIONS:  1. Assist patient/family to identify coping skills, available support systems and cultural and spiritual values  2. Provide emotional support, including active listening and acknowledgement of concerns of patient and caregivers  3. Reduce environmental stimuli, as able  4. Instruct patient/family in relaxation techniques, as appropriate  5. Assess for spiritual pain/suffering and initiate Spiritual Care, Psychosocial Clinical Specialist consults as needed  10/15/2022 2206 by Byron Shaw RN  Outcome: Progressing     Problem: Confusion  Goal: Confusion, delirium, dementia, or psychosis is improved or at baseline  Description: INTERVENTIONS:  1. Assess for possible contributors to thought disturbance, including medications, impaired vision or hearing, underlying metabolic abnormalities, dehydration, psychiatric diagnoses, and notify attending LIP  2.  [place] high risk fall precautions, as indicated  3. Provide frequent short contacts to provide reality reorientation, refocusing and direction  4. Decrease environmental stimuli, including noise as appropriate  5. Monitor and intervene to maintain adequate nutrition, hydration, elimination, sleep and activity  6. If unable to ensure safety without constant attention obtain sitter and review sitter guidelines with assigned personnel  7. Initiate Psychosocial CNS and Spiritual Care consult, as indicated  10/15/2022 2206 by Ailyn Pham RN  Outcome: Progressing     Problem: Nutrition Deficit:  Goal: Optimize nutritional status  10/15/2022 2206 by Ailyn Pham RN  Outcome: Progressing  Patient is accepting of evening snack. Problem: Pain  Goal: Verbalizes/displays adequate comfort level or baseline comfort level  10/15/2022 2206 by Ailyn Pham RN  Outcome: Progressing  Patient verbalizes that he will request medication for pain as needed.

## 2022-10-17 PROCEDURE — 6370000000 HC RX 637 (ALT 250 FOR IP): Performed by: INTERNAL MEDICINE

## 2022-10-17 PROCEDURE — 6370000000 HC RX 637 (ALT 250 FOR IP): Performed by: PSYCHIATRY & NEUROLOGY

## 2022-10-17 PROCEDURE — 94760 N-INVAS EAR/PLS OXIMETRY 1: CPT

## 2022-10-17 PROCEDURE — 94640 AIRWAY INHALATION TREATMENT: CPT

## 2022-10-17 PROCEDURE — APPSS30 APP SPLIT SHARED TIME 16-30 MINUTES

## 2022-10-17 PROCEDURE — 6360000002 HC RX W HCPCS: Performed by: INTERNAL MEDICINE

## 2022-10-17 PROCEDURE — 99232 SBSQ HOSP IP/OBS MODERATE 35: CPT | Performed by: PSYCHIATRY & NEUROLOGY

## 2022-10-17 PROCEDURE — 1240000000 HC EMOTIONAL WELLNESS R&B

## 2022-10-17 RX ADMIN — HYDROXYZINE HYDROCHLORIDE 50 MG: 50 TABLET ORAL at 21:04

## 2022-10-17 RX ADMIN — Medication 1 CAPSULE: at 07:47

## 2022-10-17 RX ADMIN — ALBUTEROL SULFATE 2 PUFF: 90 AEROSOL, METERED RESPIRATORY (INHALATION) at 19:45

## 2022-10-17 RX ADMIN — OLANZAPINE 15 MG: 15 TABLET, FILM COATED ORAL at 07:47

## 2022-10-17 RX ADMIN — BUDESONIDE AND FORMOTEROL FUMARATE DIHYDRATE 2 PUFF: 160; 4.5 AEROSOL RESPIRATORY (INHALATION) at 19:44

## 2022-10-17 RX ADMIN — NICOTINE POLACRILEX 2 MG: 2 GUM, CHEWING BUCCAL at 22:35

## 2022-10-17 RX ADMIN — IPRATROPIUM BROMIDE 0.5 MG: 0.5 SOLUTION RESPIRATORY (INHALATION) at 11:58

## 2022-10-17 RX ADMIN — HYDROXYZINE HYDROCHLORIDE 50 MG: 50 TABLET ORAL at 12:53

## 2022-10-17 RX ADMIN — DOCUSATE SODIUM 100 MG: 100 CAPSULE, LIQUID FILLED ORAL at 07:48

## 2022-10-17 RX ADMIN — OLANZAPINE 15 MG: 15 TABLET, FILM COATED ORAL at 21:04

## 2022-10-17 RX ADMIN — BUDESONIDE AND FORMOTEROL FUMARATE DIHYDRATE 2 PUFF: 160; 4.5 AEROSOL RESPIRATORY (INHALATION) at 07:46

## 2022-10-17 RX ADMIN — IPRATROPIUM BROMIDE 0.5 MG: 0.5 SOLUTION RESPIRATORY (INHALATION) at 19:44

## 2022-10-17 RX ADMIN — ALUMINUM HYDROXIDE, MAGNESIUM HYDROXIDE, AND SIMETHICONE 30 ML: 200; 200; 20 SUSPENSION ORAL at 21:53

## 2022-10-17 ASSESSMENT — PAIN SCALES - GENERAL
PAINLEVEL_OUTOF10: 0
PAINLEVEL_OUTOF10: 0

## 2022-10-17 NOTE — PROGRESS NOTES
Daily Progress Note  10/17/2022    Patient Name: Jeannine Quiles    CHIEF COMPLAINT: Acute psychosis         SUBJECTIVE:    Patient was met with on unit for follow-up interview. Patient has been compliant with scheduled medications at this time. Patient has required emergency medications in the past 24 hours. When approached for interview patient states that he is feeling anxious and weak. Patient states that he received emergency medication of Haldol because he was \"about to go off the deep end\" however medication was helpful and he is currently feeling better. Patient is however endorsing depression and relates this to wanting to go home to see if treatment will work. Patient states that he needs to be with his wife and needs her to be his doctor. Patient was unable to communicate clearly what this meant and appears somewhat disorganized and illogical.  Patient superficially agreed that evidence of successful treatment can be seen while in the hospital however did not appear to internalize or understand what staff met. Patient is currently denying suicidal ideation. Patient is endorsing visual hallucinations of tracers on his hand when it is moving. Patient denies auditory hallucinations. Patient endorses paranoia that he will be stuck here forever. Patient denies medication side effects. Appetite:  [] Normal/Adequate/Unchanged  [x] Fair  [] Decreased      Sleep:       [] Normal/Adequate/showing improvement [x] Fair  [] Poor      Group Attendance on Unit:   [] Yes  [] Selectively    [x] No    Medication Side Effects: Patient denies any medication side effects at the time of assessment.          Mental Status Exam     Level of consciousness: Alert and awake   Appearance: Appropriate attire for setting, seated on bed, with good  grooming and hygiene   Behavior/Motor: Approachable, pleasant, no psychomotor abnormalities  Attitude toward examiner:  cooperative, attentive, good eye contact  Speech: Normal rate volume and tone  Mood: Patient reports \"Anxious\"  Affect: Congruent  Thought processes: Linear, remains perseverative  Thought content: Denies homicidal ideation, religiously preoccupied  Suicidal Ideation: Denies suicidal ideations, contracts for safety on the unit. Delusions: paranoid, bizarre  Perceptual Disturbance:  Patient reports visual hallucinations  Cognition: Oriented to person, place, time and situation  Memory: intact  Insight: poor   Judgement: Fair. Data   height is 5' 10\" (1.778 m) and weight is 111 lb (50.3 kg). His temporal temperature is 98.7 °F (37.1 °C). His blood pressure is 107/75 and his pulse is 81. His respiration is 16 and oxygen saturation is 99%. Labs:   No visits with results within 2 Day(s) from this visit. Latest known visit with results is:   Admission on 10/03/2022, Discharged on 10/06/2022   Component Date Value Ref Range Status    Ventricular Rate 10/03/2022 67  BPM Final    Atrial Rate 10/03/2022 67  BPM Final    P-R Interval 10/03/2022 136  ms Final    QRS Duration 10/03/2022 88  ms Final    Q-T Interval 10/03/2022 396  ms Final    QTc Calculation (Bazett) 10/03/2022 418  ms Final    P Axis 10/03/2022 60  degrees Final    R Axis 10/03/2022 98  degrees Final    T Axis 10/03/2022 90  degrees Final    Troponin, High Sensitivity 10/04/2022 14  0 - 22 ng/L Final    Comment:       High Sensitivity Troponin values cannot be compared with other Troponin methodologies. Patients with high levels of Biotin oral intake (i.e >5mg/day) may have falsely decreased   Troponin levels. Samples collected within 8 hours of biotin intake may require additional   information for diagnosis.       Glucose 10/04/2022 86  70 - 99 mg/dL Final    BUN 10/04/2022 12  8 - 23 mg/dL Final    Creatinine 10/04/2022 0.52 (A)  0.70 - 1.20 mg/dL Final    Calcium 10/04/2022 8.7  8.6 - 10.4 mg/dL Final    Sodium 10/04/2022 140  135 - 144 mmol/L Final    Potassium 10/04/2022 4.1  3.7 - 5.3 mmol/L Final    Chloride 10/04/2022 104  98 - 107 mmol/L Final    CO2 10/04/2022 29  20 - 31 mmol/L Final    Anion Gap 10/04/2022 7 (A)  9 - 17 mmol/L Final    Est, Glom Filt Rate 10/04/2022 >60  >60 mL/min/1.73m2 Final    Comment:       Effective Oct 3, 2022        These results are not intended for use in patients <25years of age. eGFR results are calculated without a race factor using the 2021 CKD-EPI equation. Careful clinical correlation is recommended, particularly when comparing to results   calculated using previous equations. The CKD-EPI equation is less accurate in patients with extremes of muscle mass, extra-renal   metabolism of creatine, excessive creatine ingestion, or following therapy that affects   renal tubular secretion.       WBC 10/04/2022 19.8 (A)  3.5 - 11.0 k/uL Final    RBC 10/04/2022 3.47 (A)  4.5 - 5.9 m/uL Final    Hemoglobin 10/04/2022 11.5 (A)  13.5 - 17.5 g/dL Final    Hematocrit 10/04/2022 34.0 (A)  41 - 53 % Final    MCV 10/04/2022 98.1  80 - 100 fL Final    MCH 10/04/2022 33.3  26 - 34 pg Final    MCHC 10/04/2022 33.9  31 - 37 g/dL Final    RDW 10/04/2022 13.5  11.5 - 14.9 % Final    Platelets 23/53/8850 328  150 - 450 k/uL Final    MPV 10/04/2022 6.8  6.0 - 12.0 fL Final    Color, UA 10/05/2022 Yellow  Yellow Final    Turbidity UA 10/05/2022 Clear  Clear Final    Glucose, Ur 10/05/2022 NEGATIVE  NEGATIVE Final    Bilirubin Urine 10/05/2022 NEGATIVE  NEGATIVE Final    Ketones, Urine 10/05/2022 NEGATIVE  NEGATIVE Final    Specific Gravity, UA 10/05/2022 1.014  1.000 - 1.030 Final    Urine Hgb 10/05/2022 NEGATIVE  NEGATIVE Final    pH, UA 10/05/2022 7.0  5.0 - 8.0 Final    Protein, UA 10/05/2022 NEGATIVE  NEGATIVE Final    Urobilinogen, Urine 10/05/2022 Normal  Normal Final    Nitrite, Urine 10/05/2022 NEGATIVE  NEGATIVE Final    Leukocyte Esterase, Urine 10/05/2022 NEGATIVE  NEGATIVE Final    Urinalysis Comments 10/05/2022 Microscopic exam not performed based on chemical results unless requested in original order. Final    Glucose 10/05/2022 136 (A)  70 - 99 mg/dL Final    BUN 10/05/2022 7 (A)  8 - 23 mg/dL Final    Creatinine 10/05/2022 0.74  0.70 - 1.20 mg/dL Final    Calcium 10/05/2022 9.1  8.6 - 10.4 mg/dL Final    Sodium 10/05/2022 140  135 - 144 mmol/L Final    Potassium 10/05/2022 4.2  3.7 - 5.3 mmol/L Final    Chloride 10/05/2022 102  98 - 107 mmol/L Final    CO2 10/05/2022 29  20 - 31 mmol/L Final    Anion Gap 10/05/2022 9  9 - 17 mmol/L Final    Est, Glom Filt Rate 10/05/2022 >60  >60 mL/min/1.73m2 Final    Comment:       Effective Oct 3, 2022        These results are not intended for use in patients <25years of age. eGFR results are calculated without a race factor using the 2021 CKD-EPI equation. Careful clinical correlation is recommended, particularly when comparing to results   calculated using previous equations. The CKD-EPI equation is less accurate in patients with extremes of muscle mass, extra-renal   metabolism of creatine, excessive creatine ingestion, or following therapy that affects   renal tubular secretion. WBC 10/05/2022 27.0 (A)  3.5 - 11.0 k/uL Final    RBC 10/05/2022 4.11 (A)  4.5 - 5.9 m/uL Final    Hemoglobin 10/05/2022 13.4 (A)  13.5 - 17.5 g/dL Final    Hematocrit 10/05/2022 40.6 (A)  41 - 53 % Final    MCV 10/05/2022 98.8  80 - 100 fL Final    MCH 10/05/2022 32.7  26 - 34 pg Final    MCHC 10/05/2022 33.1  31 - 37 g/dL Final    RDW 10/05/2022 13.5  11.5 - 14.9 % Final    Platelets 57/64/4569 386  150 - 450 k/uL Final    MPV 10/05/2022 6.8  6.0 - 12.0 fL Final    Glucose 10/06/2022 99  70 - 99 mg/dL Final    BUN 10/06/2022 12  8 - 23 mg/dL Final    Creatinine 10/06/2022 0.63 (A)  0.70 - 1.20 mg/dL Final    Est, Glom Filt Rate 10/06/2022 >60  >60 mL/min/1.73m2 Final    Comment:       Effective Oct 3, 2022        These results are not intended for use in patients <25years of age.         eGFR results are calculated without a race factor using the 2021 CKD-EPI equation. Careful clinical correlation is recommended, particularly when comparing to results   calculated using previous equations. The CKD-EPI equation is less accurate in patients with extremes of muscle mass, extra-renal   metabolism of creatine, excessive creatine ingestion, or following therapy that affects   renal tubular secretion. Calcium 10/06/2022 9.2  8.6 - 10.4 mg/dL Final    Sodium 10/06/2022 140  135 - 144 mmol/L Final    Potassium 10/06/2022 4.0  3.7 - 5.3 mmol/L Final    Chloride 10/06/2022 101  98 - 107 mmol/L Final    CO2 10/06/2022 31  20 - 31 mmol/L Final    Anion Gap 10/06/2022 8 (A)  9 - 17 mmol/L Final    WBC 10/06/2022 17.3 (A)  3.5 - 11.0 k/uL Final    RBC 10/06/2022 3.49 (A)  4.5 - 5.9 m/uL Final    Hemoglobin 10/06/2022 11.4 (A)  13.5 - 17.5 g/dL Final    Hematocrit 10/06/2022 34.4 (A)  41 - 53 % Final    MCV 10/06/2022 98.6  80 - 100 fL Final    MCH 10/06/2022 32.8  26 - 34 pg Final    MCHC 10/06/2022 33.2  31 - 37 g/dL Final    RDW 10/06/2022 13.5  11.5 - 14.9 % Final    Platelets 84/73/5370 335  150 - 450 k/uL Final    MPV 10/06/2022 6.8  6.0 - 12.0 fL Final    Color, UA 10/06/2022 Yellow  Yellow Final    Turbidity UA 10/06/2022 Clear  Clear Final    Glucose, Ur 10/06/2022 NEGATIVE  NEGATIVE Final    Bilirubin Urine 10/06/2022 NEGATIVE  NEGATIVE Final    Ketones, Urine 10/06/2022 NEGATIVE  NEGATIVE Final    Specific Gravity, UA 10/06/2022 1.010  1.000 - 1.030 Final    Urine Hgb 10/06/2022 NEGATIVE  NEGATIVE Final    pH, UA 10/06/2022 7.0  5.0 - 8.0 Final    Protein, UA 10/06/2022 NEGATIVE  NEGATIVE Final    Urobilinogen, Urine 10/06/2022 Normal  Normal Final    Nitrite, Urine 10/06/2022 NEGATIVE  NEGATIVE Final    Leukocyte Esterase, Urine 10/06/2022 NEGATIVE  NEGATIVE Final    Urinalysis Comments 10/06/2022 Microscopic exam not performed based on chemical results unless requested in original order.    Final         Reviewed patient's current plan of care and vital signs with nursing staff. Labs reviewed: [x] Yes  Last EKG in EMR reviewed: [x] Yes  QTc: 418    Medications  Current Facility-Administered Medications: OLANZapine (ZYPREXA) tablet 15 mg, 15 mg, Oral, BID  ipratropium (ATROVENT) 0.02 % nebulizer solution 0.5 mg, 0.5 mg, Nebulization, 4x Daily PRN  albuterol sulfate HFA (PROVENTIL;VENTOLIN;PROAIR) 108 (90 Base) MCG/ACT inhaler 2 puff, 2 puff, Inhalation, Q4H PRN  budesonide-formoterol (SYMBICORT) 160-4.5 MCG/ACT inhaler 2 puff, 2 puff, Inhalation, BID  lidocaine 4 % external patch 1 patch, 1 patch, TransDERmal, Daily  lactobacillus (CULTURELLE) capsule 1 capsule, 1 capsule, Oral, Daily  docusate sodium (COLACE) capsule 100 mg, 100 mg, Oral, TID PRN  ibuprofen (ADVIL;MOTRIN) tablet 200 mg, 200 mg, Oral, Q6H PRN  acetaminophen (TYLENOL) tablet 650 mg, 650 mg, Oral, Q6H PRN  hydrOXYzine HCl (ATARAX) tablet 50 mg, 50 mg, Oral, TID PRN  polyethylene glycol (GLYCOLAX) packet 17 g, 17 g, Oral, Daily PRN  aluminum & magnesium hydroxide-simethicone (MAALOX) 200-200-20 MG/5ML suspension 30 mL, 30 mL, Oral, Q6H PRN  nicotine polacrilex (NICORETTE) gum 2 mg, 2 mg, Oral, Q2H PRN  haloperidol lactate (HALDOL) injection 5 mg, 5 mg, IntraMUSCular, Q6H PRN **AND** diphenhydrAMINE (BENADRYL) injection 50 mg, 50 mg, IntraMUSCular, Q6H PRN  haloperidol (HALDOL) tablet 5 mg, 5 mg, Oral, Q6H PRN    ASSESSMENT  Acute psychosis (HCC)         HANDOFF  Patient symptoms: Showing modest improvement  Consultation with attending physician for medication  Encourage participation in groups and milieu. Probable discharge is to be determined by MD    Electronically signed by Ferdinand Bernheim, APRN - CNP on 10/17/2022 at 6:02 PM    **This report has been created using voice recognition software. It may contain minor errors which are inherent in voice recognition technology. **    I independently saw and evaluated the patient. I reviewed the nurse practioner's documentation above. Any additional comments or changes to the    documentation are stated below otherwise agree with assessment.      -The patient continues to have grandiose delusional beliefs. He believes he has had multiple falls over the weekend. He said he has been sleeping okay. He has been frustrated about his prolonged hospital stay. I noted that his wife has been visiting and she thinks he is better. PLAN  Medications as noted above  Attempt to develop insight  Expected Length of Stay is 1-2 days. Psycho-education conducted. Supportive Therapy conducted.   Follow-up daily while on inpatient unit    Electronically signed by Dre Eagle MD on 10/17/22 at 7:48 PM EDT

## 2022-10-17 NOTE — PROGRESS NOTES
Daily Progress Note  10/16/2022    Patient Name: Aziza Melendez    CHIEF COMPLAINT: Acute psychosis         SUBJECTIVE:    Patient was met with on unit for follow-up interview. Patient has been compliant with scheduled medications at this time and has not required emergency medications in the past 24 hours. When approached for interview patient states he is anxious about discharge however is trying to stay positive. Throughout interview patient continued to mentalize conversation and ruminate using unhealthy coping mechanisms utilized throughout his life to escape through [place]". Psychotherapy took place and attempt to gain a sense of wholeness with the desirable and undesirable parts of himself and attempts made to bring awareness to past trauma as reasons for previous need for escape. Patient continued to rationalize why he needs multiple personalities however was receptive, actively listening and attempting to understand. Patient continues to present as religiously preoccupied. As interview progressed, and patient was gently challenged, which resulted in him stating \" see now I switched to fried\" resulting in him becoming more abrasive with his responses. Patient was asked to consider self compassion when feeling undesirable stimuli that brings out [place]. As a result patient stated \"if I smashed my hand with a hammer while holding my grandson's hand it would let everything out and I would show him a part of everything\". Patient was unable to describe further what this meant and quickly changed topics. Patient is endorsing auditory hallucinations of \"some noises\". Patient denies visual hallucinations. Patient is denying medication side effects. We will continue to monitor and consider further titration of Zyprexa.           Appetite:  [] Normal/Adequate/Unchanged  [x] Increased-patient continues to utilize Ensure nutritional supplementation [] Decreased      Sleep:       [] Normal/Adequate/showing improvement [x] Fair  [] Poor      Group Attendance on Unit:   [] Yes  [] Selectively    [x] No    Medication Side Effects: Patient denies any medication side effects at the time of assessment. Mental Status Exam     Level of consciousness: Alert and awake   Appearance: Appropriate attire for setting, seated on bed, with good  grooming and hygiene   Behavior/Motor: Approachable, pleasant, no psychomotor abnormalities  Attitude toward examiner:  cooperative, attentive, good eye contact  Speech: Normal rate volume and tone  Mood: Patient reports \"Anxious\"  Affect: Congruent  Thought processes: Linear, remains perseverative  Thought content: Denies homicidal ideation, religiously preoccupied  Suicidal Ideation: Denies suicidal ideations, contracts for safety on the unit. Delusions: paranoid, bizarre, grandiose  Perceptual Disturbance:  Patient reports auditory hallucinations  Cognition: Oriented to person, place, time and situation  Memory: intact  Insight: poor   Judgement: poor   Data   height is 5' 10\" (1.778 m) and weight is 111 lb (50.3 kg). His temporal temperature is 99 °F (37.2 °C). His blood pressure is 112/83 and his pulse is 93. His respiration is 15 and oxygen saturation is 96%. Labs:   No visits with results within 2 Day(s) from this visit.    Latest known visit with results is:   Admission on 10/03/2022, Discharged on 10/06/2022   Component Date Value Ref Range Status    Ventricular Rate 10/03/2022 67  BPM Final    Atrial Rate 10/03/2022 67  BPM Final    P-R Interval 10/03/2022 136  ms Final    QRS Duration 10/03/2022 88  ms Final    Q-T Interval 10/03/2022 396  ms Final    QTc Calculation ([place]) 10/03/2022 418  ms Final    P Axis 10/03/2022 60  degrees Final    R Axis 10/03/2022 98  degrees Final    T Axis 10/03/2022 90  degrees Final    Troponin, High Sensitivity 10/04/2022 14  0 - 22 ng/L Final    Comment:       High Sensitivity Troponin values cannot be compared with other Troponin methodologies. Patients with high levels of Biotin oral intake (i.e >5mg/day) may have falsely decreased   Troponin levels. Samples collected within 8 hours of biotin intake may require additional   information for diagnosis. Glucose 10/04/2022 86  70 - 99 mg/dL Final    BUN 10/04/2022 12  8 - 23 mg/dL Final    Creatinine 10/04/2022 0.52 (A)  0.70 - 1.20 mg/dL Final    Calcium 10/04/2022 8.7  8.6 - 10.4 mg/dL Final    Sodium 10/04/2022 140  135 - 144 mmol/L Final    Potassium 10/04/2022 4.1  3.7 - 5.3 mmol/L Final    Chloride 10/04/2022 104  98 - 107 mmol/L Final    CO2 10/04/2022 29  20 - 31 mmol/L Final    Anion Gap 10/04/2022 7 (A)  9 - 17 mmol/L Final    Est, Glom Filt Rate 10/04/2022 >60  >60 mL/min/1.73m2 Final    Comment:       Effective Oct 3, 2022        These results are not intended for use in patients <25years of age. eGFR results are calculated without a race factor using the 2021 CKD-EPI equation. Careful clinical correlation is recommended, particularly when comparing to results   calculated using previous equations. The CKD-EPI equation is less accurate in patients with extremes of muscle mass, extra-renal   metabolism of creatine, excessive creatine ingestion, or following therapy that affects   renal tubular secretion.       WBC 10/04/2022 19.8 (A)  3.5 - 11.0 k/uL Final    RBC 10/04/2022 3.47 (A)  4.5 - 5.9 m/uL Final    Hemoglobin 10/04/2022 11.5 (A)  13.5 - 17.5 g/dL Final    Hematocrit 10/04/2022 34.0 (A)  41 - 53 % Final    MCV 10/04/2022 98.1  80 - 100 fL Final    MCH 10/04/2022 33.3  26 - 34 pg Final    MCHC 10/04/2022 33.9  31 - 37 g/dL Final    RDW 10/04/2022 13.5  11.5 - 14.9 % Final    Platelets 08/86/9950 328  150 - 450 k/uL Final    MPV 10/04/2022 6.8  6.0 - 12.0 fL Final    Color, UA 10/05/2022 Yellow  Yellow Final    Turbidity UA 10/05/2022 Clear  Clear Final    Glucose, Ur 10/05/2022 NEGATIVE  NEGATIVE Final    Bilirubin Urine 10/05/2022 NEGATIVE  NEGATIVE Final    Ketones, Urine 10/05/2022 NEGATIVE  NEGATIVE Final    Specific Gravity, UA 10/05/2022 1.014  1.000 - 1.030 Final    Urine Hgb 10/05/2022 NEGATIVE  NEGATIVE Final    pH, UA 10/05/2022 7.0  5.0 - 8.0 Final    Protein, UA 10/05/2022 NEGATIVE  NEGATIVE Final    Urobilinogen, Urine 10/05/2022 Normal  Normal Final    Nitrite, Urine 10/05/2022 NEGATIVE  NEGATIVE Final    Leukocyte Esterase, Urine 10/05/2022 NEGATIVE  NEGATIVE Final    Urinalysis Comments 10/05/2022 Microscopic exam not performed based on chemical results unless requested in original order. Final    Glucose 10/05/2022 136 (A)  70 - 99 mg/dL Final    BUN 10/05/2022 7 (A)  8 - 23 mg/dL Final    Creatinine 10/05/2022 0.74  0.70 - 1.20 mg/dL Final    Calcium 10/05/2022 9.1  8.6 - 10.4 mg/dL Final    Sodium 10/05/2022 140  135 - 144 mmol/L Final    Potassium 10/05/2022 4.2  3.7 - 5.3 mmol/L Final    Chloride 10/05/2022 102  98 - 107 mmol/L Final    CO2 10/05/2022 29  20 - 31 mmol/L Final    Anion Gap 10/05/2022 9  9 - 17 mmol/L Final    Est, Glom Filt Rate 10/05/2022 >60  >60 mL/min/1.73m2 Final    Comment:       Effective Oct 3, 2022        These results are not intended for use in patients <25years of age. eGFR results are calculated without a race factor using the 2021 CKD-EPI equation. Careful clinical correlation is recommended, particularly when comparing to results   calculated using previous equations. The CKD-EPI equation is less accurate in patients with extremes of muscle mass, extra-renal   metabolism of creatine, excessive creatine ingestion, or following therapy that affects   renal tubular secretion.       WBC 10/05/2022 27.0 (A)  3.5 - 11.0 k/uL Final    RBC 10/05/2022 4.11 (A)  4.5 - 5.9 m/uL Final    Hemoglobin 10/05/2022 13.4 (A)  13.5 - 17.5 g/dL Final    Hematocrit 10/05/2022 40.6 (A)  41 - 53 % Final    MCV 10/05/2022 98.8  80 - 100 fL Final    MCH 10/05/2022 32.7  26 - 34 pg Final    MCHC 10/05/2022 33.1  31 - 37 g/dL Final    RDW 10/05/2022 13.5  11.5 - 14.9 % Final    Platelets 26/39/3684 386  150 - 450 k/uL Final    MPV 10/05/2022 6.8  6.0 - 12.0 fL Final    Glucose 10/06/2022 99  70 - 99 mg/dL Final    BUN 10/06/2022 12  8 - 23 mg/dL Final    Creatinine 10/06/2022 0.63 (A)  0.70 - 1.20 mg/dL Final    Est, Glom Filt Rate 10/06/2022 >60  >60 mL/min/1.73m2 Final    Comment:       Effective Oct 3, 2022        These results are not intended for use in patients <25years of age. eGFR results are calculated without a race factor using the 2021 CKD-EPI equation. Careful clinical correlation is recommended, particularly when comparing to results   calculated using previous equations. The CKD-EPI equation is less accurate in patients with extremes of muscle mass, extra-renal   metabolism of creatine, excessive creatine ingestion, or following therapy that affects   renal tubular secretion.       Calcium 10/06/2022 9.2  8.6 - 10.4 mg/dL Final    Sodium 10/06/2022 140  135 - 144 mmol/L Final    Potassium 10/06/2022 4.0  3.7 - 5.3 mmol/L Final    Chloride 10/06/2022 101  98 - 107 mmol/L Final    CO2 10/06/2022 31  20 - 31 mmol/L Final    Anion Gap 10/06/2022 8 (A)  9 - 17 mmol/L Final    WBC 10/06/2022 17.3 (A)  3.5 - 11.0 k/uL Final    RBC 10/06/2022 3.49 (A)  4.5 - 5.9 m/uL Final    Hemoglobin 10/06/2022 11.4 (A)  13.5 - 17.5 g/dL Final    Hematocrit 10/06/2022 34.4 (A)  41 - 53 % Final    MCV 10/06/2022 98.6  80 - 100 fL Final    MCH 10/06/2022 32.8  26 - 34 pg Final    MCHC 10/06/2022 33.2  31 - 37 g/dL Final    RDW 10/06/2022 13.5  11.5 - 14.9 % Final    Platelets 15/93/4903 335  150 - 450 k/uL Final    MPV 10/06/2022 6.8  6.0 - 12.0 fL Final    Color, UA 10/06/2022 Yellow  Yellow Final    Turbidity UA 10/06/2022 Clear  Clear Final    Glucose, Ur 10/06/2022 NEGATIVE  NEGATIVE Final    Bilirubin Urine 10/06/2022 NEGATIVE  NEGATIVE Final    Ketones, Urine 10/06/2022 NEGATIVE  NEGATIVE Final    Specific Gravity, UA 10/06/2022 1.010  1.000 - 1.030 Final    Urine Hgb 10/06/2022 NEGATIVE  NEGATIVE Final    pH, UA 10/06/2022 7.0  5.0 - 8.0 Final    Protein, UA 10/06/2022 NEGATIVE  NEGATIVE Final    Urobilinogen, Urine 10/06/2022 Normal  Normal Final    Nitrite, Urine 10/06/2022 NEGATIVE  NEGATIVE Final    Leukocyte Esterase, Urine 10/06/2022 NEGATIVE  NEGATIVE Final    Urinalysis Comments 10/06/2022 Microscopic exam not performed based on chemical results unless requested in original order. Final         Reviewed patient's current plan of care and vital signs with nursing staff.     Labs reviewed: [x] Yes  Last EKG in EMR reviewed: [x] Yes  QTc: 418    Medications  Current Facility-Administered Medications: OLANZapine (ZYPREXA) tablet 15 mg, 15 mg, Oral, BID  ipratropium (ATROVENT) 0.02 % nebulizer solution 0.5 mg, 0.5 mg, Nebulization, 4x Daily PRN  albuterol sulfate HFA (PROVENTIL;VENTOLIN;PROAIR) 108 (90 Base) MCG/ACT inhaler 2 puff, 2 puff, Inhalation, Q4H PRN  budesonide-formoterol (SYMBICORT) 160-4.5 MCG/ACT inhaler 2 puff, 2 puff, Inhalation, BID  lidocaine 4 % external patch 1 patch, 1 patch, TransDERmal, Daily  lactobacillus (CULTURELLE) capsule 1 capsule, 1 capsule, Oral, Daily  docusate sodium (COLACE) capsule 100 mg, 100 mg, Oral, TID PRN  ibuprofen (ADVIL;MOTRIN) tablet 200 mg, 200 mg, Oral, Q6H PRN  acetaminophen (TYLENOL) tablet 650 mg, 650 mg, Oral, Q6H PRN  hydrOXYzine HCl (ATARAX) tablet 50 mg, 50 mg, Oral, TID PRN  traZODone (DESYREL) tablet 50 mg, 50 mg, Oral, Nightly PRN  polyethylene glycol (GLYCOLAX) packet 17 g, 17 g, Oral, Daily PRN  aluminum & magnesium hydroxide-simethicone (MAALOX) 200-200-20 MG/5ML suspension 30 mL, 30 mL, Oral, Q6H PRN  nicotine polacrilex (NICORETTE) gum 2 mg, 2 mg, Oral, Q2H PRN  haloperidol lactate (HALDOL) injection 5 mg, 5 mg, IntraMUSCular, Q6H PRN **AND** diphenhydrAMINE (BENADRYL) injection 50 mg, 50 mg, IntraMUSCular, Q6H PRN  haloperidol (HALDOL) tablet 5 mg, 5 mg, Oral, Q6H PRN    ASSESSMENT  Acute psychosis ([facility] [address]         PLAN  Patient symptoms: Remain unstable, showing interest in growing self awareness  Continue current medication regimen. Monitor need and frequency of PRN medications. Encourage participation in groups and milieu. Attempt to develop insight. Psycho-education conducted. Supportive Therapy conducted. Probable discharge is per attending physician. Follow-up daily while inpatient. Patient continues to be monitored in the inpatient psychiatric facility at [facility] for safety and stabilization. Patient continues to need, on a daily basis, active treatment furnished directly by or requiring the supervision of inpatient psychiatric personnel. Electronically signed by ELISEO Lawrence CNP on 10/16/2022 at 8:36 PM    **This report has been created using voice recognition software. It may contain minor errors which are inherent in voice recognition technology. **

## 2022-10-17 NOTE — PLAN OF CARE
Problem: Safety - Adult  Goal: Free from fall injury  10/16/2022 2047 by Lisa Calvin RN  Outcome: Progressing  Note: Patient has had no physical injuries and no signs of self harm were noted. Patient encouraged to inform staff if he starts to develop any thoughts to harm self. Patient voiced understanding. Problem: ABCDS Injury Assessment  Goal: Absence of physical injury  10/16/2022 2047 by Lisa Calvin RN  Outcome: Progressing  Note: Patient has had no physical injuries and no signs of self harm were noted. Patient encouraged to inform staff if he starts to develop any thoughts to harm self. Patient voiced understanding. Problem: Anxiety  Goal: Will report anxiety at manageable levels  Description: INTERVENTIONS:  1. Administer medication as ordered  2. Teach and rehearse alternative coping skills  3. Provide emotional support with 1:1 interaction with staff  10/16/2022 2047 by Lisa Calvin RN  Outcome: Progressing  Note: Patient reports moderate anxiety. Pt appears agitated. Pt was given 1:1 talk time, quiet time in room and distraction techniques of watching television. Pt asked for atarax at bedtime for anxiety. Medication provided as requested. Problem: Coping  Goal: Pt/Family able to verbalize concerns and demonstrate effective coping strategies  Description: INTERVENTIONS:  1. Assist patient/family to identify coping skills, available support systems and cultural and spiritual values  2. Provide emotional support, including active listening and acknowledgement of concerns of patient and caregivers  3. Reduce environmental stimuli, as able  4. Instruct patient/family in relaxation techniques, as appropriate  5.  Assess for spiritual pain/suffering and initiate Spiritual Care, Psychosocial Clinical Specialist consults as needed  10/16/2022 2047 by Lisa Calvin RN  Outcome: Progressing  Flowsheets (Taken 10/16/2022 2047)  Patient/family able to verbalize anxieties, fears, and concerns, and demonstrate effective coping:   Assist patient/family to identify coping skills, available support systems and cultural and spiritual values   Provide emotional support, including active listening and acknowledgement of concerns of patient and caregivers   Reduce environmental stimuli, as able  Note: Patient encouraged to attend groups to learn coping skills. Pt declined at this time due to paranoia. Problem: Confusion  Goal: Confusion, delirium, dementia, or psychosis is improved or at baseline  Description: INTERVENTIONS:  1. Assess for possible contributors to thought disturbance, including medications, impaired vision or hearing, underlying metabolic abnormalities, dehydration, psychiatric diagnoses, and notify attending LIP  2. [place] high risk fall precautions, as indicated  3. Provide frequent short contacts to provide reality reorientation, refocusing and direction  4. Decrease environmental stimuli, including noise as appropriate  5. Monitor and intervene to maintain adequate nutrition, hydration, elimination, sleep and activity  6. If unable to ensure safety without constant attention obtain sitter and review sitter guidelines with assigned personnel  7. Initiate Psychosocial CNS and Spiritual Care consult, as indicated  10/16/2022 2047 by Amanda Cassidy RN  Outcome: Progressing  Note: Patient denies any voices but is seen doing self talk in room alone. Pt is exhibiting symptoms of delusional behavior of paranoia and Gnosticist preoccupations. Pt encouraged to discuss symptoms with staff but pt denied any symptoms at this time.       Problem: Pain  Goal: Verbalizes/displays adequate comfort level or baseline comfort level  10/16/2022 2047 by Amanda Cassidy RN  Outcome: Progressing  Flowsheets (Taken 10/16/2022 2047)  Verbalizes/displays adequate comfort level or baseline comfort level:   Encourage patient to monitor pain and request assistance   Assess pain using appropriate pain scale   Administer analgesics based on type and severity of pain and evaluate response     Problem: Nutrition Deficit:  Goal: Optimize nutritional status  10/16/2022 2047 by Piper Devine RN  Outcome: Progressing  Note: Pt reportedly ate meals today.

## 2022-10-17 NOTE — GROUP NOTE
Group Therapy Note    Date: 10/17/2022    Group Start Time: 1100  Group End Time: 1150  Group Topic: Cognitive Skills    [facility]    Rohit Mayorga, NEVAEHS        Group Therapy Note    Attendees: 7/17     Topic: Decision making, concentration, and communication skills. .  Goal of Group: To increase social interaction and to practice Decision making, concentration ,and communication skills. Comments: Patient did not participate in Cognitive Skills group, despite staff encouragement and explanation of benefits. Patient remains seclusive to self during group time. Q15 minute safety checks maintained for patient safety and will continue to encourage patient to attend unit programming.          Discipline Responsible: Psychoeducational Specialist        Signature:  Jaime Smith

## 2022-10-17 NOTE — GROUP NOTE
Group Therapy Note    Date: 10/17/2022    Group Start Time: 0900  Group End Time: 3776  Group Topic: Group Therapy    [facility]    Andi Abel        Group Therapy Note    Attendees: 3/17         Patient's Goal:  focus on healing rather than shame and guilt    Notes:  morning goal group session; patient participated after, was in a consult during group    Status After Intervention:  Improved    Participation Level: Interactive    Participation Quality: Appropriate and Attentive      Speech:  normal      Thought Process/Content: Logical      Affective Functioning: Congruent      Mood: euthymic      Level of consciousness:  Alert, Oriented x4, and Attentive      Response to Learning: Capable of insight, Able to change behavior, and Progressing to goal      Endings: None Reported    Modes of Intervention: Support      Discipline Responsible: Behavorial Health Tech      Signature:  Dorita Brandon

## 2022-10-17 NOTE — GROUP NOTE
Group Therapy Note    Date: 10/17/2022    Group Start Time: 1430  Group End Time: 6787  Group Topic: Cognitive Skills    FREDI Mercedes        Group Therapy Note    Attendees: 6/13     Topic: Expression of feelings, identifying stressors, and positive stress management skills/resources, boundary setting . Vidal Jorge Goal of Group: To increase social interaction and to practice expressing feelings, identifying stressors, and positive stress management skills/resources, boundary setting using creative expression and discussion. Comments: Patient did not participate in Cognitive Skills group, despite staff encouragement and explanation of benefits. Patient remains seclusive to self during group time. Q15 minute safety checks maintained for patient safety and will continue to encourage patient to attend unit programming.          Discipline Responsible: Psychoeducational Specialist        Signature:  María Dick

## 2022-10-17 NOTE — CARE COORDINATION
SOCIAL SERVICE NOTE: FRANCES speaks with PT wife [facility] at [phone], SW lets her know that the doctor is planning on discharging PT tomorrow. [facility] reports that she is glad that PT is going to be discharged and reports that he is able to return home with her to [place] and she reports a plan to  pt when he is discharged. SW let PT wife know that PT will have a scheduled appointment with [address] [place] [address] when he leaves and SW lets PT wife know that she will attempt to reach out to the [place] [facility] again in Care coordination regarding pt needing a hospital bed and wheelchair at home. FRANCES speaks with Christine Prabhakar at the [place] [facility] who reports that he will need an order form a doctor for the durable medical equipment: Hospital Bed and wheelchair, He requested that we fax a copy of the doctors orders and Pt facesheet information to him attention: Christine Prabhakar at [phone] he will be able to order the equipment and have it delivered to the Patient's home.

## 2022-10-17 NOTE — PLAN OF CARE
Problem: Anxiety  Goal: Will report anxiety at manageable levels  Description: INTERVENTIONS:  1. Administer medication as ordered  2. Teach and rehearse alternative coping skills  3. Provide emotional support with 1:1 interaction with staff  Outcome: Progressing  Note: Patient relates experiencing moderate feelings of depression and anxiety due to not being eligible for discharge yet. Patient denies experiencing thoughts of wanting to harm himself or others, as well as any hallucinations. Patient is able to perform ADL's independently. Patient is comfortable approaching staff for any requests or questions. Patient relates that he has been struggling to have an appetite and has been feeling increasingly tired throughout the day. Patient has been out in dayroom and selectively social with his peers. Patient has not been attending group sessions, but staff will continue to encourage involvement. Patient remains safe at this time and agrees to notify staff if any thoughts, feelings, or concerns need to be brought to their attention. Q15 minute safety checks maintained.

## 2022-10-18 VITALS
RESPIRATION RATE: 14 BRPM | HEART RATE: 79 BPM | TEMPERATURE: 98.2 F | SYSTOLIC BLOOD PRESSURE: 114 MMHG | OXYGEN SATURATION: 94 % | DIASTOLIC BLOOD PRESSURE: 82 MMHG | WEIGHT: 111 LBS | BODY MASS INDEX: 15.89 KG/M2 | HEIGHT: 70 IN

## 2022-10-18 PROCEDURE — 6370000000 HC RX 637 (ALT 250 FOR IP): Performed by: PSYCHIATRY & NEUROLOGY

## 2022-10-18 PROCEDURE — 99239 HOSP IP/OBS DSCHRG MGMT >30: CPT | Performed by: PSYCHIATRY & NEUROLOGY

## 2022-10-18 PROCEDURE — 6370000000 HC RX 637 (ALT 250 FOR IP): Performed by: INTERNAL MEDICINE

## 2022-10-18 RX ORDER — OLANZAPINE 15 MG/1
15 TABLET ORAL 2 TIMES DAILY
Qty: 60 TABLET | Refills: 0 | Status: ON HOLD | OUTPATIENT
Start: 2022-10-18

## 2022-10-18 RX ADMIN — ALUMINUM HYDROXIDE, MAGNESIUM HYDROXIDE, AND SIMETHICONE 30 ML: 200; 200; 20 SUSPENSION ORAL at 08:25

## 2022-10-18 RX ADMIN — HYDROXYZINE HYDROCHLORIDE 50 MG: 50 TABLET ORAL at 11:07

## 2022-10-18 RX ADMIN — BUDESONIDE AND FORMOTEROL FUMARATE DIHYDRATE 2 PUFF: 160; 4.5 AEROSOL RESPIRATORY (INHALATION) at 08:25

## 2022-10-18 RX ADMIN — OLANZAPINE 15 MG: 15 TABLET, FILM COATED ORAL at 08:25

## 2022-10-18 RX ADMIN — DOCUSATE SODIUM 100 MG: 100 CAPSULE, LIQUID FILLED ORAL at 08:26

## 2022-10-18 RX ADMIN — ACETAMINOPHEN 650 MG: 325 TABLET, FILM COATED ORAL at 11:07

## 2022-10-18 RX ADMIN — Medication 1 CAPSULE: at 08:25

## 2022-10-18 ASSESSMENT — PAIN DESCRIPTION - LOCATION
LOCATION: ABDOMEN
LOCATION: HEAD

## 2022-10-18 ASSESSMENT — PAIN DESCRIPTION - DESCRIPTORS
DESCRIPTORS: BURNING
DESCRIPTORS: ACHING

## 2022-10-18 ASSESSMENT — PAIN SCALES - GENERAL
PAINLEVEL_OUTOF10: 10
PAINLEVEL_OUTOF10: 10

## 2022-10-18 NOTE — PLAN OF CARE
Problem: Safety - Adult  Goal: Free from fall injury  Outcome: Progressing     Problem: ABCDS Injury Assessment  Goal: Absence of physical injury  Outcome: Progressing     Problem: Anxiety  Goal: Will report anxiety at manageable levels  Description: INTERVENTIONS:  1. Administer medication as ordered  2. Teach and rehearse alternative coping skills  3. Provide emotional support with 1:1 interaction with staff  10/17/2022 2203 by Oracio Washington RN  Outcome: Progressing  Note: Patient reports anxiety at a manageable level with no great change. Problem: Coping  Goal: Pt/Family able to verbalize concerns and demonstrate effective coping strategies  Description: INTERVENTIONS:  1. Assist patient/family to identify coping skills, available support systems and cultural and spiritual values  2. Provide emotional support, including active listening and acknowledgement of concerns of patient and caregivers  3. Reduce environmental stimuli, as able  4. Instruct patient/family in relaxation techniques, as appropriate  5. Assess for spiritual pain/suffering and initiate Spiritual Care, Psychosocial Clinical Specialist consults as needed  Outcome: Progressing     Problem: Confusion  Goal: Confusion, delirium, dementia, or psychosis is improved or at baseline  Description: INTERVENTIONS:  1. Assess for possible contributors to thought disturbance, including medications, impaired vision or hearing, underlying metabolic abnormalities, dehydration, psychiatric diagnoses, and notify attending LIP  2. [place] high risk fall precautions, as indicated  3. Provide frequent short contacts to provide reality reorientation, refocusing and direction  4. Decrease environmental stimuli, including noise as appropriate  5. Monitor and intervene to maintain adequate nutrition, hydration, elimination, sleep and activity  6.  If unable to ensure safety without constant attention obtain sitter and review sitter guidelines with assigned

## 2022-10-18 NOTE — DISCHARGE INSTR - DIET

## 2022-10-18 NOTE — BH NOTE
Relaxation Group Note        Date: October 17, 2022 Start Time: 2030 End Time: 2050      Number of Participants in [address]:  10/14    Topic: Relaxation    Goal of Group:Relaxation      Comments:     Patient did not participate in Relaxation group, despite staff encouragement and explanation of benefits. Patient remain seclusive to self. Q15 minute safety checks maintained for patient safety and will continue to encourage patient to attend unit programming.

## 2022-10-18 NOTE — DISCHARGE INSTR - OTHER ORDERS
Make sure to follow up with outpatient facility    Take all medication as directed    Do not do any illicit drugs or alcohol

## 2022-10-18 NOTE — DISCHARGE INSTRUCTIONS
Information:  Medications:   Medication summary provided   I understand that I should take only the medications on my list.     -why and when I need to take each medicine.     -which side effects to watch for.     -that I should carry my medication information at all times in case of     Emergency situations. I will take all of my medicines to follow up appointments.     -check with my physician or pharmacist before taking any new    Medication, over the counter product or drink alcohol.    -Ask about food, drug or dietary supplement interactions.    -discard old lists and update records with medication providers. Notify Physician:  Notify physician if you notice:   Always call 911 if you feel your life is in danger  In case of an emergency call 911 immediately! If 911 is not available call your local emergency medical system for help    Behavioral Health Follow Up:  Original Referral Source:CON  Discharge Diagnosis: Acute psychosis ([facility] [address] [F23]  Recommendations for Level of Care: Follow up with outpatient facility  Patient status at discharge: Verbalizes readiness for discharge  My hospital  was: [facility]  Aftercare plan faxed: Yes   -faxed by: Staff   -date: 10/18/22   -time: 1102  Prescriptions: Sent to patients pharmacy     Smoking: Quit Smoking. Call the [employer]'s smoking quitline at 3-256-84F-QUIT  Know the signs of a heart attack   If you have any of the following symptoms call 911 immediately, do not wait more    Than five minutes. 1. Pressure, fullness and/ or squeezing in the center of the chest spreading to    The jaw, neck or shoulder. 2. Chest discomfort with light headedness, fainting, sweating, nausea or    Shortness of breath. 3. Upper abdominal pressure or discomfort. 4. Lower chest pain, back pain, unusual fatigue, shortness of breath, nausea   Or dizziness.      General Information:   Questions regarding your bill: Call HELP program [phone]     Suicide Hotline (Karoline Barton)  ([phone] [phone]      Recovery Help line- 828.512.3115      To obtain results of pending studies call Medical Records at: [phone]     For emergencies and 24 hour/7 days a week contact information:  [phone]        Learning About COVID-19 and Flu Symptoms  How can you tell COVID-19 from the flu? COVID-19 and the flu have similar symptoms. The two can be hard to tell apart. The only way to know for sure which illness you have is to be tested. If you have questions about COVID-19 testing, ask your doctor or go to cdc.gov to use the COVID-19 Viral Testing Tool. Since the symptoms are so alike, it makes sense to act as if you have COVID-19 until your test results come back. This means staying home and limiting contact with people in your home. You'll need to wash your hands often and disinfect surfaces that you touch. And be sure to wear a mask when you're around other people. This is also good advice if you think you have the flu. COVID-19 and the flu have these symptoms in common:  Fever or chills  Cough  Shortness of breath  Fatigue (tiredness)  Sore throat  Runny or stuffy nose  Muscle and body aches  Headache  Vomiting and diarrhea (more common in children than adults)  COVID-19 has another symptom that also may occur:  New loss of taste or smell  COVID-19 symptoms may appear from 2 to 14 days after infection. Flu symptoms usually appear 1 to 4 days after infection. Why should you get the flu vaccine? It's important to get your yearly flu vaccine. Both the flu and COVID-19 can be active at the same time. You can get sick with both infections at once. And having both may make you more sick than getting just one. The flu vaccine won't protect you from COVID-19. But it can help prevent the flu or reduce its symptoms.  If fewer people get very ill with the flu, this will help free up medical resources that are needed for people who need urgent care, such as those suffering from COVID-19, heart attacks, and injuries from accidents. Where can you learn more? Go to [email or web address] and sign in to your [employer] account. Enter C123 in the [facility] box to learn more about \"Learning About COVID-19 and Flu Symptoms. \"     If you do not have an account, please click on the \"Sign Up Now\" link. Current as of: July 28, 2022               Content Version: 13.4  © 2006-2022 [employer]. Care instructions adapted under license by [facility] ([facility]). If you have questions about a medical condition or this instruction, always ask your healthcare professional. [place] [address] any warranty or liability for your use of this information. olanzapine (oral)  Pronunciation:  lorri balbuena  Brand:  ZyPREXA, ZyPREXA Zydis  What is the most important information I should know about olanzapine? Olanzapine is not approved for use in older adults with dementia-related psychosis. What is olanzapine? Olanzapine is an antipsychotic medication that is used to treat psychotic conditions such as schizophrenia and bipolar disorder (manic depression) in adults and children at least 15years old. Olanzapine is also used together with fluoxetine (Prozac) to treat episodes of depression in adults and children at least 8years old who have bipolar I disorder. Olanzapine may also be used for purposes not listed in this medication guide. What should I discuss with my healthcare provider before taking olanzapine? You should not take olanzapine if you are allergic to it. Olanzapine may increase the risk of death in older adults with dementia-related psychosis and is not approved for this use. Tell your doctor if you have ever had:  liver disease;  heart disease, high or low blood pressure;  high cholesterol or triglycerides;  a stroke, including \"mini-stroke\";  breast cancer;  a seizure;   Alzheimer's disease;  diabetes or high blood sugar;  an enlarged prostate;  bowel problems; or  narrow-angle glaucoma. Taking antipsychotic medicine in the last 3 months of pregnancy may cause breathing problems, feeding problems, or withdrawal symptoms in the . If you get pregnant, tell your doctor right away. Do not stop taking olanzapine without your doctor's advice. Olanzapine can pass into breast milk. If you are breastfeeding, tell your doctor if you notice severe drowsiness, irritability, feeding problems, tremors, or unusual muscle movements in the nursing baby. The olanzapine orally disintegrating tablet (Zyprexa Zydis) may contain phenylalanine. Tell your doctor if you have phenylketonuria (PKU). How should I take olanzapine? Follow all directions on your prescription label and read all medication guides or instruction sheets. Your doctor may occasionally change your dose. Use the medicine exactly as directed. Olanzapine can be taken with or without food. Remove an orally disintegrating tablet (Zyprexa Zydis) from the package only when you are ready to take the medicine. Place the tablet in your mouth and allow it to dissolve, without chewing. Swallow several times as the tablet dissolves. Olanzapine can cause high blood sugar (hyperglycemia). If you are diabetic, check your blood sugar levels on a regular basis. You may gain weight or have high cholesterol and triglycerides (types of fat) while taking this medicine, especially if you are a teenager. You may need frequent blood tests. Call your doctor if your symptoms do not improve, or if they get worse while using olanzapine. Do not stop using olanzapine suddenly, even if you feel fine. Stopping suddenly may cause serious side effects. Olanzapine is sometimes used together with other antipsychotic medications or antidepressants. Use all medications as directed and read all medication guides you receive.  Do not change your dose or dosing schedule without your doctor's advice. Medication may be only part of a complete program of treatment that also includes counseling and other psychological support programs. Follow your doctor's instructions. Store at room temperature away from moisture, heat, and light. What happens if I miss a dose? Take the medicine as soon as you can, but skip the missed dose if it is almost time for your next dose. Do not take two doses at one time. What happens if I overdose? Seek emergency medical attention or call the Poison Help line at 1-146.685.3937. Overdose symptoms may include drowsiness, agitation, aggression, slurred speech, confusion, increased heart rate, jerky or uncontrolled muscle movements, trouble breathing, or fainting. What should I avoid while taking olanzapine? Avoid driving or hazardous activity until you know how this medicine will affect you. Dizziness or drowsiness can cause falls, accidents, or severe injuries. Avoid getting up too fast from a sitting or lying position, or you may feel dizzy. Avoid drinking alcohol. Dangerous side effects could occur. Avoid becoming overheated or dehydrated. Drink plenty of fluids, especially in hot weather and during exercise. It is easier to become dangerously overheated and dehydrated while you are taking olanzapine. What are the possible side effects of olanzapine? Get emergency medical help if you have signs of an allergic reaction: hives; difficult breathing; swelling of your face, lips, tongue, or throat. Seek medical treatment if you have a serious drug reaction that can affect many parts of your body. Symptoms may include: skin rash, fever, swollen glands, muscle aches, severe weakness, unusual bruising, or yellowing of your skin or eyes. High doses or long-term use of olanzapine can cause a serious movement disorder that may not be reversible.  The longer you use olanzapine, the more likely you are to develop this disorder, especially if you are a woman or an older adult. Call your doctor at once if you have:  uncontrolled muscle movements in your face (chewing, lip smacking, frowning, tongue movement, blinking or eye movement);  trouble speaking or swallowing;  swelling in your hands or feet;  confusion, unusual thoughts or behavior, hallucinations, or thoughts about hurting yourself;  low white blood cell counts --fever, chills, mouth sores, skin sores, sore throat, cough, trouble breathing, feeling light-headed; or  signs of dehydration --feeling very thirsty or hot, being unable to urinate, heavy sweating, or hot and dry skin;  liver problems --upper stomach pain, itching, loss of appetite, dark urine, gerhard-colored stools, jaundice (yellowing of the skin or eyes);  high blood sugar --increased thirst, increased urination, hunger, dry mouth, fruity breath odor, drowsiness, dry skin, blurred vision, weight loss; or  severe nervous system reaction --very stiff (rigid) muscles, high fever, sweating, confusion, fast or uneven heartbeats, tremors, feeling like you might pass out. Common side effects may include:  weight gain (more likely in teenagers), increased appetite;  headache, dizziness, drowsiness, feeling tired or restless;  problems with speech or memory;  tremors or shaking, numbness or tingly feeling;  changes in personality;  dry mouth, or increased salivation;  stomach pain, constipation; or  pain in your arms or legs. This is not a complete list of side effects and others may occur. Call your doctor for medical advice about side effects. You may report side effects to FDA at 4-585-FDA-8019. What other drugs will affect olanzapine? Taking olanzapine with other drugs that make you sleepy or slow your breathing can cause dangerous or life-threatening side effects. Ask your doctor before using opioid medication, a sleeping pill, a muscle relaxer, or medicine for anxiety or seizures.   Other drugs may affect olanzapine, including prescription and over-the-counter medicines, vitamins, and herbal products. Tell your doctor about all your current medicines and any medicine you start or stop using. Where can I get more information? Your pharmacist can provide more information about olanzapine. Remember, keep this and all other medicines out of the reach of children, never share your medicines with others, and use this medication only for the indication prescribed. Every effort has been made to ensure that the information provided by [address] is accurate, up-to-date, and complete, but no guarantee is made to that effect. Drug information contained herein may be time sensitive. [facility] information has been compiled for use by healthcare practitioners and consumers in the [place] and therefore [facility] does not warrant that uses outside of the [place] are appropriate, unless specifically indicated otherwise. [facility]'s drug information does not endorse drugs, diagnose patients or recommend therapy. [facility]'s drug information is an informational resource designed to assist licensed healthcare practitioners in caring for their patients and/or to serve consumers viewing this service as a supplement to, and not a substitute for, the expertise, skill, knowledge and judgment of healthcare practitioners. The absence of a warning for a given drug or drug combination in no way should be construed to indicate that the drug or drug combination is safe, effective or appropriate for any given patient. [facility] does not assume any responsibility for any aspect of healthcare administered with the aid of information [facility] provides. The information contained herein is not intended to cover all possible uses, directions, precautions, warnings, drug interactions, allergic reactions, or adverse effects. If you have questions about the drugs you are taking, check with your doctor, nurse or pharmacist.  Copyright 1028-6712 [address]. Version: 16.01.  Revision date: 4/28/2020. Care instructions adapted under license by [facility] ([facility]). If you have questions about a medical condition or this instruction, always ask your healthcare professional. [address] any warranty or liability for your use of this information. The patient is a 40y Male complaining of urinary symptoms.

## 2022-10-18 NOTE — GROUP NOTE
Group Therapy Note    Date: 10/18/2022    Group Start Time: 1100  Group End Time: 4034  Group Topic: Cognitive Skills    [facility]    FREDI Houston    Cognitive Skills Group Note        Date: October 18, 2022 Start Time: 11am  End Time: 11:45am      Number of Participants in Group & Unit Census:  7/18    Topic: Communication, memory recall & concentration. Goal of Group: To improve concentration and communication skills through memory recall and collaborating with peers. Comments:     Patient did not participate in Cognitive Skills group, despite staff encouragement and explanation of benefits. Patient remain seclusive to self. Q15 minute safety checks maintained for patient safety and will continue to encourage patient to attend unit programming.         Signature:  FREDI Houston

## 2022-10-18 NOTE — GROUP NOTE
Group Therapy Note    Date: 10/18/2022    Group Start Time: 0900  Group End Time: 0915  Group Topic: Community Meeting    BRAYDEN Diazseseth Owen        Group Therapy Note    Attendees: 5/13       Patient's Goal:  Discharge planning    Status After Intervention:  Unchanged    Participation Level:  Active Listener and Interactive    Participation Quality: Appropriate and Attentive      Speech:  normal      Thought Process/Content: Logical      Affective Functioning: Congruent      Mood: euthymic      Level of consciousness:  Alert and Oriented x4      Response to Learning: Able to verbalize current knowledge/experience and Able to verbalize/acknowledge new learning      Endings: None Reported    Modes of Intervention: Education and Support      Discipline Responsible: Behavorial Health Tech      Signature:  Margie Ganser

## 2022-10-18 NOTE — BH NOTE
[facility]  Discharge Note    Pt discharged with followings belongings:   Dental Appliances: None  Vision - Corrective Lenses: Eyeglasses  Hearing Aid: None  Jewelry: None  Body Piercings Removed: N/A  Clothing: Pants, Shirt, Undergarments, Socks, Slippers, Jacket/Coat  Other Valuables: At home   Valuables sent home with patient. Patient educated on aftercare instructions: Yes  Information faxed to [facility] by Staff  at 3:55 PM .Patient verbalize understanding of AVS:  Yes. Status EXAM upon discharge:  Mental Status and Behavioral Exam  Normal: No  Level of Assistance: Independent/Self  Facial Expression: Brightened  Affect: Blunt  Level of Consciousness: Alert  Frequency of Checks: 4 times per hour, close  Mood:Normal: No  Mood: Anxious  Motor Activity:Normal: No  Motor Activity: Decreased  Eye Contact: Good  Observed Behavior: Cooperative  Sexual Misconduct History: Current - no  Preception: [place] to person, [place] to time, [place] to place, [place] to situation  Attention:Normal: No  Attention: Distractible  Thought Processes: Circumstantial  Thought Content:Normal: No  Thought Content: Preoccupations  Depression Symptoms: Feelings of hopelessess, Isolative  Anxiety Symptoms: Generalized  June Symptoms: No problems reported or observed.   Hallucinations: None  Delusions: No  Delusions: Paranoid, Oriental orthodox  Memory:Normal: No  Memory: Confabulation  Insight and Judgment: No  Insight and Judgment: Poor insight, Poor judgment    Tobacco Screening:  Practical Counseling, on admission, dillon X, if applicable and completed (first 3 are required if patient doesn't refuse):            ( ) Recognizing danger situations (included triggers and roadblocks)                    ( ) Coping skills (new ways to manage stress,relaxation techniques, changing routine, distraction)                                                           ( ) Basic information about quitting (benefits of quitting, techniques in how to quit, available resources  ( ) Referral for counseling faxed to [facility]                                                                                                                   ( ) Patient refused counseling  ( ) Patient refused referral  ( ) Patient refused prescription upon discharge  ( x) Patient has not smoked in the last 30 days    Metabolic Screening:    No results found for: LABA1C    No results found for: CHOL  No results found for: TRIG  No results found for: HDL  No components found for: LDLCAL  No results found for: LABVLDL    Patient was discharged from unit at 1550. Patient was ambulatory off unit. Patient was picked up by wife (Madeline) and Son. Patient is being discharged to home. All belongings were verified with patient and sent along with patient.      Liseth Hanna RN

## 2022-10-18 NOTE — GROUP NOTE
Group Therapy Note    Date: 10/18/2022    Group Start Time: 1330  Group End Time: 1410  Group Topic: Psychoeducation    [facility] Gerhardt Corolla, CTRS        Group Therapy Note    Attendees: 9/17     Patient's Goal:  To improve coping skill and concentration by collaborating with peers. Notes:  Patient attended and participated in group. Patient demonstrated ability to collaborate with peers and share healthy coping skills during group. Status After Intervention:  Improved    Participation Level:  Active Listener and Interactive    Participation Quality: Appropriate, Attentive, Sharing, and Supportive      Speech:  normal      Thought Process/Content: Logical  Linear      Affective Functioning: Congruent      Mood: euthymic      Level of consciousness:  Alert, Oriented x4, and Attentive      Response to Learning: Able to verbalize current knowledge/experience, Able to verbalize/acknowledge new learning, Able to retain information, Capable of insight, and Progressing to goal      Endings: None Reported    Modes of Intervention: Support, Socialization, Exploration, and Activity      Discipline Responsible: Psychoeducational Specialist      Signature:  Gerhardt Corolla, [address]
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
## 2022-10-27 ENCOUNTER — HOSPITAL ENCOUNTER (INPATIENT)
Age: 62
LOS: 2 days | Discharge: PSYCHIATRIC OR SUBSTANCE USE FACILITY | DRG: 885 | End: 2022-10-29
Attending: EMERGENCY MEDICINE | Admitting: INTERNAL MEDICINE
Payer: OTHER GOVERNMENT

## 2022-10-27 ENCOUNTER — APPOINTMENT (OUTPATIENT)
Dept: CT IMAGING | Age: 62
DRG: 885 | End: 2022-10-27
Payer: OTHER GOVERNMENT

## 2022-10-27 DIAGNOSIS — F29 PSYCHOSIS, UNSPECIFIED PSYCHOSIS TYPE (HCC): Primary | ICD-10-CM

## 2022-10-27 PROBLEM — R41.0 ACUTE DELIRIUM: Status: ACTIVE | Noted: 2022-10-27

## 2022-10-27 LAB
ABSOLUTE EOS #: 0.23 K/UL (ref 0–0.4)
ABSOLUTE LYMPH #: 7.82 K/UL (ref 1–4.8)
ABSOLUTE MONO #: 2.3 K/UL (ref 0.1–1.3)
ANION GAP SERPL CALCULATED.3IONS-SCNC: 15 MMOL/L (ref 9–17)
ATYPICAL LYMPHOCYTE ABSOLUTE COUNT: 1.38 K/UL
ATYPICAL LYMPHOCYTES: 6 %
BASOPHILS # BLD: 0 % (ref 0–2)
BASOPHILS ABSOLUTE: 0 K/UL (ref 0–0.2)
BUN BLDV-MCNC: 9 MG/DL (ref 8–23)
CALCIUM SERPL-MCNC: 9.1 MG/DL (ref 8.6–10.4)
CHLORIDE BLD-SCNC: 98 MMOL/L (ref 98–107)
CO2: 24 MMOL/L (ref 20–31)
CREAT SERPL-MCNC: 0.62 MG/DL (ref 0.7–1.2)
EOSINOPHILS RELATIVE PERCENT: 1 % (ref 0–4)
ETHANOL PERCENT: <0.01 %
ETHANOL: <10 MG/DL
GFR SERPL CREATININE-BSD FRML MDRD: >60 ML/MIN/1.73M2
GLUCOSE BLD-MCNC: 101 MG/DL (ref 70–99)
HCT VFR BLD CALC: 38.2 % (ref 41–53)
HEMOGLOBIN: 12.9 G/DL (ref 13.5–17.5)
LYMPHOCYTES # BLD: 34 % (ref 24–44)
MCH RBC QN AUTO: 32.3 PG (ref 26–34)
MCHC RBC AUTO-ENTMCNC: 33.6 G/DL (ref 31–37)
MCV RBC AUTO: 96 FL (ref 80–100)
MONOCYTES # BLD: 10 % (ref 1–7)
MORPHOLOGY: NORMAL
PDW BLD-RTO: 13.4 % (ref 11.5–14.9)
PLATELET # BLD: 376 K/UL (ref 150–450)
PMV BLD AUTO: 6.5 FL (ref 6–12)
POTASSIUM SERPL-SCNC: 4.6 MMOL/L (ref 3.7–5.3)
RBC # BLD: 3.98 M/UL (ref 4.5–5.9)
SEG NEUTROPHILS: 49 % (ref 36–66)
SEGMENTED NEUTROPHILS ABSOLUTE COUNT: 11.27 K/UL (ref 1.3–9.1)
SODIUM BLD-SCNC: 137 MMOL/L (ref 135–144)
WBC # BLD: 23 K/UL (ref 3.5–11)

## 2022-10-27 PROCEDURE — 36415 COLL VENOUS BLD VENIPUNCTURE: CPT

## 2022-10-27 PROCEDURE — 70450 CT HEAD/BRAIN W/O DYE: CPT

## 2022-10-27 PROCEDURE — 99285 EMERGENCY DEPT VISIT HI MDM: CPT

## 2022-10-27 PROCEDURE — 1200000000 HC SEMI PRIVATE

## 2022-10-27 PROCEDURE — 85025 COMPLETE CBC W/AUTO DIFF WBC: CPT

## 2022-10-27 PROCEDURE — 80048 BASIC METABOLIC PNL TOTAL CA: CPT

## 2022-10-27 PROCEDURE — G0480 DRUG TEST DEF 1-7 CLASSES: HCPCS

## 2022-10-27 RX ORDER — LIDOCAINE HYDROCHLORIDE 20 MG/ML
15 SOLUTION OROPHARYNGEAL
Status: ON HOLD | COMMUNITY
End: 2022-11-08 | Stop reason: HOSPADM

## 2022-10-27 RX ORDER — AZITHROMYCIN 250 MG/1
250 TABLET, FILM COATED ORAL DAILY
COMMUNITY
Start: 2022-10-24 | End: 2022-10-28

## 2022-10-27 RX ORDER — NICOTINE 21 MG/24HR
1 PATCH, TRANSDERMAL 24 HOURS TRANSDERMAL DAILY
Status: DISCONTINUED | OUTPATIENT
Start: 2022-10-27 | End: 2022-10-29 | Stop reason: HOSPADM

## 2022-10-27 RX ORDER — LORAZEPAM 2 MG/ML
2 INJECTION INTRAMUSCULAR
Status: COMPLETED | OUTPATIENT
Start: 2022-10-27 | End: 2022-10-28

## 2022-10-27 ASSESSMENT — LIFESTYLE VARIABLES
HOW OFTEN DO YOU HAVE A DRINK CONTAINING ALCOHOL: NEVER
HOW MANY STANDARD DRINKS CONTAINING ALCOHOL DO YOU HAVE ON A TYPICAL DAY: PATIENT DOES NOT DRINK

## 2022-10-27 ASSESSMENT — ENCOUNTER SYMPTOMS
SHORTNESS OF BREATH: 0
COLOR CHANGE: 0
COUGH: 0
PHOTOPHOBIA: 0
TROUBLE SWALLOWING: 0
VOMITING: 0
NAUSEA: 0
ABDOMINAL PAIN: 0
DIARRHEA: 0

## 2022-10-27 ASSESSMENT — PAIN DESCRIPTION - LOCATION: LOCATION: ABDOMEN

## 2022-10-27 ASSESSMENT — PAIN DESCRIPTION - DESCRIPTORS: DESCRIPTORS: ACHING

## 2022-10-27 ASSESSMENT — PAIN SCALES - GENERAL: PAINLEVEL_OUTOF10: 4

## 2022-10-27 ASSESSMENT — PAIN - FUNCTIONAL ASSESSMENT: PAIN_FUNCTIONAL_ASSESSMENT: 0-10

## 2022-10-27 ASSESSMENT — PAIN DESCRIPTION - PAIN TYPE: TYPE: ACUTE PAIN

## 2022-10-27 NOTE — ED PROVIDER NOTES
EMERGENCY DEPARTMENT ENCOUNTER    Pt Name: Giovanna Soto  MRN: [id number]  [place] 1960  Date of evaluation: 10/27/22  CHIEF COMPLAINT       Chief Complaint   Patient presents with    Manic Behavior     HISTORY OF PRESENT ILLNESS   23-year-old male presents to the hospital after the family was concerned of a progressively worsening states since the last time he was admitted. Patient was admitted prior due to suicidal ideations and since then he has been considerably becoming progressively worse in terms of his mental state. The patient is becoming more and more manic and has not been sleeping well. The patient states he is asymptomatic but he is very racy and has thoughts. The patient was at an ER yesterday where he was diagnosed with diverticulitis and was started on levofloxacin as well as Flagyl. The history is provided by the patient and a relative. Mental Health Problem  Presenting symptoms: agitation, bizarre behavior and disorganized speech    Associated symptoms: anxiety    Associated symptoms: no abdominal pain, no chest pain and no fatigue          REVIEW OF SYSTEMS     Review of Systems   Constitutional:  Negative for activity change, fatigue and fever. HENT:  Negative for congestion, ear pain and trouble swallowing. Eyes:  Negative for photophobia and visual disturbance. Respiratory:  Negative for cough and shortness of breath. Cardiovascular:  Negative for chest pain and palpitations. Gastrointestinal:  Negative for abdominal pain, diarrhea, nausea and vomiting. Genitourinary:  Negative for dysuria, flank pain and urgency. Musculoskeletal:  Negative for arthralgias and myalgias. Skin:  Negative for color change and rash. Neurological:  Negative for dizziness and facial asymmetry. Psychiatric/Behavioral:  Positive for agitation, decreased concentration and sleep disturbance. Negative for behavioral problems. The patient is nervous/anxious.     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 BAPE J90    Delirium R41.0    Severe malnutrition ([facility] E43    Acute psychosis ([facility] F23    Panlobular emphysema ([facility] J43.1    Acute delirium R41.0     SURGICAL HISTORY       Past Surgical History:   Procedure Laterality Date    SKIN GRAFT Bilateral     lower extremities     CURRENT MEDICATIONS       Current Discharge Medication List        CONTINUE these medications which have NOT CHANGED    Details   azithromycin (ZITHROMAX) 250 MG tablet Take 250 mg by mouth daily For 5 days starting 10/24/22  500 mg day 1 then 250 mg daily days 2-5      lidocaine viscous hcl (XYLOCAINE) 2 % SOLN solution Take 15 mLs by mouth every 3 hours as needed for Irritation      OLANZapine (ZYPREXA) 15 MG tablet Take 1 tablet by mouth in the morning and at bedtime  Qty: 60 tablet, Refills: 0      albuterol sulfate HFA (PROVENTIL;VENTOLIN;PROAIR) 108 (90 Base) MCG/ACT inhaler Inhale 2 puffs into the lungs every 4 hours as needed for Wheezing      docusate sodium (COLACE) 100 MG capsule Take 100 mg by mouth 3 times daily as needed for Constipation      ipratropium (ATROVENT) 0.02 % nebulizer solution Take 0.5 mg by nebulization 4 times daily      Acidophilus Lactobacillus CAPS Take 1 capsule by mouth daily      ibuprofen (ADVIL;MOTRIN) 200 MG tablet Take 200 mg by mouth every 6 hours as needed for Pain Pt taking 2 pills prn      mometasone-formoterol (DULERA) 200-5 MCG/ACT inhaler Inhale 2 puffs into the lungs 2 times daily  Qty: 1 each, Refills: 5    Associated Diagnoses: Stage 4 very severe COPD by GOLD classification ([address]           ALLERGIES     is allergic to prednisone, morphine, and propoxyphene. FAMILY HISTORY     He indicated that his mother is alive. He indicated that his father is . SOCIAL HISTORY       Social History     Tobacco Use    Smoking status: Every Day     Packs/day: 1.00     Years: 45.00     Pack years: 45.00     Types: Cigarettes    Smokeless tobacco: Never   Vaping Use    Vaping Use: Never used   Substance Use Topics    Alcohol use: Yes     Comment: 6 pack a day last drink 2 days ago    Drug use: Never     PHYSICAL EXAM     INITIAL VITALS: BP (!) 128/95   Pulse 93   Temp 97.5 °F (36.4 °C)   Resp 20   Ht 6' (1.829 m)   Wt 106 lb 4.2 oz (48.2 kg)   SpO2 96%   BMI 14.41 kg/m²    Physical Exam  Constitutional:       General: He is not in acute distress. Appearance: Normal appearance. HENT:      Head: Normocephalic and atraumatic. Right Ear: External ear normal.      Left Ear: External ear normal.      Nose: Nose normal. No congestion or rhinorrhea. Eyes:      Extraocular Movements: Extraocular movements intact. Pupils: Pupils are equal, round, and reactive to light. Cardiovascular:      Rate and Rhythm: Normal rate and regular rhythm. Pulses: Normal pulses. Heart sounds: Normal heart sounds. Pulmonary:      Effort: Pulmonary effort is normal. No respiratory distress. Breath sounds: Normal breath sounds. Abdominal:      General: Bowel sounds are normal.      Palpations: Abdomen is soft. Musculoskeletal:         General: No deformity. Normal range of motion. Cervical back: Normal range of motion. No rigidity. Skin:     General: Skin is warm and dry. Neurological:      General: No focal deficit present. Mental Status: He is alert and oriented to person, place, and time.  Mental status is at baseline. Psychiatric:         Mood and Affect: Mood is anxious. Speech: Speech is rapid and pressured. Behavior: Behavior is agitated and hyperactive. Thought Content: Thought content does not include homicidal or suicidal ideation. MEDICAL DECISION MAKING:   Patient in a delirious/manic state. Patient's symptoms have been progressively getting worse since the late patient's last admission on the 18th, per the family. Lab work in the ER did display elevated white count which is similar to previous lab values from earlier this month. CT of the head did not display signs of acute abnormality. Psychiatry was spoken with who recommended admission to the medical team with a consult to them. The patient was admitted to the medicine team after speaking with the medicine team.  A consult to psychiatry was placed. A routine EEG was placed upon admission per the request of the psychiatrist.  Patient understands the plan. CRITICAL CARE:       PROCEDURES:    Procedures    DIAGNOSTIC RESULTS   EKG:All EKG's are interpreted by the Emergency Department Physician who either signs or Co-signs this chart in the absence of a cardiologist.        RADIOLOGY:All plain film, CT, MRI, and formal ultrasound images (except ED bedside ultrasound) are read by the radiologist, see reports below, unless otherwisenoted in MDM or here. CT HEAD WO CONTRAST   Final Result   No acute intracranial abnormality. LABS: All lab results were reviewed by myself, and all abnormals are listed below.   Labs Reviewed   BASIC METABOLIC PANEL - Abnormal; Notable for the following components:       Result Value    Glucose 101 (*)     Creatinine 0.62 (*)     All other components within normal limits   CBC WITH AUTO DIFFERENTIAL - Abnormal; Notable for the following components:    WBC 23.0 (*)     RBC 3.98 (*)     Hemoglobin 12.9 (*)     Hematocrit 38.2 (*)     Monocytes 10 (*)     Segs Absolute 11.27 (*)     Absolute Lymph # 7.82 (*)     Absolute Mono # 2.30 (*)     All other components within normal limits   ETHANOL   URINALYSIS WITH REFLEX TO CULTURE   URINE DRUG SCREEN   BASIC METABOLIC PANEL W/ REFLEX TO MG FOR LOW K   CBC WITH AUTO DIFFERENTIAL   AMMONIA       EMERGENCY DEPARTMENTCOURSE:         Vitals:    Vitals:    10/27/22 1836 10/27/22 2031 10/27/22 2314 10/28/22 0000   BP: (!) 137/92  (!) 128/95    Pulse: (!) 102  93    Resp: 18  20    Temp: 98.5 °F (36.9 °C)  97.5 °F (36.4 °C)    TempSrc: Oral      SpO2:  96% 96%    Weight:    106 lb 4.2 oz (48.2 kg)   Height:    6' (1.829 m)       The patient was given the following medications while in the emergency department:  Orders Placed This Encounter   Medications    nicotine (NICODERM CQ) 21 MG/24HR 1 patch    LORazepam (ATIVAN) injection 2 mg    lactobacillus (CULTURELLE) capsule 1 capsule    albuterol sulfate HFA (PROVENTIL;VENTOLIN;PROAIR) 108 (90 Base) MCG/ACT inhaler 2 puff     Order Specific Question:   Initiate RT Bronchodilator Protocol     Answer:   Yes - Inpatient Protocol    metroNIDAZOLE (FLAGYL) tablet 500 mg     Order Specific Question:   Antimicrobial Indications     Answer:   Intra-Abdominal Infection    levoFLOXacin (LEVAQUIN) tablet 750 mg     Order Specific Question:   Antimicrobial Indications     Answer:   Intra-Abdominal Infection    docusate sodium (COLACE) capsule 100 mg    ipratropium (ATROVENT) 0.02 % nebulizer solution 0.5 mg     Order Specific Question:   Initiate RT Bronchodilator Protocol     Answer:   Yes - Inpatient Protocol    budesonide-formoterol (SYMBICORT) 160-4.5 MCG/ACT inhaler 2 puff    OLANZapine (ZYPREXA) tablet 15 mg    acetaminophen (TYLENOL) tablet 650 mg    polyethylene glycol (GLYCOLAX) packet 17 g    enoxaparin (LOVENOX) injection 40 mg     Order Specific Question:   Indication of Use     Answer:   Prophylaxis-DVT/PE    DISCONTD: haloperidol (HALDOL) tablet 5 mg    DISCONTD: haloperidol lactate (HALDOL) injection 5 mg    traZODone (DESYREL) tablet 50 mg    hydrOXYzine HCl (ATARAX) tablet 50 mg    OR Linked Order Group     haloperidol (HALDOL) tablet 10 mg     haloperidol lactate (HALDOL) injection 10 mg    LORazepam (ATIVAN) injection 1 mg     CONSULTS:  IP CONSULT TO PSYCHIATRY    FINAL IMPRESSION      1. Psychosis, unspecified psychosis type [facility])          DISPOSITION/PLAN   DISPOSITION Admitted 10/27/2022 09:24:52 PM      PATIENT REFERRED TO:  No follow-up provider specified. DISCHARGE MEDICATIONS:  Current Discharge Medication List        The care is provided during an unprecedented national emergency due to the novel coronavirus, COVID 19.   DO Jeremy Boyer DO  10/28/22 0159

## 2022-10-27 NOTE — ED NOTES
Psychosocial and Contextual Factors:   Patient presents at the ED by family members due to the progressively worsening state of patient. C-SSRS Summary:  X    Patient: X  Family: X  Agency:     Substance Abuse    Present Suicidal Behavior:  Unable assess due to psychosis. Patient did shout \"I was thinking of blowing my head off. \"     Verbal:     Attempt:    Past Suicidal Behavior: Unable assess due to psychosis     Verbal:    Attempt:      Self-Injurious/Self-Mutilation: Unable assess due to psychosis       Violence Current or Past: Unable assess due to psychosis       Trauma Identified:  Unable assess due to psychosis     Protective Factors:    Patient has a good support system       Risk Factors:          Clinical Summary:    Jaylon Beavers is a 60 y.o. male who presents at the ED by family members due to the progressively worsening state of patient. Patient is currently is manic and in psychosis. Patient has not been able to assess due to psychosis. Patient continues to to talk about random things including:  \"I came from [employer] hoping to save my family\"  \"Someone get a camera because the first billion is going to be mine and then I will give the rest to maria victoria. \"   \"I am a saint who will save people\"  \"Someone get me the news so I can talk to [employer]\"  \"Bad people are coming back\" \"Obed is the bad person\"  \"I want to see the president and shake his hand, if you called the [employer] right now they will answer for this. \"  \"I am already dead. \"    Patient is able to listen and follow direction but will continue to go on a tangent. During assessment patient will redirect when asked questions. Patient will continue to talk and start to cry but then immediately switch to a normal voice. Patient is talking to the television believing they are responding back to him.     Level of Care Disposition:    Patient is going to be admitted medically

## 2022-10-27 NOTE — LETTER
[address] [facility] [address]  Phone: [phone]            October 28, 2022     Patient: Haroldo Vines       Date of Visit: 10/27/2022           To Whom It May Concern:     Kacy Arden was in this facility for a mandatory family meeting with several of the patient's  physicians. Family arrived at 6 am and completed meetings with physicians and hospital personnel at 4 pm.       If you have any questions or concerns, please don't hesitate to call.       Sincerely,        Bhupinder Lane Rn

## 2022-10-27 NOTE — ED TRIAGE NOTES
Mode of arrival (squad #, walk in, police, etc) : walk in        Chief complaint(s): manic behavior        Arrival Note (brief scenario, treatment PTA, etc). : family states pt has been talking nonstop, pacing with racing thoughts since this morning. Pt was just admitted here for 2 weeks with \"psychosis\" per pt        C= \"Have you ever felt that you should Cut down on your drinking? \"  No  A= \"Have people Annoyed you by criticizing your drinking? \"  No  G= \"Have you ever felt bad or Guilty about your drinking? \"  No  E= \"Have you ever had a drink as an Eye-opener first thing in the morning to steady your nerves or to help a hangover? \"  No      Deferred []      Reason for deferring: N/A    *If yes to two or more: probable alcohol abuse. *

## 2022-10-28 ENCOUNTER — APPOINTMENT (OUTPATIENT)
Dept: NEUROLOGY | Age: 62
DRG: 885 | End: 2022-10-28
Payer: OTHER GOVERNMENT

## 2022-10-28 PROBLEM — F29 PSYCHOSIS (HCC): Status: ACTIVE | Noted: 2022-10-07

## 2022-10-28 LAB
ABSOLUTE EOS #: 0.33 K/UL (ref 0–0.4)
ABSOLUTE LYMPH #: 7.81 K/UL (ref 1–4.8)
ABSOLUTE MONO #: 1.49 K/UL (ref 0.1–1.3)
AMMONIA: 30 UMOL/L (ref 16–60)
AMPHETAMINE SCREEN URINE: NEGATIVE
ANION GAP SERPL CALCULATED.3IONS-SCNC: 9 MMOL/L (ref 9–17)
ATYPICAL LYMPHOCYTE ABSOLUTE COUNT: 0.5 K/UL
ATYPICAL LYMPHOCYTES: 3 %
BARBITURATE SCREEN URINE: NEGATIVE
BASOPHILS # BLD: 0 % (ref 0–2)
BASOPHILS ABSOLUTE: 0 K/UL (ref 0–0.2)
BENZODIAZEPINE SCREEN, URINE: NEGATIVE
BILIRUBIN URINE: NEGATIVE
BUN BLDV-MCNC: 10 MG/DL (ref 8–23)
CALCIUM SERPL-MCNC: 8.7 MG/DL (ref 8.6–10.4)
CANNABINOID SCREEN URINE: NEGATIVE
CHLORIDE BLD-SCNC: 102 MMOL/L (ref 98–107)
CO2: 27 MMOL/L (ref 20–31)
COCAINE METABOLITE, URINE: NEGATIVE
COLOR: YELLOW
COMMENT UA: NORMAL
CREAT SERPL-MCNC: 0.59 MG/DL (ref 0.7–1.2)
EOSINOPHILS RELATIVE PERCENT: 2 % (ref 0–4)
FENTANYL URINE: NEGATIVE
GFR SERPL CREATININE-BSD FRML MDRD: >60 ML/MIN/1.73M2
GLUCOSE BLD-MCNC: 91 MG/DL (ref 70–99)
GLUCOSE URINE: NEGATIVE
HCT VFR BLD CALC: 34.9 % (ref 41–53)
HEMOGLOBIN: 11.8 G/DL (ref 13.5–17.5)
KETONES, URINE: NEGATIVE
LEUKOCYTE ESTERASE, URINE: NEGATIVE
LYMPHOCYTES # BLD: 47 % (ref 24–44)
MCH RBC QN AUTO: 32.8 PG (ref 26–34)
MCHC RBC AUTO-ENTMCNC: 33.9 G/DL (ref 31–37)
MCV RBC AUTO: 96.9 FL (ref 80–100)
METHADONE SCREEN, URINE: NEGATIVE
MONOCYTES # BLD: 9 % (ref 1–7)
MORPHOLOGY: ABNORMAL
MORPHOLOGY: ABNORMAL
NITRITE, URINE: NEGATIVE
OPIATES, URINE: NEGATIVE
OXYCODONE SCREEN URINE: NEGATIVE
PDW BLD-RTO: 13.3 % (ref 11.5–14.9)
PH UA: 6 (ref 5–8)
PHENCYCLIDINE, URINE: NEGATIVE
PLATELET # BLD: 330 K/UL (ref 150–450)
PMV BLD AUTO: 6.5 FL (ref 6–12)
POTASSIUM SERPL-SCNC: 4 MMOL/L (ref 3.7–5.3)
PROTEIN UA: NEGATIVE
RBC # BLD: 3.6 M/UL (ref 4.5–5.9)
SEG NEUTROPHILS: 39 % (ref 36–66)
SEGMENTED NEUTROPHILS ABSOLUTE COUNT: 6.47 K/UL (ref 1.3–9.1)
SODIUM BLD-SCNC: 138 MMOL/L (ref 135–144)
SPECIFIC GRAVITY UA: 1.01 (ref 1–1.03)
TEST INFORMATION: NORMAL
TURBIDITY: CLEAR
URINE HGB: NEGATIVE
UROBILINOGEN, URINE: NORMAL
WBC # BLD: 16.6 K/UL (ref 3.5–11)

## 2022-10-28 PROCEDURE — 6360000002 HC RX W HCPCS: Performed by: EMERGENCY MEDICINE

## 2022-10-28 PROCEDURE — 81003 URINALYSIS AUTO W/O SCOPE: CPT

## 2022-10-28 PROCEDURE — 6360000002 HC RX W HCPCS: Performed by: NURSE PRACTITIONER

## 2022-10-28 PROCEDURE — 6370000000 HC RX 637 (ALT 250 FOR IP): Performed by: NURSE PRACTITIONER

## 2022-10-28 PROCEDURE — 80048 BASIC METABOLIC PNL TOTAL CA: CPT

## 2022-10-28 PROCEDURE — 95819 EEG AWAKE AND ASLEEP: CPT

## 2022-10-28 PROCEDURE — 94640 AIRWAY INHALATION TREATMENT: CPT

## 2022-10-28 PROCEDURE — 95819 EEG AWAKE AND ASLEEP: CPT | Performed by: PSYCHIATRY & NEUROLOGY

## 2022-10-28 PROCEDURE — 94761 N-INVAS EAR/PLS OXIMETRY MLT: CPT

## 2022-10-28 PROCEDURE — 85025 COMPLETE CBC W/AUTO DIFF WBC: CPT

## 2022-10-28 PROCEDURE — APPSS60 APP SPLIT SHARED TIME 46-60 MINUTES: Performed by: NURSE PRACTITIONER

## 2022-10-28 PROCEDURE — 94664 DEMO&/EVAL PT USE INHALER: CPT

## 2022-10-28 PROCEDURE — 1200000000 HC SEMI PRIVATE

## 2022-10-28 PROCEDURE — 80307 DRUG TEST PRSMV CHEM ANLYZR: CPT

## 2022-10-28 PROCEDURE — 99253 IP/OBS CNSLTJ NEW/EST LOW 45: CPT | Performed by: PSYCHIATRY & NEUROLOGY

## 2022-10-28 PROCEDURE — 36415 COLL VENOUS BLD VENIPUNCTURE: CPT

## 2022-10-28 PROCEDURE — 99223 1ST HOSP IP/OBS HIGH 75: CPT | Performed by: INTERNAL MEDICINE

## 2022-10-28 PROCEDURE — 82140 ASSAY OF AMMONIA: CPT

## 2022-10-28 RX ORDER — BUDESONIDE AND FORMOTEROL FUMARATE DIHYDRATE 160; 4.5 UG/1; UG/1
2 AEROSOL RESPIRATORY (INHALATION) 2 TIMES DAILY
Status: CANCELLED | OUTPATIENT
Start: 2022-10-28

## 2022-10-28 RX ORDER — HYDROXYZINE HYDROCHLORIDE 25 MG/1
50 TABLET, FILM COATED ORAL 3 TIMES DAILY PRN
Status: DISCONTINUED | OUTPATIENT
Start: 2022-10-28 | End: 2022-10-29 | Stop reason: HOSPADM

## 2022-10-28 RX ORDER — HALOPERIDOL 10 MG/1
10 TABLET ORAL EVERY 4 HOURS PRN
Status: DISCONTINUED | OUTPATIENT
Start: 2022-10-28 | End: 2022-10-29 | Stop reason: HOSPADM

## 2022-10-28 RX ORDER — HALOPERIDOL 5 MG/ML
10 INJECTION INTRAMUSCULAR EVERY 4 HOURS PRN
OUTPATIENT
Start: 2022-10-28

## 2022-10-28 RX ORDER — HALOPERIDOL 5 MG/1
5 TABLET ORAL EVERY 4 HOURS PRN
Status: DISCONTINUED | OUTPATIENT
Start: 2022-10-28 | End: 2022-10-28

## 2022-10-28 RX ORDER — POLYETHYLENE GLYCOL 3350 17 G/17G
17 POWDER, FOR SOLUTION ORAL DAILY PRN
OUTPATIENT
Start: 2022-10-28

## 2022-10-28 RX ORDER — HYDROXYZINE HYDROCHLORIDE 25 MG/1
50 TABLET, FILM COATED ORAL 3 TIMES DAILY PRN
OUTPATIENT
Start: 2022-10-28

## 2022-10-28 RX ORDER — HALOPERIDOL 10 MG/1
10 TABLET ORAL EVERY 4 HOURS PRN
OUTPATIENT
Start: 2022-10-28

## 2022-10-28 RX ORDER — DOCUSATE SODIUM 100 MG/1
100 CAPSULE, LIQUID FILLED ORAL 3 TIMES DAILY PRN
Status: DISCONTINUED | OUTPATIENT
Start: 2022-10-28 | End: 2022-10-29 | Stop reason: HOSPADM

## 2022-10-28 RX ORDER — LORAZEPAM 2 MG/ML
1 INJECTION INTRAMUSCULAR EVERY 4 HOURS PRN
Status: DISCONTINUED | OUTPATIENT
Start: 2022-10-28 | End: 2022-10-29 | Stop reason: HOSPADM

## 2022-10-28 RX ORDER — TRAZODONE HYDROCHLORIDE 50 MG/1
50 TABLET ORAL NIGHTLY PRN
Status: DISCONTINUED | OUTPATIENT
Start: 2022-10-28 | End: 2022-10-29

## 2022-10-28 RX ORDER — POLYETHYLENE GLYCOL 3350 17 G/17G
17 POWDER, FOR SOLUTION ORAL DAILY PRN
Status: DISCONTINUED | OUTPATIENT
Start: 2022-10-28 | End: 2022-10-29 | Stop reason: HOSPADM

## 2022-10-28 RX ORDER — ENOXAPARIN SODIUM 100 MG/ML
40 INJECTION SUBCUTANEOUS DAILY
Status: DISCONTINUED | OUTPATIENT
Start: 2022-10-28 | End: 2022-10-28

## 2022-10-28 RX ORDER — OLANZAPINE 10 MG/1
15 TABLET ORAL 2 TIMES DAILY
OUTPATIENT
Start: 2022-10-28

## 2022-10-28 RX ORDER — LORAZEPAM 2 MG/ML
1 INJECTION INTRAMUSCULAR EVERY 4 HOURS PRN
OUTPATIENT
Start: 2022-10-28

## 2022-10-28 RX ORDER — TRAZODONE HYDROCHLORIDE 50 MG/1
50 TABLET ORAL NIGHTLY PRN
Status: CANCELLED | OUTPATIENT
Start: 2022-10-28

## 2022-10-28 RX ORDER — NICOTINE 21 MG/24HR
1 PATCH, TRANSDERMAL 24 HOURS TRANSDERMAL DAILY
OUTPATIENT
Start: 2022-10-29

## 2022-10-28 RX ORDER — ALBUTEROL SULFATE 90 UG/1
2 AEROSOL, METERED RESPIRATORY (INHALATION) EVERY 4 HOURS PRN
Status: CANCELLED | OUTPATIENT
Start: 2022-10-28

## 2022-10-28 RX ORDER — METRONIDAZOLE 500 MG/1
500 TABLET ORAL EVERY 8 HOURS SCHEDULED
Status: CANCELLED | OUTPATIENT
Start: 2022-10-28 | End: 2022-11-01

## 2022-10-28 RX ORDER — OLANZAPINE 10 MG/1
15 TABLET ORAL 2 TIMES DAILY
Status: DISCONTINUED | OUTPATIENT
Start: 2022-10-28 | End: 2022-10-29 | Stop reason: HOSPADM

## 2022-10-28 RX ORDER — BUDESONIDE AND FORMOTEROL FUMARATE DIHYDRATE 160; 4.5 UG/1; UG/1
2 AEROSOL RESPIRATORY (INHALATION) 2 TIMES DAILY
Status: DISCONTINUED | OUTPATIENT
Start: 2022-10-28 | End: 2022-10-29 | Stop reason: HOSPADM

## 2022-10-28 RX ORDER — METRONIDAZOLE 500 MG/1
500 TABLET ORAL EVERY 8 HOURS SCHEDULED
Status: DISCONTINUED | OUTPATIENT
Start: 2022-10-28 | End: 2022-10-29 | Stop reason: HOSPADM

## 2022-10-28 RX ORDER — LACTOBACILLUS RHAMNOSUS GG 10B CELL
1 CAPSULE ORAL DAILY
Status: DISCONTINUED | OUTPATIENT
Start: 2022-10-28 | End: 2022-10-29 | Stop reason: HOSPADM

## 2022-10-28 RX ORDER — ENOXAPARIN SODIUM 100 MG/ML
30 INJECTION SUBCUTANEOUS DAILY
Status: DISCONTINUED | OUTPATIENT
Start: 2022-10-28 | End: 2022-10-29 | Stop reason: HOSPADM

## 2022-10-28 RX ORDER — ACETAMINOPHEN 325 MG/1
650 TABLET ORAL EVERY 4 HOURS PRN
OUTPATIENT
Start: 2022-10-28

## 2022-10-28 RX ORDER — ACETAMINOPHEN 325 MG/1
650 TABLET ORAL EVERY 4 HOURS PRN
Status: DISCONTINUED | OUTPATIENT
Start: 2022-10-28 | End: 2022-10-29 | Stop reason: HOSPADM

## 2022-10-28 RX ORDER — HALOPERIDOL 5 MG/ML
10 INJECTION INTRAMUSCULAR EVERY 4 HOURS PRN
Status: DISCONTINUED | OUTPATIENT
Start: 2022-10-28 | End: 2022-10-29 | Stop reason: HOSPADM

## 2022-10-28 RX ORDER — LACTOBACILLUS RHAMNOSUS GG 10B CELL
1 CAPSULE ORAL DAILY
Status: CANCELLED | OUTPATIENT
Start: 2022-10-29

## 2022-10-28 RX ORDER — LEVOFLOXACIN 500 MG/1
750 TABLET, FILM COATED ORAL DAILY
Status: CANCELLED | OUTPATIENT
Start: 2022-10-29 | End: 2022-11-02

## 2022-10-28 RX ORDER — HALOPERIDOL 5 MG/ML
5 INJECTION INTRAMUSCULAR EVERY 4 HOURS PRN
Status: DISCONTINUED | OUTPATIENT
Start: 2022-10-28 | End: 2022-10-28

## 2022-10-28 RX ORDER — LEVOFLOXACIN 750 MG/1
750 TABLET ORAL DAILY
Status: DISCONTINUED | OUTPATIENT
Start: 2022-10-28 | End: 2022-10-29 | Stop reason: HOSPADM

## 2022-10-28 RX ORDER — ALBUTEROL SULFATE 90 UG/1
2 AEROSOL, METERED RESPIRATORY (INHALATION) EVERY 4 HOURS PRN
Status: DISCONTINUED | OUTPATIENT
Start: 2022-10-28 | End: 2022-10-29 | Stop reason: HOSPADM

## 2022-10-28 RX ORDER — DOCUSATE SODIUM 100 MG/1
100 CAPSULE, LIQUID FILLED ORAL 3 TIMES DAILY PRN
Status: CANCELLED | OUTPATIENT
Start: 2022-10-28

## 2022-10-28 RX ADMIN — BUDESONIDE AND FORMOTEROL FUMARATE DIHYDRATE 2 PUFF: 160; 4.5 AEROSOL RESPIRATORY (INHALATION) at 19:19

## 2022-10-28 RX ADMIN — HYDROXYZINE HYDROCHLORIDE 50 MG: 25 TABLET, FILM COATED ORAL at 18:27

## 2022-10-28 RX ADMIN — HALOPERIDOL LACTATE 5 MG: 5 INJECTION, SOLUTION INTRAMUSCULAR at 00:33

## 2022-10-28 RX ADMIN — METRONIDAZOLE 500 MG: 500 TABLET ORAL at 16:57

## 2022-10-28 RX ADMIN — METRONIDAZOLE 500 MG: 500 TABLET ORAL at 20:05

## 2022-10-28 RX ADMIN — LORAZEPAM 1 MG: 2 INJECTION INTRAMUSCULAR; INTRAVENOUS at 20:39

## 2022-10-28 RX ADMIN — LORAZEPAM 2 MG: 2 INJECTION INTRAMUSCULAR; INTRAVENOUS at 01:51

## 2022-10-28 RX ADMIN — IPRATROPIUM BROMIDE 0.5 MG: 0.5 SOLUTION RESPIRATORY (INHALATION) at 14:41

## 2022-10-28 RX ADMIN — OLANZAPINE 15 MG: 10 TABLET, FILM COATED ORAL at 20:05

## 2022-10-28 RX ADMIN — IPRATROPIUM BROMIDE 0.5 MG: 0.5 SOLUTION RESPIRATORY (INHALATION) at 19:19

## 2022-10-28 RX ADMIN — OLANZAPINE 15 MG: 10 TABLET, FILM COATED ORAL at 12:23

## 2022-10-28 RX ADMIN — TRAZODONE HYDROCHLORIDE 50 MG: 50 TABLET ORAL at 20:05

## 2022-10-28 RX ADMIN — HYDROXYZINE HYDROCHLORIDE 50 MG: 25 TABLET, FILM COATED ORAL at 00:34

## 2022-10-28 RX ADMIN — METRONIDAZOLE 500 MG: 500 TABLET ORAL at 06:30

## 2022-10-28 RX ADMIN — LEVOFLOXACIN 750 MG: 750 TABLET, FILM COATED ORAL at 12:01

## 2022-10-28 RX ADMIN — OLANZAPINE 15 MG: 10 TABLET, FILM COATED ORAL at 00:34

## 2022-10-28 RX ADMIN — TRAZODONE HYDROCHLORIDE 50 MG: 50 TABLET ORAL at 00:34

## 2022-10-28 RX ADMIN — METRONIDAZOLE 500 MG: 500 TABLET ORAL at 00:34

## 2022-10-28 ASSESSMENT — PAIN SCALES - GENERAL: PAINLEVEL_OUTOF10: 0

## 2022-10-28 NOTE — CONSULTS
Department of Psychiatry   Psychiatric Assessment      Thank you very much for allowing us to participate in the care of this patient. Reason for Consult:  evaluation for admission to [facility]    HISTORY OF PRESENT ILLNESS:      The patient is a 60 y.o. male with a  psychiatric history of acute psychosis was recently discharged to [facility] on 10/18/2022. Patient has medical diagnoses COPD, emphysema, cachexia, and chronic lymphocytic leukemia. He is admitted medically for altered mental status and manic behavior. Patient's CBC abnormal with elevated WBC. EEG performed, and indicative of mild encephalopathy. CT of head without contrast shows no intracranial abnormality. Family reports that patient had been doing fairly well and rapidly decompensated yesterday and was noted to be aggressive with pressured speech and confusion. He had been aggressive with his wife and son and reported to have been exhibiting paranoia    On assessment, patient is resting in bed. He is easily arousable to verbal stimuli. Oriented to person, place, time and general situation. Thought process is mostly linear, but he does provide some illogical responses to questions. Patient appears malnourished, cachectic and older than stated age. He reports that he is upset that he is in the hospital, as he has been admitted for most of the month and has been following up with multiple outpatient providers. Patient states he is frustrated with his family, as they believe that he is crazy and was speaking a different language yesterday prior to admission. Soco Velazquez has poor recall of the events that led to admission. He does verbalize that he believes his mind has been playing tricks on him, states that his thoughts have been racing, has had little need for sleep, and identifies that his speech has been rapid.   He states that he has not been compliant with his psychotropic medication prescribed at discharge and verbalizes desire for [facility] natural\" remedies. He denies any problems with the olanzapine that he was prescribed and does state that he would restart this medication. Patient notes that he feels sad and depressed. Expresses feeling down more days than not, for majority of the time. Does verbalize that he has had fleeting suicidal thoughts as he no longer wants to live with his multiple medical conditions, time-consuming medical appointments, and frequent admissions to hospitals. PSYCHIATRIC HISTORY:      [facility] psychiatric provider[facility]CP Dr. Master Rayo attempts: Denies  Inpatient psychiatric admissions: Recently admitted to [facility] on 10/6/2022    Past psychiatric medications includes:   Olanzapine 15 mg - not compliant    Adverse reactions from psychotropic medications:    No      Lifetime Psychiatric Review of Systems         Obsessions and Compulsions: Denies       June or Hypomania: racing thoughts, need for less sleep, rapid speech     Hallucinations: Endorses      Panic Attacks:  Denies     Delusions:  Endorses paranoia     Phobias:  Denies     Trauma: Denies    Prior to Admission medications    Medication Sig Start Date End Date Taking?  Authorizing Provider   azithromycin (ZITHROMAX) 250 MG tablet Take 250 mg by mouth daily For 5 days starting 10/24/22  500 mg day 1 then 250 mg daily days 2-5 10/24/22 10/28/22  Historical Provider, MD   lidocaine viscous hcl (XYLOCAINE) 2 % SOLN solution Take 15 mLs by mouth every 3 hours as needed for Irritation    Historical Provider, MD   OLANZapine (ZYPREXA) 15 MG tablet Take 1 tablet by mouth in the morning and at bedtime 10/18/22   Dannie Coe MD   albuterol sulfate HFA (PROVENTIL;VENTOLIN;PROAIR) 108 (90 Base) MCG/ACT inhaler Inhale 2 puffs into the lungs every 4 hours as needed for Wheezing    Historical Provider, MD   docusate sodium (COLACE) 100 MG capsule Take 100 mg by mouth 3 times daily as needed for Constipation    Historical Provider, MD   ipratropium (ATROVENT) 0.02 % nebulizer solution Take 0.5 mg by nebulization 4 times daily    Historical Provider, MD   Acidophilus Lactobacillus CAPS Take 1 capsule by mouth daily    Historical Provider, MD   ibuprofen (ADVIL;MOTRIN) 200 MG tablet Take 200 mg by mouth every 6 hours as needed for Pain Pt taking 2 pills prn    Historical Provider, MD   mometasone-formoterol (DULERA) 200-5 MCG/ACT inhaler Inhale 2 puffs into the lungs 2 times daily 6/1/22   Chetan Farmer MD        Medications:    Current Facility-Administered Medications: lactobacillus (CULTURELLE) capsule 1 capsule, 1 capsule, Oral, Daily  albuterol sulfate HFA (PROVENTIL;VENTOLIN;PROAIR) 108 (90 Base) MCG/ACT inhaler 2 puff, 2 puff, Inhalation, Q4H PRN  metroNIDAZOLE (FLAGYL) tablet 500 mg, 500 mg, Oral, 3 times per day  levoFLOXacin (LEVAQUIN) tablet 750 mg, 750 mg, Oral, Daily  docusate sodium (COLACE) capsule 100 mg, 100 mg, Oral, TID PRN  ipratropium (ATROVENT) 0.02 % nebulizer solution 0.5 mg, 0.5 mg, Nebulization, 4x Daily  budesonide-formoterol (SYMBICORT) 160-4.5 MCG/ACT inhaler 2 puff, 2 puff, Inhalation, BID  OLANZapine (ZYPREXA) tablet 15 mg, 15 mg, Oral, BID  acetaminophen (TYLENOL) tablet 650 mg, 650 mg, Oral, Q4H PRN  polyethylene glycol (GLYCOLAX) packet 17 g, 17 g, Oral, Daily PRN  traZODone (DESYREL) tablet 50 mg, 50 mg, Oral, Nightly PRN  hydrOXYzine HCl (ATARAX) tablet 50 mg, 50 mg, Oral, TID PRN  haloperidol (HALDOL) tablet 10 mg, 10 mg, Oral, Q4H PRN **OR** haloperidol lactate (HALDOL) injection 10 mg, 10 mg, IntraMUSCular, Q4H PRN  LORazepam (ATIVAN) injection 1 mg, 1 mg, IntraVENous, Q4H PRN  enoxaparin Sodium (LOVENOX) injection 30 mg, 30 mg, SubCUTAneous, Daily  nicotine (NICODERM CQ) 21 MG/24HR 1 patch, 1 patch, TransDERmal, Daily     Past Medical History:        Diagnosis Date    Asthma     COPD (chronic obstructive pulmonary disease) ([facility] [address]     Emphysema of lung ([facility] [address]        Past Surgical History:        Procedure Laterality Date    SKIN GRAFT Bilateral     lower extremities       Allergies: Prednisone, Morphine, and Propoxyphene      Social History:      RESIDENCE: Lives with his wife   CHILDREN: 2 adult children  OCCUPATION: Retired   EDUCATION: high school graduate    SUBSTANCE USE HISTORY:     Urine toxicology negative. Etoh less than 0.01%. Family Medical and Psychiatric History:     Patient reports history of substance use in family. History reviewed. No pertinent family history. Physical  /82   Pulse 91   Temp 98.3 °F (36.8 °C) (Oral)   Resp 16   Ht 6' (1.829 m)   Wt 106 lb 4.2 oz (48.2 kg)   SpO2 95%   BMI 14.41 kg/m²         Mental Status Examination:    Level of consciousness: Awake and alert  Appearance:  Appropriate attire, lying in hospital bed, fair grooming and hygiene  Behavior/Motor: Approachable, no psychomotor abnormalities noted  Attitude toward examiner: Cooperative, attentive, good eye contact  Speech: Normal rate, volume, and tone. Mood: anxious and frustrated  Affect: congruent  Thought processes: linear at times, illogical  Thought content: Denies suicidal ideation              Denies homicidal ideations               Endorses recent AV hallucinations              Endorses paranoia  Cognition:  Oriented to self, location, time, situation  Concentration: Clinically adequate  Memory: Intact  Insight & Judgment: Poor    DSM-5 DIAGNOSIS:      Acute psychosis  Depression  Rule out neurocognitive disorder    Stressors     Severity of stressors is severe  Source of stressors include: Other psychosocial and environmental stressors    PLAN:      Admit patient to [facility] once medically clear  Continue Olanzapine 15 mg BID  Continue 1:1 sitter for patient safety  Additional recommendations will follow the clinical course. Thank you very much for allowing us to participate in the care of this patient. Time spent 60 min.       Electronically signed by ELISEO Rankin CNP on 10/28/22 at 4:26 PM EDT     I independently saw and evaluated the patient. I reviewed the  documentation above. Any additional comments or changes to the    documentation are stated below otherwise agree with assessment. The patient is known to me from previously being seen on the consult service and from his recent admission to psychiatry. The patient states that he stopped taking his medications after discharge. He is very distractible and is unable to pay attention to my questions. He was oriented to situation but not to place or time. He also appears to be hallucinating. We will restart his olanzapine 15 twice daily      PLAN  Medications as noted above  Attempt to develop insight   Supportive Therapy conducted.   Follow-up daily while on inpatient unit    Electronically signed by Yash Cheung MD on 10/28/22 at 10:36 PM EDT

## 2022-10-28 NOTE — PROGRESS NOTES
Pt is climbing out of bed repeatedly and walked down the craig and was redirected back to room and security called to keep pt in room for 30min while pt was yelling and having delusions and screaming for a cigarette. Rainell Opitz NP notified and informed pt needs restraints to keep him safe, he is refusing aerosol treatments while being wheezy, and all PRN meds given to attempt to calm pt with no results. Zoya Kumar will put new orders in a order bilateral wrist restraints. 1:1 sitter remains at bedside and pt remains safe in bed.

## 2022-10-28 NOTE — CARE COORDINATION
Writer called the [place] hotline for Admission [phone]. Spoke with Rochelle Montes. Received Auth # [id number].     Electronically signed by Jared Shah RN on 10/28/2022 at 3:52 PM

## 2022-10-28 NOTE — ED NOTES
Patient transported from [facility] to hospital floor by staff and security.

## 2022-10-28 NOTE — PLAN OF CARE
Problem: Discharge Planning  Goal: Discharge to home or other facility with appropriate resources  Outcome: Progressing  Flowsheets (Taken 10/27/2022 5120 by Luigi Zapata, RN)  Discharge to home or other facility with appropriate resources:   Identify barriers to discharge with patient and caregiver   Arrange for needed discharge resources and transportation as appropriate   Identify discharge learning needs (meds, wound care, etc)     Problem: Pain  Goal: Verbalizes/displays adequate comfort level or baseline comfort level  Outcome: Progressing  Flowsheets (Taken 10/28/2022 1705)  Verbalizes/displays adequate comfort level or baseline comfort level:   Encourage patient to monitor pain and request assistance   Assess pain using appropriate pain scale   Administer analgesics based on type and severity of pain and evaluate response  Note: Patient denies pain this shift. Problem: Safety - Adult  Goal: Free from fall injury  Outcome: Progressing  Flowsheets (Taken 10/27/2022 2343 by Luigi Zapata, RN)  Free From Fall Injury: Instruct family/caregiver on patient safety     Problem: ABCDS Injury Assessment  Goal: Absence of physical injury  Outcome: Progressing  Flowsheets (Taken 10/27/2022 2343 by Luigi Zapata RN)  Absence of Physical Injury: Implement safety measures based on patient assessment  Note: Fall assessment performed and appropriate measures implemented. Room freed from clutter. Bed in lowest position with wheels locked. Call light in place. ID band in place. Problem: Safety - Medical Restraint  Goal: Remains free of injury from restraints (Restraint for Interference with Medical Device)  Description: INTERVENTIONS:  1. Determine that other, less restrictive measures have been tried or would not be effective before applying the restraint  2. Evaluate the patient's condition at the time of restraint application  3. Inform patient/family regarding the reason for restraint  4.  Q2H: Monitor safety, psychosocial status, comfort, nutrition and hydration  Outcome: Completed  Flowsheets (Taken 10/28/2022 0800)  Remains free of injury from restraints (restraint for interference with medical device):   Determine that other, less restrictive measures have been tried or would not be effective before applying the restraint   Evaluate the patient's condition at the time of restraint application   Every 2 hours: Monitor safety, psychosocial status, comfort, nutrition and hydration  Note: Patient restraints removed this shift. Patient has been cooperative with some mood swings though out the day. Usually when pt was told he was not discharging today. Pt educated to reasoning behind staying and states he is willing to be compliant to go home.  at bedside.

## 2022-10-28 NOTE — CARE COORDINATION
CASE MANAGEMENT NOTE:    Admission Date:  10/27/2022 Yvonne Napoles is a 60 y.o.  male    Admitted for : Acute delirium [R41.0]  Psychosis, unspecified psychosis type ([facility] [address] [F29]    Met with:  Family    PCP:  Kristie Rosales CNP                              Insurance:  [facility]      Is patient alert and oriented at time of discussion:  Yes    Current Residence/ Living Arrangements:  independently at home             Current Services PTA:  No    Does patient go to outpatient dialysis: No  If yes, location and chair time: n/a  Who is their nephrologist? N/a    Is patient agreeable to VNS: Yes    Freedom of choice provided:  Yes    List of [address] provided: Yes    VNS chosen:  Yes    DME:  none    Home Oxygen: Yes 2l/nc PRN    Nebulizer: No    CPAP/BIPAP: No    Supplier: [facility]    Potential Assistance Needed: Yes, needs VNS services through [place] and [facility] services as outpatient per family    SNF needed: No    Freedom of choice and list provided: NA    [facility]acy:  [facility] [address] [place] [address]      Is patient currently receiving oral anticoagulation therapy? No    Is the Patient an [facility] with Readmission Risk Score greater than 14%? No  If yes, pt needs a follow up appointment made within 7 days. Family Members/Caregivers that pt would like involved in their care:    Yes    If yes, list name here:  Spouse Flores    Transportation Provider:  Family             Discharge Plan:  10/28/22 [facility] keshav Fox Rashawn #[id number] Patient is for home with Spouse , Acute Delirium, SI, Psych consult, Neuro Consult, DME: Home oxygen 2l/nc PRN through [place] V[facility]  would like especially if they can have a psych nurse come to the house. Spouse advised she has a  named olesya from [place] coming to the house on Monday to go over his needs. Spouse does not have her telephone number. Current with [facility], [facility]. clinic , Has hx of Leukemia. Plan is to return home with VNS, SUSHIL needs signed and completed.  .Henny Moore Electronically signed by: Philip Espinosa RN on 10/28/2022 at 3:26 PM

## 2022-10-28 NOTE — PROGRESS NOTES
Writer received [employer] from pt's nurse to meet with pt's family about HPOA  Family had complex questions about HPOA, writer asked Spenser Henderson to meet with family. Writer present with family and Spenser Henderson until writer was needed elsewhere       10/28/22 1220   Encounter Summary   Encounter Overview/Reason  Spiritual/Emotional Needs; Advance Care Planning   Service Provided For: Family   Referral/Consult From: Nurse   Support System Spouse; Children   Last Encounter  10/28/22   Complexity of Encounter Moderate   Advance Care Planning   Type ACP conversation   Assessment/Intervention/Outcome   Assessment Anxious; Concerns with suffering; Hopeful   Intervention Active listening;Discussed illness injury and its impact; Explored/Affirmed feelings, thoughts, concerns;Sustaining Presence/Ministry of presence   Outcome Encouraged;Engaged in conversation;Expressed feelings, needs, and concerns; Less anxious, Less agitated;Receptive

## 2022-10-28 NOTE — PROGRESS NOTES
Patient called out stating he felt SOB, and asking for oxygen. Oxygen saturation checked, patient 95% on room air.

## 2022-10-28 NOTE — ED NOTES
Patient is threatening to break down the door to get a cigarette. Patient keeps stating \"I am going to do something bad unless I get a cigarette. \"

## 2022-10-28 NOTE — PROGRESS NOTES
Patient son Kristina Joe received work excuse note to escort his mother to hospital.  Work has strict policy for personal time and may need to confirm time of arrival and departure. Kristina Joe was here from 11 am to 4 pm to discuss with primary care, consulting physicians, and spiritual care.

## 2022-10-28 NOTE — ED NOTES
Report Called to [facility] all questions answered.       Cristiana Wilson RN  10/27/22 5011

## 2022-10-28 NOTE — PROGRESS NOTES
Patient EEG is negative for seizure-like activity  Patient need to continue antibiotic for 5 more days with recent episode of diverticulitis  Has chronically elevated white counts need to follow with heme-onc as outpatient  Patient is medically cleared to go to [facility],

## 2022-10-28 NOTE — PROGRESS NOTES
[facility] Internal Medicine  Jonathan Sanderson MD; Luci Renner MD; Darrion Miguel MD; MD Lolis Sung MD; Gloria Cm MD  [facility]   [address] [facility]                 Date:   10/28/2022  Patientname:  Ida Brady  Date of admission:  10/27/2022  6:44 PM  MRN:   [id number]  Account:  [de-identified]  YOB: 1960  PCP:    Garrett Saenz NP, APRN - CNP  Room:   2063/2063-01  Code Status:    Full Code      Chief Complaint:     Chief Complaint   Patient presents with    Manic Behavior       History of Present Illness:     Ida Brady is a 60 y.o. Unavailable / unknown male who presents with Manic Behavior and is admitted to the hospital for the management of Acute delirium. The Medical history includes stage IV COPD, emphysema, mixed restrictive lung disease, cachexia, CLL, psychosis and delirium. Family brought patient to the ER due to progressively worsening agitation and manic behavior. He was previously admitted for suicidal ideations and family reports that he has not been sleeping well. On assessment his speech is very pressured with flight of ideas and he becomes very agitated with assessment. Unable to answer any questions appropriately and repetitive with statements that that seem paranoid such as asking if he is being found or on camera. He was seen in the ER yesterday and was diagnosed with diverticulitis. He was discharged with prescription for levofloxacin and Flagyl. Patient is unable to provide review of systems. Unable to describe alleviating or aggravating factors. Symptoms are acute, constant and severe. CT head negative for acute abnormality. Labs significant for WBC 23.  He does appear to have a chronically elevated WBC. Urinalysis negative. Vitals stable.   Psychiatry was consulted in the ED and due to elevated WBC and recent diagnosis of diverticulitis, it was felt that patient is more appropriate for hospital admission to work-up acute delirium. EEG ordered per psychiatry      Past Medical History:     Past Medical History:   Diagnosis Date    Asthma     COPD (chronic obstructive pulmonary disease) ([facility] [address]     Emphysema of lung ([facility] [address]         Past Surgical History:     Past Surgical History:   Procedure Laterality Date    SKIN GRAFT Bilateral     lower extremities        Medications Prior to Admission:     Prior to Admission medications    Medication Sig Start Date End Date Taking? Authorizing Provider   azithromycin (ZITHROMAX) 250 MG tablet Take 250 mg by mouth daily For 5 days starting 10/24/22  500 mg day 1 then 250 mg daily days 2-5 10/24/22 10/28/22  Historical Provider, MD   lidocaine viscous hcl (XYLOCAINE) 2 % SOLN solution Take 15 mLs by mouth every 3 hours as needed for Irritation    Historical Provider, MD   OLANZapine (ZYPREXA) 15 MG tablet Take 1 tablet by mouth in the morning and at bedtime 10/18/22   Bassam Villareal MD   albuterol sulfate HFA (PROVENTIL;VENTOLIN;PROAIR) 108 (90 Base) MCG/ACT inhaler Inhale 2 puffs into the lungs every 4 hours as needed for Wheezing    Historical Provider, MD   docusate sodium (COLACE) 100 MG capsule Take 100 mg by mouth 3 times daily as needed for Constipation    Historical Provider, MD   ipratropium (ATROVENT) 0.02 % nebulizer solution Take 0.5 mg by nebulization 4 times daily    Historical Provider, MD   Acidophilus Lactobacillus CAPS Take 1 capsule by mouth daily    Historical Provider, MD   ibuprofen (ADVIL;MOTRIN) 200 MG tablet Take 200 mg by mouth every 6 hours as needed for Pain Pt taking 2 pills prn    Historical Provider, MD   mometasone-formoterol (DULERA) 200-5 MCG/ACT inhaler Inhale 2 puffs into the lungs 2 times daily 6/1/22   Brenda Perry MD        Allergies:     Prednisone, Morphine, and Propoxyphene    Social History:     Tobacco:    reports that he has been smoking cigarettes. He has a 45.00 pack-year smoking history.  He has never used smokeless tobacco.  Alcohol:      reports current alcohol use. Drug Use:  reports no history of drug use. Family History:     History reviewed. No pertinent family history. Investigations:      Laboratory Testing:  Recent Results (from the past 24 hour(s))   Ethanol    Collection Time: 10/27/22  7:30 PM   Result Value Ref Range    Ethanol <10 <10 mg/dL    Ethanol percent <0.010 %   Basic Metabolic Panel    Collection Time: 10/27/22  7:30 PM   Result Value Ref Range    Glucose 101 (H) 70 - 99 mg/dL    BUN 9 8 - 23 mg/dL    Creatinine 0.62 (L) 0.70 - 1.20 mg/dL    Est, Glom Filt Rate >60 >60 mL/min/1.73m2    Calcium 9.1 8.6 - 10.4 mg/dL    Sodium 137 135 - 144 mmol/L    Potassium 4.6 3.7 - 5.3 mmol/L    Chloride 98 98 - 107 mmol/L    CO2 24 20 - 31 mmol/L    Anion Gap 15 9 - 17 mmol/L   CBC with Auto Differential    Collection Time: 10/27/22  7:30 PM   Result Value Ref Range    WBC 23.0 (H) 3.5 - 11.0 k/uL    RBC 3.98 (L) 4.5 - 5.9 m/uL    Hemoglobin 12.9 (L) 13.5 - 17.5 g/dL    Hematocrit 38.2 (L) 41 - 53 %    MCV 96.0 80 - 100 fL    MCH 32.3 26 - 34 pg    MCHC 33.6 31 - 37 g/dL    RDW 13.4 11.5 - 14.9 %    Platelets 613 711 - 946 k/uL    MPV 6.5 6.0 - 12.0 fL    Seg Neutrophils 49 36 - 66 %    Lymphocytes 34 24 - 44 %    Atypical Lymphocytes 6 %    Monocytes 10 (H) 1 - 7 %    Eosinophils % 1 0 - 4 %    Basophils 0 0 - 2 %    Segs Absolute 11.27 (H) 1.3 - 9.1 k/uL    Absolute Lymph # 7.82 (H) 1.0 - 4.8 k/uL    Atypical Lymphocytes Absolute 1.38 k/uL    Absolute Mono # 2.30 (H) 0.1 - 1.3 k/uL    Absolute Eos # 0.23 0.0 - 0.4 k/uL    Basophils Absolute 0.00 0.0 - 0.2 k/uL    Morphology Normal        Imaging/Diagnostics:  CT HEAD WO CONTRAST    Result Date: 10/27/2022  No acute intracranial abnormality. Plan:     Patient status inpatient in the Med/Surge    Acute delirium  -CT head - no intracranial abnormality  -check Ammonia level  -UA negative  -WBC 23, Metabolic encephalopathy?    -EEG per psychiatry    Acute Diverticulitis  -Levaquin and Flagyl prescribed at previous ED visit  -Continue on admission  -monitor CBC    Appears manic with previous reports of suicidal ideatition  -psych consult  -Search patient and remove belongings from room  -sitter at bedside  -suicide precautions    Full code    Lovenox for DVT prophylaxis      ELISEO Mcelroy - [facility]  10/28/2022  12:37 AM      Please note that this chart was generated using voice recognition Dragon dictation software. Although every effort was made to ensure the accuracy of this automated transcription, some errors in transcription may have occurred. [facility]  [address], [address] [place] [address].    Phone [phone]

## 2022-10-28 NOTE — PROGRESS NOTES
Writer received referral from Charla Cody to talk with patient and his family regarding ACP; writer talked with Tiarra Anthony RN, who stated patient was Carmella Lechuga an episode of psychosis and was unable to execute ACP documents at this time\"; met with rosaura's wife, son and Charla Cody in [facility] area; explained to family patient being unable to execute ACP documents at this time and why; explained in detail the hierarchy for medical decisions makers per the [place] in the absence of HCPOA; family expressed understanding; writer explained that if and when patient is able to execute ACP documents [place] is available to help answer questions and facilitate this happening; family asking to speak with someone at the [facility] regarding patient's last admittance in that unit and the events that happened at that time; faciliated communication with  and Clin Lead in [facility]; and family in this regard; talked with family regarding the difference between medical HCPOA, mental health POA, release of information form and guardianship papers; listening presence and support;  Visited with patient at bedside; patient grateful for visit;     10/28/22 1430   Encounter Summary   Encounter Overview/Reason  Spiritual/Emotional Needs; Advance Care Planning   Service Provided For: Patient and family together   Referral/Consult From: Other    Support System Spouse; Children   Last Encounter  10/28/22   Complexity of Encounter Moderate   Begin Time 1145   End Time  1400   Total Time Calculated 135 min   Spiritual/Emotional needs   Type Spiritual Support   Grief, Loss, and Adjustments   Type Adjustment to illness   Advance Care Planning   Type ACP conversation   Assessment/Intervention/Outcome   Assessment Calm;Coping; Hopeful;Powerlessness; Impaired resilience;Stress overload   Intervention Active listening;Discussed illness injury and its impact; Explored/Affirmed feelings, thoughts, concerns;Sustaining Presence/Ministry of presence; Explored Coping Skills/Resources   Outcome Comfort;Coping;Engaged in conversation;Expressed feelings, needs, and concerns;Expressed Gratitude;Receptive
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
## 2022-10-28 NOTE — PROCEDURES
EEG REPORT    Patient Name: Aziza Melendez  Patient MRN: [id number]    Referring Physician: Ambrosio Klein DO  Dictating Physician: Mirza Gabriel DO  Date: 10/28/2022      CLINICAL HISTORY: This EEG was performed on a 60 y.o. male with The encounter diagnosis was Psychosis, unspecified psychosis type ([facility] . It is being performed to evaluate for seizure activity. CURRENT ANTI-EPILEPTIC MEDICATIONS: None    DESCRIPTION: Wakefulness, drowsiness, and stage II sleep were obtained. During wakefulness there was a posterior background of well modulated, reactive, symmetrical, low voltage, 8 Hz activity. During drowsiness there were symmetrical vertex sharp waves and attenuation of the waking background. During stage II sleep there were symmetrical K complexes and sleep spindles present. Photic stimulation evoked symmetrical posterior driving response at some flash frequencies. Hyperventilation was not done secondary to patient clinical status. EEG DIAGNOSIS: This EEG was abnormal due to the presence of:  1. Mild diffuse background slowing    CLINICAL INTERPRETATION: The mild background slowing in this EEG is indicative of a mild encephalopathy, of nonspecific origin. No epileptiform activity was present.       Mirza Gabriel, [address] [place] [address]  Neurology

## 2022-10-28 NOTE — CARE COORDINATION
Writer spoke with Both Spouse Flores and son Brady Palscencia regarding Discharge plans. Spouse advised She was having a Person named Radha Cantu from the [place] come to her house on Monday to evaluate for needs for patient . Spouse did not have her name. Was advised that patient follows with the [facility] clinic. Writer asked if she had any telephone numbers and she did not. Writer advised that we would need to call [facility] and start from there. Spouse requesting a VNS services. She advised she has a very hard time getting anything done around the house or even grocery shop. She advised Horacio Ricardo follows her another the house and wants to pick a fight and will not stop until he gets a reaction out of her. Jigna Joseph said she is stress and that Horacio Ricardo has put her and her son through [place] during the past 3 months. She is begging for help. I research online and found telephone number to [facility]. [phone]. Writer was transferred to a  by the Name of Rory Walsh. His direct telephone number is [phone] ext [phone]. Rory Walsh has been intouch witgh Patient and also agrees he needs help. He says he spoke wit the patient yesterday and was trying to get services for him. Rory Walsh wa happy he was admitted because he needs help. Writer asked who this olesya is amor tis coming to the house on Monday. Rory Walsh advised her chart notes that is from the mobility clinic. However the appt was cancelled fro some reason and he is not sure why. Writer asked who is he sees at the [facility]. Rory Walsh advised he doesn't have an established [facility] doctor there but says a NP Carla Han and Dr Patricia Trimble office number [phone]. The office is in [place]. Writer called Dr Loretta Chaney office and spoke with their . She advised they are following Horacio Ricardo. She advised that spouse has gone back and forth with wanting hospice the last 3 months. Writer asked if they had ever given patient a VNS.   She advised [facility], but don't think [facility] will see patient since they live in [place] and also use [facility]. Writer spoke with Ilia Dahl and asked if she would like a referral faxed to [facility] and she was agreeable. Called Brain from [facility] and he will call Patients spouse per her request tomorrow to discuss his services. While I was on the telephone with Bruno Capps, she advised that if her   needs inpatient [facility], she is asking if he can go to any place in [address] or [place] [address] [place] [address] [place] as its only 30 minutes from their house. She said the 1 ohur and 15-30 minute drive is tiring. She also asked if [facility] seen him yet. Writer advised they did , but hadn't completed their note yet. After finishing call with Spouse, [facility] is recommending inpatient I. Tried calling over to [facility] to see if they know of any place in [place] that has a behavioral health unit. All social workers and CM left for the day. Will need to revisit this on Monday. Can patient go to [facility] and  then [facility]  work on transferring to a closer facility?     Electronically signed by Minoo Dinero RN on 10/28/2022 at 5:26 PM

## 2022-10-29 ENCOUNTER — HOSPITAL ENCOUNTER (INPATIENT)
Age: 62
LOS: 10 days | Discharge: HOME | DRG: 885 | End: 2022-11-08
Attending: PSYCHIATRY & NEUROLOGY | Admitting: PSYCHIATRY & NEUROLOGY
Payer: OTHER GOVERNMENT

## 2022-10-29 VITALS
HEART RATE: 88 BPM | WEIGHT: 106.26 LBS | SYSTOLIC BLOOD PRESSURE: 144 MMHG | RESPIRATION RATE: 18 BRPM | OXYGEN SATURATION: 97 % | DIASTOLIC BLOOD PRESSURE: 97 MMHG | TEMPERATURE: 97.7 F | HEIGHT: 72 IN | BODY MASS INDEX: 14.39 KG/M2

## 2022-10-29 PROBLEM — J44.89 OBSTRUCTIVE CHRONIC BRONCHITIS WITHOUT EXACERBATION: Status: ACTIVE | Noted: 2022-10-29

## 2022-10-29 PROBLEM — F29 PSYCHOSIS (HCC): Status: ACTIVE | Noted: 2022-09-26

## 2022-10-29 PROBLEM — J44.9 OBSTRUCTIVE CHRONIC BRONCHITIS WITHOUT EXACERBATION (HCC): Status: ACTIVE | Noted: 2022-10-29

## 2022-10-29 PROBLEM — F30.9 MANIA (HCC): Status: ACTIVE | Noted: 2022-10-29

## 2022-10-29 PROBLEM — F17.200 TOBACCO DEPENDENCE: Status: ACTIVE | Noted: 2020-08-06

## 2022-10-29 LAB
ABSOLUTE EOS #: 0.15 K/UL (ref 0–0.4)
ABSOLUTE LYMPH #: 7.23 K/UL (ref 1–4.8)
ABSOLUTE MONO #: 1.6 K/UL (ref 0.1–1.3)
ABSOLUTE RETIC #: 0.04 M/UL (ref 0.02–0.1)
ANION GAP SERPL CALCULATED.3IONS-SCNC: 8 MMOL/L (ref 9–17)
BASOPHILS # BLD: 0 % (ref 0–2)
BASOPHILS ABSOLUTE: 0 K/UL (ref 0–0.2)
BUN BLDV-MCNC: 10 MG/DL (ref 8–23)
CALCIUM SERPL-MCNC: 8.8 MG/DL (ref 8.6–10.4)
CHLORIDE BLD-SCNC: 103 MMOL/L (ref 98–107)
CO2: 27 MMOL/L (ref 20–31)
CREAT SERPL-MCNC: 0.68 MG/DL (ref 0.7–1.2)
EOSINOPHILS RELATIVE PERCENT: 1 % (ref 0–4)
GFR SERPL CREATININE-BSD FRML MDRD: >60 ML/MIN/1.73M2
GLUCOSE BLD-MCNC: 103 MG/DL (ref 70–99)
HCT VFR BLD CALC: 34.8 % (ref 41–53)
HEMOGLOBIN: 11.8 G/DL (ref 13.5–17.5)
LYMPHOCYTES # BLD: 50 % (ref 24–44)
MCH RBC QN AUTO: 32.7 PG (ref 26–34)
MCHC RBC AUTO-ENTMCNC: 33.8 G/DL (ref 31–37)
MCV RBC AUTO: 96.9 FL (ref 80–100)
METAMYELOCYTES ABSOLUTE COUNT: 0.15 K/UL
METAMYELOCYTES: 1 %
MONOCYTES # BLD: 11 % (ref 1–7)
MORPHOLOGY: NORMAL
PDW BLD-RTO: 13.3 % (ref 11.5–14.9)
PLATELET # BLD: 298 K/UL (ref 150–450)
PMV BLD AUTO: 6.4 FL (ref 6–12)
POTASSIUM SERPL-SCNC: 4.1 MMOL/L (ref 3.7–5.3)
RBC # BLD: 3.6 M/UL (ref 4.5–5.9)
RETIC %: 1.3 % (ref 0.5–2)
SEG NEUTROPHILS: 37 % (ref 36–66)
SEGMENTED NEUTROPHILS ABSOLUTE COUNT: 5.37 K/UL (ref 1.3–9.1)
SODIUM BLD-SCNC: 138 MMOL/L (ref 135–144)
WBC # BLD: 14.5 K/UL (ref 3.5–11)

## 2022-10-29 PROCEDURE — 6370000000 HC RX 637 (ALT 250 FOR IP): Performed by: NURSE PRACTITIONER

## 2022-10-29 PROCEDURE — 94640 AIRWAY INHALATION TREATMENT: CPT

## 2022-10-29 PROCEDURE — 99238 HOSP IP/OBS DSCHRG MGMT 30/<: CPT | Performed by: INTERNAL MEDICINE

## 2022-10-29 PROCEDURE — 94761 N-INVAS EAR/PLS OXIMETRY MLT: CPT

## 2022-10-29 PROCEDURE — 80048 BASIC METABOLIC PNL TOTAL CA: CPT

## 2022-10-29 PROCEDURE — 6370000000 HC RX 637 (ALT 250 FOR IP): Performed by: INTERNAL MEDICINE

## 2022-10-29 PROCEDURE — 1240000000 HC EMOTIONAL WELLNESS R&B

## 2022-10-29 PROCEDURE — 6360000002 HC RX W HCPCS: Performed by: NURSE PRACTITIONER

## 2022-10-29 PROCEDURE — 99232 SBSQ HOSP IP/OBS MODERATE 35: CPT | Performed by: PSYCHIATRY & NEUROLOGY

## 2022-10-29 PROCEDURE — 6370000000 HC RX 637 (ALT 250 FOR IP): Performed by: PSYCHIATRY & NEUROLOGY

## 2022-10-29 PROCEDURE — 85045 AUTOMATED RETICULOCYTE COUNT: CPT

## 2022-10-29 PROCEDURE — 85025 COMPLETE CBC W/AUTO DIFF WBC: CPT

## 2022-10-29 PROCEDURE — 36415 COLL VENOUS BLD VENIPUNCTURE: CPT

## 2022-10-29 RX ORDER — OLANZAPINE 15 MG/1
15 TABLET ORAL 2 TIMES DAILY
Status: DISCONTINUED | OUTPATIENT
Start: 2022-10-29 | End: 2022-10-31

## 2022-10-29 RX ORDER — ALBUTEROL SULFATE 90 UG/1
2 AEROSOL, METERED RESPIRATORY (INHALATION) EVERY 4 HOURS PRN
Status: DISCONTINUED | OUTPATIENT
Start: 2022-10-29 | End: 2022-11-08 | Stop reason: HOSPADM

## 2022-10-29 RX ORDER — LORAZEPAM 1 MG/1
2 TABLET ORAL EVERY 6 HOURS PRN
Status: DISCONTINUED | OUTPATIENT
Start: 2022-10-29 | End: 2022-11-08 | Stop reason: HOSPADM

## 2022-10-29 RX ORDER — LEVOFLOXACIN 750 MG/1
750 TABLET ORAL DAILY
Status: COMPLETED | OUTPATIENT
Start: 2022-10-30 | End: 2022-11-02

## 2022-10-29 RX ORDER — TRAZODONE HYDROCHLORIDE 50 MG/1
50 TABLET ORAL NIGHTLY PRN
Status: DISCONTINUED | OUTPATIENT
Start: 2022-10-29 | End: 2022-10-29

## 2022-10-29 RX ORDER — IBUPROFEN 400 MG/1
400 TABLET ORAL EVERY 6 HOURS PRN
Status: DISCONTINUED | OUTPATIENT
Start: 2022-10-29 | End: 2022-11-08 | Stop reason: HOSPADM

## 2022-10-29 RX ORDER — ACETAMINOPHEN 325 MG/1
650 TABLET ORAL EVERY 6 HOURS PRN
Status: DISCONTINUED | OUTPATIENT
Start: 2022-10-29 | End: 2022-11-08 | Stop reason: HOSPADM

## 2022-10-29 RX ORDER — LACTOBACILLUS RHAMNOSUS GG 10B CELL
1 CAPSULE ORAL DAILY
Status: DISCONTINUED | OUTPATIENT
Start: 2022-10-30 | End: 2022-11-08 | Stop reason: HOSPADM

## 2022-10-29 RX ORDER — LORAZEPAM 2 MG/ML
2 INJECTION INTRAMUSCULAR EVERY 6 HOURS PRN
Status: DISCONTINUED | OUTPATIENT
Start: 2022-10-29 | End: 2022-11-08 | Stop reason: HOSPADM

## 2022-10-29 RX ORDER — DOCUSATE SODIUM 100 MG/1
100 CAPSULE, LIQUID FILLED ORAL 3 TIMES DAILY PRN
Status: DISCONTINUED | OUTPATIENT
Start: 2022-10-29 | End: 2022-11-08 | Stop reason: HOSPADM

## 2022-10-29 RX ORDER — METRONIDAZOLE 500 MG/1
500 TABLET ORAL EVERY 8 HOURS SCHEDULED
Status: DISPENSED | OUTPATIENT
Start: 2022-10-29 | End: 2022-11-02

## 2022-10-29 RX ORDER — MAGNESIUM HYDROXIDE/ALUMINUM HYDROXICE/SIMETHICONE 120; 1200; 1200 MG/30ML; MG/30ML; MG/30ML
30 SUSPENSION ORAL EVERY 6 HOURS PRN
Status: DISCONTINUED | OUTPATIENT
Start: 2022-10-29 | End: 2022-11-08 | Stop reason: HOSPADM

## 2022-10-29 RX ORDER — BUDESONIDE AND FORMOTEROL FUMARATE DIHYDRATE 160; 4.5 UG/1; UG/1
2 AEROSOL RESPIRATORY (INHALATION) 2 TIMES DAILY
Status: DISCONTINUED | OUTPATIENT
Start: 2022-10-30 | End: 2022-11-08 | Stop reason: HOSPADM

## 2022-10-29 RX ORDER — DIPHENHYDRAMINE HYDROCHLORIDE 50 MG/ML
50 INJECTION INTRAMUSCULAR; INTRAVENOUS EVERY 6 HOURS PRN
Status: DISCONTINUED | OUTPATIENT
Start: 2022-10-29 | End: 2022-11-08 | Stop reason: HOSPADM

## 2022-10-29 RX ORDER — HALOPERIDOL 5 MG/ML
5 INJECTION INTRAMUSCULAR EVERY 6 HOURS PRN
Status: DISCONTINUED | OUTPATIENT
Start: 2022-10-29 | End: 2022-11-08 | Stop reason: HOSPADM

## 2022-10-29 RX ORDER — HALOPERIDOL 5 MG/1
5 TABLET ORAL EVERY 6 HOURS PRN
Status: DISCONTINUED | OUTPATIENT
Start: 2022-10-29 | End: 2022-11-08 | Stop reason: HOSPADM

## 2022-10-29 RX ADMIN — METRONIDAZOLE 500 MG: 500 TABLET ORAL at 06:42

## 2022-10-29 RX ADMIN — LEVOFLOXACIN 750 MG: 750 TABLET, FILM COATED ORAL at 08:09

## 2022-10-29 RX ADMIN — IPRATROPIUM BROMIDE 0.5 MG: 0.5 SOLUTION RESPIRATORY (INHALATION) at 11:04

## 2022-10-29 RX ADMIN — METRONIDAZOLE 500 MG: 500 TABLET ORAL at 15:06

## 2022-10-29 RX ADMIN — OLANZAPINE 15 MG: 10 TABLET, FILM COATED ORAL at 19:56

## 2022-10-29 RX ADMIN — METRONIDAZOLE 500 MG: 500 TABLET ORAL at 22:07

## 2022-10-29 RX ADMIN — BUDESONIDE AND FORMOTEROL FUMARATE DIHYDRATE 2 PUFF: 160; 4.5 AEROSOL RESPIRATORY (INHALATION) at 18:57

## 2022-10-29 RX ADMIN — IPRATROPIUM BROMIDE 0.5 MG: 0.5 SOLUTION RESPIRATORY (INHALATION) at 16:31

## 2022-10-29 RX ADMIN — OLANZAPINE 15 MG: 10 TABLET, FILM COATED ORAL at 08:09

## 2022-10-29 RX ADMIN — LORAZEPAM 2 MG: 1 TABLET ORAL at 22:07

## 2022-10-29 RX ADMIN — IPRATROPIUM BROMIDE 0.5 MG: 0.5 SOLUTION RESPIRATORY (INHALATION) at 18:57

## 2022-10-29 RX ADMIN — HALOPERIDOL 5 MG: 5 TABLET ORAL at 22:07

## 2022-10-29 RX ADMIN — BUDESONIDE AND FORMOTEROL FUMARATE DIHYDRATE 2 PUFF: 160; 4.5 AEROSOL RESPIRATORY (INHALATION) at 07:12

## 2022-10-29 RX ADMIN — IPRATROPIUM BROMIDE 0.5 MG: 0.5 SOLUTION RESPIRATORY (INHALATION) at 07:12

## 2022-10-29 ASSESSMENT — PAIN SCALES - GENERAL
PAINLEVEL_OUTOF10: 2
PAINLEVEL_OUTOF10: 10

## 2022-10-29 ASSESSMENT — LIFESTYLE VARIABLES
HOW MANY STANDARD DRINKS CONTAINING ALCOHOL DO YOU HAVE ON A TYPICAL DAY: PATIENT DOES NOT DRINK
HOW OFTEN DO YOU HAVE A DRINK CONTAINING ALCOHOL: NEVER

## 2022-10-29 ASSESSMENT — ENCOUNTER SYMPTOMS
COUGH: 0
CONSTIPATION: 0
VOICE CHANGE: 0
TROUBLE SWALLOWING: 0
VOMITING: 0
EYE REDNESS: 0
NAUSEA: 0
EYE DISCHARGE: 0
RHINORRHEA: 0
BLOOD IN STOOL: 0
SHORTNESS OF BREATH: 0
EYES NEGATIVE: 1
WHEEZING: 0
BACK PAIN: 0
SORE THROAT: 0
ABDOMINAL PAIN: 1
DIARRHEA: 0
PHOTOPHOBIA: 0

## 2022-10-29 ASSESSMENT — SLEEP AND FATIGUE QUESTIONNAIRES
AVERAGE NUMBER OF SLEEP HOURS: 4
SLEEP PATTERN: DIFFICULTY FALLING ASLEEP;DISTURBED/INTERRUPTED SLEEP;RESTFUL
DO YOU USE A SLEEP AID: NO
DO YOU HAVE DIFFICULTY SLEEPING: YES

## 2022-10-29 ASSESSMENT — PAIN DESCRIPTION - LOCATION: LOCATION: TEETH

## 2022-10-29 ASSESSMENT — PAIN DESCRIPTION - ORIENTATION: ORIENTATION: RIGHT

## 2022-10-29 NOTE — PROGRESS NOTES
Daily Progress Note  10/29/2022    Patient Name: Ceci Seek: Acute psychosis         SUBJECTIVE:   The patient was seen face to face. He had a sitter present. The patient is going on about being a billionaire in the near future. He also wants me to be the President of the [employer] in the near future. He is euphoric in mood. The patient has flight of ideas. He has no insight into his condition. He denies auditory or visual hallucinations at this time. The patient has been taking olanzapine which he finds helpful. He has slept better. He states he intends to take it going forward        Appetite:  [] Normal/Adequate/Unchanged  [x] Fair  [] Decreased      Sleep:       [] Normal/Adequate/showing improvement [x] Fair  [] Poor      Group Attendance on Unit:   [] Yes  [] Selectively    [x] No    Medication Side Effects: Patient denies any medication side effects at the time of assessment. Mental Status Exam     Level of consciousness: Alert and awake   Appearance: Appropriate attire for setting, seated on bed, with good  grooming and hygiene   Behavior/Motor: Approachable, pleasant, no psychomotor abnormalities  Attitude toward examiner:  cooperative, attentive, good eye contact  Speech: Normal rate volume and tone  Mood: Expansive and euphoric  Affect: Congruent  Thought processes: Linear, remains perseverative  Thought content: Denies homicidal ideation, religiously preoccupied  Suicidal Ideation: Denies suicidal ideations, contracts for safety on the unit. Delusions: Grandiose  Perceptual Disturbance: Denies auditory or visual hallucinations at this time   cognition: Oriented to person, place, time and situation  Memory: intact  Insight: poor   Judgement: Poor    Data   height is 6' (1.829 m) and weight is 106 lb 4.2 oz (48.2 kg). His temperature is 97.7 °F (36.5 °C). His blood pressure is 144/97 (abnormal) and his pulse is 87.  His respiration is 20 and oxygen saturation is 98%. Labs:   Admission on 10/27/2022   Component Date Value Ref Range Status    Color, UA 10/28/2022 Yellow  Yellow Final    Turbidity UA 10/28/2022 Clear  Clear Final    Glucose, Ur 10/28/2022 NEGATIVE  NEGATIVE Final    Bilirubin Urine 10/28/2022 NEGATIVE  NEGATIVE Final    Ketones, Urine 10/28/2022 NEGATIVE  NEGATIVE Final    Specific Gravity, UA 10/28/2022 1.007  1.000 - 1.030 Final    Urine Hgb 10/28/2022 NEGATIVE  NEGATIVE Final    pH, UA 10/28/2022 6.0  5.0 - 8.0 Final    Protein, UA 10/28/2022 NEGATIVE  NEGATIVE Final    Urobilinogen, Urine 10/28/2022 Normal  Normal Final    Nitrite, Urine 10/28/2022 NEGATIVE  NEGATIVE Final    Leukocyte Esterase, Urine 10/28/2022 NEGATIVE  NEGATIVE Final    Urinalysis Comments 10/28/2022 Microscopic exam not performed based on chemical results unless requested in original order.    Final    Amphetamine Screen, Ur 10/28/2022 NEGATIVE  NEGATIVE Final    Comment:       (Positive cutoff 1000 ng/mL)                  Barbiturate Screen, Ur 10/28/2022 NEGATIVE  NEGATIVE Final    Comment:       (Positive cutoff 200 ng/mL)                  Benzodiazepine Screen, Urine 10/28/2022 NEGATIVE   Final    Comment:       (Positive cutoff 200 ng/mL)                  Cocaine Metabolite, Urine 10/28/2022 NEGATIVE  NEGATIVE Final    Comment:       (Positive cutoff 300 ng/mL)                  Methadone Screen, Urine 10/28/2022 NEGATIVE  NEGATIVE Final    Comment:       (Positive cutoff 300 ng/mL)                  Opiates, Urine 10/28/2022 NEGATIVE  NEGATIVE Final    Comment:       (Positive cutoff 300 ng/mL)                  Phencyclidine, Urine 10/28/2022 NEGATIVE  NEGATIVE Final    Comment:       (Positive cutoff 25 ng/mL)                  Cannabinoid Scrn, Ur 10/28/2022 NEGATIVE  NEGATIVE Final    Comment:       (Positive cutoff 50 ng/mL)                  Oxycodone Screen, Ur 10/28/2022 NEGATIVE  NEGATIVE Final    Comment:       (Positive cutoff 100 ng/mL)                  Fentanyl, Ur 10/28/2022 NEGATIVE  NEGATIVE Final    Comment:       (Positive cutoff  5 ng/ml)            Test Information 10/28/2022 Assay provides medical screening only. The absence of expected drug(s) and/or metabolite(s) may indicate diluted or adulterated urine, limitations of testing or timing of collection. Final    Comment: Testing for legal purposes should be confirmed by another method. To request confirmation   of test result, please call the lab within 7 days of sample submission. Ethanol 10/27/2022 <10  <10 mg/dL Final    Ethanol percent 10/27/2022 <0.010  % Final    Glucose 10/27/2022 101 (A)  70 - 99 mg/dL Final    BUN 10/27/2022 9  8 - 23 mg/dL Final    SPECIMEN SLIGHTLY HEMOLYZED, RESULTS MAY BE ADVERSELY AFFECTED. Creatinine 10/27/2022 0.62 (A)  0.70 - 1.20 mg/dL Final    SPECIMEN SLIGHTLY HEMOLYZED, RESULTS MAY BE ADVERSELY AFFECTED. Est, Glom Filt Rate 10/27/2022 >60  >60 mL/min/1.73m2 Final    Comment:       Effective Oct 3, 2022        These results are not intended for use in patients <25years of age. eGFR results are calculated without a race factor using the 2021 CKD-EPI equation. Careful clinical correlation is recommended, particularly when comparing to results   calculated using previous equations. The CKD-EPI equation is less accurate in patients with extremes of muscle mass, extra-renal   metabolism of creatine, excessive creatine ingestion, or following therapy that affects   renal tubular secretion. Calcium 10/27/2022 9.1  8.6 - 10.4 mg/dL Final    SPECIMEN SLIGHTLY HEMOLYZED, RESULTS MAY BE ADVERSELY AFFECTED. Sodium 10/27/2022 137  135 - 144 mmol/L Final    SPECIMEN SLIGHTLY HEMOLYZED, RESULTS MAY BE ADVERSELY AFFECTED. Potassium 10/27/2022 4.6  3.7 - 5.3 mmol/L Final    SPECIMEN SLIGHTLY HEMOLYZED, RESULTS MAY BE ADVERSELY AFFECTED. Chloride 10/27/2022 98  98 - 107 mmol/L Final    SPECIMEN SLIGHTLY HEMOLYZED, RESULTS MAY BE ADVERSELY AFFECTED.     CO2 10/27/2022 24  20 - 31 mmol/L Final    SPECIMEN SLIGHTLY HEMOLYZED, RESULTS MAY BE ADVERSELY AFFECTED. Anion Gap 10/27/2022 15  9 - 17 mmol/L Final    WBC 10/27/2022 23.0 (A)  3.5 - 11.0 k/uL Final    RBC 10/27/2022 3.98 (A)  4.5 - 5.9 m/uL Final    Hemoglobin 10/27/2022 12.9 (A)  13.5 - 17.5 g/dL Final    Hematocrit 10/27/2022 38.2 (A)  41 - 53 % Final    MCV 10/27/2022 96.0  80 - 100 fL Final    MCH 10/27/2022 32.3  26 - 34 pg Final    MCHC 10/27/2022 33.6  31 - 37 g/dL Final    RDW 10/27/2022 13.4  11.5 - 14.9 % Final    Platelets 78/16/2347 376  150 - 450 k/uL Final    MPV 10/27/2022 6.5  6.0 - 12.0 fL Final    Seg Neutrophils 10/27/2022 49  36 - 66 % Final    Lymphocytes 10/27/2022 34  24 - 44 % Final    Atypical Lymphocytes 10/27/2022 6  % Final    Monocytes 10/27/2022 10 (A)  1 - 7 % Final    Eosinophils % 10/27/2022 1  0 - 4 % Final    Basophils 10/27/2022 0  0 - 2 % Final    Segs Absolute 10/27/2022 11.27 (A)  1.3 - 9.1 k/uL Final    Absolute Lymph # 10/27/2022 7.82 (A)  1.0 - 4.8 k/uL Final    Atypical Lymphocytes Absolute 10/27/2022 1.38  k/uL Final    Absolute Mono # 10/27/2022 2.30 (A)  0.1 - 1.3 k/uL Final    Absolute Eos # 10/27/2022 0.23  0.0 - 0.4 k/uL Final    Basophils Absolute 10/27/2022 0.00  0.0 - 0.2 k/uL Final    Morphology 10/27/2022 Normal   Final    Glucose 10/28/2022 91  70 - 99 mg/dL Final    BUN 10/28/2022 10  8 - 23 mg/dL Final    Creatinine 10/28/2022 0.59 (A)  0.70 - 1.20 mg/dL Final    Est, Glom Filt Rate 10/28/2022 >60  >60 mL/min/1.73m2 Final    Comment:       Effective Oct 3, 2022        These results are not intended for use in patients <25years of age. eGFR results are calculated without a race factor using the 2021 CKD-EPI equation. Careful clinical correlation is recommended, particularly when comparing to results   calculated using previous equations.   The CKD-EPI equation is less accurate in patients with extremes of muscle mass, extra-renal   metabolism of creatine, excessive creatine ingestion, or following therapy that affects   renal tubular secretion.       Calcium 10/28/2022 8.7  8.6 - 10.4 mg/dL Final    Sodium 10/28/2022 138  135 - 144 mmol/L Final    Potassium 10/28/2022 4.0  3.7 - 5.3 mmol/L Final    Chloride 10/28/2022 102  98 - 107 mmol/L Final    CO2 10/28/2022 27  20 - 31 mmol/L Final    Anion Gap 10/28/2022 9  9 - 17 mmol/L Final    WBC 10/28/2022 16.6 (A)  3.5 - 11.0 k/uL Final    RBC 10/28/2022 3.60 (A)  4.5 - 5.9 m/uL Final    Hemoglobin 10/28/2022 11.8 (A)  13.5 - 17.5 g/dL Final    Hematocrit 10/28/2022 34.9 (A)  41 - 53 % Final    MCV 10/28/2022 96.9  80 - 100 fL Final    MCH 10/28/2022 32.8  26 - 34 pg Final    MCHC 10/28/2022 33.9  31 - 37 g/dL Final    RDW 10/28/2022 13.3  11.5 - 14.9 % Final    Platelets 76/96/6894 330  150 - 450 k/uL Final    MPV 10/28/2022 6.5  6.0 - 12.0 fL Final    Seg Neutrophils 10/28/2022 39  36 - 66 % Final    Lymphocytes 10/28/2022 47 (A)  24 - 44 % Final    Atypical Lymphocytes 10/28/2022 3  % Final    Monocytes 10/28/2022 9 (A)  1 - 7 % Final    Eosinophils % 10/28/2022 2  0 - 4 % Final    Basophils 10/28/2022 0  0 - 2 % Final    Segs Absolute 10/28/2022 6.47  1.3 - 9.1 k/uL Final    Absolute Lymph # 10/28/2022 7.81 (A)  1.0 - 4.8 k/uL Final    Atypical Lymphocytes Absolute 10/28/2022 0.50  k/uL Final    Absolute Mono # 10/28/2022 1.49 (A)  0.1 - 1.3 k/uL Final    Absolute Eos # 10/28/2022 0.33  0.0 - 0.4 k/uL Final    Basophils Absolute 10/28/2022 0.00  0.0 - 0.2 k/uL Final    Morphology 10/28/2022 ANISOCYTOSIS PRESENT   Final    Morphology 10/28/2022 1+ TEARDROPS   Final    Ammonia 10/28/2022 30  16 - 60 umol/L Final    Glucose 10/29/2022 103 (A)  70 - 99 mg/dL Final    BUN 10/29/2022 10  8 - 23 mg/dL Final    Creatinine 10/29/2022 0.68 (A)  0.70 - 1.20 mg/dL Final    Est, Glom Filt Rate 10/29/2022 >60  >60 mL/min/1.73m2 Final    Comment:       Effective Oct 3, 2022        These results are not intended for use in patients <25years of age. eGFR results are calculated without a race factor using the 2021 CKD-EPI equation. Careful clinical correlation is recommended, particularly when comparing to results   calculated using previous equations. The CKD-EPI equation is less accurate in patients with extremes of muscle mass, extra-renal   metabolism of creatine, excessive creatine ingestion, or following therapy that affects   renal tubular secretion.       Calcium 10/29/2022 8.8  8.6 - 10.4 mg/dL Final    Sodium 10/29/2022 138  135 - 144 mmol/L Final    Potassium 10/29/2022 4.1  3.7 - 5.3 mmol/L Final    Chloride 10/29/2022 103  98 - 107 mmol/L Final    CO2 10/29/2022 27  20 - 31 mmol/L Final    Anion Gap 10/29/2022 8 (A)  9 - 17 mmol/L Final    WBC 10/29/2022 14.5 (A)  3.5 - 11.0 k/uL Final    RBC 10/29/2022 3.60 (A)  4.5 - 5.9 m/uL Final    Hemoglobin 10/29/2022 11.8 (A)  13.5 - 17.5 g/dL Final    Hematocrit 10/29/2022 34.8 (A)  41 - 53 % Final    MCV 10/29/2022 96.9  80 - 100 fL Final    MCH 10/29/2022 32.7  26 - 34 pg Final    MCHC 10/29/2022 33.8  31 - 37 g/dL Final    RDW 10/29/2022 13.3  11.5 - 14.9 % Final    Platelets 66/59/5281 298  150 - 450 k/uL Final    MPV 10/29/2022 6.4  6.0 - 12.0 fL Final    Seg Neutrophils 10/29/2022 37  36 - 66 % Final    Lymphocytes 10/29/2022 50 (A)  24 - 44 % Final    Monocytes 10/29/2022 11 (A)  1 - 7 % Final    Eosinophils % 10/29/2022 1  0 - 4 % Final    Basophils 10/29/2022 0  0 - 2 % Final    Metamyelocytes 10/29/2022 1 (A)  0 % Final    Segs Absolute 10/29/2022 5.37  1.3 - 9.1 k/uL Final    Absolute Lymph # 10/29/2022 7.23 (A)  1.0 - 4.8 k/uL Final    Absolute Mono # 10/29/2022 1.60 (A)  0.1 - 1.3 k/uL Final    Absolute Eos # 10/29/2022 0.15  0.0 - 0.4 k/uL Final    Basophils Absolute 10/29/2022 0.00  0.0 - 0.2 k/uL Final    Metamyelocytes Absolute 10/29/2022 0.15 (A)  0 k/uL Final    Morphology 10/29/2022 Normal   Final    Retic % 10/29/2022 1.3  0.5 - 2.0 % Final    Absolute Retic # 10/29/2022 0.045  0.0245 - 0.098 M/uL Final         Reviewed patient's current plan of care and vital signs with nursing staff. Labs reviewed: [x] Yes  Last EKG in EMR reviewed: [x] Yes  QTc: 418    Medications  Current Facility-Administered Medications: sodium chloride (OCEAN, BABY AYR) 0.65 % nasal spray 1 spray, 1 spray, Each Nostril, PRN  lactobacillus (CULTURELLE) capsule 1 capsule, 1 capsule, Oral, Daily  albuterol sulfate HFA (PROVENTIL;VENTOLIN;PROAIR) 108 (90 Base) MCG/ACT inhaler 2 puff, 2 puff, Inhalation, Q4H PRN  metroNIDAZOLE (FLAGYL) tablet 500 mg, 500 mg, Oral, 3 times per day  levoFLOXacin (LEVAQUIN) tablet 750 mg, 750 mg, Oral, Daily  docusate sodium (COLACE) capsule 100 mg, 100 mg, Oral, TID PRN  ipratropium (ATROVENT) 0.02 % nebulizer solution 0.5 mg, 0.5 mg, Nebulization, 4x Daily  budesonide-formoterol (SYMBICORT) 160-4.5 MCG/ACT inhaler 2 puff, 2 puff, Inhalation, BID  OLANZapine (ZYPREXA) tablet 15 mg, 15 mg, Oral, BID  acetaminophen (TYLENOL) tablet 650 mg, 650 mg, Oral, Q4H PRN  polyethylene glycol (GLYCOLAX) packet 17 g, 17 g, Oral, Daily PRN  hydrOXYzine HCl (ATARAX) tablet 50 mg, 50 mg, Oral, TID PRN  haloperidol (HALDOL) tablet 10 mg, 10 mg, Oral, Q4H PRN **OR** haloperidol lactate (HALDOL) injection 10 mg, 10 mg, IntraMUSCular, Q4H PRN  LORazepam (ATIVAN) injection 1 mg, 1 mg, IntraVENous, Q4H PRN  enoxaparin Sodium (LOVENOX) injection 30 mg, 30 mg, SubCUTAneous, Daily  nicotine (NICODERM CQ) 21 MG/24HR 1 patch, 1 patch, TransDERmal, Daily    ASSESSMENT  Acute tierra         Plan      Will admit to psychiatry when medically cleared  Continue olanzapine 15 mg twice daily  Attempt to develop insight  Expected Length of Stay is 5 days. Psycho-education conducted. Supportive Therapy conducted.   Follow-up daily while on inpatient unit  Electronically signed by Merlin Adie, MD on 10/29/2022 at 5:59 PM

## 2022-10-29 NOTE — PROGRESS NOTES
Writer updated about available room and bed in the [facility]. Patient and patients wife updated. Wife would like to be notified of any changes.

## 2022-10-29 NOTE — PLAN OF CARE
Problem: Discharge Planning  Goal: Discharge to home or other facility with appropriate resources  Outcome: Progressing     Problem: Pain  Goal: Verbalizes/displays adequate comfort level or baseline comfort level  Outcome: Progressing     Problem: Safety - Adult  Goal: Free from fall injury  Outcome: Progressing     Problem: ABCDS Injury Assessment  Goal: Absence of physical injury  Outcome: Progressing     Problem: Self Harm/Suicidality  Goal: Will have no self-injury during hospital stay  Description: INTERVENTIONS:  1. Q 30 MINUTES: Routine safety checks  2. Q SHIFT & PRN: Assess risk to determine if routine checks are adequate to maintain patient safety  Outcome: Progressing

## 2022-10-29 NOTE — PROGRESS NOTES
Writer responded to [facility] per family's request. PT's spouse wanted to fill out AD, but according to yesterday's note from Damaso Henson, the paper is meaningless and the writer was not comfortable to fill it out. Spouse stated that she could not get any information once PT goes into [facility]. Damaso Zuniga's note clearly states that medical and [facility] are totally different. For the peace of spouse's mind, Zulma Perez went over to [facility] to better solution for the spouse. [facility] staff states that the only wait for her to faustina the PT's update is that PT sign the release from. Spouse was disappointed with the update, and the spouse and the writer met the nurse for the solution. The nurse will call [facility], if the pink slip will work or getting the release paper from [facility], and fill it out while he is oriented now at [facility], and send the paper with him to [facility]. Spouse was much glad for the outcome and appreciated the [facility].       10/29/22 1205   Encounter Summary   Encounter Overview/Reason  Advance Care Planning   Service Provided For: Family   Referral/Consult From: Family   Support System Spouse; Children   Last Encounter  10/29/22   Complexity of Encounter High   Begin Time 1032   End Time  1130   Total Time Calculated 58 min   Advance Care Planning   Type ACP conversation   Assessment/Intervention/Outcome   Assessment Anxious; Coping   Intervention Active listening;Empowerment;Sustaining Presence/Ministry of presence   Outcome Acceptance;Engaged in conversation;Coping;Peace; Receptive
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
## 2022-10-30 PROBLEM — F23 ACUTE PSYCHOSIS (HCC): Status: ACTIVE | Noted: 2022-09-26

## 2022-10-30 PROCEDURE — 6360000002 HC RX W HCPCS: Performed by: PSYCHIATRY & NEUROLOGY

## 2022-10-30 PROCEDURE — 94761 N-INVAS EAR/PLS OXIMETRY MLT: CPT

## 2022-10-30 PROCEDURE — 6370000000 HC RX 637 (ALT 250 FOR IP): Performed by: PSYCHIATRY & NEUROLOGY

## 2022-10-30 PROCEDURE — 6370000000 HC RX 637 (ALT 250 FOR IP): Performed by: INTERNAL MEDICINE

## 2022-10-30 PROCEDURE — 99223 1ST HOSP IP/OBS HIGH 75: CPT | Performed by: PSYCHIATRY & NEUROLOGY

## 2022-10-30 PROCEDURE — 94640 AIRWAY INHALATION TREATMENT: CPT

## 2022-10-30 PROCEDURE — 1240000000 HC EMOTIONAL WELLNESS R&B

## 2022-10-30 PROCEDURE — APPSS60 APP SPLIT SHARED TIME 46-60 MINUTES

## 2022-10-30 PROCEDURE — 6360000002 HC RX W HCPCS: Performed by: INTERNAL MEDICINE

## 2022-10-30 RX ADMIN — IPRATROPIUM BROMIDE 0.5 MG: 0.5 SOLUTION RESPIRATORY (INHALATION) at 08:30

## 2022-10-30 RX ADMIN — OLANZAPINE 15 MG: 15 TABLET, FILM COATED ORAL at 20:40

## 2022-10-30 RX ADMIN — METRONIDAZOLE 500 MG: 500 TABLET ORAL at 14:41

## 2022-10-30 RX ADMIN — LEVOFLOXACIN 750 MG: 750 TABLET, FILM COATED ORAL at 10:00

## 2022-10-30 RX ADMIN — METRONIDAZOLE 500 MG: 500 TABLET ORAL at 20:40

## 2022-10-30 RX ADMIN — LORAZEPAM 2 MG: 1 TABLET ORAL at 15:25

## 2022-10-30 RX ADMIN — LORAZEPAM 2 MG: 2 INJECTION INTRAMUSCULAR; INTRAVENOUS at 23:31

## 2022-10-30 RX ADMIN — OLANZAPINE 15 MG: 15 TABLET, FILM COATED ORAL at 08:57

## 2022-10-30 RX ADMIN — IPRATROPIUM BROMIDE 0.5 MG: 0.5 SOLUTION RESPIRATORY (INHALATION) at 20:01

## 2022-10-30 RX ADMIN — METRONIDAZOLE 500 MG: 500 TABLET ORAL at 06:56

## 2022-10-30 RX ADMIN — HALOPERIDOL 5 MG: 5 TABLET ORAL at 15:25

## 2022-10-30 RX ADMIN — BUDESONIDE AND FORMOTEROL FUMARATE DIHYDRATE 2 PUFF: 160; 4.5 AEROSOL RESPIRATORY (INHALATION) at 08:57

## 2022-10-30 RX ADMIN — DIPHENHYDRAMINE HYDROCHLORIDE 50 MG: 50 INJECTION, SOLUTION INTRAMUSCULAR; INTRAVENOUS at 23:31

## 2022-10-30 RX ADMIN — HALOPERIDOL LACTATE 5 MG: 5 INJECTION, SOLUTION INTRAMUSCULAR at 23:30

## 2022-10-30 RX ADMIN — IPRATROPIUM BROMIDE 0.5 MG: 0.5 SOLUTION RESPIRATORY (INHALATION) at 15:05

## 2022-10-30 RX ADMIN — BUDESONIDE AND FORMOTEROL FUMARATE DIHYDRATE 2 PUFF: 160; 4.5 AEROSOL RESPIRATORY (INHALATION) at 20:04

## 2022-10-30 NOTE — BH NOTE
Emergency Medication Follow-Up Note:    PRN medication of Ativan 2 mg PO and Haldol 5 mg PO was effective as evidence by absence of behavior warranting emergency medication, resting quietly. Patient denies medication side effects. Will continue to monitor and provide support as needed.
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
## 2022-10-30 NOTE — GROUP NOTE
Group Therapy Note    Date: 10/30/2022    Group Start Time: 0900  Group End Time: 0915  Group Topic: Group Documentation    [facility]    Tonie Blake        Group Therapy Note    Attendees: 5/18  Goal  Group Note        Date: 10/30/2022 Start Time: 0900 End Time: 3442      Number of Participants in Group & Unit Census:  5/18    Topic: morning goal group session    Goal of Group: allow patients the time to reflect and decide on a short-term goal to accomplish      Comments:     Patient did not participate in  Goal  group, despite staff encouragement and explanation of benefits. Patient remain seclusive to self. Q15 minute safety checks maintained for patient safety and will continue to encourage patient to attend unit programming.       Modes of Intervention: Support and Socialization      Discipline Responsible: Behavorial Health Tech      Signature:  Tonie Blake

## 2022-10-30 NOTE — PROGRESS NOTES
Behavioral Services  Medicare Certification Upon Admission    I certify that this patient's inpatient psychiatric hospital admission is medically necessary for:    [x] (1) Treatment which could reasonably be expected to improve this patient's condition,       [x] (2) Or for diagnostic study;     AND     [x](2) The inpatient psychiatric services are provided while the individual is under the care of a physician and are included in the individualized plan of care.     Estimated length of stay/service 2 to 9 days    Plan for post-hospital care outpatient care    Electronically signed by Bhupinder Devine MD on 10/30/2022 at 4:54 PM

## 2022-10-30 NOTE — BH NOTE
Patient given tobacco quitline number 45499868537 at this time, refusing to call at this time, states \" I just dont want to quit now\"- patient given information as to the dangers of long term tobacco use. Continue to reinforce the importance of tobacco cessation.

## 2022-10-30 NOTE — BH NOTE
On-call provider (Dr. Vibha Snider) contacted via secure messaging services for orders, provider placed orders and instructed RN it was ok to release the internal medicine orders that were placed prior to admission.

## 2022-10-30 NOTE — GROUP NOTE
Group Therapy Note    Date: 10/30/2022    Group Start Time: 6787  Group End Time: 1110  Group Topic: Music Therapy    BRAYDEN Zimmer    Music Therapy Group Note        Date: 10/30/2022   Start Time: 5622  End Time: 1110      Number of Participants in Group & Unit Census:  2/17    Topic: Music appreciation group; Sharing and talking about music     Goal of Group:Goals to demonstrate positive use of time; Increase socialization; Increase self-expression; Normalization of the environment      Comments:     Patient did not participate in Music Therapy group, despite staff encouragement and explanation of benefits. Patient remain seclusive to self. Q15 minute safety checks maintained for patient safety and will continue to encourage patient to attend unit programming.

## 2022-10-30 NOTE — BH NOTE
Emergency PRN Medication Administration Note:      Patient is Disruptive as evidence by Making announcements in the day area \"Attention everybody, do you believe that I am your savior? \", Answering other peoples questions in private conversation. Coughing and hacking loudly in the day area and spitting into the garbage can in the milieu, refusing to go to his room when asked by staff to hack and spit. Staff attempted to find and relieve the distress by Talking to patient, Refocusing on new activity, Offering suggestions, and Administer PRN medications Patient is currently  accepted PRN medications). Medication Administered as prescribed: Ativan 2 mg PO and Haldol 5 mg PO. Patient Tolerated medication administration. Will continue to monitor, offer support, and reassess.

## 2022-10-30 NOTE — BH NOTE
Emergency PRN Medication Administration Note:    Patient is Disruptive as evidence by Making announcements in the day area \"Attention everybody, do you believe that I am your savior? \", Answering other peoples questions in private conversation. Coughing and hacking loudly in the day area and spitting in to the garbage can in the milieu, refusing to go to his room when asked by staff to hack and spit. Staff attempted to find and relieve the distress by Talking to patient, Refocusing on new activity, Offering suggestions, and Administer PRN medications. Patient is currently accepting to PRN medications. Medication administrated as prescribed: Ativan 2 mg PO and Haldol 5 mg PO. Patient tolerated medication administration. Will continue to monitor, offer support and reassess.

## 2022-10-30 NOTE — BH NOTE
Emergency Medication Follow-Up Note:    PRN medication of (Haldol 5mg, Ativan 2mg) was effective as evidence by Patient regain behavioral control, absence of behavior warranting emergency medication, socializing with peers. Patient denies medication side effects. Will continue to monitor and provide support as needed.

## 2022-10-30 NOTE — CARE COORDINATION
BHI Biopsychosocial Assessment    Current Level of Psychosocial Functioning     Independent   Dependent  X  Minimal Assist     Comments:    Psychosocial High Risk Factors (check all that apply)    Unable to obtain meds   Chronic illness/pain    Substance abuse   Lack of Family Support   Financial stress   Isolation X   Inadequate Community Resources  Suicide attempt(s)  Not taking medications   Victim of crime   Developmental Delay  Unable to manage personal needs  X   Age 71 or older   Homeless  No transportation   Readmission within 30 days X Last hosp from 10/6-10/18/2022  Unemployment  Traumatic Event    Psychiatric Advanced Directives: none reported     Family to Involve in Treatment: Pt wife Beth Vincent is supportive and involved in his care along [facility] his son Brittnee Wei     Sexual Orientation:  Heterosexual     Patient Strengths: adequate housing, strong family support, income, insurance, linked with outpatient Treatment     Patient Barriers: presenting on admission with bizarre and delusional behaviors, Acute Psychosis, presents with multiple inpatient Psychiatric hospitalizations. Opiate Education Provided: N/a Pt denies an does not have a documented history of Opiate or Heroin use/abuse. Pt reports daily Alcohol use     CMHC/mental health history:Pt is linked for Outpatient medication management with NP Almas Childress in [address]. Pt is linked with the [place] in [address] with the [phone] ext [phone] working with patient on home care needs at discharge. Lives in [place], [place] with his wife Radu Badillo of Care   medication management, group/individual therapies, family meetings, psycho -education, treatment team meetings to assist with stabilization    Initial Discharge Plan:  Reports a plan to return to his home with wife Joaquina Thornton in [place], [place]       Clinical Summary:  Patient has had multiple previous inpatient psychiatric hospitalizations.   Most recent BHI in 10/6/2022 to 10/18/2022. When discussing symptoms of psychosis patient endorses whispers telling him to hurt himself. Patient endorses visual hallucinations seeing \"them\" reporting that they are demons. Patient also endorses the ability to feel other people's pain when they are hurt. Patient endorses paranoia that people are watching him and talking about him endorses receiving messages from the media. Pt is linked for Outpatient medication management with [facility] in [facility] [address], [phone]. Pt is linked with the [place] in [address] with the [employer] [phone] ext [phone] working with patient on home care needs at discharge. Lives in [place], [place] with his wife Alberto Dalal speaks with PT wife Ty Madison with permission from patient. Ty Madison reports that pt's Psychosis has gotten worse since being discharged. She reports that PT has been following her around the house and tries to pick a fight with her and won't stop until he gets a reaction. SW provided support and encouragement to Pt wife on this date. Pt wife also reported that she was told in the medical department that staff from the [facility] will try to get Patient transferred to another inpatient Psychiatric facility close to their home. SW reports that she will coordinate with supervisor and is unsure of another facility close to their home in [place], [place]. SW will continue to monitor the situation.

## 2022-10-30 NOTE — PLAN OF CARE
Problem: Decision Making  Goal: Pt/Family able to effectively weigh alternatives and participate in decision making related to treatment and care  Description: INTERVENTIONS:  1. Determine when there are differences between patient's view, family's view, and healthcare provider's view of condition  2. Facilitate patient and family articulation of goals for care  3. Help patient and family identify pros/cons of alternative solutions  4. Provide information as requested by patient/family  5. Respect patient/family right to receive or not to receive information  6. Serve as a liaison between patient and family and health care team  7. Initiate Consults from Ethics, Palliative Care or initiate [facility] as is appropriate  Outcome: Progressing  Note: Mr. Estevan Henley is impulsive and can be intrusive to his peers and staff conversations. Mr. Estevan Henley has required ongoing redirection and encouragement to be appropriate in the day area with his peers. Mr. Estevan Henley made numerous announcements to the milieu \"Attention everybody, do all of you believe I am your savior? \" He was met with many \"no's\", and he replied \"good\". He did dial 911 at one point, as writer was walking past him, and overheard him stating \"[id number]? \", writer quickly told him not to call 911, he quickly ended the conversation. Writer educated him calling 911 was inappropriate and how could we help? He demanded a cigarette. Writer explained that he wasn't able to smoke here and offered his as needed Patch. He declined. PRN Ativan and Haldol were administered and were helpful.

## 2022-10-30 NOTE — BH NOTE
[facility]  Admission Note     Admission Type:   Admission Type: Voluntary    Reason for admission:  Reason for Admission: Racing thoughts, rapid speach, paranoid, hallucinations, june, depressed      Addictive Behavior:   Addictive Behavior  In the Past 3 Months, Have You Felt or Has Someone Told You That You Have a Problem With  : None    Medical Problems:   Past Medical History:   Diagnosis Date    Asthma     COPD (chronic obstructive pulmonary disease) (HCC)     Emphysema of lung ([facility] [address]        Status EXAM:  Mental Status and Behavioral Exam  Normal: No  Level of Assistance: Independent/Self  Facial Expression: Euphoric  Affect: Unstable  Level of Consciousness: Alert  Frequency of Checks: 4 times per hour, close  Mood:Normal: No  Mood: Anxious, Elated, Sad  Motor Activity:Normal: No  Motor Activity: Decreased  Eye Contact: Good  Observed Behavior: Agitated, Cooperative, Friendly, Preoccupied, Tearful  Sexual Misconduct History: Current - no  Preception: [place] to person, [place] to time, [place] to place  Attention:Normal: No  Attention: Distractible  Thought Processes: Tangential  Thought Content:Normal: No  Thought Content: Preoccupations  Depression Symptoms: Feelings of hopelessess, Isolative  Anxiety Symptoms: Generalized, Unexplained fears, Obsessions  June Symptoms: Grandiosity, Poor judgment, Rapid cycling  Hallucinations: None  Delusions: Yes  Delusions: Jain, Grandeur, Paranoid  Memory:Normal: No  Memory: Confabulation  Insight and Judgment: No  Insight and Judgment: Poor judgment, Poor insight, Unrealistic    Tobacco Screening:  Practical Counseling, on admission, dillon X, if applicable and completed (first 3 are required if patient doesn't refuse):            ( ) Recognizing danger situations (included triggers and roadblocks)                    ( ) Coping skills (new ways to manage stress,relaxation techniques, changing routine, distraction) ( ) Basic information about quitting (benefits of quitting, techniques in how to quit, available resources  ( ) Referral for counseling faxed to [facility]                                                                                                                   (X) Patient refused counseling  ( ) Patient has not smoked in the last 30 days    Metabolic Screening:    No results found for: LABA1C    No results found for: CHOL  No results found for: TRIG  No results found for: HDL  No components found for: LDLCAL  No results found for: LABVLDL      Body mass index is 14.38 kg/m². BP Readings from Last 2 Encounters:   10/29/22 (!) 144/95   10/28/22 (!) 144/97           Pt admitted with followings belongings:  Dental Appliances: None  Vision - Corrective Lenses: Eyeglasses  Hearing Aid: None  Jewelry: None  Body Piercings Removed: N/A  Clothing: Other (Comment) (no belongings on admit)  Other Valuables:  Other (Comment) (no valuables on admission)    Etta Ayala RN

## 2022-10-31 LAB — SURGICAL PATHOLOGY REPORT: NORMAL

## 2022-10-31 PROCEDURE — 99232 SBSQ HOSP IP/OBS MODERATE 35: CPT | Performed by: PSYCHIATRY & NEUROLOGY

## 2022-10-31 PROCEDURE — 1240000000 HC EMOTIONAL WELLNESS R&B

## 2022-10-31 PROCEDURE — APPSS30 APP SPLIT SHARED TIME 16-30 MINUTES

## 2022-10-31 PROCEDURE — 6360000002 HC RX W HCPCS: Performed by: PSYCHIATRY & NEUROLOGY

## 2022-10-31 PROCEDURE — 6370000000 HC RX 637 (ALT 250 FOR IP): Performed by: INTERNAL MEDICINE

## 2022-10-31 PROCEDURE — 6370000000 HC RX 637 (ALT 250 FOR IP): Performed by: PSYCHIATRY & NEUROLOGY

## 2022-10-31 RX ORDER — PALIPERIDONE 3 MG/1
3 TABLET, EXTENDED RELEASE ORAL DAILY
Status: DISCONTINUED | OUTPATIENT
Start: 2022-10-31 | End: 2022-11-01

## 2022-10-31 RX ORDER — OLANZAPINE 7.5 MG/1
7.5 TABLET ORAL 2 TIMES DAILY
Status: DISCONTINUED | OUTPATIENT
Start: 2022-10-31 | End: 2022-11-01

## 2022-10-31 RX ADMIN — DIPHENHYDRAMINE HYDROCHLORIDE 50 MG: 50 INJECTION, SOLUTION INTRAMUSCULAR; INTRAVENOUS at 18:43

## 2022-10-31 RX ADMIN — HALOPERIDOL LACTATE 5 MG: 5 INJECTION, SOLUTION INTRAMUSCULAR at 18:43

## 2022-10-31 RX ADMIN — METRONIDAZOLE 500 MG: 500 TABLET ORAL at 09:03

## 2022-10-31 RX ADMIN — PALIPERIDONE 3 MG: 3 TABLET, EXTENDED RELEASE ORAL at 22:17

## 2022-10-31 RX ADMIN — Medication 1 CAPSULE: at 09:03

## 2022-10-31 RX ADMIN — LEVOFLOXACIN 750 MG: 750 TABLET, FILM COATED ORAL at 11:47

## 2022-10-31 RX ADMIN — ALUMINUM HYDROXIDE, MAGNESIUM HYDROXIDE, AND SIMETHICONE 30 ML: 200; 200; 20 SUSPENSION ORAL at 09:10

## 2022-10-31 RX ADMIN — LORAZEPAM 2 MG: 2 INJECTION INTRAMUSCULAR; INTRAVENOUS at 18:41

## 2022-10-31 RX ADMIN — HALOPERIDOL 5 MG: 5 TABLET ORAL at 16:12

## 2022-10-31 RX ADMIN — OLANZAPINE 7.5 MG: 7.5 TABLET, FILM COATED ORAL at 22:17

## 2022-10-31 RX ADMIN — NICOTINE POLACRILEX 2 MG: 2 GUM, CHEWING BUCCAL at 16:12

## 2022-10-31 RX ADMIN — METRONIDAZOLE 500 MG: 500 TABLET ORAL at 22:17

## 2022-10-31 RX ADMIN — BUDESONIDE AND FORMOTEROL FUMARATE DIHYDRATE 2 PUFF: 160; 4.5 AEROSOL RESPIRATORY (INHALATION) at 23:23

## 2022-10-31 RX ADMIN — OLANZAPINE 15 MG: 15 TABLET, FILM COATED ORAL at 09:03

## 2022-10-31 NOTE — PLAN OF CARE
Problem: June  Goal: Will exhibit normal sleep and speech and no impulsivity  Description: INTERVENTIONS:  1. Administer medication as ordered  2. Set limits on impulsive behavior  3. Make attempts to decrease external stimuli as possible  10/31/2022 0031 by Nadia Figueroa RN  Outcome: Progressing  Note: Pt agitated, impulsive, and noted to have manic behavior this shift. Staff unable to redirect, pt received emergency medication. Problem: Self Care Deficit  Goal: Return ADL status to a safe level of function  Description: INTERVENTIONS:  1. Administer medication as ordered  2. Assess ADL deficits and provide assistive devices as needed  3. Obtain PT/OT consults as needed  4. Assist and instruct patient to increase activity and self care as tolerated  10/31/2022 0031 by Nadia Figueroa RN  Outcome: Progressing  Flowsheets (Taken 10/31/2022 0024)  Return ADL Status to a Safe Level of Function:   Administer medication as ordered   Assess activities of daily living deficits and provide assistive devices as needed   Assist and instruct patient to increase activity and self care as tolerated  Note: Pt able to perform ADLs, but needs set-up cues and supervision. Problem: Safety - Adult  Goal: Free from fall injury  10/31/2022 0031 by Nadia Figueroa RN  Outcome: Progressing  Flowsheets (Taken 10/31/2022 0024)  Free From Fall Injury:   Instruct family/caregiver on patient safety   Based on caregiver fall risk screen, instruct family/caregiver to ask for assistance with transferring infant if caregiver noted to have fall risk factors  Note: Pt educated about \"Stay with me\" program.      Problem: Decision Making  Goal: Pt/Family able to effectively weigh alternatives and participate in decision making related to treatment and care  Description: INTERVENTIONS:  1. Determine when there are differences between patient's view, family's view, and healthcare provider's view of condition  2.  Facilitate patient and family articulation of goals for care  3. Help patient and family identify pros/cons of alternative solutions  4. Provide information as requested by patient/family  5. Respect patient/family right to receive or not to receive information  6. Serve as a liaison between patient and family and health care team  7. Initiate Consults from Ethics, Palliative Care or initiate [address] [facility] as is appropriate  10/31/2022 0031 by Nessa Flores RN  Outcome: Progressing  Flowsheets (Taken 10/31/2022 0024)  Patient/family able to effectively weigh alternatives and participate in decision making related to treatment and care:    Facilitate patient and family articulation of goals for care   Provide information as requested by patient/family   Respect patient/family right to receive or not to receive information

## 2022-10-31 NOTE — PROGRESS NOTES
Emergency PRN Medication Administration Note:      Patient is Agitated and Disruptive as evidence by yelling in room, playing with bed controls, setting it to highest position, slamming bathroom door, keeps approaching the nurse station yelling at staff demanding for cigarettes, making threatening remarks, \" I'm warning you. . If you don't give my cigarettes! \" Staff attempted to find and relieve the distress by Talking to patient, Refocusing on new activity, Checking for undiagnosed pain, and Administer PRN medications Patient accepted PRN medications. Medication Administered as prescribed: Haldol 5mg, Ativan 2mg, and Benadryl 50 mg IM PRN. Patient Tolerated medication administration. Will continue to monitor, offer support, and reassess.

## 2022-10-31 NOTE — PROGRESS NOTES
Daily Progress Note  10/31/2022    Patient Name: Glory Miguel    CHIEF COMPLAINT: Acute psychosis with suicidal ideation         SUBJECTIVE:      Patient is seen today for a follow up assessment. Patient has been compliant with scheduled medications at this time and has required emergency medications in the past 24 hours. Patient presents with no insight into reasons for medication administration. When approached for interview patient appears to be responding to internal stimuli and is observed making bizarre sounds. Congruent to conversation. Patient also presents with rapid pressured speech and flight of ideas. Patient endorses auditory hallucinations of hearing \"God\". Patient also endorses visual hallucinations of seeing \"Arthur everywhere\". Patient reports he does not know if he is dreaming or napping. Patient denies paranoia related to perceptual disturbances. When discussing medication side effects patient reports Zyprexa is making him defecate on himself at night. Nursing on unit confirms that patient had an episode of defecating on himself last night. We will continue to monitor and consult internal medicine if continuous. Writer encouraged patient to attend groups on the unit. At this time, the patient is not appropriate for a lower level of care. There is risk of decompensation and patient warrants further hospitalization for safety and stabilization. Appetite:  [x] Adequate/Unchanged  [] Increased  [] Decreased      Sleep:       [x] Adequate/Unchanged  [] Fair  [] Poor      Group Attendance on Unit:   [] Yes   [] Selectively    [x] No    Medication Side Effects: Patient reports \"incontinence\"         Mental Status Exam  Level of consciousness: Alert and awake. Appearance: Appropriate attire for setting, seated in chair, with fair  grooming and hygiene.    Behavior/Motor: Approachable, compliant with interview, hyperactive  Attitude toward examiner: [address] cooperation, poor attention, easily distracted, good eye contact. Speech: Rapid, normal volume  Mood:  Patient reports \" \"good\". Affect: Reactive, hyperactive  Thought processes: rapid, flight of ideas, and illogical.   Thought content: Denies homicidal ideation. Suicidal Ideation: Denies suicidal ideations, with a  current plan, denies intent to harm self on unit. Unable to contract for safety off unit. Delusions: Patient presents with delusions. Denies paranoia, presents as paranoid  Perceptual Disturbance: Patient does appear to be responding to internal stimuli. Endorses auditory hallucinations. Endorses visual hallucinations. Cognition: Oriented to person and place. Memory: Impaired. Insight: Poor. Judgement: Poor. Data   height is 6' (1.829 m) and weight is 106 lb (48.1 kg). His temperature is 98.6 °F (37 °C). His blood pressure is 127/88 and his pulse is 107 (abnormal). His respiration is 18 and oxygen saturation is 94%. Labs:   No visits with results within 2 Day(s) from this visit. Latest known visit with results is:   Admission on 10/27/2022, Discharged on 10/29/2022   Component Date Value Ref Range Status    Color, UA 10/28/2022 Yellow  Yellow Final    Turbidity UA 10/28/2022 Clear  Clear Final    Glucose, Ur 10/28/2022 NEGATIVE  NEGATIVE Final    Bilirubin Urine 10/28/2022 NEGATIVE  NEGATIVE Final    Ketones, Urine 10/28/2022 NEGATIVE  NEGATIVE Final    Specific Gravity, UA 10/28/2022 1.007  1.000 - 1.030 Final    Urine Hgb 10/28/2022 NEGATIVE  NEGATIVE Final    pH, UA 10/28/2022 6.0  5.0 - 8.0 Final    Protein, UA 10/28/2022 NEGATIVE  NEGATIVE Final    Urobilinogen, Urine 10/28/2022 Normal  Normal Final    Nitrite, Urine 10/28/2022 NEGATIVE  NEGATIVE Final    Leukocyte Esterase, Urine 10/28/2022 NEGATIVE  NEGATIVE Final    Urinalysis Comments 10/28/2022 Microscopic exam not performed based on chemical results unless requested in original order.    Final    Amphetamine Screen, Ur 10/28/2022 NEGATIVE  NEGATIVE Final    Comment:       (Positive cutoff 1000 ng/mL)                  Barbiturate Screen, Ur 10/28/2022 NEGATIVE  NEGATIVE Final    Comment:       (Positive cutoff 200 ng/mL)                  Benzodiazepine Screen, Urine 10/28/2022 NEGATIVE   Final    Comment:       (Positive cutoff 200 ng/mL)                  Cocaine Metabolite, Urine 10/28/2022 NEGATIVE  NEGATIVE Final    Comment:       (Positive cutoff 300 ng/mL)                  Methadone Screen, Urine 10/28/2022 NEGATIVE  NEGATIVE Final    Comment:       (Positive cutoff 300 ng/mL)                  Opiates, Urine 10/28/2022 NEGATIVE  NEGATIVE Final    Comment:       (Positive cutoff 300 ng/mL)                  Phencyclidine, Urine 10/28/2022 NEGATIVE  NEGATIVE Final    Comment:       (Positive cutoff 25 ng/mL)                  Cannabinoid Scrn, Ur 10/28/2022 NEGATIVE  NEGATIVE Final    Comment:       (Positive cutoff 50 ng/mL)                  Oxycodone Screen, Ur 10/28/2022 NEGATIVE  NEGATIVE Final    Comment:       (Positive cutoff 100 ng/mL)                  Fentanyl, Ur 10/28/2022 NEGATIVE  NEGATIVE Final    Comment:       (Positive cutoff  5 ng/ml)            Test Information 10/28/2022 Assay provides medical screening only. The absence of expected drug(s) and/or metabolite(s) may indicate diluted or adulterated urine, limitations of testing or timing of collection. Final    Comment: Testing for legal purposes should be confirmed by another method. To request confirmation   of test result, please call the lab within 7 days of sample submission. Ethanol 10/27/2022 <10  <10 mg/dL Final    Ethanol percent 10/27/2022 <0.010  % Final    Glucose 10/27/2022 101 (A)  70 - 99 mg/dL Final    BUN 10/27/2022 9  8 - 23 mg/dL Final    SPECIMEN SLIGHTLY HEMOLYZED, RESULTS MAY BE ADVERSELY AFFECTED. Creatinine 10/27/2022 0.62 (A)  0.70 - 1.20 mg/dL Final    SPECIMEN SLIGHTLY HEMOLYZED, RESULTS MAY BE ADVERSELY AFFECTED.     Est, Glom Filt Rate 10/27/2022 >60 >60 mL/min/1.73m2 Final    Comment:       Effective Oct 3, 2022        These results are not intended for use in patients <25years of age. eGFR results are calculated without a race factor using the 2021 CKD-EPI equation. Careful clinical correlation is recommended, particularly when comparing to results   calculated using previous equations. The CKD-EPI equation is less accurate in patients with extremes of muscle mass, extra-renal   metabolism of creatine, excessive creatine ingestion, or following therapy that affects   renal tubular secretion. Calcium 10/27/2022 9.1  8.6 - 10.4 mg/dL Final    SPECIMEN SLIGHTLY HEMOLYZED, RESULTS MAY BE ADVERSELY AFFECTED. Sodium 10/27/2022 137  135 - 144 mmol/L Final    SPECIMEN SLIGHTLY HEMOLYZED, RESULTS MAY BE ADVERSELY AFFECTED. Potassium 10/27/2022 4.6  3.7 - 5.3 mmol/L Final    SPECIMEN SLIGHTLY HEMOLYZED, RESULTS MAY BE ADVERSELY AFFECTED. Chloride 10/27/2022 98  98 - 107 mmol/L Final    SPECIMEN SLIGHTLY HEMOLYZED, RESULTS MAY BE ADVERSELY AFFECTED. CO2 10/27/2022 24  20 - 31 mmol/L Final    SPECIMEN SLIGHTLY HEMOLYZED, RESULTS MAY BE ADVERSELY AFFECTED.     Anion Gap 10/27/2022 15  9 - 17 mmol/L Final    WBC 10/27/2022 23.0 (A)  3.5 - 11.0 k/uL Final    RBC 10/27/2022 3.98 (A)  4.5 - 5.9 m/uL Final    Hemoglobin 10/27/2022 12.9 (A)  13.5 - 17.5 g/dL Final    Hematocrit 10/27/2022 38.2 (A)  41 - 53 % Final    MCV 10/27/2022 96.0  80 - 100 fL Final    MCH 10/27/2022 32.3  26 - 34 pg Final    MCHC 10/27/2022 33.6  31 - 37 g/dL Final    RDW 10/27/2022 13.4  11.5 - 14.9 % Final    Platelets 77/09/1403 376  150 - 450 k/uL Final    MPV 10/27/2022 6.5  6.0 - 12.0 fL Final    Seg Neutrophils 10/27/2022 49  36 - 66 % Final    Lymphocytes 10/27/2022 34  24 - 44 % Final    Atypical Lymphocytes 10/27/2022 6  % Final    Monocytes 10/27/2022 10 (A)  1 - 7 % Final    Eosinophils % 10/27/2022 1  0 - 4 % Final    Basophils 10/27/2022 0  0 - 2 % Final    Segs Absolute 10/27/2022 11.27 (A)  1.3 - 9.1 k/uL Final    Absolute Lymph # 10/27/2022 7.82 (A)  1.0 - 4.8 k/uL Final    Atypical Lymphocytes Absolute 10/27/2022 1.38  k/uL Final    Absolute Mono # 10/27/2022 2.30 (A)  0.1 - 1.3 k/uL Final    Absolute Eos # 10/27/2022 0.23  0.0 - 0.4 k/uL Final    Basophils Absolute 10/27/2022 0.00  0.0 - 0.2 k/uL Final    Morphology 10/27/2022 Normal   Final    Glucose 10/28/2022 91  70 - 99 mg/dL Final    BUN 10/28/2022 10  8 - 23 mg/dL Final    Creatinine 10/28/2022 0.59 (A)  0.70 - 1.20 mg/dL Final    Est, Glom Filt Rate 10/28/2022 >60  >60 mL/min/1.73m2 Final    Comment:       Effective Oct 3, 2022        These results are not intended for use in patients <25years of age. eGFR results are calculated without a race factor using the 2021 CKD-EPI equation. Careful clinical correlation is recommended, particularly when comparing to results   calculated using previous equations. The CKD-EPI equation is less accurate in patients with extremes of muscle mass, extra-renal   metabolism of creatine, excessive creatine ingestion, or following therapy that affects   renal tubular secretion.       Calcium 10/28/2022 8.7  8.6 - 10.4 mg/dL Final    Sodium 10/28/2022 138  135 - 144 mmol/L Final    Potassium 10/28/2022 4.0  3.7 - 5.3 mmol/L Final    Chloride 10/28/2022 102  98 - 107 mmol/L Final    CO2 10/28/2022 27  20 - 31 mmol/L Final    Anion Gap 10/28/2022 9  9 - 17 mmol/L Final    WBC 10/28/2022 16.6 (A)  3.5 - 11.0 k/uL Final    RBC 10/28/2022 3.60 (A)  4.5 - 5.9 m/uL Final    Hemoglobin 10/28/2022 11.8 (A)  13.5 - 17.5 g/dL Final    Hematocrit 10/28/2022 34.9 (A)  41 - 53 % Final    MCV 10/28/2022 96.9  80 - 100 fL Final    MCH 10/28/2022 32.8  26 - 34 pg Final    MCHC 10/28/2022 33.9  31 - 37 g/dL Final    RDW 10/28/2022 13.3  11.5 - 14.9 % Final    Platelets 48/62/4460 330  150 - 450 k/uL Final    MPV 10/28/2022 6.5  6.0 - 12.0 fL Final    Seg Neutrophils 10/28/2022 39  36 - 66 % Final    Lymphocytes 10/28/2022 47 (A)  24 - 44 % Final    Atypical Lymphocytes 10/28/2022 3  % Final    Monocytes 10/28/2022 9 (A)  1 - 7 % Final    Eosinophils % 10/28/2022 2  0 - 4 % Final    Basophils 10/28/2022 0  0 - 2 % Final    Segs Absolute 10/28/2022 6.47  1.3 - 9.1 k/uL Final    Absolute Lymph # 10/28/2022 7.81 (A)  1.0 - 4.8 k/uL Final    Atypical Lymphocytes Absolute 10/28/2022 0.50  k/uL Final    Absolute Mono # 10/28/2022 1.49 (A)  0.1 - 1.3 k/uL Final    Absolute Eos # 10/28/2022 0.33  0.0 - 0.4 k/uL Final    Basophils Absolute 10/28/2022 0.00  0.0 - 0.2 k/uL Final    Morphology 10/28/2022 ANISOCYTOSIS PRESENT   Final    Morphology 10/28/2022 1+ TEARDROPS   Final    Ammonia 10/28/2022 30  16 - 60 umol/L Final    Glucose 10/29/2022 103 (A)  70 - 99 mg/dL Final    BUN 10/29/2022 10  8 - 23 mg/dL Final    Creatinine 10/29/2022 0.68 (A)  0.70 - 1.20 mg/dL Final    Est, Glom Filt Rate 10/29/2022 >60  >60 mL/min/1.73m2 Final    Comment:       Effective Oct 3, 2022        These results are not intended for use in patients <25years of age. eGFR results are calculated without a race factor using the 2021 CKD-EPI equation. Careful clinical correlation is recommended, particularly when comparing to results   calculated using previous equations. The CKD-EPI equation is less accurate in patients with extremes of muscle mass, extra-renal   metabolism of creatine, excessive creatine ingestion, or following therapy that affects   renal tubular secretion.       Calcium 10/29/2022 8.8  8.6 - 10.4 mg/dL Final    Sodium 10/29/2022 138  135 - 144 mmol/L Final    Potassium 10/29/2022 4.1  3.7 - 5.3 mmol/L Final    Chloride 10/29/2022 103  98 - 107 mmol/L Final    CO2 10/29/2022 27  20 - 31 mmol/L Final    Anion Gap 10/29/2022 8 (A)  9 - 17 mmol/L Final    WBC 10/29/2022 14.5 (A)  3.5 - 11.0 k/uL Final    RBC 10/29/2022 3.60 (A)  4.5 - 5.9 m/uL Final    Hemoglobin 10/29/2022 11.8 (A)  13.5 - 17.5 g/dL Final Hematocrit 10/29/2022 34.8 (A)  41 - 53 % Final    MCV 10/29/2022 96.9  80 - 100 fL Final    MCH 10/29/2022 32.7  26 - 34 pg Final    MCHC 10/29/2022 33.8  31 - 37 g/dL Final    RDW 10/29/2022 13.3  11.5 - 14.9 % Final    Platelets 99/84/0386 298  150 - 450 k/uL Final    MPV 10/29/2022 6.4  6.0 - 12.0 fL Final    Seg Neutrophils 10/29/2022 37  36 - 66 % Final    Lymphocytes 10/29/2022 50 (A)  24 - 44 % Final    Monocytes 10/29/2022 11 (A)  1 - 7 % Final    Eosinophils % 10/29/2022 1  0 - 4 % Final    Basophils 10/29/2022 0  0 - 2 % Final    Metamyelocytes 10/29/2022 1 (A)  0 % Final    Segs Absolute 10/29/2022 5.37  1.3 - 9.1 k/uL Final    Absolute Lymph # 10/29/2022 7.23 (A)  1.0 - 4.8 k/uL Final    Absolute Mono # 10/29/2022 1.60 (A)  0.1 - 1.3 k/uL Final    Absolute Eos # 10/29/2022 0.15  0.0 - 0.4 k/uL Final    Basophils Absolute 10/29/2022 0.00  0.0 - 0.2 k/uL Final    Metamyelocytes Absolute 10/29/2022 0.15 (A)  0 k/uL Final    Morphology 10/29/2022 Normal   Final    Retic % 10/29/2022 1.3  0.5 - 2.0 % Final    Absolute Retic # 10/29/2022 0.045  0.0245 - 0.098 M/uL Final    Surgical Pathology Report 10/29/2022    Final                    Value:[id number]  [facility]  ANATOMIC PATHOLOGY  [address] [place] [address]. [place], [address]  [phone]  Fax: [phone]  [facility]    Patient Name: Munira Hester  MR#: [id number]  Specimen #[id number]    Procedures/Addenda  PERIPHERAL BLOOD REPORT     Date Ordered:     10/31/2022     Status:  Signed Out       Date Complete:     10/31/2022     By: Halley Simental. Wilfred Duarte D.O. Date Reported:     10/31/2022       INTERPRETATION  Peripheral blood:    MILD LEUKOCYTOSIS WITH ABSOLUTE LYMPHOCYTOSIS AND MONOCYTOSIS. MILD NORMOCYTIC ANEMIA WITH NORMAL MORPHOLOGY. PLATELET COUNT WITHIN THE REFERENCE RANGE WITH NORMAL MORPHOLOGY. THE ELECTRONIC HEALTH RECORD IS REVIEWED, DOCUMENTING A HISTORY OF  CLL.   THE ABSOLUTE LYMPHOCYTOSIS IS CONSISTENT WITH HIS KNOWN  DIAGNOSIS. MONOCYTOSIS CAN BE SEEN WITH A VARIETY OF REACTIVE  CONDITIONS. IF PERSISTENT, MONOCYTOSIS CAN BE SEEN WITH SOME  MYELOPROLIFERATIVE DISORDERS. RESULT                          S-COMMENTS  PERIPHERAL BLOOD STUDY    CBC: Please see the electronic health record for CBC parameters  ([id number], 10/29/22, 05:40). PLATELETS: Platelets show normal morphology. LEUKOCYTES: White blood cells show mild leukocytosis with absolute  lymphocytosis and monocytosis. Reactive lymphocytes are present. There are no blasts. ERYTHROCYTES: Red blood cells show mild normocytic anemia with normal  morphology. Note: The electronic health record is reviewed. Ramonita Graham D.O. Source:  A: Peripheral Blood           Reviewed patient's current plan of care and vital signs with nursing staff.     Labs reviewed: [x] Yes    Medications  Current Facility-Administered Medications: ipratropium (ATROVENT HFA) 17 MCG/ACT inhaler 2 puff, 2 puff, Inhalation, Q4H PRN  ipratropium (ATROVENT) 0.02 % nebulizer solution 0.5 mg, 0.5 mg, Nebulization, Q4H PRN  haloperidol lactate (HALDOL) injection 5 mg, 5 mg, IntraMUSCular, Q6H PRN **AND** LORazepam (ATIVAN) injection 2 mg, 2 mg, IntraMUSCular, Q6H PRN **AND** diphenhydrAMINE (BENADRYL) injection 50 mg, 50 mg, IntraMUSCular, Q6H PRN  acetaminophen (TYLENOL) tablet 650 mg, 650 mg, Oral, Q6H PRN  ibuprofen (ADVIL;MOTRIN) tablet 400 mg, 400 mg, Oral, Q6H PRN  aluminum & magnesium hydroxide-simethicone (MAALOX) 200-200-20 MG/5ML suspension 30 mL, 30 mL, Oral, Q6H PRN  nicotine polacrilex (NICORETTE) gum 2 mg, 2 mg, Oral, Q2H PRN  lactobacillus (CULTURELLE) capsule 1 capsule, 1 capsule, Oral, Daily  albuterol sulfate HFA (PROVENTIL;VENTOLIN;PROAIR) 108 (90 Base) MCG/ACT inhaler 2 puff, 2 puff, Inhalation, Q4H PRN  metroNIDAZOLE (FLAGYL) tablet 500 mg, 500 mg, Oral, 3 times per day  levoFLOXacin (LEVAQUIN) tablet 750 mg, 750 mg, Oral, Daily  docusate sodium (COLACE) capsule 100 mg, 100 mg, Oral, TID PRN  budesonide-formoterol (SYMBICORT) 160-4.5 MCG/ACT inhaler 2 puff, 2 puff, Inhalation, BID  OLANZapine (ZYPREXA) tablet 15 mg, 15 mg, Oral, BID  haloperidol (HALDOL) tablet 5 mg, 5 mg, Oral, Q6H PRN **AND** LORazepam (ATIVAN) tablet 2 mg, 2 mg, Oral, Q6H PRN    ASSESSMENT  Acute psychosis ([facility] [address]         HANDOFF  Patient symptoms: Remain unstable  Consultation with attending physician for medication  MD advise: Consider further titration of Zyprexa  Encourage participation in groups and milieu. Probable discharge is to be determined by MD    Electronically signed by ELISEO Escalera Mai, CNP on 10/31/2022 at 3:45 PM    **This report has been created using voice recognition software. It may contain minor errors which are inherent in voice recognition technology. **  I independently saw and evaluated the patient. I reviewed the  documentation above. Any additional comments or changes to the    documentation are stated below otherwise agree with assessment. The patient continues to be grandiose. He is complaining of some lethargy and sedation with the medication. He is religiously preoccupied. He wants to be on a long-acting injection. Zyprexa decreased to 7.5 mg twice daily. Invega 3 mg at      PLAN  Medications as noted above  Attempt to develop insight  Expected Length of Stay is 3-5 days. Psycho-education conducted. Supportive Therapy conducted.   Follow-up daily while on inpatient unit    Electronically signed by Dre Eagle MD on 10/31/22 at 7:26 PM EDT

## 2022-10-31 NOTE — PROGRESS NOTES
Emergency Medication Follow-Up Note:    PRN medication of Haldol 5mg, Ativan 2mg, and Benadryl 50 mg IM  was effective as evidence by  patient resting in bed quietly with eyes closed. No noted signs/symptoms of medication side effects. Pt breathing regular and unlabored, RR of 12, no sign of any distress. Will continue to monitor and provide support as needed.

## 2022-10-31 NOTE — GROUP NOTE
Recreation Group Note        Date: October 31, 2022 Start Time:  11:10am   End Time: 11:45am      Number of Participants in Group & Unit Census:  5/14    Topic: Interpersonal Skills and Problem-Solving Skills     Goal of Group:Patient will demonstrate increased interpersonal skills and problem-solving skills. Comments:     Patient did not participate in Recreation group, despite staff encouragement and explanation of benefits. Patient remain seclusive to self. Q15 minute safety checks maintained for patient safety and will continue to encourage patient to attend unit programming.           Signature:  Chasidy Henson

## 2022-10-31 NOTE — PROGRESS NOTES
10/31/22 1535   Encounter Summary   Encounter Overview/Reason  Spiritual/Emotional Needs   Service Provided For: Patient   Referral/Consult From: Phillip   Last Encounter  10/31/22   Complexity of Encounter Moderate   Begin Time 0140   End Time  0215   Total Time Calculated 35 min   Spiritual/Emotional needs   Type Spiritual Support   Behavioral Health    Type  Spirituality Group   Assessment/Intervention/Outcome   Assessment Anxious; Coping   Intervention Active listening;Nurtured Hope   Outcome Receptive; Expressed Gratitude;Engaged in conversation

## 2022-10-31 NOTE — BH NOTE
[facility] Hematology/Oncology  PROGRESS NOTE      Subjective:       Patient ID:   NAME: Blanca Loya : 1966     53 y.o. female    Referring Doc: Cora  Other Physicians: Lyndsay Gonsales, CARLY Barillas    Chief Complaint:  Breast ca f/u    History of Present Illness:     Patient returns today for a regularly scheduled follow-up visit.  She is doing well with no new issues. No CP, SOB, HA's or N/V. She had been on the femara and has since discontinued and started aromasin. She is here by herself.  She is having some aches and pains but she has fibromyalgia.  Occasional HA's and some resolving insomnia. Some hot flashes.      ROS:   GEN: normal without any fever, night sweats or weight loss  HEENT: normal with no HA's, sore throat, stiff neck, changes in vision  CV: normal with no CP, SOB, PND, RUGGIERO or orthopnea  PULM: normal with no SOB, cough, hemoptysis, sputum or pleuritic pain  GI: normal with no abdominal pain, nausea, vomiting, constipation, diarrhea, melanotic stools, BRBPR, or hematemesis  : normal with no hematuria, dysuria  BREAST: normal with no mass, discharge, pain  SKIN: normal with no rash, erythema, bruising, or swelling    Allergies:  Review of patient's allergies indicates:   Allergen Reactions    Demerol [meperidine] Anxiety    Pcn [penicillins] Rash       Medications:    Current Outpatient Medications:     ALPRAZolam (XANAX) 0.5 MG tablet, TK 1 T PO QD PRN, Disp: , Rfl: 0    ascorbic acid, vitamin C, (VITAMIN C) 1000 MG tablet, Take 1,000 mg by mouth once daily., Disp: , Rfl:     b complex vitamins tablet, Take 1 tablet by mouth once daily., Disp: , Rfl:     butalbital-acetaminophen-caffeine -40 mg (FIORICET, ESGIC) -40 mg per tablet, , Disp: , Rfl:     CHROMIUM ORAL, Take by mouth., Disp: , Rfl:     exemestane (AROMASIN) 25 mg tablet, Take 1 tablet (25 mg total) by mouth once daily., Disp: 30 tablet, Rfl: 3    levothyroxine (SYNTHROID) 100 MCG tablet, TK 1 T  5 [facility]  Initial Interdisciplinary Treatment Plan NO      Original treatment plan Date & Time: 10/31/2022   13:10    Admission Type:  Admission Type: Voluntary    Reason for admission:   Reason for Admission: Racing thoughts, rapid speach, paranoid, hallucinations, june, depressed    Estimated Length of Stay:  5-7days  Estimated Discharge Date: to be determined by physician    PATIENT STRENGTHS:  Patient Strengths:   Patient Strengths and Limitations:Limitations: Perceives need for assistance with self-care, Unrealistic self-view, External locus of control, Difficulty problem solving/relies on others to help solve problems, Multiple barriers to leisure interests  Addictive Behavior: Addictive Behavior  In the Past 3 Months, Have You Felt or Has Someone Told You That You Have a Problem With  : None  Medical Problems:  Past Medical History:   Diagnosis Date    Asthma     COPD (chronic obstructive pulmonary disease) ([facility] [address]     Emphysema of lung ([facility]      Status EXAM:Mental Status and Behavioral Exam  Normal: No  Level of Assistance: Needs encouragement  Facial Expression: Worried, Flat  Affect: Unstable  Level of Consciousness: Alert  Frequency of Checks: 4 times per hour, close  Mood:Normal: No  Mood: Anxious, Labile, Suspicious  Motor Activity:Normal: No  Motor Activity: Increased  Eye Contact: Good  Observed Behavior: Agitated, Preoccupied  Sexual Misconduct History: Current - no  Preception: [place] to person, [place] to time, [place] to place  Attention:Normal: No  Attention: Distractible  Thought Processes: Flight of ideas, Perseveration  Thought Content:Normal: No  Thought Content: Preoccupations, Obsessions, Paranoia  Depression Symptoms: No problems reported or observed.   Anxiety Symptoms: Generalized, Obsessions  June Symptoms: Increased energy, Flight of ideas, Poor judgment  Hallucinations: None  Delusions: Yes  Delusions: Grandeur, Paranoid  Memory:Normal: No  Memory: Confabulation, Poor PO QD, Disp: , Rfl: 0    oxyCODONE-acetaminophen (PERCOCET) 5-325 mg per tablet, Take 1 tablet by mouth every 6 (six) hours as needed for Pain., Disp: 28 tablet, Rfl: 0    sucralfate (CARAFATE) 1 gram tablet, Carafate 1 gram tablet  Take 1 tablet 4 times a day by oral route for 30 days.  as needed for abd upset, Disp: , Rfl:     PMHx/PSHx Updates:  See patient's last visit with me on 10/24/2019  See H&P on 9/8/2016        Pathology:      See path note on 8/30/2016      Objective:     Vitals:  Blood pressure 125/71, pulse 82, temperature 98.1 °F (36.7 °C), temperature source Oral, resp. rate 19, weight 61.6 kg (135 lb 14.4 oz), last menstrual period 09/10/2016.    Physical Examination:   GEN: no apparent distress, comfortable; AAOx3  HEAD: atraumatic and normocephalic  EYES: no pallor, no icterus, PERRLA  ENT: OMM, no pharyngeal erythema, external ears WNL; no nasal discharge; no thrush  NECK: no masses, thyroid normal, trachea midline, no LAD/LN's, supple  CV: RRR with no murmur; normal pulse; normal S1 and S2; no pedal edema  CHEST: Normal respiratory effort; CTAB; normal breath sounds; no wheeze or crackles  ABDOM: nontender and nondistended; soft; normal bowel sounds; no rebound/guarding  MUSC/Skeletal: ROM normal; no crepitus; joints normal; no deformities or arthropathy  EXTREM: no clubbing, cyanosis, inflammation or swelling  SKIN: no rashes, lesions, ulcers, petechiae or subcutaneous nodules  : no tyson  NEURO: grossly intact; motor/sensory WNL; AAOx3; no tremors  PSYCH: normal mood, affect and behavior  LYMPH: normal cervical, supraclavicular, axillary and groin LN's  Breast: no changes          Labs:     10/22/2019    Lab Results   Component Value Date    WBC 3.68 (L) 12/04/2019    HGB 12.6 12/04/2019    HCT 38.1 12/04/2019    MCV 95 12/04/2019     12/04/2019          BMP  Lab Results   Component Value Date     12/04/2019    K 3.6 12/04/2019     12/04/2019    CO2 31 (H) 12/04/2019     recent  Insight and Judgment: No  Insight and Judgment: Poor judgment, Poor insight    EDUCATION:   Learner Progress Toward Treatment Goals: reviewed group plans and strategies for care    Method:group therapy, medication compliance, individualized assessments and care planning    Outcome: needs reinforcement    PATIENT GOALS: short term: take my medications  Long term: keep on medications, [facility]    PLAN/TREATMENT RECOMMENDATIONS:     continue group therapy , medications compliance, goal setting, individualized assessments and care, continue to monitor pt on unit      SHORT-TERM GOALS:   Time frame for Short-Term Goals: 5-7 days    LONG-TERM GOALS:  Time frame for Long-Term Goals: 6 months  Members Present in Team Meeting: See Signature Sheet    71 Lorelei Lau RN BUN 10 12/04/2019    CREATININE 0.9 12/04/2019    CALCIUM 9.5 12/04/2019    ANIONGAP 9 12/04/2019    ESTGFRAFRICA >60.0 12/04/2019    EGFRNONAA >60.0 12/04/2019     Lab Results   Component Value Date     (H) 12/04/2019     (H) 12/04/2019    ALKPHOS 223 (H) 12/04/2019    BILITOT 1.5 (H) 12/04/2019           Radiology/Diagnostic Studies:    PET/CT  5/13/2019:    IMPRESSION:    1. No evidence of FDG avid metastatic disease.  2. Incidental findings as above.          Chest/Abdom CT  8/24/2018:      IMPRESSION:  1. No evidence of metastatic disease in the chest, abdomen, or the pelvis.  2. Additional observations as above        [facility] Unknown Rad Eap    Result Date: 9/21/2017  INTEGRATED PET CT WITH IMAGE FUSION HISTORY:  c50.411 z17.0 subsequent treatment evaluation for right breast cancer diagnosed in August 2016. Bilateral mastectomies with chemotherapy and radiation. TECHNIQUE: Following the injection of 12.2 mCi of F-18 labeled FDG into a left antecubital vein, PET CT was performed from the vertex of the skull through the proximal thighs with an integrated full ring PET CT scanner with image fusion. The patient's serum glucose at the time of the exam was 86 mg/dL. COMPARISON: 09/20/2016 FINDINGS: There is no abnormal FDG activity to suggest recurrent or metastatic disease. CT of the head and neck show no intra-axial lesions or cervical adenopathy. The patient has had a left occipital craniotomy. CT of the chest demonstrates bilateral breast implants. There is no hilar, mediastinal or axillary adenopathy. There are no pulmonary nodules, infiltrates or pleural effusions. CT of the abdomen and pelvis demonstrates physiologic activity within the GI tract and urinary system. There is no adenopathy. The adrenal glands are normal. There are no lytic or blastic lesions.     IMPRESSION: No evidence of metastatic disease Prior bilateral mastectomies Read and electronically signed by: Mireya Rodriguez MD on  9/21/2017 1:01 PM CDT WALLACE CARIAS MD      I have reviewed all available lab results and radiology reports.    Assessment/Plan:   (1) 53 y.o. female with diagnosis of right breast cancer  - s/p bilateral mastectomies on 9/26/16 followed by reconstruction  - followed by Dr Bear with [facility]  - she had 2 out of 7 LN's that were positive  - she was a Stage IIA  - ER+/NE+ and her2nu neg  - s/p AC x4 and taxol x4  - she has seen Dr Gonsales with rad/onc for XRT and completed 5 weeks last month  - discussed the pro's and con's of tamoxifen versus arimidex, and in light of the pap issues, arimidex is probably the better choice  - she had been on arimidex but it was discontinued because of her liver  - she has some mood swings and weight gain  - PET done on 9/21/17 with no evidence of mets;   - recent CT chest/abdom on 8/24/2018 showed no evidence on cancer  - s/p prior repeat surgery with right-sided lift with Dr Umana with repeat evaluation in Nov 2018   - she had a slow post-op recovery and has residual aches and pains  - she had PET on 5/13/2019  - she has been on arimidex and now femara which she both discontinued; she is having some generalized aches and pains;   - discussed aromasine and she is willing to give it a try; she does not want to take tamoxifen due to the clot and ovarian cancer risks   - she has been on the aromasin for 5 weeks now and seems to be doing well with it so far    (2) Mild leucopenia/anemia secondary to chemotherapy (resolved)  - latest wbc is at 3.68 - latest hgb was at 12.6     (3) Prior abnormal PAP issues - followed by Dr Cline with GYN; latest pap was negative per patient and she was also HPV negative     (4) Hx of acoustic neuroma in past - s/p XRT in 2010; followed by Dr Gonsales     (5) Chronic fibromyalgia - she is not being followed by anyone; she may be having a flare-up      (6) GI issues - seen by Dr Edmonds with GI  - prior mild elevation bili and alk phos -  "resolved  - she had liver biopsy and EGD with "inflammation" per patient  - s/p esophageal dilation  - Dr Edmonds was concerned it may be from the arimidex  - she is having an repeat US in Jan 2019  - sudden increase LFT's - ? Due to the recently started aromasin        (7) RUE - resolved - possible lymphedema issues - she no longer sees PT  - also seen by Dr Liao    (8) Hypokalemia -on supplements - patient wanted to try OTC and did not want prescription meds        1. Malignant neoplasm of upper-outer quadrant of right breast in female, estrogen receptor positive     2. Estrogen receptor positive     3. S/P bilateral mastectomy             PLAN:  1. Check up to date labs every 2 weeks since LFT's are up  2. F/U with PCP, Surg and GYN  3. Hold aromasin for now since LFT's increased suddenly  4.  F/u with Dr Edmonds - will place communication to him about the increase in LFT's again    RTC in 1 month     Fax note to Chaparro Shepherd III, *, CARLY Bear Mannina, Babycos; Grey     I spent over 25 mins of time with the patient. Over half of this time was spent couseling and coordinating care.    Discussion:     I have explained all of the above in detail and the patient understands all of the current recommendation(s). I have answered all of their questions to the best of my ability and to their complete satisfaction.   The patient is to continue with the current management plan.            Electronically signed by Bronson Carrillo MD      "

## 2022-10-31 NOTE — GROUP NOTE
Group Therapy Note    Date: 10/31/2022    Group Start Time: 1430  Group End Time: 6377  Group Topic: Cognitive Skills    BRAYDEN Hamilton, CTRS        Group Therapy Note    Attendees: 7/20     Topic: Expressing feelings, and explore positive factors that improve mental health and sobriety, and communication skills. Also negative factors that harm mental health and sobriety. .     Goal of Group: To increase social interaction and to practice expressing feelings, and explore positive factors that improve mental health and sobriety, and communication skills. Also negative factors that harm mental health and sobriety. Comments: Patient did not participate in Cognitive Skills Group, despite staff encouragement and explanation of benefits. Patient remains seclusive to self during group time. Q15 minute safety checks maintained for patient safety and will continue to encourage patient to attend unit programming.      Discipline Responsible: Psychoeducational Specialist        Signature:  Modesto Hermosillo

## 2022-10-31 NOTE — GROUP NOTE
Group Therapy Note    Date: 10/31/2022    Group Start Time: 1430  Group End Time: 8033  Group Topic: Cognitive Skills    FREDI Williamson        Group Therapy Note    Attendees: 7/20     Topic: Expressing feelings, and explore positive factors that improve mental health and sobriety, and communication skills. Also negative factors that harm mental health and sobriety. .     Goal of Group: To increase social interaction and to practice expressing feelings, and explore positive factors that improve mental health and sobriety, and communication skills. Also negative factors that harm mental health and sobriety. Comments: Patient did not participate in Cognitive Skills Group, despite staff encouragement and explanation of benefits. Patient remains seclusive to self during group time. Q15 minute safety checks maintained for patient safety and will continue to encourage patient to attend unit programming.      Discipline Responsible: Psychoeducational Specialist        Signature:  Anders Cotton

## 2022-10-31 NOTE — BH NOTE
Emergency PRN Medication Administration Note:      Patient is Agitated and Disruptive as evidence by yelling at peers and staff . Staff attempted to find and relieve the distress by Refocusing on new activity Patient is currently  standing in room door way yelling. Medication Administered as prescribed:  Haldol 5 mg IM, Ativan 2mg IM, and benadryl 50mg IM. Patient Tolerated medication administration. Will continue to monitor, offer support, and reassess.

## 2022-11-01 LAB — PATHOLOGIST REVIEW: NORMAL

## 2022-11-01 PROCEDURE — 1240000000 HC EMOTIONAL WELLNESS R&B

## 2022-11-01 PROCEDURE — 99223 1ST HOSP IP/OBS HIGH 75: CPT | Performed by: INTERNAL MEDICINE

## 2022-11-01 PROCEDURE — APPSS30 APP SPLIT SHARED TIME 16-30 MINUTES

## 2022-11-01 PROCEDURE — 6360000002 HC RX W HCPCS: Performed by: INTERNAL MEDICINE

## 2022-11-01 PROCEDURE — 6370000000 HC RX 637 (ALT 250 FOR IP): Performed by: PSYCHIATRY & NEUROLOGY

## 2022-11-01 PROCEDURE — 99232 SBSQ HOSP IP/OBS MODERATE 35: CPT | Performed by: PSYCHIATRY & NEUROLOGY

## 2022-11-01 PROCEDURE — 6370000000 HC RX 637 (ALT 250 FOR IP): Performed by: INTERNAL MEDICINE

## 2022-11-01 RX ORDER — LOPERAMIDE HYDROCHLORIDE 2 MG/1
2 CAPSULE ORAL 4 TIMES DAILY PRN
Status: DISCONTINUED | OUTPATIENT
Start: 2022-11-01 | End: 2022-11-08 | Stop reason: HOSPADM

## 2022-11-01 RX ORDER — PALIPERIDONE 6 MG/1
6 TABLET, EXTENDED RELEASE ORAL DAILY
Status: DISCONTINUED | OUTPATIENT
Start: 2022-11-02 | End: 2022-11-07

## 2022-11-01 RX ORDER — OLANZAPINE 5 MG/1
5 TABLET ORAL 2 TIMES DAILY
Status: DISCONTINUED | OUTPATIENT
Start: 2022-11-01 | End: 2022-11-02

## 2022-11-01 RX ORDER — HYDROXYZINE 50 MG/1
50 TABLET, FILM COATED ORAL 3 TIMES DAILY PRN
Status: DISCONTINUED | OUTPATIENT
Start: 2022-11-01 | End: 2022-11-08 | Stop reason: HOSPADM

## 2022-11-01 RX ADMIN — OLANZAPINE 7.5 MG: 7.5 TABLET, FILM COATED ORAL at 07:43

## 2022-11-01 RX ADMIN — ACETAMINOPHEN 650 MG: 325 TABLET, FILM COATED ORAL at 19:21

## 2022-11-01 RX ADMIN — METRONIDAZOLE 500 MG: 500 TABLET ORAL at 07:00

## 2022-11-01 RX ADMIN — HALOPERIDOL 5 MG: 5 TABLET ORAL at 23:40

## 2022-11-01 RX ADMIN — PALIPERIDONE 3 MG: 3 TABLET, EXTENDED RELEASE ORAL at 07:43

## 2022-11-01 RX ADMIN — IBUPROFEN 400 MG: 400 TABLET ORAL at 19:21

## 2022-11-01 RX ADMIN — IPRATROPIUM BROMIDE 0.5 MG: 0.5 SOLUTION RESPIRATORY (INHALATION) at 07:00

## 2022-11-01 RX ADMIN — LEVOFLOXACIN 750 MG: 750 TABLET, FILM COATED ORAL at 11:25

## 2022-11-01 RX ADMIN — Medication 2 PUFF: at 07:00

## 2022-11-01 RX ADMIN — HYDROXYZINE HYDROCHLORIDE 50 MG: 50 TABLET, FILM COATED ORAL at 17:48

## 2022-11-01 RX ADMIN — BUDESONIDE AND FORMOTEROL FUMARATE DIHYDRATE 2 PUFF: 160; 4.5 AEROSOL RESPIRATORY (INHALATION) at 20:36

## 2022-11-01 RX ADMIN — LORAZEPAM 2 MG: 1 TABLET ORAL at 23:40

## 2022-11-01 RX ADMIN — LORAZEPAM 2 MG: 1 TABLET ORAL at 07:43

## 2022-11-01 RX ADMIN — HALOPERIDOL 5 MG: 5 TABLET ORAL at 07:43

## 2022-11-01 RX ADMIN — OLANZAPINE 5 MG: 5 TABLET, FILM COATED ORAL at 20:36

## 2022-11-01 ASSESSMENT — PAIN - FUNCTIONAL ASSESSMENT
PAIN_FUNCTIONAL_ASSESSMENT: 0-10
PAIN_FUNCTIONAL_ASSESSMENT: ACTIVITIES ARE NOT PREVENTED

## 2022-11-01 ASSESSMENT — PAIN DESCRIPTION - LOCATION: LOCATION: OTHER (COMMENT)

## 2022-11-01 ASSESSMENT — PAIN DESCRIPTION - DESCRIPTORS: DESCRIPTORS: ACHING

## 2022-11-01 ASSESSMENT — PAIN SCALES - GENERAL: PAINLEVEL_OUTOF10: 10

## 2022-11-01 NOTE — GROUP NOTE
Group Therapy Note    Date: 11/1/2022    Group Start Time: 1000  Group End Time: 3466  Group Topic: Music Therapy    BRAYDEN Pedroza    Music Therapy Group Note        Date: 11/1/2022   Start Time: 10am  End Time: 10:45am      Number of Participants in Group & Unit Census:  8/20    Topic: Patients shared music and based on themes within their song, shared general advice to peer. Goal of Group: Patient goals to engage in peer support; Increase sense of community; Increase self-expression; Normalization of the environment      Comments:     Patient did not participate in Music Therapy group, despite staff encouragement and explanation of benefits. Patient remain seclusive to self. Q15 minute safety checks maintained for patient safety and will continue to encourage patient to attend unit programming.

## 2022-11-01 NOTE — PROGRESS NOTES
Daily Progress Note  11/1/2022    Patient Name: Seema Samuel    CHIEF COMPLAINT: Acute psychosis with suicidal ideation         SUBJECTIVE:      Patient is seen today for a follow up assessment. Gabriela Islas is compliant with scheduled psychotropic medications including Zyprexa and Invega. He last received emergency IM Benadryl, Haldol, and Ativan yesterday 10/31 around 6 PM.  Per nursing documentation patient is agitated and disruptive as evidenced by yelling at peers and staff. Patient then again needed oral emergency Ativan and Haldol today 11/1 around 7 AM.  Per nursing documentation patient was agitated and disruptive as evidenced by pushing on unit doors, slamming doors, pushing chairs around, and yelling. Patient is seen at bedside today. When asked about events leading up to hospitalization he states \"I was just acting kind of funny I guess. \"  He was not able to further elaborate on this. Patient continues to be delusional and states that he has been hearing and seeing [employer].  He reports that he believes that he is on a mission to [place] [employer]. \"  He reports that he continues to have racing thoughts and feels that he cannot shut his mind off. Today Gabriela Islas complains of \"explosive diarrhea\" however nursing staff reports that patient has not been expressing this to them. Imodium will be ordered for patient at this time to help with diarrhea. Gabriela Islas has yet to show stability of symptoms. He continues to need emergency medication to remain behaviorally in control on the unit. He continues to warrant further inpatient hospitalization for safety and stability at this time. Appetite:  [x] Normal/Adequate/Unchanged  [] Increased  [] Decreased      Sleep:       [x] Normal/Adequate/Unchanged  [] Fair  [] Poor      Group Attendance on Unit:   [] Yes  [] Selectively    [x] No    Medication Side Effects:  Patient denies any medication side effects at the time of assessment.          Mental Status Exam  Level of consciousness: Alert and awake. Appearance: Appropriate attire for setting, resting in bed, with poor grooming and hygiene. Behavior/Motor: Approachable, psychomotor agitation  Attitude toward examiner: Cooperative, attentive but seems distracted, fair eye contact. Speech: Normal rate, low volume, normal tone, mumbled at times and is hard to understand  Mood:  Patient reports \"good\". Affect: Slightly elevated  Thought processes: coherent, illogical, and perservative. Thought content: Denies homicidal ideation. Suicidal Ideation: Denies suicidal ideations  Delusions: Patient continues to present with paranoid and bizarre delusions and Yazidism preoccupation  Perceptual Disturbance: Patient does not appear to be responding to internal stimuli. Endorses auditory hallucinations. Endorses visual hallucinations. Cognition: Oriented to self, location, time, and situation. Memory: Intact. Insight & Judgement: Poor. Data   height is 6' (1.829 m) and weight is 106 lb (48.1 kg). His temperature is 97.2 °F (36.2 °C). His blood pressure is 102/72 and his pulse is 101 (abnormal). His respiration is 14 and oxygen saturation is 94%. Labs:   No visits with results within 2 Day(s) from this visit.    Latest known visit with results is:   Admission on 10/27/2022, Discharged on 10/29/2022   Component Date Value Ref Range Status    Color, UA 10/28/2022 Yellow  Yellow Final    Turbidity UA 10/28/2022 Clear  Clear Final    Glucose, Ur 10/28/2022 NEGATIVE  NEGATIVE Final    Bilirubin Urine 10/28/2022 NEGATIVE  NEGATIVE Final    Ketones, Urine 10/28/2022 NEGATIVE  NEGATIVE Final    Specific Gravity, UA 10/28/2022 1.007  1.000 - 1.030 Final    Urine Hgb 10/28/2022 NEGATIVE  NEGATIVE Final    pH, UA 10/28/2022 6.0  5.0 - 8.0 Final    Protein, UA 10/28/2022 NEGATIVE  NEGATIVE Final    Urobilinogen, Urine 10/28/2022 Normal  Normal Final    Nitrite, Urine 10/28/2022 NEGATIVE  NEGATIVE Final    Leukocyte Esterase, Urine 10/28/2022 NEGATIVE  NEGATIVE Final    Urinalysis Comments 10/28/2022 Microscopic exam not performed based on chemical results unless requested in original order. Final    Amphetamine Screen, Ur 10/28/2022 NEGATIVE  NEGATIVE Final    Comment:       (Positive cutoff 1000 ng/mL)                  Barbiturate Screen, Ur 10/28/2022 NEGATIVE  NEGATIVE Final    Comment:       (Positive cutoff 200 ng/mL)                  Benzodiazepine Screen, Urine 10/28/2022 NEGATIVE   Final    Comment:       (Positive cutoff 200 ng/mL)                  Cocaine Metabolite, Urine 10/28/2022 NEGATIVE  NEGATIVE Final    Comment:       (Positive cutoff 300 ng/mL)                  Methadone Screen, Urine 10/28/2022 NEGATIVE  NEGATIVE Final    Comment:       (Positive cutoff 300 ng/mL)                  Opiates, Urine 10/28/2022 NEGATIVE  NEGATIVE Final    Comment:       (Positive cutoff 300 ng/mL)                  Phencyclidine, Urine 10/28/2022 NEGATIVE  NEGATIVE Final    Comment:       (Positive cutoff 25 ng/mL)                  Cannabinoid Scrn, Ur 10/28/2022 NEGATIVE  NEGATIVE Final    Comment:       (Positive cutoff 50 ng/mL)                  Oxycodone Screen, Ur 10/28/2022 NEGATIVE  NEGATIVE Final    Comment:       (Positive cutoff 100 ng/mL)                  Fentanyl, Ur 10/28/2022 NEGATIVE  NEGATIVE Final    Comment:       (Positive cutoff  5 ng/ml)            Test Information 10/28/2022 Assay provides medical screening only. The absence of expected drug(s) and/or metabolite(s) may indicate diluted or adulterated urine, limitations of testing or timing of collection. Final    Comment: Testing for legal purposes should be confirmed by another method. To request confirmation   of test result, please call the lab within 7 days of sample submission.       Ethanol 10/27/2022 <10  <10 mg/dL Final    Ethanol percent 10/27/2022 <0.010  % Final    Glucose 10/27/2022 101 (A)  70 - 99 mg/dL Final    BUN 10/27/2022 9  8 - 23 mg/dL Final    SPECIMEN SLIGHTLY HEMOLYZED, RESULTS MAY BE ADVERSELY AFFECTED. Creatinine 10/27/2022 0.62 (A)  0.70 - 1.20 mg/dL Final    SPECIMEN SLIGHTLY HEMOLYZED, RESULTS MAY BE ADVERSELY AFFECTED. Tiffany Rodriguez Filt Rate 10/27/2022 >60  >60 mL/min/1.73m2 Final    Comment:       Effective Oct 3, 2022        These results are not intended for use in patients <25years of age. eGFR results are calculated without a race factor using the 2021 CKD-EPI equation. Careful clinical correlation is recommended, particularly when comparing to results   calculated using previous equations. The CKD-EPI equation is less accurate in patients with extremes of muscle mass, extra-renal   metabolism of creatine, excessive creatine ingestion, or following therapy that affects   renal tubular secretion. Calcium 10/27/2022 9.1  8.6 - 10.4 mg/dL Final    SPECIMEN SLIGHTLY HEMOLYZED, RESULTS MAY BE ADVERSELY AFFECTED. Sodium 10/27/2022 137  135 - 144 mmol/L Final    SPECIMEN SLIGHTLY HEMOLYZED, RESULTS MAY BE ADVERSELY AFFECTED. Potassium 10/27/2022 4.6  3.7 - 5.3 mmol/L Final    SPECIMEN SLIGHTLY HEMOLYZED, RESULTS MAY BE ADVERSELY AFFECTED. Chloride 10/27/2022 98  98 - 107 mmol/L Final    SPECIMEN SLIGHTLY HEMOLYZED, RESULTS MAY BE ADVERSELY AFFECTED. CO2 10/27/2022 24  20 - 31 mmol/L Final    SPECIMEN SLIGHTLY HEMOLYZED, RESULTS MAY BE ADVERSELY AFFECTED.     Anion Gap 10/27/2022 15  9 - 17 mmol/L Final    WBC 10/27/2022 23.0 (A)  3.5 - 11.0 k/uL Final    RBC 10/27/2022 3.98 (A)  4.5 - 5.9 m/uL Final    Hemoglobin 10/27/2022 12.9 (A)  13.5 - 17.5 g/dL Final    Hematocrit 10/27/2022 38.2 (A)  41 - 53 % Final    MCV 10/27/2022 96.0  80 - 100 fL Final    MCH 10/27/2022 32.3  26 - 34 pg Final    MCHC 10/27/2022 33.6  31 - 37 g/dL Final    RDW 10/27/2022 13.4  11.5 - 14.9 % Final    Platelets 65/76/8832 376  150 - 450 k/uL Final    MPV 10/27/2022 6.5  6.0 - 12.0 fL Final    Seg Neutrophils 10/27/2022 49  36 - 66 % Final    Lymphocytes 10/27/2022 34  24 - 44 % Final    Atypical Lymphocytes 10/27/2022 6  % Final    Monocytes 10/27/2022 10 (A)  1 - 7 % Final    Eosinophils % 10/27/2022 1  0 - 4 % Final    Basophils 10/27/2022 0  0 - 2 % Final    Segs Absolute 10/27/2022 11.27 (A)  1.3 - 9.1 k/uL Final    Absolute Lymph # 10/27/2022 7.82 (A)  1.0 - 4.8 k/uL Final    Atypical Lymphocytes Absolute 10/27/2022 1.38  k/uL Final    Absolute Mono # 10/27/2022 2.30 (A)  0.1 - 1.3 k/uL Final    Absolute Eos # 10/27/2022 0.23  0.0 - 0.4 k/uL Final    Basophils Absolute 10/27/2022 0.00  0.0 - 0.2 k/uL Final    Morphology 10/27/2022 Normal   Final    Glucose 10/28/2022 91  70 - 99 mg/dL Final    BUN 10/28/2022 10  8 - 23 mg/dL Final    Creatinine 10/28/2022 0.59 (A)  0.70 - 1.20 mg/dL Final    Est, Glom Filt Rate 10/28/2022 >60  >60 mL/min/1.73m2 Final    Comment:       Effective Oct 3, 2022        These results are not intended for use in patients <25years of age. eGFR results are calculated without a race factor using the 2021 CKD-EPI equation. Careful clinical correlation is recommended, particularly when comparing to results   calculated using previous equations. The CKD-EPI equation is less accurate in patients with extremes of muscle mass, extra-renal   metabolism of creatine, excessive creatine ingestion, or following therapy that affects   renal tubular secretion.       Calcium 10/28/2022 8.7  8.6 - 10.4 mg/dL Final    Sodium 10/28/2022 138  135 - 144 mmol/L Final    Potassium 10/28/2022 4.0  3.7 - 5.3 mmol/L Final    Chloride 10/28/2022 102  98 - 107 mmol/L Final    CO2 10/28/2022 27  20 - 31 mmol/L Final    Anion Gap 10/28/2022 9  9 - 17 mmol/L Final    WBC 10/28/2022 16.6 (A)  3.5 - 11.0 k/uL Final    RBC 10/28/2022 3.60 (A)  4.5 - 5.9 m/uL Final    Hemoglobin 10/28/2022 11.8 (A)  13.5 - 17.5 g/dL Final    Hematocrit 10/28/2022 34.9 (A)  41 - 53 % Final    MCV 10/28/2022 96.9  80 - 100 fL Final    MCH 10/28/2022 32.8 26 - 34 pg Final    MCHC 10/28/2022 33.9  31 - 37 g/dL Final    RDW 10/28/2022 13.3  11.5 - 14.9 % Final    Platelets 78/48/6143 330  150 - 450 k/uL Final    MPV 10/28/2022 6.5  6.0 - 12.0 fL Final    Seg Neutrophils 10/28/2022 39  36 - 66 % Final    Lymphocytes 10/28/2022 47 (A)  24 - 44 % Final    Atypical Lymphocytes 10/28/2022 3  % Final    Monocytes 10/28/2022 9 (A)  1 - 7 % Final    Eosinophils % 10/28/2022 2  0 - 4 % Final    Basophils 10/28/2022 0  0 - 2 % Final    Segs Absolute 10/28/2022 6.47  1.3 - 9.1 k/uL Final    Absolute Lymph # 10/28/2022 7.81 (A)  1.0 - 4.8 k/uL Final    Atypical Lymphocytes Absolute 10/28/2022 0.50  k/uL Final    Absolute Mono # 10/28/2022 1.49 (A)  0.1 - 1.3 k/uL Final    Absolute Eos # 10/28/2022 0.33  0.0 - 0.4 k/uL Final    Basophils Absolute 10/28/2022 0.00  0.0 - 0.2 k/uL Final    Morphology 10/28/2022 ANISOCYTOSIS PRESENT   Final    Morphology 10/28/2022 1+ TEARDROPS   Final    Ammonia 10/28/2022 30  16 - 60 umol/L Final    Glucose 10/29/2022 103 (A)  70 - 99 mg/dL Final    BUN 10/29/2022 10  8 - 23 mg/dL Final    Creatinine 10/29/2022 0.68 (A)  0.70 - 1.20 mg/dL Final    Est, Glom Filt Rate 10/29/2022 >60  >60 mL/min/1.73m2 Final    Comment:       Effective Oct 3, 2022        These results are not intended for use in patients <25years of age. eGFR results are calculated without a race factor using the 2021 CKD-EPI equation. Careful clinical correlation is recommended, particularly when comparing to results   calculated using previous equations. The CKD-EPI equation is less accurate in patients with extremes of muscle mass, extra-renal   metabolism of creatine, excessive creatine ingestion, or following therapy that affects   renal tubular secretion.       Calcium 10/29/2022 8.8  8.6 - 10.4 mg/dL Final    Sodium 10/29/2022 138  135 - 144 mmol/L Final    Potassium 10/29/2022 4.1  3.7 - 5.3 mmol/L Final    Chloride 10/29/2022 103  98 - 107 mmol/L Final    CO2 10/29/2022 27  20 - 31 mmol/L Final    Anion Gap 10/29/2022 8 (A)  9 - 17 mmol/L Final    WBC 10/29/2022 14.5 (A)  3.5 - 11.0 k/uL Final    RBC 10/29/2022 3.60 (A)  4.5 - 5.9 m/uL Final    Hemoglobin 10/29/2022 11.8 (A)  13.5 - 17.5 g/dL Final    Hematocrit 10/29/2022 34.8 (A)  41 - 53 % Final    MCV 10/29/2022 96.9  80 - 100 fL Final    MCH 10/29/2022 32.7  26 - 34 pg Final    MCHC 10/29/2022 33.8  31 - 37 g/dL Final    RDW 10/29/2022 13.3  11.5 - 14.9 % Final    Platelets 99/19/9653 298  150 - 450 k/uL Final    MPV 10/29/2022 6.4  6.0 - 12.0 fL Final    Seg Neutrophils 10/29/2022 37  36 - 66 % Final    Lymphocytes 10/29/2022 50 (A)  24 - 44 % Final    Monocytes 10/29/2022 11 (A)  1 - 7 % Final    Eosinophils % 10/29/2022 1  0 - 4 % Final    Basophils 10/29/2022 0  0 - 2 % Final    Metamyelocytes 10/29/2022 1 (A)  0 % Final    Segs Absolute 10/29/2022 5.37  1.3 - 9.1 k/uL Final    Absolute Lymph # 10/29/2022 7.23 (A)  1.0 - 4.8 k/uL Final    Absolute Mono # 10/29/2022 1.60 (A)  0.1 - 1.3 k/uL Final    Absolute Eos # 10/29/2022 0.15  0.0 - 0.4 k/uL Final    Basophils Absolute 10/29/2022 0.00  0.0 - 0.2 k/uL Final    Metamyelocytes Absolute 10/29/2022 0.15 (A)  0 k/uL Final    Morphology 10/29/2022 Normal   Final    Pathologist Review 10/29/2022 Reviewed by pathologist:  Duane Betters. Cyril Jensen, D.O. Final    Retic % 10/29/2022 1.3  0.5 - 2.0 % Final    Absolute Retic # 10/29/2022 0.045  0.0245 - 0.098 M/uL Final    Surgical Pathology Report 10/29/2022    Final                    Value:[id number]  [facility]  ANATOMIC PATHOLOGY  [address] [place] [address]. [place], [address]  [phone]  Fax: [phone]  [facility]    Patient Name: Allan Marks  MR#: [id number]  Specimen #[id number]    Procedures/Addenda  PERIPHERAL BLOOD REPORT     Date Ordered:     10/31/2022     Status:  Signed Out       Date Complete:     10/31/2022     By: Duane Betters.  Terrell Anthony, D.O.       Date Reported:     10/31/2022       INTERPRETATION  Peripheral blood:    MILD LEUKOCYTOSIS WITH ABSOLUTE LYMPHOCYTOSIS AND MONOCYTOSIS. MILD NORMOCYTIC ANEMIA WITH NORMAL MORPHOLOGY. PLATELET COUNT WITHIN THE REFERENCE RANGE WITH NORMAL MORPHOLOGY. THE ELECTRONIC HEALTH RECORD IS REVIEWED, DOCUMENTING A HISTORY OF  CLL. THE ABSOLUTE LYMPHOCYTOSIS IS CONSISTENT WITH HIS KNOWN  DIAGNOSIS. MONOCYTOSIS CAN BE SEEN WITH A VARIETY OF REACTIVE  CONDITIONS. IF PERSISTENT, MONOCYTOSIS CAN BE SEEN WITH SOME  MYELOPROLIFERATIVE DISORDERS. RESULT                          S-COMMENTS  PERIPHERAL BLOOD STUDY    CBC: Please see the electronic health record for CBC parameters  ([id number], 10/29/22, 05:40). PLATELETS: Platelets show normal morphology. LEUKOCYTES: White blood cells show mild leukocytosis with absolute  lymphocytosis and monocytosis. Reactive lymphocytes are present. There are no blasts. ERYTHROCYTES: Red blood cells show mild normocytic anemia with normal  morphology. Note: The electronic health record is reviewed. Pascual Veliz D.O. Source:  A: Peripheral Blood           Reviewed patient's current plan of care and vital signs with nursing staff.     Labs reviewed: [x] Yes  Last EKG in EMR reviewed: [x] Yes  QTc: 418    Medications  Current Facility-Administered Medications: loperamide (IMODIUM) capsule 2 mg, 2 mg, Oral, 4x Daily PRN  ipratropium (ATROVENT HFA) 17 MCG/ACT inhaler 2 puff, 2 puff, Inhalation, Q4H PRN  ipratropium (ATROVENT) 0.02 % nebulizer solution 0.5 mg, 0.5 mg, Nebulization, Q4H PRN  OLANZapine (ZYPREXA) tablet 7.5 mg, 7.5 mg, Oral, BID  paliperidone (INVEGA) extended release tablet 3 mg, 3 mg, Oral, Daily  haloperidol lactate (HALDOL) injection 5 mg, 5 mg, IntraMUSCular, Q6H PRN **AND** LORazepam (ATIVAN) injection 2 mg, 2 mg, IntraMUSCular, Q6H PRN **AND** diphenhydrAMINE (BENADRYL) injection 50 mg, 50 mg, IntraMUSCular, Q6H PRN  acetaminophen (TYLENOL) tablet 650 mg, 650 mg, Oral, Q6H PRN  ibuprofen (ADVIL;MOTRIN) tablet 400 mg, 400 mg, Oral, Q6H PRN  aluminum & magnesium hydroxide-simethicone (MAALOX) 200-200-20 MG/5ML suspension 30 mL, 30 mL, Oral, Q6H PRN  nicotine polacrilex (NICORETTE) gum 2 mg, 2 mg, Oral, Q2H PRN  lactobacillus (CULTURELLE) capsule 1 capsule, 1 capsule, Oral, Daily  albuterol sulfate HFA (PROVENTIL;VENTOLIN;PROAIR) 108 (90 Base) MCG/ACT inhaler 2 puff, 2 puff, Inhalation, Q4H PRN  metroNIDAZOLE (FLAGYL) tablet 500 mg, 500 mg, Oral, 3 times per day  levoFLOXacin (LEVAQUIN) tablet 750 mg, 750 mg, Oral, Daily  docusate sodium (COLACE) capsule 100 mg, 100 mg, Oral, TID PRN  budesonide-formoterol (SYMBICORT) 160-4.5 MCG/ACT inhaler 2 puff, 2 puff, Inhalation, BID  haloperidol (HALDOL) tablet 5 mg, 5 mg, Oral, Q6H PRN **AND** LORazepam (ATIVAN) tablet 2 mg, 2 mg, Oral, Q6H PRN    ASSESSMENT  Acute psychosis (HCC)         HANDOFF  Patient symptoms are: Remains Unstable. Medications as determined by attending physician  Monitor need and frequency of PRN medications. Encourage participation in groups and milieu. Probable discharge is to be determined by MD.     Electronically signed by ELISEO Ag CNP on 11/1/2022 at 2:32 PM    **This report has been created using voice recognition software. It may contain minor errors which are inherent in voice recognition technology. **  I independently saw and evaluated the patient. I reviewed the  documentation above. Any additional comments or changes to the    documentation are stated below otherwise agree with assessment.      -The patient has required as needed medications again yesterday evening. At this time he is quite somnolent. He continues to be euphoric in mood with grandiose delusional beliefs. He also has very poor insight into his condition. His Zyprexa has been decreased to 5mg twice daily.    His Jaquita Pollack has been increased to 6 mg daily and the first loading dose of [place] has been added      PLAN  Medications as noted above  Attempt to develop insight  Expected Length of Stay is 5-9 days. Psycho-education conducted. Supportive Therapy conducted.   Follow-up daily while on inpatient unit    Electronically signed by Francis So MD on 11/1/22 at 3:27 PM EDT

## 2022-11-01 NOTE — PLAN OF CARE
Problem: Safety - Adult  Goal: Free from fall injury  11/1/2022 1034 by Jesusita Hinojosa LPN  Outcome: Progressing  Note: Maintains balance and safety   10/31/2022 2232 by Teena Foss RN  Outcome: Progressing  Note: Patient is wearing non skid socks and is close to nurses station. He remains free from falls during this shift. Problem: Decision Making  Goal: Pt/Family able to effectively weigh alternatives and participate in decision making related to treatment and care  Description: INTERVENTIONS:  1. Determine when there are differences between patient's view, family's view, and healthcare provider's view of condition  2. Facilitate patient and family articulation of goals for care  3. Help patient and family identify pros/cons of alternative solutions  4. Provide information as requested by patient/family  5. Respect patient/family right to receive or not to receive information  6. Serve as a liaison between patient and family and health care team  7. Initiate Consults from Ethics, Palliative Care or initiate [address] as is appropriate  11/1/2022 1034 by Jesusita Hinojosa LPN  Outcome: Not Progressing  Note: Patient is unable to make stable decisions at this time. Refused certain medications. Experiencing flight of ideas, preoccupied about discharge, delusions about staff and medication, and has high paranora   10/31/2022 2232 by Teena Foss RN  Outcome: Progressing  Flowsheets (Taken 10/31/2022 0024 by New Sultana RN)  Patient/family able to effectively weigh alternatives and participate in decision making related to treatment and care: Facilitate patient and family articulation of goals for care   Provide information as requested by patient/family   Respect patient/family right to receive or not to receive information  Note: Patient slept most of shift. He is agreeable to take medication. He denies suicidal ideation and thoughts of self harm.  Staff assisted patient with dressing himself. Staff maintains Q 15 minute safety checks. Problem: June  Goal: Will exhibit normal sleep and speech and no impulsivity  Description: INTERVENTIONS:  1. Administer medication as ordered  2. Set limits on impulsive behavior  3. Make attempts to decrease external stimuli as possible  11/1/2022 1034 by Alex Dawson LPN  Outcome: Not Progressing  Note: Patient is unable to make stable decisions at this time. Refused certain medications. Experiencing flight of ideas, preoccupied about discharge, delusions about staff and medication, and has high paranora   10/31/2022 2232 by Tiffany Sanchez RN  Outcome: Progressing     Problem: Self Care Deficit  Goal: Return ADL status to a safe level of function  Description: INTERVENTIONS:  1. Administer medication as ordered  2. Assess ADL deficits and provide assistive devices as needed  3. Obtain PT/OT consults as needed  4. Assist and instruct patient to increase activity and self care as tolerated  11/1/2022 1034 by Alex Dawson LPN  Outcome: Progressing  Note: Patient is taking care of his hygiene and nutrition at this time  10/31/2022 2232 by Tiffany Sanchez RN  Outcome: Progressing     Problem: Decision Making  Goal: Pt/Family able to effectively weigh alternatives and participate in decision making related to treatment and care  Description: INTERVENTIONS:  1. Determine when there are differences between patient's view, family's view, and healthcare provider's view of condition  2. Facilitate patient and family articulation of goals for care  3. Help patient and family identify pros/cons of alternative solutions  4. Provide information as requested by patient/family  5. Respect patient/family right to receive or not to receive information  6. Serve as a liaison between patient and family and health care team  7.  Initiate Consults from Ethics, Palliative Care or initiate [address] [facility] [address] [facility] [address] as is appropriate  11/1/2022 1034 by Svetlana Chatterjee LPN  Outcome: Not Progressing  Note: Patient is unable to make stable decisions at this time. Refused certain medications. Experiencing flight of ideas, preoccupied about discharge, delusions about staff and medication, and has high paranora   10/31/2022 2232 by Vivi Feliz RN  Outcome: Progressing  Flowsheets (Taken 10/31/2022 0024 by Annie Smith RN)  Patient/family able to effectively weigh alternatives and participate in decision making related to treatment and care: Facilitate patient and family articulation of goals for care   Provide information as requested by patient/family   Respect patient/family right to receive or not to receive information  Note: Patient slept most of shift. He is agreeable to take medication. He denies suicidal ideation and thoughts of self harm. Staff assisted patient with dressing himself. Staff maintains Q 15 minute safety checks. Problem: June  Goal: Will exhibit normal sleep and speech and no impulsivity  Description: INTERVENTIONS:  1. Administer medication as ordered  2. Set limits on impulsive behavior  3. Make attempts to decrease external stimuli as possible  11/1/2022 1034 by Svetlana Chatterjee LPN  Outcome: Not Progressing  Note: Patient is unable to make stable decisions at this time. Refused certain medications.  Experiencing flight of ideas, preoccupied about discharge, delusions about staff and medication, and has high paranora   10/31/2022 2232 by Vivi Feliz RN  Outcome: Progressing

## 2022-11-01 NOTE — BH NOTE
Emergency Medication Follow-Up Note:    PRN medication of (Haldol 5mg and Ativan 2mg PO as evidenced by patient regain behavioral control, absence of behavior warranting emergency medication, sleeping in room. Patient denies medication side effects. Will continue to monitor and provide support as needed.

## 2022-11-01 NOTE — BH NOTE
Emergency PRN Medication Administration Note:      Patient is Agitated and Disruptive as evidence by pushing on unit doors, slamming doors, pushing chair around, and yelling. Staff attempted to find and relieve the distress by Talking to patient, Refocusing on new activity, and Offering suggestions Patient is currently  behavioral control, accepted PRN medications). Medication Administered as prescribed: Haldol 5mg and Ativan 2mg PO. Patient Tolerated medication administration. Will continue to monitor, offer support, and reassess.

## 2022-11-01 NOTE — PLAN OF CARE
Problem: Safety - Adult  Goal: Free from fall injury  Outcome: Progressing  Note: Patient is wearing non skid socks and is close to nurses station. He remains free from falls during this shift. Problem: Decision Making  Goal: Pt/Family able to effectively weigh alternatives and participate in decision making related to treatment and care  Description: INTERVENTIONS:  1. Determine when there are differences between patient's view, family's view, and healthcare provider's view of condition  2. Facilitate patient and family articulation of goals for care  3. Help patient and family identify pros/cons of alternative solutions  4. Provide information as requested by patient/family  5. Respect patient/family right to receive or not to receive information  6. Serve as a liaison between patient and family and health care team  7. Initiate Consults from Ethics, Palliative Care or initiate [facility] as is appropriate  Outcome: Progressing  Flowsheets (Taken 10/31/2022 0024 by Ruslan Wei RN)  Patient/family able to effectively weigh alternatives and participate in decision making related to treatment and care: Facilitate patient and family articulation of goals for care   Provide information as requested by patient/family   Respect patient/family right to receive or not to receive information  Note: Patient slept most of shift. He is agreeable to take medication. He denies suicidal ideation and thoughts of self harm. Staff assisted patient with dressing himself. Staff maintains Q 15 minute safety checks. Problem: June  Goal: Will exhibit normal sleep and speech and no impulsivity  Description: INTERVENTIONS:  1. Administer medication as ordered  2. Set limits on impulsive behavior  3. Make attempts to decrease external stimuli as possible  Outcome: Progressing     Problem: Self Care Deficit  Goal: Return ADL status to a safe level of function  Description: INTERVENTIONS:  1.  Administer medication as ordered  2. Assess ADL deficits and provide assistive devices as needed  3. Obtain PT/OT consults as needed  4.  Assist and instruct patient to increase activity and self care as tolerated  Outcome: Progressing

## 2022-11-01 NOTE — GROUP NOTE
Psych-Ed/Relapse Prevention Group Note        Date: November 1, 2022 Start Time: 1:30pm  End Time:  2:00pm      Number of Participants in Group & Unit Census:  4/15    Topic: Gratitude/Coping Skills     Goal of Group: Patient will demonstrate increased knowledge of coping skills. Patient will identify 1-2 things they are grateful for to help improve mental health. Comments:     Patient did not participate in Psych-Ed/Relapse Prevention group, despite staff encouragement and explanation of benefits. Patient remain seclusive to self. Q15 minute safety checks maintained for patient safety and will continue to encourage patient to attend unit programming.          Signature:  Niall Nielsen
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
## 2022-11-02 ENCOUNTER — APPOINTMENT (OUTPATIENT)
Dept: CT IMAGING | Age: 62
DRG: 885 | End: 2022-11-02
Attending: PSYCHIATRY & NEUROLOGY
Payer: OTHER GOVERNMENT

## 2022-11-02 ENCOUNTER — APPOINTMENT (OUTPATIENT)
Dept: GENERAL RADIOLOGY | Age: 62
DRG: 885 | End: 2022-11-02
Attending: PSYCHIATRY & NEUROLOGY
Payer: OTHER GOVERNMENT

## 2022-11-02 PROCEDURE — 1240000000 HC EMOTIONAL WELLNESS R&B

## 2022-11-02 PROCEDURE — 6370000000 HC RX 637 (ALT 250 FOR IP): Performed by: PSYCHIATRY & NEUROLOGY

## 2022-11-02 PROCEDURE — 70450 CT HEAD/BRAIN W/O DYE: CPT

## 2022-11-02 PROCEDURE — 73502 X-RAY EXAM HIP UNI 2-3 VIEWS: CPT

## 2022-11-02 PROCEDURE — APPSS30 APP SPLIT SHARED TIME 16-30 MINUTES: Performed by: NURSE PRACTITIONER

## 2022-11-02 PROCEDURE — 6370000000 HC RX 637 (ALT 250 FOR IP): Performed by: INTERNAL MEDICINE

## 2022-11-02 PROCEDURE — 99232 SBSQ HOSP IP/OBS MODERATE 35: CPT | Performed by: PSYCHIATRY & NEUROLOGY

## 2022-11-02 RX ORDER — OLANZAPINE 10 MG/1
10 TABLET ORAL 2 TIMES DAILY
Status: DISCONTINUED | OUTPATIENT
Start: 2022-11-03 | End: 2022-11-04

## 2022-11-02 RX ADMIN — HYDROXYZINE HYDROCHLORIDE 50 MG: 50 TABLET, FILM COATED ORAL at 20:03

## 2022-11-02 RX ADMIN — Medication 2 PUFF: at 20:03

## 2022-11-02 RX ADMIN — LEVOFLOXACIN 750 MG: 750 TABLET, FILM COATED ORAL at 08:21

## 2022-11-02 RX ADMIN — BUDESONIDE AND FORMOTEROL FUMARATE DIHYDRATE 2 PUFF: 160; 4.5 AEROSOL RESPIRATORY (INHALATION) at 20:03

## 2022-11-02 RX ADMIN — OLANZAPINE 5 MG: 5 TABLET, FILM COATED ORAL at 08:21

## 2022-11-02 RX ADMIN — HALOPERIDOL 5 MG: 5 TABLET ORAL at 20:05

## 2022-11-02 RX ADMIN — ACETAMINOPHEN 650 MG: 325 TABLET, FILM COATED ORAL at 21:53

## 2022-11-02 RX ADMIN — PALIPERIDONE 6 MG: 6 TABLET, EXTENDED RELEASE ORAL at 08:21

## 2022-11-02 RX ADMIN — BUDESONIDE AND FORMOTEROL FUMARATE DIHYDRATE 2 PUFF: 160; 4.5 AEROSOL RESPIRATORY (INHALATION) at 08:19

## 2022-11-02 RX ADMIN — LORAZEPAM 2 MG: 1 TABLET ORAL at 20:05

## 2022-11-02 RX ADMIN — OLANZAPINE 5 MG: 5 TABLET, FILM COATED ORAL at 20:03

## 2022-11-02 ASSESSMENT — PAIN SCALES - GENERAL
PAINLEVEL_OUTOF10: 10
PAINLEVEL_OUTOF10: 3

## 2022-11-02 ASSESSMENT — PAIN DESCRIPTION - LOCATION
LOCATION: HIP
LOCATION: HEAD

## 2022-11-02 ASSESSMENT — PAIN DESCRIPTION - DESCRIPTORS: DESCRIPTORS: ACHING

## 2022-11-02 ASSESSMENT — PAIN DESCRIPTION - ORIENTATION: ORIENTATION: RIGHT
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
## 2022-11-02 NOTE — PROGRESS NOTES
Pharmacy Med Education Group Note    Date: 11/2/22  Start Time: 1430  End Time: 1611    Number Participants in Group:  4    Goal:  Patient will demonstrate an understanding of the medications intended purpose and possible adverse effects  Topic: [place] for Pharmacy Med Ed Group    Discipline Responsible:     OT  AT  [facility].  RT     X Other       Participation Level:     None  Minimal      X Active Listener    X Interactive    Monopolizing         Participation Quality:    X Appropriate  Inappropriate     X       Attentive        Intrusive          Sharing        Resistant          Supportive        Lethargic       Affective:     X Congruent  Incongruent  Blunted  Flat    Constricted  Anxious  Elated  Angry    Labile  Depressed  Other         Cognitive:    X Alert  Oriented PPTP     Concentration   X G  F  P   Attention Span   X G  F  P   Short-Term Memory   X G  F  P   Long-Term Memory  G  F  P   ProblemSolving/  Decision Making  G  F  P   Ability to Process  Information   X G  F  P      Contributing Factors             Delusional             Hallucinating             Flight of Ideas             Other:       Modes of Intervention:    X Education   X Support  Exploration    Clarifying  Problem Solving  Confrontation    Socialization  Limit Setting  Reality Testing    Activity  Movement  Media    Other:            Response to Learning:    X Able to verbalize current knowledge/experience    Able to verbalize/acknowledge new learning    Able to retain information    Capable of insight    Able to change behavior    Progressing to goal    Other:        Comments:       Irqa Ruvalcaba, Bob, BCPS, BCPP  11/2/2022 3:20 PM  [phone]

## 2022-11-02 NOTE — GROUP NOTE
Group Therapy Note    Date: 11/2/2022    Group Start Time: 1100  Group End Time: 0294  Group Topic: Music Therapy    [facility]    Luis Fernando Colbert        Group Therapy Note    Attendees: 6/19       Patient's Goal:  Patients shared music and dedicated songs to important people int heir lives, and answered questions as asked by this writer. Patient goals to increase sense of community; Reflect on relationships; Increase socialization; Normalization of the environment; Increase self-expression    Notes:  Patient was immediately loud and intrusive upon entering group. Needed multiple reminders to not interrupt while this writer was explaining group to peers. Patient was quiet once music started. Patient did share a song and was crying during that song. Patient did not have a person to dedicate his song to, and as talking about wanting to be a preacher and \"change the world\" patient then randomly pointed at a peer and stated to the peer \"you're bald and fat. \" Set firm limit on patient, and patient continued to try and interrupt this writer. Spoke over the patient and told him was welcome to sit in group as a listener but that if he said one more word in group he was no longer welcome to stay. Patient was quiet for about 10 seconds as this writer continued group with the next person, and patient began talking and with direction began to collect his belongings and continued talking as he left group.     Status After Intervention:  Unchanged    Participation Level: Monopolizing    Participation Quality: Inappropriate and Intrusive      Speech:  loud      Thought Process/Content: Flight of ideas      Affective Functioning: Exaggerated      Mood: dysphoric      Level of consciousness:  Inattentive when peers speaking and unreceptive to redirection; though attentive while music was playing      Response to Learning: Resistant      Endings: None Reported    Modes of Intervention: Support, Socialization, Exploration, Activity, Media, and Reality-testing      Discipline Responsible: Psychoeducational Specialist      Signature:  Johan Lewis

## 2022-11-02 NOTE — PLAN OF CARE
[facility]  Day 3 Interdisciplinary Treatment Plan NOTE    Review Date & Time: 11/2/2022   0755    Admission Type:   Admission Type: Voluntary    Reason for admission:  Reason for Admission: Racing thoughts, rapid speach, paranoid, hallucinations, tierra, depressed  Estimated Length of Stay: 5-7 days  Estimated Discharge Date Update: to be determined by physician    PATIENT STRENGTHS:  Patient Strengths    Patient Strengths and Limitations:Limitations: Perceives need for assistance with self-care, Unrealistic self-view, External locus of control, Difficulty problem solving/relies on others to help solve problems, Multiple barriers to leisure interests  Addictive Behavior:Addictive Behavior  In the Past 3 Months, Have You Felt or Has Someone Told You That You Have a Problem With  : None  Medical Problems:  Past Medical History:   Diagnosis Date    Asthma     COPD (chronic obstructive pulmonary disease) ([facility]     Emphysema of lung ([facility]        Risk:  Fall Risk   Leroy Scale Leroy Scale Score: 22  BVC    Change in scores no Changes to plan of Care no    Status EXAM:   Mental Status and Behavioral Exam  Normal: No  Level of Assistance: Independent/Self  Facial Expression: Flat, Hostile  Affect: Unstable  Level of Consciousness: Alert  Frequency of Checks: 4 times per hour, close  Mood:Normal: No  Mood: Depressed, Anxious, Angry, Labile, Irritable  Motor Activity:Normal: No  Motor Activity: Agitated  Eye Contact: Fair  Observed Behavior: Exit seeking, Preoccupied, Hostile, Agitated  Sexual Misconduct History: Current - no  Preception: [place] to person, [place] to time, [place] to place, [place] to situation  Attention:Normal: No  Attention: Unable to concentrate  Thought Processes: Flight of ideas  Thought Content:Normal: No  Thought Content: Delusions, Paranoia, Preoccupations, Poverty of content  Depression Symptoms: Increased irritability, Impaired concentration  Anxiety Symptoms: Generalized, Obsessions  June Symptoms: Poor judgment, Pressured speech  Hallucinations: None  Delusions: Yes  Delusions: Paranoid, Controlling  Memory:Normal: No  Memory: Confabulation, Poor recent  Insight and Judgment: No  Insight and Judgment: Poor insight, Poor judgment    Daily Assessment Last Entry:   Daily Sleep (WDL): Within Defined Limits            Daily Nutrition (WDL): Within Defined Limits  Appetite Change: Normal for patient  Barriers to Nutrition: Cognitive impairment  Level of Assistance: Independent/Self    Patient Monitoring:  Frequency of Checks: 4 times per hour, close    Psychiatric Symptoms:   Depression Symptoms  Depression Symptoms: Increased irritability, Impaired concentration  Anxiety Symptoms  Anxiety Symptoms: Generalized, Obsessions  June Symptoms  June Symptoms: Poor judgment, Pressured speech          Suicide Risk CSSR-S:  1) Within the past month, have you wished you were dead or wished you could go to sleep and not wake up? : No  2) Have you actually had any thoughts of killing yourself? : No  6) Have you ever done anything, started to do anything, or prepared to do anything to end your life?: No  Change in Result no Change in Plan of care no      EDUCATION:   EDUCATION:   Learner Progress Toward Treatment Goals: Reviewed results and recommendations of this team, Reviewed group plan and strategies, Reviewed signs, symptoms and risk of self harm and violent behavior, Reviewed goals and plan of care    Method:small group, individual verbal education    Outcome:verbalized by patient, but needs reinforcement to obtain goals    PATIENT GOALS:  Short term:pt refused meeting  Long term:    PLAN/TREATMENT RECOMMENDATIONS UPDATE: continue with group therapies, increased socialization, continue planning for after discharge goals, continue with medication compliance    SHORT-TERM GOALS UPDATE:   Time frame for Short-Term Goals: 5-7 days    LONG-TERM GOALS UPDATE:   Time frame for Long-Term Goals: 6 months  Members Present in Team Meeting: See Signature Sheet    Jose A Moreno, RN
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
## 2022-11-02 NOTE — PLAN OF CARE
Problem: Safety - Adult  Goal: Free from fall injury  11/1/2022 2221 by Chel Vo RN  Note: Patient remains free from falls. Problem: Decision Making  Goal: Pt/Family able to effectively weigh alternatives and participate in decision making related to treatment and care  Description: INTERVENTIONS:  1. Determine when there are differences between patient's view, family's view, and healthcare provider's view of condition  2. Facilitate patient and family articulation of goals for care  3. Help patient and family identify pros/cons of alternative solutions  4. Provide information as requested by patient/family  5. Respect patient/family right to receive or not to receive information  6. Serve as a liaison between patient and family and health care team  7. Initiate Consults from Ethics, Palliative Care or initiate [address] as is appropriate  11/1/2022 2221 by Chel Vo RN  Outcome: Progressing  Flowsheets (Taken 10/31/2022 0024 by Dieudonne Dueñas RN)  Patient/family able to effectively weigh alternatives and participate in decision making related to treatment and care: Facilitate patient and family articulation of goals for care   Provide information as requested by patient/family   Respect patient/family right to receive or not to receive information  Note: Patient is social with staff in day room . His thought process is unorganized. Patient is writing disorganized phrases on papers and asking staff to read them, stating \" this is what's going to happen\". Patient is focused on discharge and asking to speak with the doctor stating he is ready to leave. Patient is medication compliant with psych medications and easily directed this shift. He denies suicidal ideation and thoughts of self harm. Staff maintains Q 15 minute safety checks.
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
## 2022-11-02 NOTE — BH NOTE
Emergency Medication Follow-Up Note:    PRN medication of Haldol 5mg oral and Ativan 3mg oral was effective as evidence by patient regaining control and resting in bed. Patient denies medication side effects. Will continue to monitor and provide support as needed.

## 2022-11-02 NOTE — GROUP NOTE
Group Therapy Note    Date: 11/2/2022    Group Start Time: 0900  Group End Time: 0930  Group Topic: Community Meeting    BRAYDEN Snider LPN             Patient's Goal:  stay positive       Status After Intervention:  Unchanged    Participation Level: Interactive    Participation Quality: Intrusive      Speech:  normal      Thought Process/Content: Flight of ideas      Affective Functioning: Flat      Mood: anxious      Level of consciousness:  Preoccupied      Response to Learning: Able to verbalize current knowledge/experience      Endings: None Reported    Modes of Intervention: Support      Discipline Responsible: Licensed Practical Nurse      Signature:  Azucena Mckeon LPN

## 2022-11-02 NOTE — PLAN OF CARE
Problem: Safety - Adult  Goal: Free from fall injury  11/2/2022 1734 by Herminia Foley LPN  Outcome: Progressing     Problem: Decision Making  Goal: Pt/Family able to effectively weigh alternatives and participate in decision making related to treatment and care  Description: INTERVENTIONS:  1. Determine when there are differences between patient's view, family's view, and healthcare provider's view of condition  2. Facilitate patient and family articulation of goals for care  3. Help patient and family identify pros/cons of alternative solutions  4. Provide information as requested by patient/family  5. Respect patient/family right to receive or not to receive information  6. Serve as a liaison between patient and family and health care team  7. Initiate Consults from Ethics, Palliative Care or initiate [address] [facility] [facility] [address] as is appropriate  11/2/2022 1734 by Ann Cortez LPN  Outcome: Progressing   Mr. Son is seen in the dayroom, his affect is flat and he is slightly irritable. Demanding to talk with \"Eric Trump\" redirected after some talk. Denies any thoughts to harm self. Took some of morning medications refusing selected ones, with education patient continued to refuse. Intrusive with peers needs redirection. Fair sleep, and fair appetite. Problem: June  Goal: Will exhibit normal sleep and speech and no impulsivity  Description: INTERVENTIONS:  1. Administer medication as ordered  2. Set limits on impulsive behavior  3. Make attempts to decrease external stimuli as possible  11/2/2022 1734 by Ann Cortez LPN  Outcome: Progressing     Problem: Self Care Deficit  Goal: Return ADL status to a safe level of function  Description: INTERVENTIONS:  1. Administer medication as ordered  2. Assess ADL deficits and provide assistive devices as needed  3. Obtain PT/OT consults as needed  4.  Assist and instruct patient to increase activity and self care as tolerated  11/2/2022 1734 by [facility] Glenny Ladonna Lechuga LPN  Outcome: Progressing

## 2022-11-02 NOTE — BH NOTE
Relaxation Group Note        Date: November 1, 2022 Start Time: 2030 End Time: 2050      Number of Participants in [address]:  7/16    Topic: Relaxation    Goal of Group:Relaxation      Comments:     Patient did not participate in Relaxation group, despite staff encouragement and explanation of benefits. Patient remain seclusive to self. Q15 minute safety checks maintained for patient safety and will continue to encourage patient to attend unit programming.
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
## 2022-11-02 NOTE — PROGRESS NOTES
11/02/22 1402   Encounter Summary   Encounter Overview/Reason  Spiritual/Emotional Needs   Service Provided For: Patient   Referral/Consult From: Phillip   Last Encounter  11/02/22   Complexity of Encounter Moderate   Begin Time 0130   End Time  0200   Total Time Calculated 30 min   Spiritual/Emotional needs   Type Spiritual Support   Behavioral Health    Type  [facility]   Assessment/Intervention/Outcome   Assessment Anxious; Coping;Tearful   Intervention Active listening;Prayer (assurance of)/[place];Sustaining Presence/Ministry of presence   Outcome Receptive; Less anxious, Less agitated;Expressed Gratitude;Engaged in conversation

## 2022-11-02 NOTE — GROUP NOTE
Group Therapy Note    Date: 11/2/2022    Group Start Time: 1663  Group End Time: 5974  Group Topic: Psychotherapy    BRAYDEN Cheatham        Group Therapy Note    Attendee4/20       Patient refused to attend psychotherapy group after encouragement from staff. 1:1 talk time offered but refused. Signature:   Yasir Cheatham

## 2022-11-02 NOTE — BH NOTE
Patient is very intrusive with peers, and attempting to open doors, stating \" I want to smoke a cigarette\" needs karena redirection. Dr. Pooja Garrido notified, new order for 1;1 for safety.

## 2022-11-03 PROCEDURE — APPSS30 APP SPLIT SHARED TIME 16-30 MINUTES: Performed by: NURSE PRACTITIONER

## 2022-11-03 PROCEDURE — 6370000000 HC RX 637 (ALT 250 FOR IP): Performed by: INTERNAL MEDICINE

## 2022-11-03 PROCEDURE — 1240000000 HC EMOTIONAL WELLNESS R&B

## 2022-11-03 PROCEDURE — 6370000000 HC RX 637 (ALT 250 FOR IP): Performed by: PSYCHIATRY & NEUROLOGY

## 2022-11-03 PROCEDURE — 6360000002 HC RX W HCPCS: Performed by: PSYCHIATRY & NEUROLOGY

## 2022-11-03 PROCEDURE — 99232 SBSQ HOSP IP/OBS MODERATE 35: CPT | Performed by: PSYCHIATRY & NEUROLOGY

## 2022-11-03 RX ORDER — DIVALPROEX SODIUM 500 MG/1
500 TABLET, EXTENDED RELEASE ORAL 2 TIMES DAILY
Status: DISCONTINUED | OUTPATIENT
Start: 2022-11-03 | End: 2022-11-08 | Stop reason: HOSPADM

## 2022-11-03 RX ADMIN — BUDESONIDE AND FORMOTEROL FUMARATE DIHYDRATE 2 PUFF: 160; 4.5 AEROSOL RESPIRATORY (INHALATION) at 21:00

## 2022-11-03 RX ADMIN — OLANZAPINE 10 MG: 10 TABLET, FILM COATED ORAL at 20:56

## 2022-11-03 RX ADMIN — OLANZAPINE 10 MG: 10 TABLET, FILM COATED ORAL at 08:58

## 2022-11-03 RX ADMIN — LORAZEPAM 2 MG: 2 INJECTION INTRAMUSCULAR; INTRAVENOUS at 01:06

## 2022-11-03 RX ADMIN — DIVALPROEX SODIUM 500 MG: 500 TABLET, EXTENDED RELEASE ORAL at 20:56

## 2022-11-03 RX ADMIN — BUDESONIDE AND FORMOTEROL FUMARATE DIHYDRATE 2 PUFF: 160; 4.5 AEROSOL RESPIRATORY (INHALATION) at 08:58

## 2022-11-03 RX ADMIN — DIPHENHYDRAMINE HYDROCHLORIDE 50 MG: 50 INJECTION, SOLUTION INTRAMUSCULAR; INTRAVENOUS at 01:06

## 2022-11-03 RX ADMIN — HALOPERIDOL LACTATE 5 MG: 5 INJECTION, SOLUTION INTRAMUSCULAR at 01:05

## 2022-11-03 RX ADMIN — ALUMINUM HYDROXIDE, MAGNESIUM HYDROXIDE, AND SIMETHICONE 30 ML: 200; 200; 20 SUSPENSION ORAL at 20:09

## 2022-11-03 RX ADMIN — PALIPERIDONE 6 MG: 6 TABLET, EXTENDED RELEASE ORAL at 08:58

## 2022-11-03 RX ADMIN — HYDROXYZINE HYDROCHLORIDE 50 MG: 50 TABLET, FILM COATED ORAL at 20:56

## 2022-11-03 RX ADMIN — ACETAMINOPHEN 650 MG: 325 TABLET, FILM COATED ORAL at 20:13

## 2022-11-03 RX ADMIN — Medication 2 PUFF: at 09:31

## 2022-11-03 RX ADMIN — IBUPROFEN 400 MG: 400 TABLET ORAL at 23:11

## 2022-11-03 ASSESSMENT — PAIN SCALES - GENERAL
PAINLEVEL_OUTOF10: 1
PAINLEVEL_OUTOF10: 3

## 2022-11-03 ASSESSMENT — PAIN DESCRIPTION - DESCRIPTORS: DESCRIPTORS: SPASM

## 2022-11-03 ASSESSMENT — PAIN DESCRIPTION - LOCATION
LOCATION: GENERALIZED
LOCATION: LEG

## 2022-11-03 ASSESSMENT — PAIN SCALES - WONG BAKER: WONGBAKER_NUMERICALRESPONSE: 0

## 2022-11-03 NOTE — PROGRESS NOTES
Emergency Medication Follow-Up Note:    PRN medication was effective as evidence by patient resting quietly in bed with his eyes closed. Patient denies medication side effects. Will continue to monitor and provide support as needed.

## 2022-11-03 NOTE — PROGRESS NOTES
Emergency Medication Follow-Up Note:    PRN medication was effective as evidence by reduced patient behaviors. Patient denies medication side effects. Will continue to monitor and provide support as needed.

## 2022-11-03 NOTE — PLAN OF CARE
Problem: Safety - Adult  Goal: Free from fall injury  11/3/2022 0608 by Griselda Michele RN  Outcome: Progressing     Problem: Decision Making  Goal: Pt/Family able to effectively weigh alternatives and participate in decision making related to treatment and care  Description: INTERVENTIONS:  1. Determine when there are differences between patient's view, family's view, and healthcare provider's view of condition  2. Facilitate patient and family articulation of goals for care  3. Help patient and family identify pros/cons of alternative solutions  4. Provide information as requested by patient/family  5. Respect patient/family right to receive or not to receive information  6. Serve as a liaison between patient and family and health care team  7. Initiate Consults from Ethics, Palliative Care or initiate [address] [facility] as is appropriate  11/3/2022 0608 by Griselda Michele RN  Outcome: Progressing     Problem: June  Goal: Will exhibit normal sleep and speech and no impulsivity  Description: INTERVENTIONS:  1. Administer medication as ordered  2. Set limits on impulsive behavior  3. Make attempts to decrease external stimuli as possible  11/3/2022 0608 by Griselda Michele RN  Outcome: Progressing  Note: Patient was impulsive throughout the shift, unable to keep voice down and approach peers. Problem: Self Care Deficit  Goal: Return ADL status to a safe level of function  Description: INTERVENTIONS:  1. Administer medication as ordered  2. Assess ADL deficits and provide assistive devices as needed  3. Obtain PT/OT consults as needed  4.  Assist and instruct patient to increase activity and self care as tolerated  11/3/2022 0608 by Griselda Michele RN  Outcome: Progressing

## 2022-11-03 NOTE — GROUP NOTE
Group Therapy Note    Date: 11/3/2022    Group Start Time: 1100  Group End Time: 8796  Group Topic: Cognitive Skills    [facility]    Elisa Chu, CTRS    Cognitive Skills Group Note        Date: November 3, 2022 Start Time: 11am  End Time: 11:30am      Number of Participants in Group & Unit Census:  9/20    Topic: cognitive skills     Goal of Group: Pt will improve communication skills, improve socialization      Comments:     Patient did not participate in Cognitive Skills group, despite staff encouragement and explanation of benefits. Patient remain seclusive to self. Q15 minute safety checks maintained for patient safety and will continue to encourage patient to attend unit programming.             Signature:  Shashi Dahl

## 2022-11-03 NOTE — GROUP NOTE
Group Therapy Note    Date: 11/3/2022    Group Start Time: 1330  Group End Time: 3287  Group Topic: Relaxation    [facility]    Elisa Chu, CTRS    Relaxation Group Note        Date: November 3, 2022 Start Time: 1:30pm  End Time:  2:30pm      Number of Participants in Group & Unit Census:  8/19    Topic: relaxation group     Goal of Group: pt will improve relaxation using art       Comments:     Patient did not participate in Relaxation group, despite staff encouragement and explanation of benefits. Patient remain seclusive to self. Q15 minute safety checks maintained for patient safety and will continue to encourage patient to attend unit programming.               Signature:  Shashi Dahl

## 2022-11-03 NOTE — GROUP NOTE
Group Therapy Note    Date: 11/3/2022    Group Start Time: [place]  Group End Time: 0585  Group Topic: Music Therapy    [facility]    Aristides Chandler        Group Therapy Note    Attendees: 9/19       Patient's Goal:  Patients shared music and analyzed music for themes, and shared general advice with peers based on the themes of their music. Patient goals to engage in peer support; Increase sense of community; Increase socialization; Normalization of the environment; Increase self-expression    Notes:  Patient attended and participated in first half of group. Patient was talkative and required redirection multiple times. As group continued patient required less redirections. Patient shared a song and advice. Also stated at one point \"I have the devil inside me and he's trying to commit suicide. \" Patient left group, and was grabbing at his hip, and returned shortly after.  Patient left group again about a minute after returning to group    Status After Intervention:  Unchanged    Participation Level: Interactive and Monopolizing    Participation Quality: Appropriate and Intrusive      Speech:  normal      Thought Process/Content: Delusional      Affective Functioning: Congruent      Mood: dysphoric      Level of consciousness:  Alert      Response to Learning: Able to change behavior and Progressing to goal      Endings: None Reported    Modes of Intervention: Support, Socialization, Exploration, Activity, Media, and Reality-testing      Discipline Responsible: Psychoeducational Specialist      Signature:  Aristides Chandler

## 2022-11-03 NOTE — BH NOTE
Pt is disruptive as evidence by pt yelling in day room repetitively \" I want my cigarettes\" causing peers to become upset. . Staff attempted to stop the action and set limits by talking with pt which further upset him, offering choices and distraction offering PO or IM medications. Pt is currently accepting of PO Haldol 5mg and Ativan 2mg pt then spit one tab of ativan out but did take other 2 pills. Will continue to monitor pt's behaviors.

## 2022-11-03 NOTE — PLAN OF CARE
Problem: Safety - Adult  Goal: Free from fall injury  11/3/2022 1601 by Herminia Foley LPN  Outcome: Progressing     Problem: Decision Making  Goal: Pt/Family able to effectively weigh alternatives and participate in decision making related to treatment and care  Description: INTERVENTIONS:  1. Determine when there are differences between patient's view, family's view, and healthcare provider's view of condition  2. Facilitate patient and family articulation of goals for care  3. Help patient and family identify pros/cons of alternative solutions  4. Provide information as requested by patient/family  5. Respect patient/family right to receive or not to receive information  6. Serve as a liaison between patient and family and health care team  7. Initiate Consults from Ethics, Palliative Care or initiate [address] as is appropriate  11/3/2022 1601 by Carlos Vance LPN  Outcome: Progressing   Mr. Son is seen in the dayroom affect is flat, he has been sleeping most of the morning. Denies all, except depression. 1;1 for continues for safety. Able to redirect more easily today. Took medication as ordered. Problem: June  Goal: Will exhibit normal sleep and speech and no impulsivity  Description: INTERVENTIONS:  1. Administer medication as ordered  2. Set limits on impulsive behavior  3. Make attempts to decrease external stimuli as possible  11/3/2022 1601 by Carlos Vance LPN  Outcome: Progressing     Problem: Self Care Deficit  Goal: Return ADL status to a safe level of function  Description: INTERVENTIONS:  1. Administer medication as ordered  2. Assess ADL deficits and provide assistive devices as needed  3. Obtain PT/OT consults as needed  4.  Assist and instruct patient to increase activity and self care as tolerated  11/3/2022 1601 by Carlos Vance LPN  Outcome: Progressing

## 2022-11-03 NOTE — PROGRESS NOTES
Emergency PRN Medication Administration Note:      Patient is Agitated as evidence by patient refusing to stay in his room and repeatedly requesting his cigaretteds. Staff attempted to find and relieve the distress by Talking to patient Patient is currently continuing to escalate but accepting PRN medications. Medication Administered as prescribed: (haldol 5 mg, ativan 2 mg and benadryl 50 mg, intramuscular). Patient Tolerated medication administration. Will continue to monitor, offer support, and reassess.
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
## 2022-11-03 NOTE — GROUP NOTE
Group Therapy Note    Date: 11/3/2022    Group Start Time: 0900  Group End Time: 7811  Group Topic: Music Therapy    [facility]    [address] [facility] Meeting Group Note        Date: 11/3/2022   Start Time: 9am  End Time: 0915      Number of Participants in Group & Unit Census:  3/20    Topic: Education and goal setting    Goal of Group: To orient to unit rules. Expectations, and set a daily goal      Comments:     Patient did not participate in [employer] group, despite staff encouragement and explanation of benefits. Patient remain seclusive to self. Q15 minute safety checks maintained for patient safety and will continue to encourage patient to attend unit programming.

## 2022-11-04 PROCEDURE — APPSS30 APP SPLIT SHARED TIME 16-30 MINUTES: Performed by: NURSE PRACTITIONER

## 2022-11-04 PROCEDURE — 6360000002 HC RX W HCPCS: Performed by: PSYCHIATRY & NEUROLOGY

## 2022-11-04 PROCEDURE — 1240000000 HC EMOTIONAL WELLNESS R&B

## 2022-11-04 PROCEDURE — 6370000000 HC RX 637 (ALT 250 FOR IP): Performed by: PSYCHIATRY & NEUROLOGY

## 2022-11-04 PROCEDURE — 6360000002 HC RX W HCPCS: Performed by: INTERNAL MEDICINE

## 2022-11-04 PROCEDURE — 6370000000 HC RX 637 (ALT 250 FOR IP): Performed by: INTERNAL MEDICINE

## 2022-11-04 PROCEDURE — 99232 SBSQ HOSP IP/OBS MODERATE 35: CPT | Performed by: PSYCHIATRY & NEUROLOGY

## 2022-11-04 RX ORDER — OLANZAPINE 5 MG/1
5 TABLET ORAL 2 TIMES DAILY
Status: DISCONTINUED | OUTPATIENT
Start: 2022-11-04 | End: 2022-11-07

## 2022-11-04 RX ADMIN — OLANZAPINE 10 MG: 10 TABLET, FILM COATED ORAL at 10:09

## 2022-11-04 RX ADMIN — IPRATROPIUM BROMIDE 0.5 MG: 0.5 SOLUTION RESPIRATORY (INHALATION) at 15:52

## 2022-11-04 RX ADMIN — DIVALPROEX SODIUM 500 MG: 500 TABLET, EXTENDED RELEASE ORAL at 21:43

## 2022-11-04 RX ADMIN — HYDROXYZINE HYDROCHLORIDE 50 MG: 50 TABLET, FILM COATED ORAL at 23:45

## 2022-11-04 RX ADMIN — LORAZEPAM 2 MG: 2 INJECTION INTRAMUSCULAR; INTRAVENOUS at 01:02

## 2022-11-04 RX ADMIN — OLANZAPINE 5 MG: 5 TABLET, FILM COATED ORAL at 21:43

## 2022-11-04 RX ADMIN — BUDESONIDE AND FORMOTEROL FUMARATE DIHYDRATE 2 PUFF: 160; 4.5 AEROSOL RESPIRATORY (INHALATION) at 10:09

## 2022-11-04 RX ADMIN — DIPHENHYDRAMINE HYDROCHLORIDE 50 MG: 50 INJECTION, SOLUTION INTRAMUSCULAR; INTRAVENOUS at 01:02

## 2022-11-04 RX ADMIN — Medication 2 PUFF: at 14:46

## 2022-11-04 RX ADMIN — IBUPROFEN 400 MG: 400 TABLET ORAL at 21:43

## 2022-11-04 RX ADMIN — PALIPERIDONE 6 MG: 6 TABLET, EXTENDED RELEASE ORAL at 10:09

## 2022-11-04 RX ADMIN — BUDESONIDE AND FORMOTEROL FUMARATE DIHYDRATE 2 PUFF: 160; 4.5 AEROSOL RESPIRATORY (INHALATION) at 21:42

## 2022-11-04 RX ADMIN — ACETAMINOPHEN 650 MG: 325 TABLET, FILM COATED ORAL at 21:43

## 2022-11-04 RX ADMIN — DIVALPROEX SODIUM 500 MG: 500 TABLET, EXTENDED RELEASE ORAL at 10:09

## 2022-11-04 RX ADMIN — HALOPERIDOL LACTATE 5 MG: 5 INJECTION, SOLUTION INTRAMUSCULAR at 01:02

## 2022-11-04 ASSESSMENT — PAIN DESCRIPTION - LOCATION: LOCATION: OTHER (COMMENT)

## 2022-11-04 ASSESSMENT — PAIN - FUNCTIONAL ASSESSMENT: PAIN_FUNCTIONAL_ASSESSMENT: ACTIVITIES ARE NOT PREVENTED

## 2022-11-04 ASSESSMENT — PAIN SCALES - GENERAL: PAINLEVEL_OUTOF10: 10

## 2022-11-04 NOTE — PROGRESS NOTES
Daily Progress Note  11/4/2022    Patient Name: Jeannine Quiles    CHIEF COMPLAINT: Acute psychosis with suicidal ideation         SUBJECTIVE:      Patient seen face-to-face for follow-up assessment. He is a one-to-one at this time for safety. Staff report that patient has been less intrusive today. Not exhibiting as frequent psychomotor agitation is resting with eyes closed at time of assessment. He is arousable to verbal stimuli, but is somnolent and tired. Does not engage in conversation and verbalizes desire to continue resting. Patient has been compliant with his scheduled psychotropic medications. He received his first loading dose of Invega Sustenna 234 mg IM injection on 11/1 and plan to administer second loading dose tomorrow as patient symptoms are improving with his current regimen. No EPS symptoms observed at this time. Staff do note that patient required emergency IM injections early this morning around 1 AM for psychomotor agitation. He has yet to demonstrate stability and continues to present risk to self if not admitted to psychiatric unit. Appetite:  [x] Normal/Adequate/Unchanged  [] Increased  [] Decreased      Sleep:       [x] Normal/Adequate/Unchanged  [] Fair  [] Poor      Group Attendance on Unit:   [] Yes  [] Selectively    [x] No    Medication Side Effects:  Patient denies any medication side effects at the time of assessment. Mental Status Exam  Level of consciousness: Somnolent  Appearance: Appropriate attire for setting, resting in bed, with fair grooming and hygiene. Behavior/Motor: Approachable, resting  Attitude toward examiner: Patient resting in too tired to engage in interview  Speech: Normal rate, low volume  Mood: Patient provides no response  Affect: Patient resting, affect congruent  Thought processes: Thought disordered and illogical  Thought content: Denies homicidal ideation.   Suicidal Ideation: Denies suicidal ideations  Delusions: Paranoid and Low back pain radiating into buttocks. No numbness or weakness. Sent from [facility] for eval. pain started last night, no loss of bowel or urine control. no abdominal pain,. no C/P,  no SOB, Had similar episode a couple a weeks ago. Has told to have outpatient MRI.  Has not bee done as of yet. Patient ambulates as home with walker grandiose delusions   Perceptual Disturbance:  Endorses auditory hallucinations. Endorses visual hallucinations. Cognition: Oriented to self location time but not situation  Memory: Poor recent recall  Insight & Judgement: Poor to absent    Data   height is 6' (1.829 m) and weight is 106 lb (48.1 kg). His oral temperature is 97.4 °F (36.3 °C). His blood pressure is 111/79 and his pulse is 90. His respiration is 14 and oxygen saturation is 90%. Labs:   No visits with results within 2 Day(s) from this visit. Latest known visit with results is:   Admission on 10/27/2022, Discharged on 10/29/2022   Component Date Value Ref Range Status    Color, UA 10/28/2022 Yellow  Yellow Final    Turbidity UA 10/28/2022 Clear  Clear Final    Glucose, Ur 10/28/2022 NEGATIVE  NEGATIVE Final    Bilirubin Urine 10/28/2022 NEGATIVE  NEGATIVE Final    Ketones, Urine 10/28/2022 NEGATIVE  NEGATIVE Final    Specific Gravity, UA 10/28/2022 1.007  1.000 - 1.030 Final    Urine Hgb 10/28/2022 NEGATIVE  NEGATIVE Final    pH, UA 10/28/2022 6.0  5.0 - 8.0 Final    Protein, UA 10/28/2022 NEGATIVE  NEGATIVE Final    Urobilinogen, Urine 10/28/2022 Normal  Normal Final    Nitrite, Urine 10/28/2022 NEGATIVE  NEGATIVE Final    Leukocyte Esterase, Urine 10/28/2022 NEGATIVE  NEGATIVE Final    Urinalysis Comments 10/28/2022 Microscopic exam not performed based on chemical results unless requested in original order.    Final    Amphetamine Screen, Ur 10/28/2022 NEGATIVE  NEGATIVE Final    Comment:       (Positive cutoff 1000 ng/mL)                  Barbiturate Screen, Ur 10/28/2022 NEGATIVE  NEGATIVE Final    Comment:       (Positive cutoff 200 ng/mL)                  Benzodiazepine Screen, Urine 10/28/2022 NEGATIVE   Final    Comment:       (Positive cutoff 200 ng/mL)                  Cocaine Metabolite, Urine 10/28/2022 NEGATIVE  NEGATIVE Final    Comment:       (Positive cutoff 300 ng/mL)                  Methadone Screen, Urine 10/28/2022 NEGATIVE NEGATIVE Final    Comment:       (Positive cutoff 300 ng/mL)                  Opiates, Urine 10/28/2022 NEGATIVE  NEGATIVE Final    Comment:       (Positive cutoff 300 ng/mL)                  Phencyclidine, Urine 10/28/2022 NEGATIVE  NEGATIVE Final    Comment:       (Positive cutoff 25 ng/mL)                  Cannabinoid Scrn, Ur 10/28/2022 NEGATIVE  NEGATIVE Final    Comment:       (Positive cutoff 50 ng/mL)                  Oxycodone Screen, Ur 10/28/2022 NEGATIVE  NEGATIVE Final    Comment:       (Positive cutoff 100 ng/mL)                  Fentanyl, Ur 10/28/2022 NEGATIVE  NEGATIVE Final    Comment:       (Positive cutoff  5 ng/ml)            Test Information 10/28/2022 Assay provides medical screening only. The absence of expected drug(s) and/or metabolite(s) may indicate diluted or adulterated urine, limitations of testing or timing of collection. Final    Comment: Testing for legal purposes should be confirmed by another method. To request confirmation   of test result, please call the lab within 7 days of sample submission. Ethanol 10/27/2022 <10  <10 mg/dL Final    Ethanol percent 10/27/2022 <0.010  % Final    Glucose 10/27/2022 101 (A)  70 - 99 mg/dL Final    BUN 10/27/2022 9  8 - 23 mg/dL Final    SPECIMEN SLIGHTLY HEMOLYZED, RESULTS MAY BE ADVERSELY AFFECTED. Creatinine 10/27/2022 0.62 (A)  0.70 - 1.20 mg/dL Final    SPECIMEN SLIGHTLY HEMOLYZED, RESULTS MAY BE ADVERSELY AFFECTED. Est, Glom Filt Rate 10/27/2022 >60  >60 mL/min/1.73m2 Final    Comment:       Effective Oct 3, 2022        These results are not intended for use in patients <25years of age. eGFR results are calculated without a race factor using the 2021 CKD-EPI equation. Careful clinical correlation is recommended, particularly when comparing to results   calculated using previous equations.   The CKD-EPI equation is less accurate in patients with extremes of muscle mass, extra-renal   metabolism of creatine, excessive creatine ingestion, or following therapy that affects   renal tubular secretion. Calcium 10/27/2022 9.1  8.6 - 10.4 mg/dL Final    SPECIMEN SLIGHTLY HEMOLYZED, RESULTS MAY BE ADVERSELY AFFECTED. Sodium 10/27/2022 137  135 - 144 mmol/L Final    SPECIMEN SLIGHTLY HEMOLYZED, RESULTS MAY BE ADVERSELY AFFECTED. Potassium 10/27/2022 4.6  3.7 - 5.3 mmol/L Final    SPECIMEN SLIGHTLY HEMOLYZED, RESULTS MAY BE ADVERSELY AFFECTED. Chloride 10/27/2022 98  98 - 107 mmol/L Final    SPECIMEN SLIGHTLY HEMOLYZED, RESULTS MAY BE ADVERSELY AFFECTED. CO2 10/27/2022 24  20 - 31 mmol/L Final    SPECIMEN SLIGHTLY HEMOLYZED, RESULTS MAY BE ADVERSELY AFFECTED.     Anion Gap 10/27/2022 15  9 - 17 mmol/L Final    WBC 10/27/2022 23.0 (A)  3.5 - 11.0 k/uL Final    RBC 10/27/2022 3.98 (A)  4.5 - 5.9 m/uL Final    Hemoglobin 10/27/2022 12.9 (A)  13.5 - 17.5 g/dL Final    Hematocrit 10/27/2022 38.2 (A)  41 - 53 % Final    MCV 10/27/2022 96.0  80 - 100 fL Final    MCH 10/27/2022 32.3  26 - 34 pg Final    MCHC 10/27/2022 33.6  31 - 37 g/dL Final    RDW 10/27/2022 13.4  11.5 - 14.9 % Final    Platelets 68/99/3567 376  150 - 450 k/uL Final    MPV 10/27/2022 6.5  6.0 - 12.0 fL Final    Seg Neutrophils 10/27/2022 49  36 - 66 % Final    Lymphocytes 10/27/2022 34  24 - 44 % Final    Atypical Lymphocytes 10/27/2022 6  % Final    Monocytes 10/27/2022 10 (A)  1 - 7 % Final    Eosinophils % 10/27/2022 1  0 - 4 % Final    Basophils 10/27/2022 0  0 - 2 % Final    Segs Absolute 10/27/2022 11.27 (A)  1.3 - 9.1 k/uL Final    Absolute Lymph # 10/27/2022 7.82 (A)  1.0 - 4.8 k/uL Final    Atypical Lymphocytes Absolute 10/27/2022 1.38  k/uL Final    Absolute Mono # 10/27/2022 2.30 (A)  0.1 - 1.3 k/uL Final    Absolute Eos # 10/27/2022 0.23  0.0 - 0.4 k/uL Final    Basophils Absolute 10/27/2022 0.00  0.0 - 0.2 k/uL Final    Morphology 10/27/2022 Normal   Final    Glucose 10/28/2022 91  70 - 99 mg/dL Final    BUN 10/28/2022 10  8 - 23 mg/dL Final Creatinine 10/28/2022 0.59 (A)  0.70 - 1.20 mg/dL Final    Est, Glom Filt Rate 10/28/2022 >60  >60 mL/min/1.73m2 Final    Comment:       Effective Oct 3, 2022        These results are not intended for use in patients <25years of age. eGFR results are calculated without a race factor using the 2021 CKD-EPI equation. Careful clinical correlation is recommended, particularly when comparing to results   calculated using previous equations. The CKD-EPI equation is less accurate in patients with extremes of muscle mass, extra-renal   metabolism of creatine, excessive creatine ingestion, or following therapy that affects   renal tubular secretion.       Calcium 10/28/2022 8.7  8.6 - 10.4 mg/dL Final    Sodium 10/28/2022 138  135 - 144 mmol/L Final    Potassium 10/28/2022 4.0  3.7 - 5.3 mmol/L Final    Chloride 10/28/2022 102  98 - 107 mmol/L Final    CO2 10/28/2022 27  20 - 31 mmol/L Final    Anion Gap 10/28/2022 9  9 - 17 mmol/L Final    WBC 10/28/2022 16.6 (A)  3.5 - 11.0 k/uL Final    RBC 10/28/2022 3.60 (A)  4.5 - 5.9 m/uL Final    Hemoglobin 10/28/2022 11.8 (A)  13.5 - 17.5 g/dL Final    Hematocrit 10/28/2022 34.9 (A)  41 - 53 % Final    MCV 10/28/2022 96.9  80 - 100 fL Final    MCH 10/28/2022 32.8  26 - 34 pg Final    MCHC 10/28/2022 33.9  31 - 37 g/dL Final    RDW 10/28/2022 13.3  11.5 - 14.9 % Final    Platelets 37/42/7685 330  150 - 450 k/uL Final    MPV 10/28/2022 6.5  6.0 - 12.0 fL Final    Seg Neutrophils 10/28/2022 39  36 - 66 % Final    Lymphocytes 10/28/2022 47 (A)  24 - 44 % Final    Atypical Lymphocytes 10/28/2022 3  % Final    Monocytes 10/28/2022 9 (A)  1 - 7 % Final    Eosinophils % 10/28/2022 2  0 - 4 % Final    Basophils 10/28/2022 0  0 - 2 % Final    Segs Absolute 10/28/2022 6.47  1.3 - 9.1 k/uL Final    Absolute Lymph # 10/28/2022 7.81 (A)  1.0 - 4.8 k/uL Final    Atypical Lymphocytes Absolute 10/28/2022 0.50  k/uL Final    Absolute Mono # 10/28/2022 1.49 (A)  0.1 - 1.3 k/uL Final Absolute Eos # 10/28/2022 0.33  0.0 - 0.4 k/uL Final    Basophils Absolute 10/28/2022 0.00  0.0 - 0.2 k/uL Final    Morphology 10/28/2022 ANISOCYTOSIS PRESENT   Final    Morphology 10/28/2022 1+ TEARDROPS   Final    Ammonia 10/28/2022 30  16 - 60 umol/L Final    Glucose 10/29/2022 103 (A)  70 - 99 mg/dL Final    BUN 10/29/2022 10  8 - 23 mg/dL Final    Creatinine 10/29/2022 0.68 (A)  0.70 - 1.20 mg/dL Final    Est, Glom Filt Rate 10/29/2022 >60  >60 mL/min/1.73m2 Final    Comment:       Effective Oct 3, 2022        These results are not intended for use in patients <25years of age. eGFR results are calculated without a race factor using the 2021 CKD-EPI equation. Careful clinical correlation is recommended, particularly when comparing to results   calculated using previous equations. The CKD-EPI equation is less accurate in patients with extremes of muscle mass, extra-renal   metabolism of creatine, excessive creatine ingestion, or following therapy that affects   renal tubular secretion.       Calcium 10/29/2022 8.8  8.6 - 10.4 mg/dL Final    Sodium 10/29/2022 138  135 - 144 mmol/L Final    Potassium 10/29/2022 4.1  3.7 - 5.3 mmol/L Final    Chloride 10/29/2022 103  98 - 107 mmol/L Final    CO2 10/29/2022 27  20 - 31 mmol/L Final    Anion Gap 10/29/2022 8 (A)  9 - 17 mmol/L Final    WBC 10/29/2022 14.5 (A)  3.5 - 11.0 k/uL Final    RBC 10/29/2022 3.60 (A)  4.5 - 5.9 m/uL Final    Hemoglobin 10/29/2022 11.8 (A)  13.5 - 17.5 g/dL Final    Hematocrit 10/29/2022 34.8 (A)  41 - 53 % Final    MCV 10/29/2022 96.9  80 - 100 fL Final    MCH 10/29/2022 32.7  26 - 34 pg Final    MCHC 10/29/2022 33.8  31 - 37 g/dL Final    RDW 10/29/2022 13.3  11.5 - 14.9 % Final    Platelets 51/46/1083 298  150 - 450 k/uL Final    MPV 10/29/2022 6.4  6.0 - 12.0 fL Final    Seg Neutrophils 10/29/2022 37  36 - 66 % Final    Lymphocytes 10/29/2022 50 (A)  24 - 44 % Final    Monocytes 10/29/2022 11 (A)  1 - 7 % Final    Eosinophils % 10/29/2022 1  0 - 4 % Final    Basophils 10/29/2022 0  0 - 2 % Final    Metamyelocytes 10/29/2022 1 (A)  0 % Final    Segs Absolute 10/29/2022 5.37  1.3 - 9.1 k/uL Final    Absolute Lymph # 10/29/2022 7.23 (A)  1.0 - 4.8 k/uL Final    Absolute Mono # 10/29/2022 1.60 (A)  0.1 - 1.3 k/uL Final    Absolute Eos # 10/29/2022 0.15  0.0 - 0.4 k/uL Final    Basophils Absolute 10/29/2022 0.00  0.0 - 0.2 k/uL Final    Metamyelocytes Absolute 10/29/2022 0.15 (A)  0 k/uL Final    Morphology 10/29/2022 Normal   Final    Pathologist Review 10/29/2022 Reviewed by pathologist:  Ronn Kemp D.O. Final    Retic % 10/29/2022 1.3  0.5 - 2.0 % Final    Absolute Retic # 10/29/2022 0.045  0.0245 - 0.098 M/uL Final    Surgical Pathology Report 10/29/2022    Final                    Value:[id number]  [facility]  ANATOMIC PATHOLOGY  [address] [place] [address]. [facility], [address]  [phone]  Fax: [phone]  [facility]    Patient Name: Thompson Underwood  MR#: [id number]  Specimen #[id number]    Procedures/Addenda  PERIPHERAL BLOOD REPORT     Date Ordered:     10/31/2022     Status:  Signed Out       Date Complete:     10/31/2022     By: Ronn Kemp D.O. Date Reported:     10/31/2022       INTERPRETATION  Peripheral blood:    MILD LEUKOCYTOSIS WITH ABSOLUTE LYMPHOCYTOSIS AND MONOCYTOSIS. MILD NORMOCYTIC ANEMIA WITH NORMAL MORPHOLOGY. PLATELET COUNT WITHIN THE REFERENCE RANGE WITH NORMAL MORPHOLOGY. THE ELECTRONIC HEALTH RECORD IS REVIEWED, DOCUMENTING A HISTORY OF  CLL. THE ABSOLUTE LYMPHOCYTOSIS IS CONSISTENT WITH HIS KNOWN  DIAGNOSIS. MONOCYTOSIS CAN BE SEEN WITH A VARIETY OF REACTIVE  CONDITIONS. IF PERSISTENT, MONOCYTOSIS CAN BE SEEN WITH SOME  MYELOPROLIFERATIVE DISORDERS. RESULT                          S-COMMENTS  PERIPHERAL BLOOD STUDY    CBC: Please see the electronic health record for CBC parameters  ([id number], 10/29/22, 05:40). PLATELETS: Platelets show normal morphology. LEUKOCYTES: White blood cells show mild leukocytosis with absolute  lymphocytosis and monocytosis. Reactive lymphocytes are present. There are no blasts. ERYTHROCYTES: Red blood cells show mild normocytic anemia with normal  morphology. Note: The electronic health record is reviewed. Francine Manuel D.O. Source:  A: Peripheral Blood           Reviewed patient's current plan of care and vital signs with nursing staff.     Labs reviewed: [x] Yes  Last EKG in EMR reviewed: [x] Yes  QTc: 418    Medications  Current Facility-Administered Medications: [START ON 11/5/2022] paliperidone palmitate ER (INVEGA SUSTENNA) IM injection 156 mg, 156 mg, IntraMUSCular, Once  divalproex (DEPAKOTE ER) extended release tablet 500 mg, 500 mg, Oral, BID  OLANZapine (ZYPREXA) tablet 10 mg, 10 mg, Oral, BID  loperamide (IMODIUM) capsule 2 mg, 2 mg, Oral, 4x Daily PRN  paliperidone (INVEGA) extended release tablet 6 mg, 6 mg, Oral, Daily  hydrOXYzine HCl (ATARAX) tablet 50 mg, 50 mg, Oral, TID PRN  ipratropium (ATROVENT HFA) 17 MCG/ACT inhaler 2 puff, 2 puff, Inhalation, Q4H PRN  ipratropium (ATROVENT) 0.02 % nebulizer solution 0.5 mg, 0.5 mg, Nebulization, Q4H PRN  haloperidol lactate (HALDOL) injection 5 mg, 5 mg, IntraMUSCular, Q6H PRN **AND** LORazepam (ATIVAN) injection 2 mg, 2 mg, IntraMUSCular, Q6H PRN **AND** diphenhydrAMINE (BENADRYL) injection 50 mg, 50 mg, IntraMUSCular, Q6H PRN  acetaminophen (TYLENOL) tablet 650 mg, 650 mg, Oral, Q6H PRN  ibuprofen (ADVIL;MOTRIN) tablet 400 mg, 400 mg, Oral, Q6H PRN  aluminum & magnesium hydroxide-simethicone (MAALOX) 200-200-20 MG/5ML suspension 30 mL, 30 mL, Oral, Q6H PRN  nicotine polacrilex (NICORETTE) gum 2 mg, 2 mg, Oral, Q2H PRN  lactobacillus (CULTURELLE) capsule 1 capsule, 1 capsule, Oral, Daily  albuterol sulfate HFA (PROVENTIL;VENTOLIN;PROAIR) 108 (90 Base) MCG/ACT inhaler 2 puff, 2 puff, Inhalation, Q4H PRN  docusate sodium (COLACE) capsule 100 mg, 100 mg, Oral, TID PRN  budesonide-formoterol (SYMBICORT) 160-4.5 MCG/ACT inhaler 2 puff, 2 puff, Inhalation, BID  haloperidol (HALDOL) tablet 5 mg, 5 mg, Oral, Q6H PRN **AND** LORazepam (ATIVAN) tablet 2 mg, 2 mg, Oral, Q6H PRN    ASSESSMENT  Acute psychosis ([facility] [address]         HANDOFF  Patient symptoms are: Remains Unstable. Medications as determined by attending physician  Patient to receive second dose Invega Sustenna 156 mg IM injection tomorrow  Monitor need and frequency of PRN medications. Encourage participation in groups and milieu. Probable discharge is to be determined by MD.     Electronically signed by ELISEO Galan CNP on 11/4/2022 at 2:55 PM    **This report has been created using voice recognition software. It may contain minor errors which are inherent in voice recognition technology. **    I independently saw and evaluated the patient. I reviewed the  documentation above. Any additional comments or changes to the    documentation are stated below otherwise agree with assessment.        -The patient appears sedated. He continues to have grandiose delusional beliefs. He believes the medications are working and slowing him down. He recognizes that his mental state was not normal earlier in his admission. The patient's olanzapine has been decreased to 5mg twice daily. He is due to get his second [place] tomorrow    PLAN  Medications as noted above  Attempt to develop insight  Expected Length of Stay is 3-5 days. Psycho-education conducted. Supportive Therapy conducted.   Follow-up daily while on inpatient unit    Electronically signed by Paulina Bazzi MD on 11/4/22 at 5:20 PM EDT

## 2022-11-04 NOTE — PLAN OF CARE
Problem: Safety - Adult  Goal: Free from fall injury  11/3/2022 2206 by Nestor Hooper  Outcome: Progressing     Problem: Decision Making  Goal: Pt/Family able to effectively weigh alternatives and participate in decision making related to treatment and care  Description: INTERVENTIONS:  1. Determine when there are differences between patient's view, family's view, and healthcare provider's view of condition  2. Facilitate patient and family articulation of goals for care  3. Help patient and family identify pros/cons of alternative solutions  4. Provide information as requested by patient/family  5. Respect patient/family right to receive or not to receive information  6. Serve as a liaison between patient and family and health care team  7. Initiate Consults from Ethics, Palliative Care or initiate [facility] as is appropriate  11/3/2022 2206 by Nestor Hooper  Outcome: Progressing     Problem: June  Goal: Will exhibit normal sleep and speech and no impulsivity  Description: INTERVENTIONS:  1. Administer medication as ordered  2. Set limits on impulsive behavior  3. Make attempts to decrease external stimuli as possible  11/3/2022 2206 by Nestor Hooper  Outcome: Progressing     Problem: Self Care Deficit  Goal: Return ADL status to a safe level of function  Description: INTERVENTIONS:  1. Administer medication as ordered  2. Assess ADL deficits and provide assistive devices as needed  3. Obtain PT/OT consults as needed  4. Assist and instruct patient to increase activity and self care as tolerated  11/3/2022 2206 by Nestor Hooper  Outcome: Progressing    Patient present in room this shift. Patient was cooperative with vitals and interview. Patient affect remains elevated but patient is more subdued today compared to writer's previous interactions with him. He remained in his room most of the evening and minimally interacted with his peers.  Patient is more self-aware and anxious of his behaviors this evening: he would make a comment and then make sure the comment did not make anyone uncomfortable. Patient reports racing thoughts and restless legs and relates this to his evening medication. Patient is attention seeking at times, asks for nurse to look at his leg due to thinking there was a bedbug, later admits  there was just an itch there. Patient denies any thoughts to harm himself or others this evening. Safety maintained with 1:1 constant observer, 15-minute purposeful rounds by unit staff, and additional checks and monitoring as needed.

## 2022-11-04 NOTE — GROUP NOTE
Group Therapy Note    Date: 11/4/2022    Group Start Time: 1100  Group End Time: 2812  Group Topic: Cognitive Skills    [facility]    Justin Corbett, [address]        Group Therapy Note    Attendees: 6/12     Cognitive Skills Group Note        Date: November 4, 2022 Start Time: 11am  End Time: 11:35am      Number of Participants in Group & Unit Census:  6/18     Topic: cognitive skills     Goal of Group: pt will improve positive coping skills, improve socialization, improve decision making skills       Comments:     Patient did not participate in Cognitive Skills group, despite staff encouragement and explanation of benefits. Patient remain seclusive to self. Q15 minute safety checks maintained for patient safety and will continue to encourage patient to attend unit programming.          Signature:  Dari Chow

## 2022-11-04 NOTE — GROUP NOTE
Group Therapy Note    Date: 11/4/2022    Group Start Time: 1330  Group End Time: 2188  Group Topic: Psychoeducation    [facility]    Tiffanijasen Poppy, [address]        Group Therapy Note    Attendees: 7/16     Psychoeducation Group Note        Date: November 4, 2022 Start Time: 1:30pm  End Time: 2:15pm      Number of Participants in Group & Unit Census:  7/16     Topic: Psychoeducation    Goal of Group: Patient will verbalize increased positive aspects of self, increased interpersonal skills, and increased knowledge of coping skills. Comments:     Patient did not participate in Cognitive Skills group, despite staff encouragement and explanation of benefits. Patient remain seclusive to self. Q15 minute safety checks maintained for patient safety and will continue to encourage patient to attend unit programming.          Signature:  Kelli Villareal

## 2022-11-04 NOTE — BH NOTE
Emergency PRN Medication Administration Note:      Patient is restless,anxious, and irritable as evidence by pacing, demanding, and not responding to staff request. Staff attempted to find and relieve the distress by Talking to patient, Refocusing on new activity, and Offering suggestions Patient is currently continuing to escalate, and accepting of PRN medications. Medication Administered as prescribed: Benadryl 50mg, Ativan 2mg, and Haldol 3mg all IM. Patient Tolerated medication administration. Will continue to monitor, offer support, and reassess.

## 2022-11-04 NOTE — BH NOTE
Emergency Medication Follow-Up Note:    PRN medication of Benadryl 50mg, Ativan 3mg, and Haldol 2mg, all IM, was effective as evidence by patient regaining behavioral control, absence of behavior warranting emergency medication, and patient resting in  bed quietly. Patient denies medication side effects. Will continue to monitor and provide support as needed.

## 2022-11-04 NOTE — BH NOTE
Dr aJda Rodriguez, Internal Medicine notified via telephone of patient's current complaints of feeling like he is coming down with pneumonia. Internal Medicine consult in place for tomorrow morning. Call Internal Medicine should conditions change before then.

## 2022-11-04 NOTE — BH NOTE
Patient reports to nurses station with complaints of wheezing/difficulty breathing. O2 Sat is 94 % room air, pulse is 107, Respiration rate is 14. Skin is pink warm and dry. Albuterol Sulfate HFA administered. Patient educated on the importance of not laying in bed so much and the importance of ambulating to increase oxygenation.

## 2022-11-04 NOTE — BH NOTE
Patient is focused on getting \"shots\" Ativan 2mg IM, Haldol 5mg IM, and Benadryl 50mg IM PRN to help him sleep tonight. Patient educated that these medications are used for agitation not a sleep-aid.

## 2022-11-04 NOTE — PLAN OF CARE
Problem: Safety - Adult  Goal: Free from fall injury  11/4/2022 1143 by Kerry Hennessy LPN  Outcome: Progressing  Note: Patient is currently 1:1 constant observation for patient safety. Patient resting with eyes closed until 1000. Patient is alert and orientated X4. Patient currently denies pain/discomfort. Patient denies suicidal and homicidal ideations. Patient denies thoughts of harming self or others. Patient currently denies having auditory, visual hallucinations. Patients stated depression and anxiety is mild. Patient is isolative to room today reading a book. Skin is clean, dry, and intact. Problem: Decision Making  Goal: Pt/Family able to effectively weigh alternatives and participate in decision making related to treatment and care  Description: INTERVENTIONS:  1. Determine when there are differences between patient's view, family's view, and healthcare provider's view of condition  2. Facilitate patient and family articulation of goals for care  3. Help patient and family identify pros/cons of alternative solutions  4. Provide information as requested by patient/family  5. Respect patient/family right to receive or not to receive information  6. Serve as a liaison between patient and family and health care team  7. Initiate Consults from Ethics, Palliative Care or initiate [address] [facility] [address] as is appropriate  11/4/2022 1143 by Kerry Hennessy LPN  Outcome: Progressing  Note: Patient is currently 1:1 constant observation for patient safety. Patient resting with eyes closed until 1000. Patient is alert and orientated X4. Patient currently denies pain/discomfort. Patient denies suicidal and homicidal ideations. Patient denies thoughts of harming self or others. Patient currently denies having auditory, visual hallucinations. Patients stated depression and anxiety is mild. Patient is isolative to room today reading a book. Skin is clean, dry, and intact.       Problem: June  Goal: Will exhibit normal sleep and speech and no impulsivity  Description: INTERVENTIONS:  1. Administer medication as ordered  2. Set limits on impulsive behavior  3. Make attempts to decrease external stimuli as possible  11/4/2022 1143 by Kerry Hennessy LPN  Outcome: Progressing  Note: Patient is currently 1:1 constant observation for patient safety. Patient resting with eyes closed until 1000. Patient is alert and orientated X4. Patient currently denies pain/discomfort. Patient denies suicidal and homicidal ideations. Patient denies thoughts of harming self or others. Patient currently denies having auditory, visual hallucinations. Patients stated depression and anxiety is mild. Patient is isolative to room today reading a book. Skin is clean, dry, and intact. Problem: Self Care Deficit  Goal: Return ADL status to a safe level of function  Description: INTERVENTIONS:  1. Administer medication as ordered  2. Assess ADL deficits and provide assistive devices as needed  3. Obtain PT/OT consults as needed  4. Assist and instruct patient to increase activity and self care as tolerated  11/4/2022 1143 by Kerry Hennessy LPN  Outcome: Progressing  Note: Patient is currently 1:1 constant observation for patient safety. Patient resting with eyes closed until 1000. Patient is alert and orientated X4. Patient currently denies pain/discomfort. Patient denies suicidal and homicidal ideations. Patient denies thoughts of harming self or others. Patient currently denies having auditory, visual hallucinations. Patients stated depression and anxiety is mild. Patient is isolative to room today reading a book. Skin is clean, dry, and intact. Problem: Pain  Goal: Verbalizes/displays adequate comfort level or baseline comfort level  Outcome: Progressing  Note: Patient is currently 1:1 constant observation for patient safety. Patient resting with eyes closed until 1000. Patient is alert and orientated X4.  Patient currently denies pain/discomfort. Patient denies suicidal and homicidal ideations. Patient denies thoughts of harming self or others. Patient currently denies having auditory, visual hallucinations. Patients stated depression and anxiety is mild. Patient is isolative to room today reading a book. Skin is clean, dry, and intact.

## 2022-11-04 NOTE — BH NOTE
Patient reports that he feels like he need an oxygen treatment. Writer calls Respiratory Therapy for nebulizer treatment.

## 2022-11-04 NOTE — BH NOTE
Atrovent 0.5 mg nebulizer treatment administered to patient by writer per order. Patient states, \"I feel like I'm getting pneumonia again. /71, Pulse 90 Resp.  14, O2 sat 94% RA. Will notify Internal Medical Physician.

## 2022-11-05 ENCOUNTER — APPOINTMENT (OUTPATIENT)
Dept: GENERAL RADIOLOGY | Age: 62
DRG: 885 | End: 2022-11-05
Attending: PSYCHIATRY & NEUROLOGY
Payer: OTHER GOVERNMENT

## 2022-11-05 PROCEDURE — 6370000000 HC RX 637 (ALT 250 FOR IP): Performed by: INTERNAL MEDICINE

## 2022-11-05 PROCEDURE — 6370000000 HC RX 637 (ALT 250 FOR IP): Performed by: PSYCHIATRY & NEUROLOGY

## 2022-11-05 PROCEDURE — 6360000002 HC RX W HCPCS: Performed by: INTERNAL MEDICINE

## 2022-11-05 PROCEDURE — 99223 1ST HOSP IP/OBS HIGH 75: CPT | Performed by: INTERNAL MEDICINE

## 2022-11-05 PROCEDURE — 1240000000 HC EMOTIONAL WELLNESS R&B

## 2022-11-05 PROCEDURE — 94640 AIRWAY INHALATION TREATMENT: CPT

## 2022-11-05 PROCEDURE — 99232 SBSQ HOSP IP/OBS MODERATE 35: CPT | Performed by: NURSE PRACTITIONER

## 2022-11-05 PROCEDURE — 71046 X-RAY EXAM CHEST 2 VIEWS: CPT

## 2022-11-05 PROCEDURE — 94761 N-INVAS EAR/PLS OXIMETRY MLT: CPT

## 2022-11-05 RX ADMIN — Medication 2 PUFF: at 15:26

## 2022-11-05 RX ADMIN — IPRATROPIUM BROMIDE 0.5 MG: 0.5 SOLUTION RESPIRATORY (INHALATION) at 11:34

## 2022-11-05 RX ADMIN — BUDESONIDE AND FORMOTEROL FUMARATE DIHYDRATE 2 PUFF: 160; 4.5 AEROSOL RESPIRATORY (INHALATION) at 20:38

## 2022-11-05 RX ADMIN — OLANZAPINE 5 MG: 5 TABLET, FILM COATED ORAL at 20:39

## 2022-11-05 RX ADMIN — PALIPERIDONE 6 MG: 6 TABLET, EXTENDED RELEASE ORAL at 09:49

## 2022-11-05 RX ADMIN — HYDROXYZINE HYDROCHLORIDE 50 MG: 50 TABLET, FILM COATED ORAL at 20:39

## 2022-11-05 RX ADMIN — DIVALPROEX SODIUM 500 MG: 500 TABLET, EXTENDED RELEASE ORAL at 20:39

## 2022-11-05 RX ADMIN — OLANZAPINE 5 MG: 5 TABLET, FILM COATED ORAL at 09:50

## 2022-11-05 RX ADMIN — DIVALPROEX SODIUM 500 MG: 500 TABLET, EXTENDED RELEASE ORAL at 09:50

## 2022-11-05 RX ADMIN — BUDESONIDE AND FORMOTEROL FUMARATE DIHYDRATE 2 PUFF: 160; 4.5 AEROSOL RESPIRATORY (INHALATION) at 09:52

## 2022-11-05 RX ADMIN — Medication 2 PUFF: at 11:47

## 2022-11-05 RX ADMIN — Medication 1 CAPSULE: at 09:50

## 2022-11-05 NOTE — PLAN OF CARE
Problem: Safety - Adult  Goal: Free from fall injury  Outcome: Progressing     Problem: Decision Making  Goal: Pt/Family able to effectively weigh alternatives and participate in decision making related to treatment and care  Description: INTERVENTIONS:  1. Determine when there are differences between patient's view, family's view, and healthcare provider's view of condition  2. Facilitate patient and family articulation of goals for care  3. Help patient and family identify pros/cons of alternative solutions  4. Provide information as requested by patient/family  5. Respect patient/family right to receive or not to receive information  6. Serve as a liaison between patient and family and health care team  7. Initiate Consults from Ethics, Palliative Care or initiate [address] [facility] as is appropriate  Outcome: Progressing     Problem: June  Goal: Will exhibit normal sleep and speech and no impulsivity  Description: INTERVENTIONS:  1. Administer medication as ordered  2. Set limits on impulsive behavior  3. Make attempts to decrease external stimuli as possible  Outcome: Progressing  Note: Patient exhibited improved impulse control, staying calm throughout the entirety of the shift and following directions appropriately. Problem: Self Care Deficit  Goal: Return ADL status to a safe level of function  Description: INTERVENTIONS:  1. Administer medication as ordered  2. Assess ADL deficits and provide assistive devices as needed  3. Obtain PT/OT consults as needed  4.  Assist and instruct patient to increase activity and self care as tolerated  Outcome: Progressing     Problem: Pain  Goal: Verbalizes/displays adequate comfort level or baseline comfort level  Outcome: Progressing

## 2022-11-05 NOTE — PLAN OF CARE
Problem: Safety - Adult  Goal: Free from fall injury  11/5/2022 1406 by Davion Jalloh LPN  Outcome: Progressing  Note: Patient is alert and orientated X4. Patient states that he is feeling hopeless anxious  and depressed today as he wants to leave. Patient denies suicidal/homicidal ideations, denies thoughts of hurting self or others. Patient currently denies auditory/visual hallucinations. Patient denies pain/discomfort. Patient reports that he feels like he is getting pneumonia. Internal Medicine is aware that the patient is feeling this way and an order is in place for an internal medicine consult today. Wheezing is heard while auscultating the upper anterior bilateral lobes. Anterior lower lobes are diminished. Resp Therapy gave patient a nebulizer breathing treatment. Problem: Decision Making  Goal: Pt/Family able to effectively weigh alternatives and participate in decision making related to treatment and care  Description: INTERVENTIONS:  1. Determine when there are differences between patient's view, family's view, and healthcare provider's view of condition  2. Facilitate patient and family articulation of goals for care  3. Help patient and family identify pros/cons of alternative solutions  4. Provide information as requested by patient/family  5. Respect patient/family right to receive or not to receive information  6. Serve as a liaison between patient and family and health care team  7. Initiate Consults from Ethics, Palliative Care or initiate [address] [facility] [address] as is appropriate  11/5/2022 1406 by Davion Jalloh LPN  Outcome: Progressing  Note: Patient is alert and orientated X4. Patient states that he is feeling hopeless anxious  and depressed today as he wants to leave. Patient denies suicidal/homicidal ideations, denies thoughts of hurting self or others. Patient currently denies auditory/visual hallucinations. Patient denies pain/discomfort.  Patient reports that he feels like he is getting pneumonia. Internal Medicine is aware that the patient is feeling this way and an order is in place for an internal medicine consult today. Wheezing is heard while auscultating the upper anterior bilateral lobes. Anterior lower lobes are diminished. Resp Therapy gave patient a nebulizer breathing treatment. Problem: Self Care Deficit  Goal: Return ADL status to a safe level of function  Description: INTERVENTIONS:  1. Administer medication as ordered  2. Assess ADL deficits and provide assistive devices as needed  3. Obtain PT/OT consults as needed  4. Assist and instruct patient to increase activity and self care as tolerated  11/5/2022 1406 by Kerry Hennessy LPN  Outcome: Progressing  Note: Patient is alert and orientated X4. Patient states that he is feeling hopeless anxious  and depressed today as he wants to leave. Patient denies suicidal/homicidal ideations, denies thoughts of hurting self or others. Patient currently denies auditory/visual hallucinations. Patient denies pain/discomfort. Patient reports that he feels like he is getting pneumonia. Internal Medicine is aware that the patient is feeling this way and an order is in place for an internal medicine consult today. Wheezing is heard while auscultating the upper anterior bilateral lobes. Anterior lower lobes are diminished. Resp Therapy gave patient a nebulizer breathing treatment. Problem: Pain  Goal: Verbalizes/displays adequate comfort level or baseline comfort level  11/5/2022 1406 by Kerry Hennessy LPN  Outcome: Progressing  Note: Patient is alert and orientated X4. Patient states that he is feeling hopeless anxious  and depressed today as he wants to leave. Patient denies suicidal/homicidal ideations, denies thoughts of hurting self or others. Patient currently denies auditory/visual hallucinations. Patient denies pain/discomfort. Patient reports that he feels like he is getting pneumonia.  Internal Medicine is aware that the patient is feeling this way and an order is in place for an internal medicine consult today. Wheezing is heard while auscultating the upper anterior bilateral lobes. Anterior lower lobes are diminished. Resp Therapy gave patient a nebulizer breathing treatment.

## 2022-11-05 NOTE — GROUP NOTE
Group Therapy Note    Date: 11/5/2022    Group Start Time: 1686  Group End Time: 4186  Group Topic: Music Therapy    [facility]    Aristides Chandler    Music Therapy Group Note        Date: 11/5/2022   Start Time: 5174  End Time: 5173      Number of Participants in Group & Unit Census:  8/17    Topic: Patients worked together to create a playlist of going going from [employer], to gradually becoming more energetic music. Goal of Group:Goals to increase sense of community; Increase socialization; Increase energy level; Demonstrate positive use of time; Increase socialization      Comments:     Patient did not participate in Music Therapy group, despite staff encouragement and explanation of benefits. Patient remain seclusive to self. Q15 minute safety checks maintained for patient safety and will continue to encourage patient to attend unit programming.

## 2022-11-05 NOTE — BH NOTE
Patient requesting a nebulizer treatment for wheezing Respiratory Therapy called and will come to unit to give patient treatment.

## 2022-11-05 NOTE — CONSULTS
[address] Internal Medicine  Chicho Cardona MD; Patricia Gaffney MD; Sabi Ayers MD; MD Manuel To MD; MD [facility]   [address] / HISTORY AND PHYSICAL EXAMINATION            Date:   11/5/2022  Patient name:  Aziza Melendez  Date of admission:  10/29/2022  8:35 PM  MRN:   [id number]  Account:  [de-identified]  YOB: 1960  PCP:    Zully Ambrocio NP, APRN - CNP  Room:   [address] [place] [address]  Code Status:    Full Code    Physician Requesting Consult: Bassam Villareal MD    Reason for Consult: Cough rule out pneumonia      Chief Complaint:     No chief complaint on file. Cough rule out pneumonia    History Obtained From:     Patient medical record nursing staff    History of Present Illness:     COPD: Patient complains of dyspnea. Symptoms began 7 days ago. Symptoms acute dyspnea does worsen with exertion. Sputum is clear in small amounts. Fever has been low grade fevers. Patient uses 0 pillows at night. Patient can walk 10 feet before resting. Patient currently is not on home oxygen therapy. Adilia Rubalcava Respiratory history: asmoker        Past Medical History:     Past Medical History:   Diagnosis Date    Asthma     COPD (chronic obstructive pulmonary disease) ([address]     [facility]physema of lung [address] [address]         Past Surgical History:     Past Surgical History:   Procedure Laterality Date    SKIN GRAFT Bilateral     lower extremities        Medications Prior to Admission:     Prior to Admission medications    Medication Sig Start Date End Date Taking?  Authorizing Provider   lidocaine viscous hcl (XYLOCAINE) 2 % SOLN solution Take 15 mLs by mouth every 3 hours as needed for Irritation    Historical Provider, MD   OLANZapine (ZYPREXA) 15 MG tablet Take 1 tablet by mouth in the morning and at bedtime 10/18/22   Bassam Villareal MD   albuterol sulfate HFA (PROVENTIL;VENTOLIN;PROAIR) 108 (90 Base) MCG/ACT inhaler Inhale 2 puffs into the lungs every 4 hours as needed for Wheezing    Historical Provider, MD   docusate sodium (COLACE) 100 MG capsule Take 100 mg by mouth 3 times daily as needed for Constipation    Historical Provider, MD   ipratropium (ATROVENT) 0.02 % nebulizer solution Take 0.5 mg by nebulization 4 times daily    Historical Provider, MD   Acidophilus Lactobacillus CAPS Take 1 capsule by mouth daily    Historical Provider, MD   ibuprofen (ADVIL;MOTRIN) 200 MG tablet Take 200 mg by mouth every 6 hours as needed for Pain Pt taking 2 pills prn    Historical Provider, MD   mometasone-formoterol (DULERA) 200-5 MCG/ACT inhaler Inhale 2 puffs into the lungs 2 times daily 6/1/22   Robert Ziegler MD        Allergies:     Prednisone, Morphine, and Propoxyphene    Social History:     Tobacco:    reports that he has been smoking cigarettes. He has a 45.00 pack-year smoking history. He has never used smokeless tobacco.  Alcohol:      reports current alcohol use. Drug Use:  reports no history of drug use. Family History:     History reviewed. No pertinent family history. Review of Systems:     Positive and Negative as described in HPI. CONSTITUTIONAL:  negative for fevers, chills, sweats, fatigue, weight loss  HEENT:  negative for vision, hearing changes, runny nose, throat pain  RESPIRATORY: Cough dyspnea. CARDIOVASCULAR:  negative for chest pain, palpitations.   GASTROINTESTINAL:  negative for nausea, vomiting, diarrhea, constipation, change in bowel habits, abdominal pain   GENITOURINARY:  negative for difficulty of urination, burning with urination, frequency   INTEGUMENT:  negative for rash, skin lesions, easy bruising   HEMATOLOGIC/LYMPHATIC:  negative for swelling/edema   ALLERGIC/IMMUNOLOGIC:  negative for urticaria , itching  ENDOCRINE:  negative increase in drinking, increase in urination, hot or cold intolerance  MUSCULOSKELETAL:  negative joint pains, muscle aches, swelling of joints  NEUROLOGICAL:  negative for headaches, dizziness, lightheadedness, numbness, pain, tingling extremities      Physical Exam:     /86   Pulse 92   Temp 98 °F (36.7 °C) (Oral)   Resp 18   Ht 6' (1.829 m)   Wt 106 lb (48.1 kg)   SpO2 98%   BMI 14.38 kg/m²   Temp (24hrs), Av °F (36.7 °C), Min:98 °F (36.7 °C), Max:98 °F (36.7 °C)    No results for input(s): POCGLU in the last 72 hours. No intake or output data in the 24 hours ending 22 1709    General Appearance:  alert, well appearing, and in no acute distress  Mental status: oriented to person, place, and time with normal affect  Head:  normocephalic, atraumatic. Eye: no icterus, redness, pupils equal and reactive, extraocular eye movements intact, conjunctiva clear  Ear: normal external ear, no discharge, hearing intact  Nose:  no drainage noted  Mouth: mucous membranes moist  Neck: supple, no carotid bruits, thyroid not palpable  Lungs: Poor air entry bilaterally moderate wheezing  Cardiovascular: normal rate, regular rhythm, no murmur, gallop, rub. Abdomen: Soft, nontender, nondistended, normal bowel sounds, no hepatomegaly or splenomegaly  Neurologic: There are no new focal motor or sensory deficits, normal muscle tone and bulk, no abnormal sensation, normal speech, cranial nerves II through XII grossly intact  Skin: No gross lesions, rashes, bruising or bleeding on exposed skin area  Extremities:  peripheral pulses palpable, no pedal edema or calf pain with palpation    Laboratory Testing:  No results found for this or any previous visit (from the past 24 hour(s)). Imaging/Diagonstics:  XR CHEST (2 VW)    Result Date: 2022  Stable chest when compared to the prior exam 2022. No acute process     XR HIP RIGHT (2-3 VIEWS)    Result Date: 2022  No acute abnormality of the hip. CT HEAD WO CONTRAST    Result Date: 2022  No acute intracranial abnormality.        Assessment :      Hospital Problems Last Modified POA    * (Principal) Acute psychosis ([facility] [address] 10/30/2022 Yes       Plan:     79-year-old gentleman with a severe protein calorie malnutrition likely respiratory cachexia  BMI is 14.38  Active smoker history of for COPD  Complains of cough medical consult rule out pneumonia  Patient has no fever examination has mild to moderate wheezing decreased air and  Will do a short burst of steroid no nebulizer as needed at this time  Chest x-ray two-view rule out pneumonia  Clinically have a low suspicion for pneumonia        Consultations:   IP CONSULT TO INTERNAL MEDICINE  IP CONSULT TO INTERNAL MEDICINE      Lulú Norton MD  11/5/2022  5:09 PM    Copy sent to Dr. Radha Oviedo, NP, APRN - CNP    Please note that this chart was generated using voice recognition Dragon dictation software. Although every effort was made to ensure the accuracy of this automated transcription, some errors in transcription may have occurred.

## 2022-11-06 ENCOUNTER — APPOINTMENT (OUTPATIENT)
Dept: CT IMAGING | Age: 62
DRG: 885 | End: 2022-11-06
Attending: PSYCHIATRY & NEUROLOGY
Payer: OTHER GOVERNMENT

## 2022-11-06 LAB
ABSOLUTE EOS #: 0.48 K/UL (ref 0–0.4)
ABSOLUTE LYMPH #: 8.74 K/UL (ref 1–4.8)
ABSOLUTE MONO #: 1.11 K/UL (ref 0.1–1.3)
ALBUMIN SERPL-MCNC: 3.5 G/DL (ref 3.5–5.2)
ALP BLD-CCNC: 125 U/L (ref 40–129)
ALT SERPL-CCNC: 17 U/L (ref 5–41)
ANION GAP SERPL CALCULATED.3IONS-SCNC: 9 MMOL/L (ref 9–17)
AST SERPL-CCNC: 21 U/L
ATYPICAL LYMPHOCYTE ABSOLUTE COUNT: 0.32 K/UL
ATYPICAL LYMPHOCYTES: 2 %
BASOPHILS # BLD: 0 % (ref 0–2)
BASOPHILS ABSOLUTE: 0 K/UL (ref 0–0.2)
BILIRUB SERPL-MCNC: 0.2 MG/DL (ref 0.3–1.2)
BUN BLDV-MCNC: 10 MG/DL (ref 8–23)
CALCIUM SERPL-MCNC: 8.9 MG/DL (ref 8.6–10.4)
CHLORIDE BLD-SCNC: 100 MMOL/L (ref 98–107)
CO2: 30 MMOL/L (ref 20–31)
CREAT SERPL-MCNC: 0.51 MG/DL (ref 0.7–1.2)
EOSINOPHILS RELATIVE PERCENT: 3 % (ref 0–4)
GFR SERPL CREATININE-BSD FRML MDRD: >60 ML/MIN/1.73M2
GLUCOSE BLD-MCNC: 90 MG/DL (ref 70–99)
HCT VFR BLD CALC: 38.7 % (ref 41–53)
HEMOGLOBIN: 12.7 G/DL (ref 13.5–17.5)
LYMPHOCYTES # BLD: 55 % (ref 24–44)
MCH RBC QN AUTO: 32.2 PG (ref 26–34)
MCHC RBC AUTO-ENTMCNC: 32.9 G/DL (ref 31–37)
MCV RBC AUTO: 98 FL (ref 80–100)
MONOCYTES # BLD: 7 % (ref 1–7)
MORPHOLOGY: ABNORMAL
MORPHOLOGY: ABNORMAL
PDW BLD-RTO: 13.4 % (ref 11.5–14.9)
PLATELET # BLD: 345 K/UL (ref 150–450)
PMV BLD AUTO: 6.9 FL (ref 6–12)
POTASSIUM SERPL-SCNC: 4.9 MMOL/L (ref 3.7–5.3)
RBC # BLD: 3.95 M/UL (ref 4.5–5.9)
SEG NEUTROPHILS: 33 % (ref 36–66)
SEGMENTED NEUTROPHILS ABSOLUTE COUNT: 5.25 K/UL (ref 1.3–9.1)
SODIUM BLD-SCNC: 139 MMOL/L (ref 135–144)
TOTAL PROTEIN: 6.4 G/DL (ref 6.4–8.3)
WBC # BLD: 15.9 K/UL (ref 3.5–11)

## 2022-11-06 PROCEDURE — 6370000000 HC RX 637 (ALT 250 FOR IP): Performed by: PSYCHIATRY & NEUROLOGY

## 2022-11-06 PROCEDURE — 99231 SBSQ HOSP IP/OBS SF/LOW 25: CPT | Performed by: PSYCHIATRY & NEUROLOGY

## 2022-11-06 PROCEDURE — 71260 CT THORAX DX C+: CPT

## 2022-11-06 PROCEDURE — 1240000000 HC EMOTIONAL WELLNESS R&B

## 2022-11-06 PROCEDURE — 6370000000 HC RX 637 (ALT 250 FOR IP): Performed by: INTERNAL MEDICINE

## 2022-11-06 PROCEDURE — 6360000004 HC RX CONTRAST MEDICATION: Performed by: INTERNAL MEDICINE

## 2022-11-06 PROCEDURE — 85025 COMPLETE CBC W/AUTO DIFF WBC: CPT

## 2022-11-06 PROCEDURE — 80053 COMPREHEN METABOLIC PANEL: CPT

## 2022-11-06 PROCEDURE — 99232 SBSQ HOSP IP/OBS MODERATE 35: CPT | Performed by: INTERNAL MEDICINE

## 2022-11-06 PROCEDURE — 36415 COLL VENOUS BLD VENIPUNCTURE: CPT

## 2022-11-06 PROCEDURE — 2580000003 HC RX 258: Performed by: INTERNAL MEDICINE

## 2022-11-06 RX ORDER — 0.9 % SODIUM CHLORIDE 0.9 %
100 INTRAVENOUS SOLUTION INTRAVENOUS ONCE
Status: COMPLETED | OUTPATIENT
Start: 2022-11-06 | End: 2022-11-06

## 2022-11-06 RX ORDER — SODIUM CHLORIDE 0.9 % (FLUSH) 0.9 %
10 SYRINGE (ML) INJECTION PRN
Status: DISCONTINUED | OUTPATIENT
Start: 2022-11-06 | End: 2022-11-08 | Stop reason: HOSPADM

## 2022-11-06 RX ADMIN — OLANZAPINE 5 MG: 5 TABLET, FILM COATED ORAL at 08:25

## 2022-11-06 RX ADMIN — SODIUM CHLORIDE 100 ML: 9 INJECTION, SOLUTION INTRAVENOUS at 17:57

## 2022-11-06 RX ADMIN — ALUMINUM HYDROXIDE, MAGNESIUM HYDROXIDE, AND SIMETHICONE 30 ML: 200; 200; 20 SUSPENSION ORAL at 19:21

## 2022-11-06 RX ADMIN — DIVALPROEX SODIUM 500 MG: 500 TABLET, EXTENDED RELEASE ORAL at 08:25

## 2022-11-06 RX ADMIN — IOPAMIDOL 75 ML: 755 INJECTION, SOLUTION INTRAVENOUS at 17:54

## 2022-11-06 RX ADMIN — SODIUM CHLORIDE, PRESERVATIVE FREE 10 ML: 5 INJECTION INTRAVENOUS at 17:55

## 2022-11-06 RX ADMIN — PALIPERIDONE 6 MG: 6 TABLET, EXTENDED RELEASE ORAL at 08:24

## 2022-11-06 RX ADMIN — BUDESONIDE AND FORMOTEROL FUMARATE DIHYDRATE 2 PUFF: 160; 4.5 AEROSOL RESPIRATORY (INHALATION) at 08:57

## 2022-11-06 RX ADMIN — Medication 1 CAPSULE: at 08:57

## 2022-11-06 RX ADMIN — ALUMINUM HYDROXIDE, MAGNESIUM HYDROXIDE, AND SIMETHICONE 30 ML: 200; 200; 20 SUSPENSION ORAL at 12:23

## 2022-11-06 ASSESSMENT — PAIN SCALES - GENERAL
PAINLEVEL_OUTOF10: 0
PAINLEVEL_OUTOF10: 0

## 2022-11-06 NOTE — BH NOTE
Writer and Clinical  Support Staff transports patient via wheelchair to radiology for CT Scan as ordered by Internal Medicine.

## 2022-11-06 NOTE — PLAN OF CARE
Problem: Safety - Adult  Goal: Free from fall injury  Outcome: Progressing  Note: Patient continues to be a constant observation 1:1 for safety purposes. Patient denies thoughts of harming self or others. Patient denies suicidal/homicidal ideations. Patient reports that he is anxious about when he is going to be discharged as wants to go home. Patient is isolative and withdrawn to room. Patient is alert and orientated X4, pleasant, cooperative, medication and behavioral compliant. Problem: Decision Making  Goal: Pt/Family able to effectively weigh alternatives and participate in decision making related to treatment and care  Description: INTERVENTIONS:  1. Determine when there are differences between patient's view, family's view, and healthcare provider's view of condition  2. Facilitate patient and family articulation of goals for care  3. Help patient and family identify pros/cons of alternative solutions  4. Provide information as requested by patient/family  5. Respect patient/family right to receive or not to receive information  6. Serve as a liaison between patient and family and health care team  7. Initiate Consults from Ethics, Palliative Care or initiate [address] [facility] as is appropriate  Outcome: Progressing  Note: Patient continues to be a constant observation 1:1 for safety purposes. Patient denies thoughts of harming self or others. Patient denies suicidal/homicidal ideations. Patient reports that he is anxious about when he is going to be discharged as wants to go home. Patient is isolative and withdrawn to room. Patient is alert and orientated X4, pleasant, cooperative, medication and behavioral compliant. Problem: June  Goal: Will exhibit normal sleep and speech and no impulsivity  Description: INTERVENTIONS:  1. Administer medication as ordered  2. Set limits on impulsive behavior  3.  Make attempts to decrease external stimuli as possible  Outcome: Progressing  Note: Patient continues to be a constant observation 1:1 for safety purposes. Patient denies thoughts of harming self or others. Patient denies suicidal/homicidal ideations. Patient reports that he is anxious about when he is going to be discharged as wants to go home. Patient is isolative and withdrawn to room. Patient is alert and orientated X4, pleasant, cooperative, medication and behavioral compliant. Problem: Self Care Deficit  Goal: Return ADL status to a safe level of function  Description: INTERVENTIONS:  1. Administer medication as ordered  2. Assess ADL deficits and provide assistive devices as needed  3. Obtain PT/OT consults as needed  4. Assist and instruct patient to increase activity and self care as tolerated  Outcome: Progressing  Note: Patient continues to be a constant observation 1:1 for safety purposes. Patient denies thoughts of harming self or others. Patient denies suicidal/homicidal ideations. Patient reports that he is anxious about when he is going to be discharged as wants to go home. Patient is isolative and withdrawn to room. Patient is alert and orientated X4, pleasant, cooperative, medication and behavioral compliant. Problem: Pain  Goal: Verbalizes/displays adequate comfort level or baseline comfort level  Outcome: Progressing  Note: Patient continues to be a constant observation 1:1 for safety purposes. Patient denies thoughts of harming self or others. Patient denies suicidal/homicidal ideations. Patient reports that he is anxious about when he is going to be discharged as wants to go home. Patient is isolative and withdrawn to room. Patient is alert and orientated X4, pleasant, cooperative, medication and behavioral compliant.

## 2022-11-06 NOTE — GROUP NOTE
Group Therapy Note    Date: 11/6/2022    Group Start Time: 1400  Group End Time: 1500  Group Topic: Cognitive Skills    FREDI Del Valle        Group Therapy Note    Attendees: 10/19       Topic: Decision making, and communication skills. Goal of Group: To increase social interaction and to practice decision making, and communication skills. Comments: Patient did not participate in Cognitive Skills Group offered at 14:00 , despite staff encouragement and explanation of benefits. Patient remains seclusive to self during group time. Q15 minute safety checks maintained for patient safety and will continue to encourage patient to attend unit programming.          Discipline Responsible: Psychoeducational Specialist        Signature:  Glynn Pardo

## 2022-11-06 NOTE — PROGRESS NOTES
Daily Progress Note  11/5/2022    Patient Name: Frank Perdue    CHIEF COMPLAINT: Acute psychosis with suicidal ideation         SUBJECTIVE:      Patient seen face-to-face for follow-up assessment. Showing improvement in restless and intrusive behaviors today. He is sitting in bed at time of discussion, reading a book. He has not required as frequent redirection and staff report that his behavior has been in better control. Patient thought process more linear and less rapid. Able to engage in constructive conversation. He has been compliant with his scheduled psychotropic medications and received his second loading dose [place] today. Patient on a one-to-one at this time secondary to safety, as patient was not on altercation with another peer on unit a few days ago. Will observe through today and reevaluate need for one-to-one tomorrow. Patient is showing modest improvement but has yet to demonstrate stability and requires inpatient hospitalization for safety. Staff note that patient was complaining of concern for pneumonia. Internal medicine has been consulted and a chest x-ray was ordered. Appetite:  [x] Normal/Adequate/Unchanged  [] Increased  [] Decreased      Sleep:       [x] Normal/Adequate/Unchanged  [] Fair  [] Poor      Group Attendance on Unit:   [] Yes  [] Selectively    [x] No    Medication Side Effects:  Patient denies any medication side effects at the time of assessment. Mental Status Exam  Level of consciousness: Somnolent  Appearance: Appropriate attire for setting, resting in bed, with fair grooming and hygiene. Behavior/Motor: Approachable, resting  Attitude toward examiner: Patient resting in too tired to engage in interview  Speech: Normal rate, low volume  Mood: Patient provides no response  Affect: Patient resting, affect congruent  Thought processes: Thought disordered and illogical  Thought content: Denies homicidal ideation.   Suicidal Ideation: Denies suicidal ideations  Delusions: Paranoid and grandiose delusions   Perceptual Disturbance:  Endorses auditory hallucinations. Endorses visual hallucinations. Cognition: Oriented to self location time but not situation  Memory: Poor recent recall  Insight & Judgement: Poor to absent    Data   height is 6' (1.829 m) and weight is 106 lb (48.1 kg). His oral temperature is 98.3 °F (36.8 °C). His blood pressure is 107/82 and his pulse is 89. His respiration is 14 and oxygen saturation is 98%. Labs:   No visits with results within 2 Day(s) from this visit. Latest known visit with results is:   Admission on 10/27/2022, Discharged on 10/29/2022   Component Date Value Ref Range Status    Color, UA 10/28/2022 Yellow  Yellow Final    Turbidity UA 10/28/2022 Clear  Clear Final    Glucose, Ur 10/28/2022 NEGATIVE  NEGATIVE Final    Bilirubin Urine 10/28/2022 NEGATIVE  NEGATIVE Final    Ketones, Urine 10/28/2022 NEGATIVE  NEGATIVE Final    Specific Gravity, UA 10/28/2022 1.007  1.000 - 1.030 Final    Urine Hgb 10/28/2022 NEGATIVE  NEGATIVE Final    pH, UA 10/28/2022 6.0  5.0 - 8.0 Final    Protein, UA 10/28/2022 NEGATIVE  NEGATIVE Final    Urobilinogen, Urine 10/28/2022 Normal  Normal Final    Nitrite, Urine 10/28/2022 NEGATIVE  NEGATIVE Final    Leukocyte Esterase, Urine 10/28/2022 NEGATIVE  NEGATIVE Final    Urinalysis Comments 10/28/2022 Microscopic exam not performed based on chemical results unless requested in original order.    Final    Amphetamine Screen, Ur 10/28/2022 NEGATIVE  NEGATIVE Final    Comment:       (Positive cutoff 1000 ng/mL)                  Barbiturate Screen, Ur 10/28/2022 NEGATIVE  NEGATIVE Final    Comment:       (Positive cutoff 200 ng/mL)                  Benzodiazepine Screen, Urine 10/28/2022 NEGATIVE   Final    Comment:       (Positive cutoff 200 ng/mL)                  Cocaine Metabolite, Urine 10/28/2022 NEGATIVE  NEGATIVE Final    Comment:       (Positive cutoff 300 ng/mL) Methadone Screen, Urine 10/28/2022 NEGATIVE  NEGATIVE Final    Comment:       (Positive cutoff 300 ng/mL)                  Opiates, Urine 10/28/2022 NEGATIVE  NEGATIVE Final    Comment:       (Positive cutoff 300 ng/mL)                  Phencyclidine, Urine 10/28/2022 NEGATIVE  NEGATIVE Final    Comment:       (Positive cutoff 25 ng/mL)                  Cannabinoid Scrn, Ur 10/28/2022 NEGATIVE  NEGATIVE Final    Comment:       (Positive cutoff 50 ng/mL)                  Oxycodone Screen, Ur 10/28/2022 NEGATIVE  NEGATIVE Final    Comment:       (Positive cutoff 100 ng/mL)                  Fentanyl, Ur 10/28/2022 NEGATIVE  NEGATIVE Final    Comment:       (Positive cutoff  5 ng/ml)            Test Information 10/28/2022 Assay provides medical screening only. The absence of expected drug(s) and/or metabolite(s) may indicate diluted or adulterated urine, limitations of testing or timing of collection. Final    Comment: Testing for legal purposes should be confirmed by another method. To request confirmation   of test result, please call the lab within 7 days of sample submission. Ethanol 10/27/2022 <10  <10 mg/dL Final    Ethanol percent 10/27/2022 <0.010  % Final    Glucose 10/27/2022 101 (A)  70 - 99 mg/dL Final    BUN 10/27/2022 9  8 - 23 mg/dL Final    SPECIMEN SLIGHTLY HEMOLYZED, RESULTS MAY BE ADVERSELY AFFECTED. Creatinine 10/27/2022 0.62 (A)  0.70 - 1.20 mg/dL Final    SPECIMEN SLIGHTLY HEMOLYZED, RESULTS MAY BE ADVERSELY AFFECTED. Est, Glom Filt Rate 10/27/2022 >60  >60 mL/min/1.73m2 Final    Comment:       Effective Oct 3, 2022        These results are not intended for use in patients <25years of age. eGFR results are calculated without a race factor using the 2021 CKD-EPI equation. Careful clinical correlation is recommended, particularly when comparing to results   calculated using previous equations.   The CKD-EPI equation is less accurate in patients with extremes of muscle mass, extra-renal   metabolism of creatine, excessive creatine ingestion, or following therapy that affects   renal tubular secretion. Calcium 10/27/2022 9.1  8.6 - 10.4 mg/dL Final    SPECIMEN SLIGHTLY HEMOLYZED, RESULTS MAY BE ADVERSELY AFFECTED. Sodium 10/27/2022 137  135 - 144 mmol/L Final    SPECIMEN SLIGHTLY HEMOLYZED, RESULTS MAY BE ADVERSELY AFFECTED. Potassium 10/27/2022 4.6  3.7 - 5.3 mmol/L Final    SPECIMEN SLIGHTLY HEMOLYZED, RESULTS MAY BE ADVERSELY AFFECTED. Chloride 10/27/2022 98  98 - 107 mmol/L Final    SPECIMEN SLIGHTLY HEMOLYZED, RESULTS MAY BE ADVERSELY AFFECTED. CO2 10/27/2022 24  20 - 31 mmol/L Final    SPECIMEN SLIGHTLY HEMOLYZED, RESULTS MAY BE ADVERSELY AFFECTED.     Anion Gap 10/27/2022 15  9 - 17 mmol/L Final    WBC 10/27/2022 23.0 (A)  3.5 - 11.0 k/uL Final    RBC 10/27/2022 3.98 (A)  4.5 - 5.9 m/uL Final    Hemoglobin 10/27/2022 12.9 (A)  13.5 - 17.5 g/dL Final    Hematocrit 10/27/2022 38.2 (A)  41 - 53 % Final    MCV 10/27/2022 96.0  80 - 100 fL Final    MCH 10/27/2022 32.3  26 - 34 pg Final    MCHC 10/27/2022 33.6  31 - 37 g/dL Final    RDW 10/27/2022 13.4  11.5 - 14.9 % Final    Platelets 47/15/4096 376  150 - 450 k/uL Final    MPV 10/27/2022 6.5  6.0 - 12.0 fL Final    Seg Neutrophils 10/27/2022 49  36 - 66 % Final    Lymphocytes 10/27/2022 34  24 - 44 % Final    Atypical Lymphocytes 10/27/2022 6  % Final    Monocytes 10/27/2022 10 (A)  1 - 7 % Final    Eosinophils % 10/27/2022 1  0 - 4 % Final    Basophils 10/27/2022 0  0 - 2 % Final    Segs Absolute 10/27/2022 11.27 (A)  1.3 - 9.1 k/uL Final    Absolute Lymph # 10/27/2022 7.82 (A)  1.0 - 4.8 k/uL Final    Atypical Lymphocytes Absolute 10/27/2022 1.38  k/uL Final    Absolute Mono # 10/27/2022 2.30 (A)  0.1 - 1.3 k/uL Final    Absolute Eos # 10/27/2022 0.23  0.0 - 0.4 k/uL Final    Basophils Absolute 10/27/2022 0.00  0.0 - 0.2 k/uL Final    Morphology 10/27/2022 Normal   Final    Glucose 10/28/2022 91  70 - 99 mg/dL Final BUN 10/28/2022 10  8 - 23 mg/dL Final    Creatinine 10/28/2022 0.59 (A)  0.70 - 1.20 mg/dL Final    Est, Glom Filt Rate 10/28/2022 >60  >60 mL/min/1.73m2 Final    Comment:       Effective Oct 3, 2022        These results are not intended for use in patients <25years of age. eGFR results are calculated without a race factor using the 2021 CKD-EPI equation. Careful clinical correlation is recommended, particularly when comparing to results   calculated using previous equations. The CKD-EPI equation is less accurate in patients with extremes of muscle mass, extra-renal   metabolism of creatine, excessive creatine ingestion, or following therapy that affects   renal tubular secretion.       Calcium 10/28/2022 8.7  8.6 - 10.4 mg/dL Final    Sodium 10/28/2022 138  135 - 144 mmol/L Final    Potassium 10/28/2022 4.0  3.7 - 5.3 mmol/L Final    Chloride 10/28/2022 102  98 - 107 mmol/L Final    CO2 10/28/2022 27  20 - 31 mmol/L Final    Anion Gap 10/28/2022 9  9 - 17 mmol/L Final    WBC 10/28/2022 16.6 (A)  3.5 - 11.0 k/uL Final    RBC 10/28/2022 3.60 (A)  4.5 - 5.9 m/uL Final    Hemoglobin 10/28/2022 11.8 (A)  13.5 - 17.5 g/dL Final    Hematocrit 10/28/2022 34.9 (A)  41 - 53 % Final    MCV 10/28/2022 96.9  80 - 100 fL Final    MCH 10/28/2022 32.8  26 - 34 pg Final    MCHC 10/28/2022 33.9  31 - 37 g/dL Final    RDW 10/28/2022 13.3  11.5 - 14.9 % Final    Platelets 04/78/7378 330  150 - 450 k/uL Final    MPV 10/28/2022 6.5  6.0 - 12.0 fL Final    Seg Neutrophils 10/28/2022 39  36 - 66 % Final    Lymphocytes 10/28/2022 47 (A)  24 - 44 % Final    Atypical Lymphocytes 10/28/2022 3  % Final    Monocytes 10/28/2022 9 (A)  1 - 7 % Final    Eosinophils % 10/28/2022 2  0 - 4 % Final    Basophils 10/28/2022 0  0 - 2 % Final    Segs Absolute 10/28/2022 6.47  1.3 - 9.1 k/uL Final    Absolute Lymph # 10/28/2022 7.81 (A)  1.0 - 4.8 k/uL Final    Atypical Lymphocytes Absolute 10/28/2022 0.50  k/uL Final    Absolute Mono # 10/28/2022 1.49 (A)  0.1 - 1.3 k/uL Final    Absolute Eos # 10/28/2022 0.33  0.0 - 0.4 k/uL Final    Basophils Absolute 10/28/2022 0.00  0.0 - 0.2 k/uL Final    Morphology 10/28/2022 ANISOCYTOSIS PRESENT   Final    Morphology 10/28/2022 1+ TEARDROPS   Final    Ammonia 10/28/2022 30  16 - 60 umol/L Final    Glucose 10/29/2022 103 (A)  70 - 99 mg/dL Final    BUN 10/29/2022 10  8 - 23 mg/dL Final    Creatinine 10/29/2022 0.68 (A)  0.70 - 1.20 mg/dL Final    Est, Glom Filt Rate 10/29/2022 >60  >60 mL/min/1.73m2 Final    Comment:       Effective Oct 3, 2022        These results are not intended for use in patients <25years of age. eGFR results are calculated without a race factor using the 2021 CKD-EPI equation. Careful clinical correlation is recommended, particularly when comparing to results   calculated using previous equations. The CKD-EPI equation is less accurate in patients with extremes of muscle mass, extra-renal   metabolism of creatine, excessive creatine ingestion, or following therapy that affects   renal tubular secretion.       Calcium 10/29/2022 8.8  8.6 - 10.4 mg/dL Final    Sodium 10/29/2022 138  135 - 144 mmol/L Final    Potassium 10/29/2022 4.1  3.7 - 5.3 mmol/L Final    Chloride 10/29/2022 103  98 - 107 mmol/L Final    CO2 10/29/2022 27  20 - 31 mmol/L Final    Anion Gap 10/29/2022 8 (A)  9 - 17 mmol/L Final    WBC 10/29/2022 14.5 (A)  3.5 - 11.0 k/uL Final    RBC 10/29/2022 3.60 (A)  4.5 - 5.9 m/uL Final    Hemoglobin 10/29/2022 11.8 (A)  13.5 - 17.5 g/dL Final    Hematocrit 10/29/2022 34.8 (A)  41 - 53 % Final    MCV 10/29/2022 96.9  80 - 100 fL Final    MCH 10/29/2022 32.7  26 - 34 pg Final    MCHC 10/29/2022 33.8  31 - 37 g/dL Final    RDW 10/29/2022 13.3  11.5 - 14.9 % Final    Platelets 35/06/5095 298  150 - 450 k/uL Final    MPV 10/29/2022 6.4  6.0 - 12.0 fL Final    Seg Neutrophils 10/29/2022 37  36 - 66 % Final    Lymphocytes 10/29/2022 50 (A)  24 - 44 % Final    Monocytes 10/29/2022 11 (A)  1 - 7 % Final    Eosinophils % 10/29/2022 1  0 - 4 % Final    Basophils 10/29/2022 0  0 - 2 % Final    Metamyelocytes 10/29/2022 1 (A)  0 % Final    Segs Absolute 10/29/2022 5.37  1.3 - 9.1 k/uL Final    Absolute Lymph # 10/29/2022 7.23 (A)  1.0 - 4.8 k/uL Final    Absolute Mono # 10/29/2022 1.60 (A)  0.1 - 1.3 k/uL Final    Absolute Eos # 10/29/2022 0.15  0.0 - 0.4 k/uL Final    Basophils Absolute 10/29/2022 0.00  0.0 - 0.2 k/uL Final    Metamyelocytes Absolute 10/29/2022 0.15 (A)  0 k/uL Final    Morphology 10/29/2022 Normal   Final    Pathologist Review 10/29/2022 Reviewed by pathologist:  Nuha Ayon. Tom Manuel D.O. Final    Retic % 10/29/2022 1.3  0.5 - 2.0 % Final    Absolute Retic # 10/29/2022 0.045  0.0245 - 0.098 M/uL Final    Surgical Pathology Report 10/29/2022    Final                    Value:[id number]  [facility]  [facility]  ANATOMIC PATHOLOGY  [address] [place] [address]. [place], [address]  [phone]  Fax: [phone]  [facility]    Patient Name: Lolis Castro  MR#: [id number]  Specimen #[id number]    Procedures/Addenda  PERIPHERAL BLOOD REPORT     Date Ordered:     10/31/2022     Status:  Signed Out       Date Complete:     10/31/2022     By: Nuha Manuel D.O. Date Reported:     10/31/2022       INTERPRETATION  Peripheral blood:    MILD LEUKOCYTOSIS WITH ABSOLUTE LYMPHOCYTOSIS AND MONOCYTOSIS. MILD NORMOCYTIC ANEMIA WITH NORMAL MORPHOLOGY. PLATELET COUNT WITHIN THE REFERENCE RANGE WITH NORMAL MORPHOLOGY. THE ELECTRONIC HEALTH RECORD IS REVIEWED, DOCUMENTING A HISTORY OF  CLL. THE ABSOLUTE LYMPHOCYTOSIS IS CONSISTENT WITH HIS KNOWN  DIAGNOSIS. MONOCYTOSIS CAN BE SEEN WITH A VARIETY OF REACTIVE  CONDITIONS. IF PERSISTENT, MONOCYTOSIS CAN BE SEEN WITH SOME  MYELOPROLIFERATIVE DISORDERS.     RESULT                          S-COMMENTS  PERIPHERAL BLOOD STUDY    CBC: Please see the electronic health record for CBC parameters  ([id number], 10/29/22, 05:40). PLATELETS: Platelets show normal morphology. LEUKOCYTES: White blood cells show mild leukocytosis with absolute  lymphocytosis and monocytosis. Reactive lymphocytes are present. There are no blasts. ERYTHROCYTES: Red blood cells show mild normocytic anemia with normal  morphology. Note: The electronic health record is reviewed. eRno Cortes D.O. Source:  A: Peripheral Blood           Reviewed patient's current plan of care and vital signs with nursing staff.     Labs reviewed: [x] Yes  Last EKG in EMR reviewed: [x] Yes  QTc: 418    Medications  Current Facility-Administered Medications: OLANZapine (ZYPREXA) tablet 5 mg, 5 mg, Oral, BID  divalproex (DEPAKOTE ER) extended release tablet 500 mg, 500 mg, Oral, BID  loperamide (IMODIUM) capsule 2 mg, 2 mg, Oral, 4x Daily PRN  paliperidone (INVEGA) extended release tablet 6 mg, 6 mg, Oral, Daily  hydrOXYzine HCl (ATARAX) tablet 50 mg, 50 mg, Oral, TID PRN  ipratropium (ATROVENT HFA) 17 MCG/ACT inhaler 2 puff, 2 puff, Inhalation, Q4H PRN  ipratropium (ATROVENT) 0.02 % nebulizer solution 0.5 mg, 0.5 mg, Nebulization, Q4H PRN  haloperidol lactate (HALDOL) injection 5 mg, 5 mg, IntraMUSCular, Q6H PRN **AND** LORazepam (ATIVAN) injection 2 mg, 2 mg, IntraMUSCular, Q6H PRN **AND** diphenhydrAMINE (BENADRYL) injection 50 mg, 50 mg, IntraMUSCular, Q6H PRN  acetaminophen (TYLENOL) tablet 650 mg, 650 mg, Oral, Q6H PRN  ibuprofen (ADVIL;MOTRIN) tablet 400 mg, 400 mg, Oral, Q6H PRN  aluminum & magnesium hydroxide-simethicone (MAALOX) 200-200-20 MG/5ML suspension 30 mL, 30 mL, Oral, Q6H PRN  nicotine polacrilex (NICORETTE) gum 2 mg, 2 mg, Oral, Q2H PRN  lactobacillus (CULTURELLE) capsule 1 capsule, 1 capsule, Oral, Daily  albuterol sulfate HFA (PROVENTIL;VENTOLIN;PROAIR) 108 (90 Base) MCG/ACT inhaler 2 puff, 2 puff, Inhalation, Q4H PRN  docusate sodium (COLACE) capsule 100 mg, 100 mg, Oral, TID PRN  budesonide-formoterol (SYMBICORT) 160-4.5 MCG/ACT inhaler 2 puff, 2 puff, Inhalation, BID  haloperidol (HALDOL) tablet 5 mg, 5 mg, Oral, Q6H PRN **AND** LORazepam (ATIVAN) tablet 2 mg, 2 mg, Oral, Q6H PRN    ASSESSMENT  Acute psychosis ([facility] [address]         PLAN  Patient symptoms are: Modestly improving  Medication changes: Observe on recent changes  Patient received second loading dose Invega Sustenna 156 mg today without complication  Internal medicine consulted. Chest x-ray two-view ordered to rule out pneumonia. Determine need for one-to-one tomorrow  Monitor need and frequency of PRN medications. Encourage participation in groups and milieu. Probable discharge is to be determined by MD.     Electronically signed by ELISEO Lal CNP on 11/5/2022 at 10:04 PM    **This report has been created using voice recognition software. It may contain minor errors which are inherent in voice recognition technology. **

## 2022-11-06 NOTE — PLAN OF CARE
[address] [place] [address] Interdisciplinary Treatment Plan Note     Review Date & Time: 11/6/2022   1305    Admission Type:   Admission Type: Voluntary    Reason for admission:  Reason for Admission: Racing thoughts, rapid speach, paranoid, hallucinations, tierra, depressed      Estimated Discharge Date Update: to be determined by physician    PATIENT STRENGTHS:  Patient Strengths:   Patient Strengths and Limitations:Limitations: Perceives need for assistance with self-care, Unrealistic self-view, External locus of control, Difficulty problem solving/relies on others to help solve problems, Multiple barriers to leisure interests  Addictive Behavior:Addictive Behavior  In the Past 3 Months, Have You Felt or Has Someone Told You That You Have a Problem With  : None  Medical Problems:   Past Medical History:   Diagnosis Date    Asthma     COPD (chronic obstructive pulmonary disease) ([facility] [address]     Emphysema of lung ([facility] [address]        Risk:  Fall Risk   Leroy Scale Leroy Scale Score: 22  BVC    Change in scores no.  Changes to plan of Care  no    Status EXAM:   Mental Status and Behavioral Exam  Normal: No  Level of Assistance: Independent/Self  Facial Expression: Flat  Affect: Appropriate  Level of Consciousness: Alert  Frequency of Checks: 4 times per hour, close  Mood:Normal: No  Mood: Anxious  Motor Activity:Normal: No  Motor Activity: Decreased  Eye Contact: Good  Observed Behavior: Preoccupied, Cooperative  Sexual Misconduct History: Current - no  Preception: [place] to person, [place] to time, [place] to place, [place] to situation  Attention:Normal: No  Attention: Distractible  Thought Processes: Circumstantial, Flight of ideas  Thought Content:Normal: No  Thought Content: Preoccupations  Depression Symptoms: Impaired concentration  Anxiety Symptoms: Generalized  Tierra Symptoms: Flight of ideas  Hallucinations: None  Delusions: No  Delusions: Grandeur  Memory:Normal: No  Memory: Poor remote, Poor recent  Insight and Judgment: No  Insight and Judgment: Poor judgment, Poor insight    Daily Assessment Last Entry:   Daily Sleep (WDL): Within Defined Limits            Daily Nutrition (WDL): Within Defined Limits  Appetite Change: Normal for patient  Barriers to Nutrition: Cognitive impairment  Level of Assistance: Independent/Self    Patient Monitoring:  Frequency of Checks: 4 times per hour, close    Psychiatric Symptoms:   Depression Symptoms  Depression Symptoms: Impaired concentration  Anxiety Symptoms  Anxiety Symptoms: Generalized  June Symptoms  June Symptoms: Flight of ideas          Suicide Risk CSSR-S:  1) Within the past month, have you wished you were dead or wished you could go to sleep and not wake up? : No  2) Have you actually had any thoughts of killing yourself? : No  6) Have you ever done anything, started to do anything, or prepared to do anything to end your life?: No  Change in Result no Change in Plan of care no    EDUCATION:   Learner Progress Toward Treatment Goals: Reviewed goals and plan of care    Method: Small group    Outcome: Verbalized understanding    PATIENT GOALS: short term-discharge planning             Long term-get along with people and stay to self more, follow up with HC    PLAN/TREATMENT RECOMMENDATIONS UPDATE:   [address], GOAL SETTING    SHORT-TERM GOALS UPDATE:  Time frame for Short-Term Goals: 1-2 WEEKS     LONG-TERM GOALS UPDATE:  Time frame for Long-Term Goals: 6 MONTHS  Members Present in Team Meeting: See Signature Sheet    Dwight Palm RN

## 2022-11-06 NOTE — PROGRESS NOTES
Daily Progress Note  11/6/2022    Patient Name: Kit Sandifer:  Acute psychosis with suicidal ideation         SUBJECTIVE:      Nursing staff report the patient has maintained medication adherence and has not required emergency medications in the last 24 hours. He remains on a one-to-one due to his inability to maintain appropriate boundaries with his peers. He does report increased appetite and was able to tolerate breakfast and lunch. He remains somewhat illogical but is willing to engage. He reports his mood as \"up right\". Writer encouraged patient to attend groups on the unit. At this time, the patient is not appropriate for a lower level of care. There is risk of decompensation and patient warrants further hospitalization for safety and stabilization. Appetite:  [x] Normal/Adequate/Unchanged  [] Increased  [] Decreased      Sleep:       [x] Normal/Adequate/Unchanged  [] Fair  [] Poor      Group Attendance on Unit:   [] Yes  [] Selectively    [x] No    Medication Side Effects: Patient denies any medication side effects at the time of assessment. Mental Status Exam  Level of consciousness: Alert and awake. Appearance: Appropriate attire for setting, standing, with poor grooming and hygiene. Behavior/Motor: Approachable, no psychomotor abnormalities. Attitude toward examiner: Cooperative, attentive, intense eye contact. Speech: Normal rate, low volume, normal tone. Mood:  Patient reports \"upright\". Affect: blunted  Thought processes: slow, illogical, and loose associations. Thought content: Denies homicidal ideation. Suicidal Ideation: Reports improvement in suicidal ideations, without current plan or intent, contracts for safety on the unit. Delusions: Patient presents with delusions. Denies paranoia. Perceptual Disturbance: Patient does not appear to be responding to internal stimuli. Reports improvement in auditory hallucinations.  Denies visual hallucinations. Cognition: Oriented to self and general circumstance only. Memory: Impaired. Insight & Judgement: Poor. Data   height is 6' (1.829 m) and weight is 106 lb (48.1 kg). His temperature is 97.3 °F (36.3 °C). His blood pressure is 102/66 and his pulse is 100. His respiration is 14 and oxygen saturation is 98%. Labs:   Admission on 10/29/2022   Component Date Value Ref Range Status    WBC 11/06/2022 15.9 (A)  3.5 - 11.0 k/uL Final    RBC 11/06/2022 3.95 (A)  4.5 - 5.9 m/uL Final    Hemoglobin 11/06/2022 12.7 (A)  13.5 - 17.5 g/dL Final    Hematocrit 11/06/2022 38.7 (A)  41 - 53 % Final    MCV 11/06/2022 98.0  80 - 100 fL Final    MCH 11/06/2022 32.2  26 - 34 pg Final    MCHC 11/06/2022 32.9  31 - 37 g/dL Final    RDW 11/06/2022 13.4  11.5 - 14.9 % Final    Platelets 12/56/1976 345  150 - 450 k/uL Final    MPV 11/06/2022 6.9  6.0 - 12.0 fL Final    Seg Neutrophils 11/06/2022 33 (A)  36 - 66 % Final    Lymphocytes 11/06/2022 55 (A)  24 - 44 % Final    Atypical Lymphocytes 11/06/2022 2  % Final    Monocytes 11/06/2022 7  1 - 7 % Final    Eosinophils % 11/06/2022 3  0 - 4 % Final    Basophils 11/06/2022 0  0 - 2 % Final    Segs Absolute 11/06/2022 5.25  1.3 - 9.1 k/uL Final    Absolute Lymph # 11/06/2022 8.74 (A)  1.0 - 4.8 k/uL Final    Atypical Lymphocytes Absolute 11/06/2022 0.32  k/uL Final    Absolute Mono # 11/06/2022 1.11  0.1 - 1.3 k/uL Final    Absolute Eos # 11/06/2022 0.48 (A)  0.0 - 0.4 k/uL Final    Basophils Absolute 11/06/2022 0.00  0.0 - 0.2 k/uL Final    Morphology 11/06/2022 ANISOCYTOSIS PRESENT   Final    Morphology 11/06/2022 1+ ELLIPTOCYTES   Final    Glucose 11/06/2022 90  70 - 99 mg/dL Final    BUN 11/06/2022 10  8 - 23 mg/dL Final    Creatinine 11/06/2022 0.51 (A)  0.70 - 1.20 mg/dL Final    Est, Glom Filt Rate 11/06/2022 >60  >60 mL/min/1.73m2 Final    Comment:       Effective Oct 3, 2022        These results are not intended for use in patients <25years of age.         eGFR results are calculated without a race factor using the 2021 CKD-EPI equation. Careful clinical correlation is recommended, particularly when comparing to results   calculated using previous equations. The CKD-EPI equation is less accurate in patients with extremes of muscle mass, extra-renal   metabolism of creatine, excessive creatine ingestion, or following therapy that affects   renal tubular secretion. Calcium 11/06/2022 8.9  8.6 - 10.4 mg/dL Final    Sodium 11/06/2022 139  135 - 144 mmol/L Final    Potassium 11/06/2022 4.9  3.7 - 5.3 mmol/L Final    Chloride 11/06/2022 100  98 - 107 mmol/L Final    CO2 11/06/2022 30  20 - 31 mmol/L Final    Anion Gap 11/06/2022 9  9 - 17 mmol/L Final    Alkaline Phosphatase 11/06/2022 125  40 - 129 U/L Final    ALT 11/06/2022 17  5 - 41 U/L Final    AST 11/06/2022 21  <40 U/L Final    Total Bilirubin 11/06/2022 0.2 (A)  0.3 - 1.2 mg/dL Final    Total Protein 11/06/2022 6.4  6.4 - 8.3 g/dL Final    Albumin 11/06/2022 3.5  3.5 - 5.2 g/dL Final         Reviewed patient's current plan of care and vital signs with nursing staff.     Labs reviewed: [x] Yes  Last EKG in EMR reviewed: [x] Yes  QTc: 418    Medications  Current Facility-Administered Medications: OLANZapine (ZYPREXA) tablet 5 mg, 5 mg, Oral, BID  divalproex (DEPAKOTE ER) extended release tablet 500 mg, 500 mg, Oral, BID  loperamide (IMODIUM) capsule 2 mg, 2 mg, Oral, 4x Daily PRN  paliperidone (INVEGA) extended release tablet 6 mg, 6 mg, Oral, Daily  hydrOXYzine HCl (ATARAX) tablet 50 mg, 50 mg, Oral, TID PRN  ipratropium (ATROVENT HFA) 17 MCG/ACT inhaler 2 puff, 2 puff, Inhalation, Q4H PRN  ipratropium (ATROVENT) 0.02 % nebulizer solution 0.5 mg, 0.5 mg, Nebulization, Q4H PRN  haloperidol lactate (HALDOL) injection 5 mg, 5 mg, IntraMUSCular, Q6H PRN **AND** LORazepam (ATIVAN) injection 2 mg, 2 mg, IntraMUSCular, Q6H PRN **AND** diphenhydrAMINE (BENADRYL) injection 50 mg, 50 mg, IntraMUSCular, Q6H PRN  acetaminophen (TYLENOL) tablet 650 mg, 650 mg, Oral, Q6H PRN  ibuprofen (ADVIL;MOTRIN) tablet 400 mg, 400 mg, Oral, Q6H PRN  aluminum & magnesium hydroxide-simethicone (MAALOX) 200-200-20 MG/5ML suspension 30 mL, 30 mL, Oral, Q6H PRN  nicotine polacrilex (NICORETTE) gum 2 mg, 2 mg, Oral, Q2H PRN  lactobacillus (CULTURELLE) capsule 1 capsule, 1 capsule, Oral, Daily  albuterol sulfate HFA (PROVENTIL;VENTOLIN;PROAIR) 108 (90 Base) MCG/ACT inhaler 2 puff, 2 puff, Inhalation, Q4H PRN  docusate sodium (COLACE) capsule 100 mg, 100 mg, Oral, TID PRN  budesonide-formoterol (SYMBICORT) 160-4.5 MCG/ACT inhaler 2 puff, 2 puff, Inhalation, BID  haloperidol (HALDOL) tablet 5 mg, 5 mg, Oral, Q6H PRN **AND** LORazepam (ATIVAN) tablet 2 mg, 2 mg, Oral, Q6H PRN    ASSESSMENT  Acute psychosis (HCC)         PLAN:  Patient symptoms are showing modest improvement  Maintain one-to-one due to intrusiveness  Received second dose [place] 156 mg 11/5/2022  Received first dose of Invega Sustenna 234 mg on 11/1/2022  Continue with current medication regimen and monitor for changes  Encourage participation in groups and milieu. Probable discharge is to be determined by MD    Electronically signed by ELISEO Solis CNP on 11/6/2022 at 1:08 PM    **This report has been created using voice recognition software. It may contain minor errors which are inherent in voice recognition technology. **

## 2022-11-06 NOTE — PLAN OF CARE
Problem: Safety - Adult  Goal: Free from fall injury  11/5/2022 2005 by Madonna Cisneros  Outcome: Progressing  Note: Pt remains free of falls and verbalizes understanding of individual fall risks. Pt wearing non skid footwear and encouraged to seek out staff for any assistance needed. Problem: June  Goal: Will exhibit normal sleep and speech and no impulsivity  Description: INTERVENTIONS:  1. Administer medication as ordered  2. Set limits on impulsive behavior  3. Make attempts to decrease external stimuli as possible  11/5/2022 2005 by Madonna Cisneros  Outcome: Progressing  Note: Patient is resting quietly in room. In control of behavior. Agreeable to taking all prescribed medications for this shift. Pt denies thoughts of self harm and is agreeable to seeking out should thoughts of self harm arise. Safe environment maintained. Q15 minute checks for safety cont per unit policy. Will cont to monitor for safety and provides support and reassurance as needed. Denies hallucinations. Problem: Pain  Goal: Verbalizes/displays adequate comfort level or baseline comfort level  11/5/2022 2005 by Madonna Cisneros  Outcome: Progressing  Flowsheets (Taken 11/5/2022 2005)  Verbalizes/displays adequate comfort level or baseline comfort level: Assess pain using appropriate pain scale  Note: Patient denies having pain at this time. Resting quietly in room.

## 2022-11-07 PROCEDURE — 1240000000 HC EMOTIONAL WELLNESS R&B

## 2022-11-07 PROCEDURE — 99232 SBSQ HOSP IP/OBS MODERATE 35: CPT | Performed by: INTERNAL MEDICINE

## 2022-11-07 PROCEDURE — 94640 AIRWAY INHALATION TREATMENT: CPT

## 2022-11-07 PROCEDURE — APPSS45 APP SPLIT SHARED TIME 31-45 MINUTES: Performed by: PSYCHIATRY & NEUROLOGY

## 2022-11-07 PROCEDURE — 94760 N-INVAS EAR/PLS OXIMETRY 1: CPT

## 2022-11-07 PROCEDURE — 6370000000 HC RX 637 (ALT 250 FOR IP): Performed by: INTERNAL MEDICINE

## 2022-11-07 PROCEDURE — 6370000000 HC RX 637 (ALT 250 FOR IP): Performed by: PSYCHIATRY & NEUROLOGY

## 2022-11-07 PROCEDURE — 99232 SBSQ HOSP IP/OBS MODERATE 35: CPT | Performed by: PSYCHIATRY & NEUROLOGY

## 2022-11-07 RX ORDER — IPRATROPIUM BROMIDE AND ALBUTEROL SULFATE 2.5; .5 MG/3ML; MG/3ML
1 SOLUTION RESPIRATORY (INHALATION)
Status: DISCONTINUED | OUTPATIENT
Start: 2022-11-07 | End: 2022-11-08 | Stop reason: HOSPADM

## 2022-11-07 RX ORDER — PALIPERIDONE 3 MG/1
3 TABLET, EXTENDED RELEASE ORAL DAILY
Status: DISCONTINUED | OUTPATIENT
Start: 2022-11-08 | End: 2022-11-08 | Stop reason: HOSPADM

## 2022-11-07 RX ORDER — PREDNISONE 10 MG/1
10 TABLET ORAL DAILY
Status: DISCONTINUED | OUTPATIENT
Start: 2022-11-07 | End: 2022-11-07

## 2022-11-07 RX ADMIN — PALIPERIDONE 6 MG: 6 TABLET, EXTENDED RELEASE ORAL at 07:43

## 2022-11-07 RX ADMIN — DIVALPROEX SODIUM 500 MG: 500 TABLET, EXTENDED RELEASE ORAL at 20:42

## 2022-11-07 RX ADMIN — OLANZAPINE 5 MG: 5 TABLET, FILM COATED ORAL at 07:43

## 2022-11-07 RX ADMIN — DIVALPROEX SODIUM 500 MG: 500 TABLET, EXTENDED RELEASE ORAL at 07:43

## 2022-11-07 RX ADMIN — Medication 1 CAPSULE: at 07:43

## 2022-11-07 RX ADMIN — BUDESONIDE AND FORMOTEROL FUMARATE DIHYDRATE 2 PUFF: 160; 4.5 AEROSOL RESPIRATORY (INHALATION) at 20:42

## 2022-11-07 RX ADMIN — IPRATROPIUM BROMIDE AND ALBUTEROL SULFATE 1 AMPULE: .5; 2.5 SOLUTION RESPIRATORY (INHALATION) at 20:22

## 2022-11-07 RX ADMIN — BUDESONIDE AND FORMOTEROL FUMARATE DIHYDRATE 2 PUFF: 160; 4.5 AEROSOL RESPIRATORY (INHALATION) at 07:43

## 2022-11-07 RX ADMIN — Medication 2 PUFF: at 15:51

## 2022-11-07 ASSESSMENT — PAIN SCALES - GENERAL: PAINLEVEL_OUTOF10: 0

## 2022-11-07 NOTE — PLAN OF CARE
Problem: June  Goal: Will exhibit normal sleep and speech and no impulsivity  Description: INTERVENTIONS:  1. Administer medication as ordered  2. Set limits on impulsive behavior  3. Make attempts to decrease external stimuli as possible  Outcome: Progressing     Patient is open to 1:1 talk time with staff. Patient denies depression, anxiety, suicidal ideations, homicidal ideations, auditory hallucinations, and visual hallucinations. Patient is isolative to his room and out for needs. Patient is medication compliant today and eats his meals. Patient does not attend groups.

## 2022-11-07 NOTE — GROUP NOTE
Group Therapy Note     Date: 11/7/2022     Group Start Time: 1100  Group End Time: 5556  Group Topic: Recreational      [facility]           Group Therapy Note     Attendees: 10/19     Topic: Recreation      Goal of Group: Patient will demonstrate increased interpersonal skills and increased cognition. Comments: Patient did not participate in RECREATION Group offered at 11:00 , despite staff encouragement and explanation of benefits. Patient came out to group area late, 35 mins into group and was invited to join but declined. Pt remains seclusive to self during group time. Q15 minute safety checks maintained for patient safety and will continue to encourage patient to attend unit programming.          Discipline Responsible: Psychoeducational Specialist        Signature:  Delmis Jasmine

## 2022-11-07 NOTE — PROGRESS NOTES
11/07/22 1416   Encounter Summary   Encounter Overview/Reason  Spiritual/Emotional Needs   Service Provided For: Patient   Referral/Consult From: Phillip   Last Encounter  11/07/22   Complexity of Encounter Moderate   Begin Time 0140   End Time  0215   Total Time Calculated 35 min   Spiritual/Emotional needs   Type Spiritual Support   Behavioral Health    Type  Spirituality Group   Assessment/Intervention/Outcome   Assessment Calm   Intervention Active listening   Outcome Receptive;Engaged in conversation;Expressed Gratitude

## 2022-11-07 NOTE — PROGRESS NOTES
Daily Progress Note  11/7/2022    Patient Name: Victoriano Chavira    CHIEF COMPLAINT:  Acute psychosis with suicidal ideation         SUBJECTIVE:    Nursing staff report the patient has maintained medication adherence and has not required emergency medications in the last 24 hours. Luis's one-to-one status has been discontinued as he appears to have greater insight into his intrusiveness today. He is able to confirm prior confrontations with peers and states \"I think it is best I keep to myself today \". He is more logical and forward thinking. His concentration has improved and he is able to do some leisurely reading. He has shared several family memories and appropriately requested that his wife be contacted to discuss possibility of discharge as discussed with attending physician. Patient denies side effects related to his medications. He reports adequate appetite and sleep and denies having additional questions or concerns at this time. He is endorsing improved suicidal ideation and contemplating the possibilities of returning to the community if his symptoms remain stabilized. Appetite:  [x] Normal/Adequate/Unchanged  [] Increased  [] Decreased      Sleep:       [x] Normal/Adequate/Unchanged  [] Fair  [] Poor      Group Attendance on Unit:   [] Yes  [x] Selectively    [] No    Medication Side Effects: Patient denies any medication side effects at the time of assessment. Mental Status Exam  Level of consciousness: Alert and awake. Appearance: Appropriate attire for setting, sitting on bed with improving grooming and hygiene. Behavior/Motor: Approachable, no psychomotor abnormalities. Attitude toward examiner: Cooperative, attentive, good eye contact. Speech: Normal rate, low volume, normal tone. Mood:  Patient reports \"better\". Affect: Congruent, more full range  Thought processes: Linear, goal oriented  Thought content: Denies homicidal ideation.   Suicidal Ideation: Reports improvement in suicidal ideations, without current plan or intent, contracts for safety on the unit. Delusions: Patient presents with delusions. Denies paranoia. Perceptual Disturbance: Patient does not appear to be responding to internal stimuli. Reports improvement in auditory hallucinations. Denies visual hallucinations. Cognition: Oriented to self day, circumstance and location  Memory: Improving  Insight & Judgement: Fair    Data   height is 6' (1.829 m) and weight is 106 lb (48.1 kg). His temporal temperature is 97.2 °F (36.2 °C). His blood pressure is 104/84 and his pulse is 91. His respiration is 14 and oxygen saturation is 98%.    Labs:   Admission on 10/29/2022   Component Date Value Ref Range Status    WBC 11/06/2022 15.9 (A)  3.5 - 11.0 k/uL Final    RBC 11/06/2022 3.95 (A)  4.5 - 5.9 m/uL Final    Hemoglobin 11/06/2022 12.7 (A)  13.5 - 17.5 g/dL Final    Hematocrit 11/06/2022 38.7 (A)  41 - 53 % Final    MCV 11/06/2022 98.0  80 - 100 fL Final    MCH 11/06/2022 32.2  26 - 34 pg Final    MCHC 11/06/2022 32.9  31 - 37 g/dL Final    RDW 11/06/2022 13.4  11.5 - 14.9 % Final    Platelets 34/17/9196 345  150 - 450 k/uL Final    MPV 11/06/2022 6.9  6.0 - 12.0 fL Final    Seg Neutrophils 11/06/2022 33 (A)  36 - 66 % Final    Lymphocytes 11/06/2022 55 (A)  24 - 44 % Final    Atypical Lymphocytes 11/06/2022 2  % Final    Monocytes 11/06/2022 7  1 - 7 % Final    Eosinophils % 11/06/2022 3  0 - 4 % Final    Basophils 11/06/2022 0  0 - 2 % Final    Segs Absolute 11/06/2022 5.25  1.3 - 9.1 k/uL Final    Absolute Lymph # 11/06/2022 8.74 (A)  1.0 - 4.8 k/uL Final    Atypical Lymphocytes Absolute 11/06/2022 0.32  k/uL Final    Absolute Mono # 11/06/2022 1.11  0.1 - 1.3 k/uL Final    Absolute Eos # 11/06/2022 0.48 (A)  0.0 - 0.4 k/uL Final    Basophils Absolute 11/06/2022 0.00  0.0 - 0.2 k/uL Final    Morphology 11/06/2022 ANISOCYTOSIS PRESENT   Final    Morphology 11/06/2022 1+ ELLIPTOCYTES   Final    Glucose 11/06/2022 90  70 - 99 mg/dL Final    BUN 11/06/2022 10  8 - 23 mg/dL Final    Creatinine 11/06/2022 0.51 (A)  0.70 - 1.20 mg/dL Final    Est, Glom Filt Rate 11/06/2022 >60  >60 mL/min/1.73m2 Final    Comment:       Effective Oct 3, 2022        These results are not intended for use in patients <25years of age. eGFR results are calculated without a race factor using the 2021 CKD-EPI equation. Careful clinical correlation is recommended, particularly when comparing to results   calculated using previous equations. The CKD-EPI equation is less accurate in patients with extremes of muscle mass, extra-renal   metabolism of creatine, excessive creatine ingestion, or following therapy that affects   renal tubular secretion. Calcium 11/06/2022 8.9  8.6 - 10.4 mg/dL Final    Sodium 11/06/2022 139  135 - 144 mmol/L Final    Potassium 11/06/2022 4.9  3.7 - 5.3 mmol/L Final    Chloride 11/06/2022 100  98 - 107 mmol/L Final    CO2 11/06/2022 30  20 - 31 mmol/L Final    Anion Gap 11/06/2022 9  9 - 17 mmol/L Final    Alkaline Phosphatase 11/06/2022 125  40 - 129 U/L Final    ALT 11/06/2022 17  5 - 41 U/L Final    AST 11/06/2022 21  <40 U/L Final    Total Bilirubin 11/06/2022 0.2 (A)  0.3 - 1.2 mg/dL Final    Total Protein 11/06/2022 6.4  6.4 - 8.3 g/dL Final    Albumin 11/06/2022 3.5  3.5 - 5.2 g/dL Final         Reviewed patient's current plan of care and vital signs with nursing staff.     Labs reviewed: [x] Yes  Last EKG in EMR reviewed: [x] Yes  QTc: 418    Medications  Current Facility-Administered Medications: [START ON 11/8/2022] paliperidone (INVEGA) extended release tablet 3 mg, 3 mg, Oral, Daily  sodium chloride flush 0.9 % injection 10 mL, 10 mL, IntraVENous, PRN  divalproex (DEPAKOTE ER) extended release tablet 500 mg, 500 mg, Oral, BID  loperamide (IMODIUM) capsule 2 mg, 2 mg, Oral, 4x Daily PRN  hydrOXYzine HCl (ATARAX) tablet 50 mg, 50 mg, Oral, TID PRN  ipratropium (ATROVENT HFA) 17 MCG/ACT inhaler 2 puff, 2 puff, Inhalation, Q4H PRN  ipratropium (ATROVENT) 0.02 % nebulizer solution 0.5 mg, 0.5 mg, Nebulization, Q4H PRN  haloperidol lactate (HALDOL) injection 5 mg, 5 mg, IntraMUSCular, Q6H PRN **AND** LORazepam (ATIVAN) injection 2 mg, 2 mg, IntraMUSCular, Q6H PRN **AND** diphenhydrAMINE (BENADRYL) injection 50 mg, 50 mg, IntraMUSCular, Q6H PRN  acetaminophen (TYLENOL) tablet 650 mg, 650 mg, Oral, Q6H PRN  ibuprofen (ADVIL;MOTRIN) tablet 400 mg, 400 mg, Oral, Q6H PRN  aluminum & magnesium hydroxide-simethicone (MAALOX) 200-200-20 MG/5ML suspension 30 mL, 30 mL, Oral, Q6H PRN  nicotine polacrilex (NICORETTE) gum 2 mg, 2 mg, Oral, Q2H PRN  lactobacillus (CULTURELLE) capsule 1 capsule, 1 capsule, Oral, Daily  albuterol sulfate HFA (PROVENTIL;VENTOLIN;PROAIR) 108 (90 Base) MCG/ACT inhaler 2 puff, 2 puff, Inhalation, Q4H PRN  docusate sodium (COLACE) capsule 100 mg, 100 mg, Oral, TID PRN  budesonide-formoterol (SYMBICORT) 160-4.5 MCG/ACT inhaler 2 puff, 2 puff, Inhalation, BID  haloperidol (HALDOL) tablet 5 mg, 5 mg, Oral, Q6H PRN **AND** LORazepam (ATIVAN) tablet 2 mg, 2 mg, Oral, Q6H PRN    ASSESSMENT  Acute psychosis (HCC)         HANDOFF:  Patient symptoms are showing improvement  Discontinue one-to-one sitter  Received second dose Invega Sustenna 156 mg 11/5/2022  Received first dose of Invega Sustenna 234 mg on 11/1/2022  Medications per attending physician  Encourage participation in groups and milieu. Probable discharge is to be determined by MD and potentially tomorrow if symptoms remain stabilized discontinue    Electronically signed by ELISEO Jauregui CNP on 11/7/2022 at 1:57 PM    **This report has been created using voice recognition software. It may contain minor errors which are inherent in voice recognition technology. **  I independently saw and evaluated the patient. I reviewed the  documentation above.   Any additional comments or changes to the    documentation are stated below otherwise agree with assessment.      -The patient is somewhat sedated. His behavior has been well controlled. He appears to have some insight into his condition. He denies any grandiosity. He is denying that he has any special abilities. His olanzapine has been discontinued. PLAN  Medications as noted above  Attempt to develop insight  Expected Length of Stay is 1-2 days. Psycho-education conducted. Supportive Therapy conducted.   Follow-up daily while on inpatient unit    Electronically signed by Roberto Sandra MD on 11/7/22 at 9:18 PM EST

## 2022-11-07 NOTE — PROGRESS NOTES
Behavioral Services                                              Medicare Re-Certification    I certify that the inpatient psychiatric hospital services furnished since the previous certification/re-certification were, and continue to be, medically necessary for;    [x] (1) Treatment which could reasonably be expected to improve the patient's condition,    [x] (2) Or for diagnostic study. Estimated length of stay/service 2-9 days    Plan for post-hospital care -outpatient care     This patient continues to need, on a daily basis, active treatment furnished directly by or requiring the supervision of inpatient psychiatric personnel.     Electronically signed by Trudy Almaguer MD on 11/7/2022 at 11:01 AM

## 2022-11-07 NOTE — PROGRESS NOTES
[address] Internal Medicine  Alyson Vargas MD; Anika Ricks MD; Destiny Hinkle MD; MD Kylah Dowd MD; MD FR[facility]   [address] / HISTORY AND PHYSICAL EXAMINATION            Date:   11/7/2022  Patient name:  Adelina Garcia  Date of admission:  10/29/2022  8:35 PM  MRN:   [id number]  Account:  [de-identified]  YOB: 1960  PCP:    Carolina Ball NP, APRN - CNP  Room:   [address] [place] [address]  Code Status:    Full Code    Physician Requesting Consult: Dannie Coe MD    Reason for Consult: Cough rule out pneumonia      Chief Complaint:     No chief complaint on file. Cough rule out pneumonia    History Obtained From:     Patient medical record nursing staff    History of Present Illness:     COPD: Patient complains of dyspnea. Symptoms began 7 days ago. Symptoms acute dyspnea does worsen with exertion. Sputum is clear in small amounts. Fever has been low grade fevers. Patient uses 0 pillows at night. Patient can walk 10 feet before resting. Patient currently is not on home oxygen therapy. Annakaushik Saleh Respiratory history: asmoker    11.7   Patient complaining of shortness of breath  History of asbestos exposure      Past Medical History:     Past Medical History:   Diagnosis Date    Asthma     COPD (chronic obstructive pulmonary disease) ([facility] [address]     Emphysema of lung ([facility] [address]         Past Surgical History:     Past Surgical History:   Procedure Laterality Date    SKIN GRAFT Bilateral     lower extremities        Medications Prior to Admission:     Prior to Admission medications    Medication Sig Start Date End Date Taking?  Authorizing Provider   lidocaine viscous hcl (XYLOCAINE) 2 % SOLN solution Take 15 mLs by mouth every 3 hours as needed for Irritation    Historical Provider, MD   OLANZapine (ZYPREXA) 15 MG tablet Take 1 tablet by mouth in the morning and at bedtime 10/18/22   Dannie Coe MD albuterol sulfate HFA (PROVENTIL;VENTOLIN;PROAIR) 108 (90 Base) MCG/ACT inhaler Inhale 2 puffs into the lungs every 4 hours as needed for Wheezing    Historical Provider, MD   docusate sodium (COLACE) 100 MG capsule Take 100 mg by mouth 3 times daily as needed for Constipation    Historical Provider, MD   ipratropium (ATROVENT) 0.02 % nebulizer solution Take 0.5 mg by nebulization 4 times daily    Historical Provider, MD   Acidophilus Lactobacillus CAPS Take 1 capsule by mouth daily    Historical Provider, MD   ibuprofen (ADVIL;MOTRIN) 200 MG tablet Take 200 mg by mouth every 6 hours as needed for Pain Pt taking 2 pills prn    Historical Provider, MD   mometasone-formoterol (DULERA) 200-5 MCG/ACT inhaler Inhale 2 puffs into the lungs 2 times daily 6/1/22   Jacques Basurto MD        Allergies:     Prednisone, Morphine, and Propoxyphene    Social History:     Tobacco:    reports that he has been smoking cigarettes. He has a 45.00 pack-year smoking history. He has never used smokeless tobacco.  Alcohol:      reports current alcohol use. Drug Use:  reports no history of drug use. Family History:     History reviewed. No pertinent family history. Review of Systems:     Positive and Negative as described in HPI. CONSTITUTIONAL:  negative for fevers, chills, sweats, fatigue, weight loss  HEENT:  negative for vision, hearing changes, runny nose, throat pain  RESPIRATORY: Cough dyspnea. CARDIOVASCULAR:  negative for chest pain, palpitations.   GASTROINTESTINAL:  negative for nausea, vomiting, diarrhea, constipation, change in bowel habits, abdominal pain   GENITOURINARY:  negative for difficulty of urination, burning with urination, frequency   INTEGUMENT:  negative for rash, skin lesions, easy bruising   HEMATOLOGIC/LYMPHATIC:  negative for swelling/edema   ALLERGIC/IMMUNOLOGIC:  negative for urticaria , itching  ENDOCRINE:  negative increase in drinking, increase in urination, hot or cold intolerance  MUSCULOSKELETAL:  negative joint pains, muscle aches, swelling of joints  NEUROLOGICAL:  negative for headaches, dizziness, lightheadedness, numbness, pain, tingling extremities      Physical Exam:     /84   Pulse 91   Temp 97.2 °F (36.2 °C) (Temporal)   Resp 14   Ht 6' (1.829 m)   Wt 106 lb (48.1 kg)   SpO2 98%   BMI 14.38 kg/m²   Temp (24hrs), Av.2 °F (36.2 °C), Min:97.2 °F (36.2 °C), Max:97.2 °F (36.2 °C)    No results for input(s): POCGLU in the last 72 hours. Intake/Output Summary (Last 24 hours) at 2022 1538  Last data filed at 2022 1726  Gross per 24 hour   Intake 236 ml   Output --   Net 236 ml       General Appearance:  alert, well appearing, and in no acute distress  Mental status: oriented to person, place, and time with normal affect  Head:  normocephalic, atraumatic. Eye: no icterus, redness, pupils equal and reactive, extraocular eye movements intact, conjunctiva clear  Ear: normal external ear, no discharge, hearing intact  Nose:  no drainage noted  Mouth: mucous membranes moist  Neck: supple, no carotid bruits, thyroid not palpable  Lungs: Poor air entry bilaterally moderate wheezing  Cardiovascular: normal rate, regular rhythm, no murmur, gallop, rub. Abdomen: Soft, nontender, nondistended, normal bowel sounds, no hepatomegaly or splenomegaly  Neurologic: There are no new focal motor or sensory deficits, normal muscle tone and bulk, no abnormal sensation, normal speech, cranial nerves II through XII grossly intact  Skin: No gross lesions, rashes, bruising or bleeding on exposed skin area  Extremities:  peripheral pulses palpable, no pedal edema or calf pain with palpation    Laboratory Testing:  No results found for this or any previous visit (from the past 24 hour(s)). Imaging/Diagonstics:  XR CHEST (2 VW)    Result Date: 2022  Stable chest when compared to the prior exam 2022.   No acute process     XR HIP RIGHT (2-3 VIEWS)    Result Date: 11/2/2022  No acute abnormality of the hip. CT HEAD WO CONTRAST    Result Date: 11/2/2022  No acute intracranial abnormality.        Assessment :      Hospital Problems             Last Modified POA    * (Principal) Acute psychosis ([address] 10/30/2022 Yes     Plan:     58-year-old gentleman with a severe protein calorie malnutrition likely respiratory cachexia  BMI is 14.38  Active smoker history of for COPD  Complains of cough medical consult rule out pneumonia  Patient has no fever examination has mild to moderate wheezing decreased air and  Will do a short burst of steroid no nebulizer as needed at this time  Chest x-ray two-view rule out pneumonia  Clinically have a low suspicion for pneumonia  Nov 6  Abnormal cxr  With lobar collapse  Will do ct lung  Rule out endobronchial lesion    11/7   CT chest reviewed, patient, CT chest is unchanged from CT chest done in August  History of asbestos exposure  Reviewed notes by pulmonologist Dr. Keke Gomez, dated 9/8, he is supposed to be on 10 mg prednisone daily  Patient had PET scan in the past by [address]   Discussed with pharmacist , could not find details of 10 mg prednisone prescribed to him long-term  Will start on 10 mg prednisone daily, as recommended by his pulmonologist on last office note  Although he has history of, osteoporotic vertebral fractures in the past, still it was determined by his pulmonologist to continue with prednisone given his advanced COPD and asbestosis  Patient, was admitted in the hospital with steroid-induced psychosis hence prednisone was discontinued  Will start patient on scheduled DuoNeb nebulization  Patient need to follow-up with his pulmonologist as outpatient  He voiced understanding          Consultations:   [address], [place]  11/7/2022  3:38 PM    Copy sent to Dr. Brian Diane, NP, APRN - CNP    Please note that this chart was generated using voice recognition Dragon dictation software. Although every effort was made to ensure the accuracy of this automated transcription, some errors in transcription may have occurred.

## 2022-11-07 NOTE — PLAN OF CARE
Problem: Safety - Adult  Goal: Free from fall injury  11/6/2022 2237 by Barbara Browne  Outcome: Progressing  Note: Pt remains free of falls and verbalizes understanding of individual fall risks. Pt wearing non skid footwear and encouraged to seek out staff for any assistance needed. Problem: Decision Making  Goal: Pt/Family able to effectively weigh alternatives and participate in decision making related to treatment and care  Description: INTERVENTIONS:  1. Determine when there are differences between patient's view, family's view, and healthcare provider's view of condition  2. Facilitate patient and family articulation of goals for care  3. Help patient and family identify pros/cons of alternative solutions  4. Provide information as requested by patient/family  5. Respect patient/family right to receive or not to receive information  6. Serve as a liaison between patient and family and health care team  7. Initiate Consults from Ethics, Palliative Care or initiate [address] as is appropriate  11/6/2022 2237 by Barbara Browne  Outcome: Progressing  Note: Patient was isolative to room. Refused medications tonight stating, \"I'm good for tonight. \" Flat affect. In control of behavior. Safety checks maintained q15min and irregular rounding. Problem: Pain  Goal: Verbalizes/displays adequate comfort level or baseline comfort level  11/6/2022 2237 by Barbara Browne  Outcome: Progressing  Flowsheets (Taken 11/6/2022 2237)  Verbalizes/displays adequate comfort level or baseline comfort level: Assess pain using appropriate pain scale  Note: Patient denies having pain at this time and is resting quietly in room.

## 2022-11-07 NOTE — GROUP NOTE
Group Therapy Note     Date: 11/7/2022     Group Start Time: 1430  Group End Time: 8001  Group Topic: Coping Skills     FREDI Maloney           Group Therapy Note     Attendees: 11/17     Topic: Coping Skills     Goal of Group: Patient will demonstrate increased interpersonal skills and increased coping skills awareness. Comments: Patient did not participate in Coping Skills Group offered at 14:30 , despite staff encouragement and explanation of benefits. Patient came out to group area late, 35 mins into group and was invited to join but declined. Pt remains seclusive to self during group time. Q15 minute safety checks maintained for patient safety and will continue to encourage patient to attend unit programming.          Discipline Responsible: Psychoeducational Specialist        Signature:  Tian Rodriguez

## 2022-11-08 VITALS
SYSTOLIC BLOOD PRESSURE: 114 MMHG | DIASTOLIC BLOOD PRESSURE: 80 MMHG | RESPIRATION RATE: 14 BRPM | HEART RATE: 76 BPM | BODY MASS INDEX: 14.36 KG/M2 | OXYGEN SATURATION: 99 % | TEMPERATURE: 98.1 F | WEIGHT: 106 LBS | HEIGHT: 72 IN

## 2022-11-08 PROCEDURE — 99239 HOSP IP/OBS DSCHRG MGMT >30: CPT | Performed by: PSYCHIATRY & NEUROLOGY

## 2022-11-08 PROCEDURE — 6370000000 HC RX 637 (ALT 250 FOR IP): Performed by: PSYCHIATRY & NEUROLOGY

## 2022-11-08 PROCEDURE — 6370000000 HC RX 637 (ALT 250 FOR IP): Performed by: INTERNAL MEDICINE

## 2022-11-08 RX ORDER — DIVALPROEX SODIUM 500 MG/1
500 TABLET, EXTENDED RELEASE ORAL 2 TIMES DAILY
Qty: 60 TABLET | Refills: 0 | Status: SHIPPED | OUTPATIENT
Start: 2022-11-08

## 2022-11-08 RX ADMIN — Medication 1 CAPSULE: at 07:47

## 2022-11-08 RX ADMIN — PALIPERIDONE 3 MG: 3 TABLET, EXTENDED RELEASE ORAL at 07:47

## 2022-11-08 RX ADMIN — DIVALPROEX SODIUM 500 MG: 500 TABLET, EXTENDED RELEASE ORAL at 07:47

## 2022-11-08 RX ADMIN — BUDESONIDE AND FORMOTEROL FUMARATE DIHYDRATE 2 PUFF: 160; 4.5 AEROSOL RESPIRATORY (INHALATION) at 07:47

## 2022-11-08 NOTE — GROUP NOTE
Group Therapy Note    Date: 11/8/2022    Group Start Time: 2030  Group End Time: 2100  Group Topic: Wrap-Up    BRAYDEN Navarrete    Wrap-Up and Relaxation Group Note        Date: November 7, 2022 Start Time:  2030   End Time:  2100      Number of Participants in [address]:  6/16    Topic: Wrap-Up and Relaxation    Goal of Group:Patients completed evening wrap-up and relaxation group. Organic coping skills to cope with hospitalization and other stressors were discussed such as television, coloring, puzzles, and reaching out to supportive friends and family members. Comments:     Patient did not participate in Wrap-Up and Relaxation group, despite staff encouragement and explanation of benefits. Patient remain seclusive to self. Q15 minute safety checks maintained for patient safety and will continue to encourage patient to attend unit programming.

## 2022-11-08 NOTE — PROGRESS NOTES
[facility]  Discharge Note    Pt discharged with followings belongings:   Dental Appliances: None  Vision - Corrective Lenses: Eyeglasses  Hearing Aid: None  Jewelry: None  Body Piercings Removed: N/A  Clothing: Other (Comment) (no belongings on admit)  Other Valuables: Other (Comment) (no valuables on admission)   Valuables sent home with pt or returned to patient. Patient educated on aftercare instructions: yes  Information faxed to [facility] David Bosch by [address] [place] [address]  at 1:22 PM .Patient verbalize understanding of AVS:  yes. Status EXAM upon discharge:  Mental Status and Behavioral Exam  Normal: Yes  Level of Assistance: Independent/Self  Facial Expression: Brightened  Affect: Appropriate  Level of Consciousness: Alert  Frequency of Checks: 4 times per hour, close  Mood:Normal: Yes  Mood: Anxious  Motor Activity:Normal: Yes  Motor Activity: Decreased  Eye Contact: Fair  Observed Behavior: Cooperative  Sexual Misconduct History: Current - no  Preception: [place] to person, [place] to time, [place] to place, [place] to situation  Attention:Normal: Yes  Attention: Distractible  Thought Processes: Circumstantial  Thought Content:Normal: Yes  Thought Content: Preoccupations  Depression Symptoms: No problems reported or observed. Anxiety Symptoms: No problems reported or observed. June Symptoms: No problems reported or observed.   Hallucinations: None  Delusions: No  Delusions: Grandeur  Memory:Normal: No  Memory: Poor recent  Insight and Judgment: No  Insight and Judgment: Poor insight    Tobacco Screening:  Practical Counseling, on admission, dillon X, if applicable and completed (first 3 are required if patient doesn't refuse):            ( ) Recognizing danger situations (included triggers and roadblocks)                    ( ) Coping skills (new ways to manage stress,relaxation techniques, changing routine, distraction)                                                           ( ) Basic information about quitting (benefits of quitting, techniques in how to quit, available resources  ( ) Referral for counseling faxed to [facility]                                                                                                                   ( ) Patient refused counseling  (X ) Patient refused referral  ( ) Patient refused prescription upon discharge  ( ) Patient has not smoked in the last 30 days    Metabolic Screening:    No results found for: LABA1C    No results found for: CHOL  No results found for: TRIG  No results found for: HDL  No components found for: LDLCAL  No results found for: LABVLDL    Pt discharged to home with wife. Pt et wife verbalize understandment of discharge instructions.      Lorena Vega RN

## 2022-11-08 NOTE — DISCHARGE INSTRUCTIONS
.Information:  Medications:   Medication summary provided   I understand that I should take only the medications on my list.     -why and when I need to take each medicine.     -which side effects to watch for.     -that I should carry my medication information at all times in case of     Emergency situations. I will take all of my medicines to follow up appointments.     -check with my physician or pharmacist before taking any new    Medication, over the counter product or drink alcohol.    -Ask about food, drug or dietary supplement interactions.    -discard old lists and update records with medication providers. Notify Physician:  Notify physician if you notice:   Always call 911 if you feel your life is in danger  In case of an emergency call 911 immediately! If 911 is not available call your local emergency medical system for help    Behavioral Health Follow Up:  Original Referral Source:er  Discharge Diagnosis: [facility]) [F30.9]  Recommendations for Level of Care: follow up  Patient status at discharge: stable  [facility] hospital  was: [facility]  Aftercare plan faxed: [facility]   -faxed by: Patience Bence   -date: 11/8/2022   -time: 1000  Prescriptions: escribed to own pharmacy. Learning About Coronavirus [phone])  What is coronavirus (COVID-19)? COVID-19 is a disease caused by a type of coronavirus. This illness was first found in December 2019. It has since spread worldwide. Coronaviruses are a large group of viruses. They cause the common cold. They also cause more serious illnesses like [place] respiratory syndrome (MERS) and severe acute respiratory syndrome (SARS). COVID-19 is caused by a novel coronavirus. That means it's a new type that has not been seen in people before. What are the symptoms? COVID-19 symptoms may include:  Fever. Cough. Trouble breathing. Chills or repeated shaking with chills. Muscle and body aches. Headache. Sore throat.   New loss of taste or smell.  Vomiting. Diarrhea. In severe cases, COVID-19 can cause pneumonia and make it hard to breathe without help from a machine. It can cause death. How is it diagnosed? COVID-19 is diagnosed with a viral test. This may also be called a PCR test or antigen test. It looks for evidence of the virus in your breathing passages or lungs (respiratory system). The test is most often done on a sample from the nose, throat, or lungs. It's sometimes done on a sample of saliva. One way a sample is collected is by putting a long swab into the back of your nose. If you have questions about COVID-19 testing, ask your doctor or go to cdc.gov to use the COVID-19 Viral Testing Tool. How is it treated? Mild cases of COVID-19 can be treated at home. Serious cases need treatment in the hospital. Treatment may include medicines, plus breathing support such as oxygen therapy or a ventilator. Some people may be placed on their belly to help their oxygen levels. Treatments that may help people who have COVID-19 include:  Antiviral medicines. These medicines treat viral infections. Immune-based therapy. These medicines help the immune system fight COVID-19. Examples include monoclonal antibodies. Blood thinners. These medicines help prevent blood clots. People with severe illness are at risk for blood clots. How can you protect yourself and others? Stay up to date on your COVID-19 vaccines. Avoid sick people, and stay away from others if you are sick. Stay at least 6 feet away from other people. Avoid crowds, especially inside. Get tested for COVID-19 before you have an indoor visit with people who don't live with you. Improve the airflow when you spend time indoors with people who don't live with you. If you can, open windows and doors. Or you can use a fan to blow air away from people and out a window. Cover your mouth with a tissue when you cough or sneeze.   Wash your hands often, especially after you cough or sneeze. Use soap and water, and scrub for at least 20 seconds. If soap and water aren't available, use an alcohol-based hand . Avoid touching your mouth, nose, and eyes. Check the CDC website at cdc.gov for the most current information on how to protect yourself. And if you have questions, ask your doctor or go to cdc.gov to use the COVID-19 Quarantine and Isolation Calculator. Here are some other steps you may need to take. If you are not up to date on your COVID-19 vaccines:  Wear a mask with the best fit, protection, and comfort for you. A mask can protect you even when others aren't wearing one. This might be especially important if you:  Have certain health conditions. Live with someone who has a compromised immune system. Live with someone who is not up to date on their COVID-19 vaccines. If you have been exposed to the virus AND are not up to date on your COVID-19 vaccines:  Talk to your doctor as soon as you can. Your doctor might have you take medicine to help prevent serious illness. Get a COVID-19 test. You may need to be tested more than once. And if your test is positive, follow the instructions below. Stay home. Try to separate from other people where you live. Don't go to school, work, or public areas. Wear a well-fitting mask around other people for a full 10 days. Avoid travel, and stay away from people at high risk for serious illness. Watch for symptoms. If you have been exposed AND either tested positive for COVID-19 in the last 90 days and have recovered or you are up to date on your COVID-19 vaccines:  Talk to your doctor as soon as you can. Your doctor may have you take medicine to help prevent serious illness. Get a COVID-19 test. Wait 5 days after you were last exposed. You may need to be tested more than once. And if your test is positive, follow the instructions below. Wear a well-fitting mask around other people for a full 10 days.   Avoid travel and stay away from people at high risk for serious illness. Watch for symptoms. If you tested positive for COVID-19 in the last 90 days and have not recovered, another COVID-19 test may not be needed. If you're sick or test positive for COVID-19:  Talk to your doctor as soon as you can. Your doctor may have you take medicine to help prevent serious illness. Get a COVID-19 test unless you have already been tested. You may need to be tested more than once. Stay home. Leave only if you need to get medical care. If you were seriously ill or if you have a weakened immune system, you may need to isolate for several weeks. For a full 10 days, wear a well-fitting mask whenever you're around other people. Avoid travel and stay away from people at high risk for serious illness. Limit contact with pets and people in your home. If possible, stay in a separate bedroom and use a separate bathroom. Clean and disinfect your home every day. Use household  and disinfectant wipes or sprays. Take special care to clean things that you touch with your hands. How can you self-isolate when you have COVID-19? If you have COVID-19, there are things you can do to help avoid spreading the virus to others. Stay home, and avoid contact with other people. Limit contact with people in your home. If possible, stay in a separate bedroom and use a separate bathroom. Wear a well-fitting mask when you are around other people. Avoid contact with pets and other animals. Cover your mouth and nose with a tissue when you cough or sneeze. Then throw it in the trash right away. Wash your hands often, especially after you cough or sneeze. Use soap and water, and scrub for at least 20 seconds. If soap and water aren't available, use an alcohol-based hand . Don't share personal household items. These include bedding, towels, cups and glasses, and eating utensils.   [address] laundry in the warmest water allowed for the fabric type, and dry it completely. It's okay to wash other people's laundry with yours. Clean and disinfect your home. Use household  and disinfectant wipes or sprays. If you have questions, visit cdc.gov to check the Quarantine and Isolation Calculator. When should you call for help? Call 911 anytime you think you may need emergency care. For example, call if you have life-threatening symptoms, such as: You have severe trouble breathing. (You can't talk at all.)     You have constant chest pain or pressure. You are severely dizzy or lightheaded. You are confused or can't think clearly. You have pale, gray, or blue-colored skin or lips. You pass out (lose consciousness) or are very hard to wake up. You have loss of balance or trouble walking. You have trouble seeing out of one or both eyes. You have weakness or drooping on one side of the face. You have weakness or numbness in an arm or a leg. You have trouble speaking. You have a severe headache. You have a seizure. Call your doctor now or seek immediate medical care if:    You have moderate trouble breathing. (You can't speak a full sentence.)     You are coughing up blood. You have signs of low blood pressure. These include feeling lightheaded; being too weak to stand; and having cold, pale, clammy skin. Watch closely for changes in your health, and be sure to contact your doctor if:    Your symptoms get worse. You are not getting better as expected. You have new or worse symptoms of anxiety, depression, nightmares, or flashbacks. Call before you go to the doctor's office. Follow their instructions. And wear a mask. Where can you learn more? Go to [email or web address] and sign in to your [address] account. Enter C008 in the [employer] box to learn more about \"Learning About Coronavirus (COVID-19). \"     If you do not have an account, please click on the \"Sign Up Now\" link.  Current as of: July 28, 2022               Content Version: 13.4  © 2006-2022 Healthwise, [employer]. Care instructions adapted under license by [facility] ([facility]). If you have questions about a medical condition or this instruction, always ask your healthcare professional. [address] any warranty or liability for your use of this information. Smoking: Quit Smoking. Call the [facility]'s smoking quitline at 3-129-68I-QUIT  Know the signs of a heart attack   If you have any of the following symptoms call 911 immediately, do not wait more    Than five minutes. 1. Pressure, fullness and/ or squeezing in the center of the chest spreading to    The jaw, neck or shoulder. 2. Chest discomfort with light headedness, fainting, sweating, nausea or    Shortness of breath. 3. Upper abdominal pressure or discomfort. 4. Lower chest pain, back pain, unusual fatigue, shortness of breath, nausea   Or dizziness.      General Information:   Questions regarding your bill: Call HELP program [phone]     Suicide Hotline ([place] [address])  (293) 172-1260      Recovery Help line- 662.202.1513      To obtain results of pending studies call Medical Records at: [phone]     For emergencies and 24 hour/7 days a week contact information:  [phone]

## 2022-11-08 NOTE — GROUP NOTE
Group Therapy Note    Date: 11/8/2022    Group Start Time: 6306  Group End Time: 5766  Group Topic: Psychotherapy    [facility]    Enrico Ramirez        Group Therapy Note    Attendees:7/18       Patient's Goal:PT will demonstrate increased interpersonal interaction and a clear understanding on multiple types of coping skills relating to the here-and-now therapeutic practice. Notes:Patient is making progress, AEB participating in group discussion, actively listening, and supporting other group members. PT participates in group and encourages others to participate     Status After Intervention:  Improved    Participation Level: Active Listener and Interactive    Participation Quality: Appropriate, Attentive, Sharing, and Supportive      Speech:  normal      Thought Process/Content: Logical      Affective Functioning: Flat      Mood: elevated      Level of consciousness:  Alert, Oriented x4, and Attentive      Response to Learning: Able to verbalize/acknowledge new learning and Progressing to goal      Endings: None Reported    Modes of Intervention: Support, Socialization, Exploration, Clarifying, and Problem-solving      Discipline Responsible: /Counselor      Signature:   Enrico Ramirez

## 2022-11-08 NOTE — PROGRESS NOTES
Patient given tobacco quitline number 0-790-763-817-340-8565 at this time, refusing to call at this time, states \" I just dont want to quit now\"- patient given information as to the dangers of long term tobacco use. Continue to reinforce the importance of tobacco cessation.

## 2022-11-08 NOTE — PLAN OF CARE
Problem: Safety - Adult  Goal: Free from fall injury  Outcome: Progressing  Note: Patient remains free from injury during this shift. Problem: Decision Making  Goal: Pt/Family able to effectively weigh alternatives and participate in decision making related to treatment and care  Description: INTERVENTIONS:  1. Determine when there are differences between patient's view, family's view, and healthcare provider's view of condition  2. Facilitate patient and family articulation of goals for care  3. Help patient and family identify pros/cons of alternative solutions  4. Provide information as requested by patient/family  5. Respect patient/family right to receive or not to receive information  6. Serve as a liaison between patient and family and health care team  7. Initiate Consults from Ethics, Palliative Care or initiate [facility] as is appropriate  Outcome: Progressing     Problem: June  Goal: Will exhibit normal sleep and speech and no impulsivity  Description: INTERVENTIONS:  1. Administer medication as ordered  2. Set limits on impulsive behavior  3. Make attempts to decrease external stimuli as possible  11/7/2022 2107 by Todd Jett RN  Outcome: Progressing  Note: Patient is brightened in appearance. He reports readiness for discharge. He denies depression and anxiety. He denies suicidal ideation and thoughts of self harm. Staff maintain Q 15 minute safety checks.    11/7/2022 0750 by Dave Angelucci, RN  Outcome: Progressing
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
## 2022-11-08 NOTE — GROUP NOTE
Group Therapy Note    Date: 11/8/2022    Group Start Time: 1100  Group End Time: 1172  Group Topic: Cognitive Skills    FREDI Hurst        Group Therapy Note    Attendees: 9/18     Patient's Goal:  To increase social interaction and to practice problem solving, and communication skills. Notes: Pt participated fully in group task . Pt was able to practice problem solving, and communication skills independently. Pt was appropriate with peers in group setting and identifies that at times he needs to work on staying on topic/task. Pt demonstrated knowledge r/t task and topics and put forth effort into logical problem solving. Status After Intervention:  Improved     Participation Level: Active Listener and Interactive, sharing, supportive     Participation Quality: Appropriate, Attentive, Sharing and Supportive        Speech:  normal        Thought Process/Content: Logical, linear r/t task -some thought blocking but able to give relevant answers when given extra time. Pt is social and engages in positive and encouraging conversation with peers, pt needs only minimal prompts to end social conversation and focus on next round of task.       Affective Functioning: Congruent        Mood: Euthymic        Level of consciousness:  Alert, Oriented x4 and Attentive        Response to Learning: Able to verbalize current knowledge/experience, Able to verbalize/acknowledge new learning, Able to retain information and Progressing to goal        Endings: None Reported     Modes of Intervention: Education, Support, Socialization, Exploration, Clarifying and Problem-solving        Discipline Responsible: Psychoeducational Specialist        Signature:  Chasidy Henson

## 2023-03-09 ENCOUNTER — OFFICE VISIT (OUTPATIENT)
Dept: PULMONOLOGY | Age: 63
End: 2023-03-09
Payer: OTHER GOVERNMENT

## 2023-03-09 VITALS
HEART RATE: 92 BPM | DIASTOLIC BLOOD PRESSURE: 66 MMHG | HEIGHT: 72 IN | TEMPERATURE: 97.7 F | BODY MASS INDEX: 15.14 KG/M2 | SYSTOLIC BLOOD PRESSURE: 106 MMHG | WEIGHT: 111.8 LBS | OXYGEN SATURATION: 97 %

## 2023-03-09 DIAGNOSIS — R59.0 MEDIASTINAL LYMPHADENOPATHY: ICD-10-CM

## 2023-03-09 DIAGNOSIS — R64 CACHEXIA (HCC): ICD-10-CM

## 2023-03-09 DIAGNOSIS — J92.0 CALCIFIED PLEURAL PLAQUE DUE TO ASBESTOS EXPOSURE: ICD-10-CM

## 2023-03-09 DIAGNOSIS — J44.9 STAGE 4 VERY SEVERE COPD BY GOLD CLASSIFICATION (HCC): Primary | ICD-10-CM

## 2023-03-09 DIAGNOSIS — C91.10 CLL (CHRONIC LYMPHOCYTIC LEUKEMIA) (HCC): ICD-10-CM

## 2023-03-09 DIAGNOSIS — J98.4 MIXED RESTRICTIVE AND OBSTRUCTIVE LUNG DISEASE (HCC): ICD-10-CM

## 2023-03-09 DIAGNOSIS — J43.9 MIXED RESTRICTIVE AND OBSTRUCTIVE LUNG DISEASE (HCC): ICD-10-CM

## 2023-03-09 DIAGNOSIS — J90 PLEURAL EFFUSION ON RIGHT: ICD-10-CM

## 2023-03-09 PROCEDURE — 99213 OFFICE O/P EST LOW 20 MIN: CPT | Performed by: INTERNAL MEDICINE

## 2023-03-09 NOTE — PROGRESS NOTES
REASON FOR follow-up  Pleural plaques due to asbestos  HISTORY OF PRESENT ILLNESS:    Douglas Bowers is a 60y.o. year old male   Patient had steroid psychosis. He is weaned off steroids. He is noted. He feels much better. He continues to smoke. He is only taking a multivitamin. Has sputum without purulence. Last visit  Has been having recurrent episodes of bronchitis requiring prednisone and antibiotic. He takes chronic prednisone. He has been losing weight. Main complaint is that he is having pain in his mid thoracic spine which radiates anteriorly and upwards. This worsens with coughing. He does not have hemoptysis. Is on chronic prednisone 10 mg a day. Last visit  Imaging and report of the CT chest reviewed with patient and his wife. He feels that albuterol MDI does not give him enough bronchodilator effect. Will change to Combivent Respimat. Continue nebulized albuterol and Symbicort. Advised patient to stop smoking. He is decided not to pursue any biopsies. Last visit  Since last visit patient has been losing weight, his appetite is normal and he eats 3 meals a day and takes 1 Ensure shake a day. Breathing is stable with exertion  Denies purulent sputum or hemoptysis  Compliant with his bronchodilator therapy  CT scan reviewed with patient and his wife    Last visit  PET CT scan reviewed with patient and his wife. Patient does not want invasive procedures at present. He does not even want a colonoscopy. He is doing well with Symbicort  No hemoptysis      Last visit    here for evaluation of shortness of breath. Patient has been seen in the [facility] they saw pulmonologist and now has been referred to me for evaluation. Patient has been having progressive shortness of breath after walking.   Presently he can walk 50 yards but at a slow pace and if he has to climb up a flight of stairs he has to do it at a slow pace and may have to pace himself he does have cough with sputum mostly in the morning which is clear but no hemoptysis he has lost 10 pounds of weight since 1 year he is a thin cachectic looking man. He has had extensive work-up and scanning done has known history of asbestos pleural plaques with loss of left lung volume with pleuroparenchymal scarring according to him previous PET scans did not show any neoplastic process. I will obtain records of the PET CT scan done in 2017 at [facility]. Recently patient has had blood work done and is under evaluation for CLL due to lymphocytosis. He does have lymphadenopathy that he feels under his axilla and in the groin right side more than the left. 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 He worked with asbestos according to patient he would be covered with asbestos when he would finishes work.   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     [address] [place] [address] 281 MET    []     [address]  []      CRITERIA NOT MET   [x]      REFUSED                    []        REASON CRITERIA NOT MET     SMOKING LESS THAN 30 PY  []      AGE LESS THAN 55 or GREATER 76 YEARS  []      QUIT SMOKING [address]   []      RECENT CT WITH IN 11 MONTHS    [x]      LIFE EXPECTANCY < 5 YEARS   []      SIGNS  AND SYMPTOMS OF LUNG CANCER   []         Immunization   Immunization History   Administered Date(s) Administered    COVID-19, J&J, (age 18y+), IM, 0.5 mL 11/08/2021    Influenza, Triv, 3 Years and older, IM, PF (Afluria 5yrs and older) 09/29/2020    Pneumococcal Polysaccharide ([id number]) 08/29/2018            PAST MEDICAL HISTORY:       Diagnosis Date    Asthma     COPD (chronic obstructive pulmonary disease) ([address]     Emphysema of lung ([address] Family History:   History reviewed. No pertinent family history. SURGICAL HISTORY:   Past Surgical History:   Procedure Laterality Date    SKIN GRAFT Bilateral     lower extremities           SOCIAL AND OCCUPATIONAL HEALTH:      There  No history of TB or TB exposure. There  Yes asbestos ,[place] dust exposure. The patient reports  No coal mine, [place], [place], [facility] exposure. History of travel outside the country No  There  is use of   marijuana No   VapingNo  IV heroinNo  Crack cocaineNo  There  [place] tub exposure. Pets -cats No, dogsNo  Birds No        TOBACCO:   reports that he has been smoking cigarettes. He has a 45.00 pack-year smoking history. He has never used smokeless tobacco.  ETOH:   reports current alcohol use. ALLERGIES:      Allergies   Allergen Reactions    Prednisone Other (See Comments)     Probable Psychosis    Morphine Other (See Comments)    Propoxyphene Other (See Comments)     Any for of darvon          Home Meds:   Prior to Admission medications    Medication Sig Start Date End Date Taking? Authorizing Provider   Multiple Vitamin (MULTIVITAMINS PO) Take by mouth   Yes Historical Provider, MD              Review of Systems -  General ROS: Weight loss  ENT ROS: negative for - headaches, oral lesions or sore throat  Cardiovascular ROS: no chest pain , orthopnea or pnd   Gastrointestinal ROS: no abdominal pain, change in bowel habits, or black or bloody stools  Skin - no rash   Neuro - no blurry vision , no loc .  No focal weakness   Vascular - no claudication , rest completed and negative   Lymphatic - complete and negative   Hematology - oncology - complete and negative   Allergy immunology - complete and negative    no burning or hematuria    Vitals:  /66   Pulse 92   Temp 97.7 °F (36.5 °C)   Ht 6' (1.829 m)   Wt 111 lb 12.8 oz (50.7 kg)   SpO2 97%   BMI 15.16 kg/m²     PHYSICAL EXAM:  Head and neck atraumatic, normocephalic    Lymph nodes axillary lymphadenopathy    Neck-no JVP elevation    Lungs - Prolonged expiratory phase chest expansion decreased on the left side air entry decreased on the left side no rales no dullness no bronchial breath sound, has expiratory rhonchi bilaterally    CVS- S1, S2 regular. No S3 no S4, no murmurs    Abdomen-nontender, nondistended. Bowel sounds are present. No organomegaly  Clubbing  Lower extremity-no edema    Upper extremity-no edema    Neurological-grossly normal cranial nerves. No overt motor deficit                                     Xr Chest (2 Vw)    Result Date: 10/7/2020  Loss of volume of the left hemithorax and mediastinal shift to the left. Calcified pleural plaques in the left upper lung and lung base, most compatible with provided history of asbestosis. Bibasilar pleural-parenchymal scarring versus small bowel pleural effusions. PFT today shows FEV1 32% predicted with the bronchodilator change of 8% to 35% predicted, forced vital capacity 34% predicted with 1% bronchodilator change, FEV1 FVC ratio is 72    Total lung capacity 51% predicted, basilar volume 87% predicted    Diffusion capacity uncorrected 37% predicted, corrected for alveolar volume 79% predicted    Flow volume 46% predicted    Airway resistance increased            In 9/6/2022, CT chest and CT thoracic spine  IMPRESSION:     1. CT chest: Stable chronic pleural-parenchymal scarring with cavitary   formation left lung apex, bilateral pleural thickening and   calcifications, left greater than right, and left hemithorax volume   loss. Stable mediastinal adenopathy. No evidence of progressive   disease. See discussion. 2. CT thoracic spine: New smoothly bordered compression fractures of   the thoracic spine with associated sclerosis and no lytic components,   favor posttraumatic changes or sequela of insufficiency fractures. Consider correlation with a thoracic spine MRI to exclude underlying   lesions.  There are also sclerotic changes within the anterolateral   left ninth and 10th ribs in a pattern suggesting old/healing   fractures. Professional Interpretation by [address] [place] [address] Radiology     This report was generated entirely using voice recognition software. If you have any questions or concerns, please contact the facility   radiology department. Electronically Signed     By: Haley Hubbard MD     IMPRESSION:     Diagnosis Orders   1. Stage 4 very severe COPD by GOLD classification ([facility]        2. Calcified pleural plaque due to asbestos exposure        3. CLL (chronic lymphocytic leukemia) ([facility]        4. Mediastinal lymphadenopathy        5. Pleural effusion on right - ? BAPE        6. Mixed restrictive and obstructive lung disease ([address]        7. Cachexia ([facility]             :                PLAN:      Still smoking less than a pack per day. He has stopped his bronchodilator therapy. He feels better.   Follow-up 6 months        Requested Prescriptions      No prescriptions requested or ordered in this encounter       Medications Discontinued During This Encounter   Medication Reason    albuterol sulfate HFA (PROVENTIL;VENTOLIN;PROAIR) 108 (90 Base) MCG/ACT inhaler Patient Choice    ipratropium (ATROVENT) 0.02 % nebulizer solution Patient Choice    mometasone-formoterol (DULERA) 200-5 MCG/ACT inhaler Patient Choice    divalproex (DEPAKOTE ER) 500 MG extended release tablet Patient Choice    paliperidone palmitate ER (INVEGA SUSTENNA) 117 MG/0.75ML FLEX IM injection Patient Choice    Acidophilus Lactobacillus CAPS Patient Choice    paliperidone palmitate ER (INVEGA SUSTENNA) 117 MG/0.75ML FLEX IM injection Patient Choice    ibuprofen (ADVIL;MOTRIN) 200 MG tablet Patient Choice    docusate sodium (COLACE) 100 MG capsule Patient Choice         Jamar Parker received counseling on the following healthy behaviors: nutrition, exercise and medication adherence    Patient given educational materials : see patient instruction       Discussed use, benefit, and side effects of prescribed medications. Barriers to medication compliance addressed. All patient questions answered. Pt voiced understanding. I hope this updates you on my evaluation and clinical thinking. Thank you for allowing me to participate in his care. Sincerely,    Electronically signed by Glenn Buck MD on 3/9/2023 at 1:28 PM       Please note that this chart was generated using voice recognition Dragon dictation software. Although every effort was made to ensure the accuracy of this automated transcription, some errors in transcription may have occurred.

## 2023-10-04 ENCOUNTER — OFFICE VISIT (OUTPATIENT)
Dept: PULMONOLOGY | Age: 63
End: 2023-10-04
Payer: OTHER GOVERNMENT

## 2023-10-04 VITALS
WEIGHT: 109.6 LBS | TEMPERATURE: 97.9 F | BODY MASS INDEX: 14.84 KG/M2 | OXYGEN SATURATION: 97 % | DIASTOLIC BLOOD PRESSURE: 62 MMHG | HEIGHT: 72 IN | SYSTOLIC BLOOD PRESSURE: 98 MMHG | HEART RATE: 78 BPM

## 2023-10-04 DIAGNOSIS — J44.9 STAGE 4 VERY SEVERE COPD BY GOLD CLASSIFICATION (HCC): Primary | ICD-10-CM

## 2023-10-04 DIAGNOSIS — Z87.891 PERSONAL HISTORY OF TOBACCO USE: ICD-10-CM

## 2023-10-04 DIAGNOSIS — R59.0 MEDIASTINAL LYMPHADENOPATHY: ICD-10-CM

## 2023-10-04 DIAGNOSIS — J98.4 MIXED RESTRICTIVE AND OBSTRUCTIVE LUNG DISEASE (HCC): ICD-10-CM

## 2023-10-04 DIAGNOSIS — J92.0 CALCIFIED PLEURAL PLAQUE DUE TO ASBESTOS EXPOSURE: ICD-10-CM

## 2023-10-04 DIAGNOSIS — J90 PLEURAL EFFUSION ON RIGHT: ICD-10-CM

## 2023-10-04 DIAGNOSIS — R64 CACHEXIA (HCC): ICD-10-CM

## 2023-10-04 DIAGNOSIS — C91.10 CLL (CHRONIC LYMPHOCYTIC LEUKEMIA) (HCC): ICD-10-CM

## 2023-10-04 DIAGNOSIS — J43.9 MIXED RESTRICTIVE AND OBSTRUCTIVE LUNG DISEASE (HCC): ICD-10-CM

## 2023-10-04 PROCEDURE — 99214 OFFICE O/P EST MOD 30 MIN: CPT | Performed by: INTERNAL MEDICINE

## 2023-10-04 PROCEDURE — G0296 VISIT TO DETERM LDCT ELIG: HCPCS | Performed by: INTERNAL MEDICINE

## 2023-10-04 PROCEDURE — 99213 OFFICE O/P EST LOW 20 MIN: CPT | Performed by: INTERNAL MEDICINE

## 2023-10-04 RX ORDER — IPRATROPIUM BROMIDE AND ALBUTEROL SULFATE 2.5; .5 MG/3ML; MG/3ML
3 SOLUTION RESPIRATORY (INHALATION) 4 TIMES DAILY
Qty: 360 ML | Refills: 11 | COMMUNITY
Start: 2022-10-24 | End: 2023-10-24

## 2023-10-04 RX ORDER — GUAIFENESIN 400 MG/1
400 TABLET ORAL 4 TIMES DAILY PRN
COMMUNITY

## 2023-10-04 RX ORDER — MELATONIN 5 MG
15 TABLET,CHEWABLE ORAL NIGHTLY
COMMUNITY

## 2023-10-04 NOTE — PROGRESS NOTES
REASON FOR follow-up  Pleural plaques due to asbestos  HISTORY OF PRESENT ILLNESS:    Juan Carlos Cordova is a 58y.o. year old male   No changes since last year , continues to smoke ,not using inhalers . Feels well     Last visit  Patient had steroid psychosis. He is weaned off steroids. He is noted. He feels much better. He continues to smoke. He is only taking a multivitamin. Has sputum without purulence. Last visit  Has been having recurrent episodes of bronchitis requiring prednisone and antibiotic. He takes chronic prednisone. He has been losing weight. Main complaint is that he is having pain in his mid thoracic spine which radiates anteriorly and upwards. This worsens with coughing. He does not have hemoptysis. Is on chronic prednisone 10 mg a day. Last visit  Imaging and report of the CT chest reviewed with patient and his wife. He feels that albuterol MDI does not give him enough bronchodilator effect. Will change to Combivent Respimat. Continue nebulized albuterol and Symbicort. Advised patient to stop smoking. He is decided not to pursue any biopsies. Last visit  Since last visit patient has been losing weight, his appetite is normal and he eats 3 meals a day and takes 1 Ensure shake a day. Breathing is stable with exertion  Denies purulent sputum or hemoptysis  Compliant with his bronchodilator therapy  CT scan reviewed with patient and his wife    Last visit  PET CT scan reviewed with patient and his wife. Patient does not want invasive procedures at present. He does not even want a colonoscopy. He is doing well with Symbicort  No hemoptysis      Last visit    here for evaluation of shortness of breath. Patient has been seen in the [facility] they saw pulmonologist and now has been referred to me for evaluation. Patient has been having progressive shortness of breath after walking.   Presently he can walk 50 yards but at a slow pace and if he has to climb up a flight of stairs he

## 2023-11-01 ENCOUNTER — OFFICE VISIT (OUTPATIENT)
Dept: PULMONOLOGY | Age: 63
End: 2023-11-01
Payer: OTHER GOVERNMENT

## 2023-11-01 VITALS
HEART RATE: 78 BPM | DIASTOLIC BLOOD PRESSURE: 64 MMHG | HEIGHT: 72 IN | BODY MASS INDEX: 15.17 KG/M2 | WEIGHT: 112 LBS | TEMPERATURE: 99 F | OXYGEN SATURATION: 95 % | SYSTOLIC BLOOD PRESSURE: 100 MMHG

## 2023-11-01 DIAGNOSIS — C91.10 CLL (CHRONIC LYMPHOCYTIC LEUKEMIA) (HCC): ICD-10-CM

## 2023-11-01 DIAGNOSIS — R59.0 MEDIASTINAL LYMPHADENOPATHY: ICD-10-CM

## 2023-11-01 DIAGNOSIS — J44.9 STAGE 4 VERY SEVERE COPD BY GOLD CLASSIFICATION (HCC): ICD-10-CM

## 2023-11-01 DIAGNOSIS — J47.1 BRONCHIECTASIS WITH ACUTE EXACERBATION (HCC): Primary | ICD-10-CM

## 2023-11-01 DIAGNOSIS — R04.2 HEMOPTYSIS: ICD-10-CM

## 2023-11-01 DIAGNOSIS — J98.4 MIXED RESTRICTIVE AND OBSTRUCTIVE LUNG DISEASE (HCC): ICD-10-CM

## 2023-11-01 DIAGNOSIS — J43.9 MIXED RESTRICTIVE AND OBSTRUCTIVE LUNG DISEASE (HCC): ICD-10-CM

## 2023-11-01 DIAGNOSIS — J90 PLEURAL EFFUSION ON RIGHT: ICD-10-CM

## 2023-11-01 DIAGNOSIS — R64 CACHEXIA (HCC): ICD-10-CM

## 2023-11-01 DIAGNOSIS — J92.0 CALCIFIED PLEURAL PLAQUE DUE TO ASBESTOS EXPOSURE: ICD-10-CM

## 2023-11-01 PROCEDURE — 99214 OFFICE O/P EST MOD 30 MIN: CPT | Performed by: INTERNAL MEDICINE

## 2023-11-01 RX ORDER — AZITHROMYCIN 250 MG/1
TABLET, FILM COATED ORAL
Qty: 6 TABLET | Refills: 0 | Status: SHIPPED | OUTPATIENT
Start: 2023-11-01

## 2023-11-01 RX ORDER — ALBUTEROL SULFATE 2.5 MG/3ML
2.5 SOLUTION RESPIRATORY (INHALATION) EVERY 6 HOURS PRN
Qty: 120 EACH | Refills: 5 | Status: SHIPPED | OUTPATIENT
Start: 2023-11-01

## 2023-11-01 RX ORDER — DOXYCYCLINE HYCLATE 100 MG/1
100 CAPSULE ORAL 2 TIMES DAILY
Qty: 14 CAPSULE | Refills: 0 | Status: SHIPPED | OUTPATIENT
Start: 2023-11-01 | End: 2023-11-08

## 2024-04-19 DIAGNOSIS — R04.2 HEMOPTYSIS: ICD-10-CM

## 2024-04-19 DIAGNOSIS — J47.1 BRONCHIECTASIS WITH ACUTE EXACERBATION (HCC): ICD-10-CM

## 2024-04-19 RX ORDER — AZITHROMYCIN 250 MG/1
TABLET, FILM COATED ORAL
Qty: 6 TABLET | Refills: 0 | Status: SHIPPED | OUTPATIENT
Start: 2024-04-19

## 2024-04-19 NOTE — TELEPHONE ENCOUNTER
Patient is having a COPD flare. Requesting antibiotic.    Last Appt:  11/1/2023  Next Appt:   5/30/2024  Med verified in Epic

## 2024-05-23 ENCOUNTER — TELEPHONE (OUTPATIENT)
Dept: ONCOLOGY | Age: 64
End: 2024-05-23

## 2024-05-23 DIAGNOSIS — J92.0 CALCIFIED PLEURAL PLAQUE DUE TO ASBESTOS EXPOSURE: Primary | ICD-10-CM

## 2024-05-23 NOTE — TELEPHONE ENCOUNTER
Pt called in, seeing you on 05/30/2024. He stated that his breathing is getting worse and would like a chest xray prior to his appointment on Thursday. Please advise.

## 2024-05-30 ENCOUNTER — HOSPITAL ENCOUNTER (OUTPATIENT)
Dept: GENERAL RADIOLOGY | Age: 64
Discharge: HOME | End: 2024-06-01
Payer: OTHER GOVERNMENT

## 2024-05-30 ENCOUNTER — OFFICE VISIT (OUTPATIENT)
Dept: PULMONOLOGY | Age: 64
End: 2024-05-30
Payer: OTHER GOVERNMENT

## 2024-05-30 VITALS
WEIGHT: 110 LBS | BODY MASS INDEX: 14.58 KG/M2 | HEART RATE: 81 BPM | OXYGEN SATURATION: 97 % | SYSTOLIC BLOOD PRESSURE: 116 MMHG | DIASTOLIC BLOOD PRESSURE: 74 MMHG | RESPIRATION RATE: 12 BRPM | HEIGHT: 73 IN | TEMPERATURE: 97.2 F

## 2024-05-30 DIAGNOSIS — J44.9 STAGE 4 VERY SEVERE COPD BY GOLD CLASSIFICATION (HCC): ICD-10-CM

## 2024-05-30 DIAGNOSIS — J92.0 CALCIFIED PLEURAL PLAQUE DUE TO ASBESTOS EXPOSURE: ICD-10-CM

## 2024-05-30 DIAGNOSIS — R59.0 MEDIASTINAL LYMPHADENOPATHY: ICD-10-CM

## 2024-05-30 DIAGNOSIS — J90 PLEURAL EFFUSION ON RIGHT: ICD-10-CM

## 2024-05-30 DIAGNOSIS — C91.10 CLL (CHRONIC LYMPHOCYTIC LEUKEMIA) (HCC): ICD-10-CM

## 2024-05-30 DIAGNOSIS — R64 CACHEXIA (HCC): ICD-10-CM

## 2024-05-30 DIAGNOSIS — J47.1 BRONCHIECTASIS WITH ACUTE EXACERBATION (HCC): Primary | ICD-10-CM

## 2024-05-30 DIAGNOSIS — J43.9 MIXED RESTRICTIVE AND OBSTRUCTIVE LUNG DISEASE (HCC): ICD-10-CM

## 2024-05-30 DIAGNOSIS — J98.4 MIXED RESTRICTIVE AND OBSTRUCTIVE LUNG DISEASE (HCC): ICD-10-CM

## 2024-05-30 PROCEDURE — 71046 X-RAY EXAM CHEST 2 VIEWS: CPT

## 2024-05-30 PROCEDURE — 99214 OFFICE O/P EST MOD 30 MIN: CPT | Performed by: INTERNAL MEDICINE

## 2024-05-30 PROCEDURE — 1124F ACP DISCUSS-NO DSCNMKR DOCD: CPT | Performed by: INTERNAL MEDICINE

## 2024-05-30 RX ORDER — GUAIFENESIN/DEXTROMETHORPHAN 100-10MG/5
5 SYRUP ORAL 4 TIMES DAILY PRN
Qty: 500 ML | Refills: 5 | Status: SHIPPED | OUTPATIENT
Start: 2024-05-30 | End: 2024-10-27

## 2024-05-30 RX ORDER — AZITHROMYCIN 250 MG/1
TABLET, FILM COATED ORAL
Qty: 6 TABLET | Refills: 0 | Status: SHIPPED | OUTPATIENT
Start: 2024-05-30

## 2024-05-30 NOTE — PROGRESS NOTES
Writer faxed rx, and [facility] information

## 2024-06-03 ENCOUNTER — TELEPHONE (OUTPATIENT)
Dept: PULMONOLOGY | Age: 64
End: 2024-06-03

## 2024-06-04 NOTE — PROGRESS NOTES
REASON FOR follow-up  Pleural plaques due to asbestos  HISTORY OF PRESENT ILLNESS:    Luis Son is a 64 y.o. year old male   Pt is accompanied with wife , no hemoptysis . Has purulent sputum   No fever   No wheeze   Using albuterol nebulized   Will add Atrovent       Last visit  Patient had steroid psychosis.  He is weaned off steroids.  He is noted.  He feels much better.  He continues to smoke.  He is only taking a multivitamin.  Has sputum without purulence.    Last visit  Has been having recurrent episodes of bronchitis requiring prednisone and antibiotic.  He takes chronic prednisone.  He has been losing weight.  Main complaint is that he is having pain in his mid thoracic spine which radiates anteriorly and upwards.  This worsens with coughing.  He does not have hemoptysis.  Is on chronic prednisone 10 mg a day.    Last visit  Imaging and report of the CT chest reviewed with patient and his wife.  He feels that albuterol MDI does not give him enough bronchodilator effect.  Will change to Combivent Respimat.  Continue nebulized albuterol and Symbicort.  Advised patient to stop smoking.  He is decided not to pursue any biopsies.    Last visit  Since last visit patient has been losing weight, his appetite is normal and he eats 3 meals a day and takes 1 Ensure shake a day.  Breathing is stable with exertion  Denies purulent sputum or hemoptysis  Compliant with his bronchodilator therapy  CT scan reviewed with patient and his wife    Last visit  PET CT scan reviewed with patient and his wife.  Patient does not want invasive procedures at present.  He does not even want a colonoscopy.  He is doing well with Symbicort  No hemoptysis      Last visit    here for evaluation of shortness of breath.  [facility] and now has been referred to me for evaluation.  Patient has been having progressive shortness of breath after walking.  Presently he can walk 50 yards but at a

## 2024-06-11 DIAGNOSIS — J44.9 STAGE 4 VERY SEVERE COPD BY GOLD CLASSIFICATION (HCC): ICD-10-CM

## 2024-06-11 DIAGNOSIS — J92.0 CALCIFIED PLEURAL PLAQUE DUE TO ASBESTOS EXPOSURE: ICD-10-CM

## 2024-06-11 DIAGNOSIS — J47.1 BRONCHIECTASIS WITH ACUTE EXACERBATION (HCC): ICD-10-CM

## 2024-06-11 RX ORDER — GUAIFENESIN/DEXTROMETHORPHAN 100-10MG/5
5 SYRUP ORAL 4 TIMES DAILY PRN
Qty: 500 ML | Refills: 5 | Status: SHIPPED | OUTPATIENT
Start: 2024-06-11 | End: 2024-11-08

## 2024-06-11 NOTE — TELEPHONE ENCOUNTER
Last Appt:  5/30/2024  Next Appt:   11/27/2024  Med verified in Epic     [facility]

## 2024-07-09 DIAGNOSIS — J44.9 STAGE 4 VERY SEVERE COPD BY GOLD CLASSIFICATION (HCC): ICD-10-CM

## 2024-07-09 DIAGNOSIS — J47.1 BRONCHIECTASIS WITH ACUTE EXACERBATION (HCC): ICD-10-CM

## 2024-07-09 NOTE — TELEPHONE ENCOUNTER
Patient would like to [facility] a [facility] through [facility].      Patient needs refill on ipratropium sent to [facility] .     [facility]

## 2024-09-17 ENCOUNTER — TELEPHONE (OUTPATIENT)
Dept: PULMONOLOGY | Age: 64
End: 2024-09-17

## 2024-09-17 DIAGNOSIS — J92.0 CALCIFIED PLEURAL PLAQUE DUE TO ASBESTOS EXPOSURE: ICD-10-CM

## 2024-09-17 DIAGNOSIS — R04.2 HEMOPTYSIS: ICD-10-CM

## 2024-09-17 DIAGNOSIS — J44.9 STAGE 4 VERY SEVERE COPD BY GOLD CLASSIFICATION (HCC): ICD-10-CM

## 2024-09-17 DIAGNOSIS — J47.1 BRONCHIECTASIS WITH ACUTE EXACERBATION (HCC): ICD-10-CM

## 2024-09-18 RX ORDER — ALBUTEROL SULFATE 0.83 MG/ML
2.5 SOLUTION RESPIRATORY (INHALATION) EVERY 6 HOURS PRN
Qty: 120 EACH | Refills: 5 | Status: SHIPPED | OUTPATIENT
Start: 2024-09-18

## 2024-09-18 RX ORDER — AZITHROMYCIN 250 MG/1
TABLET, FILM COATED ORAL
Qty: 6 TABLET | Refills: 0 | Status: SHIPPED | OUTPATIENT
Start: 2024-09-18

## 2024-09-18 RX ORDER — GUAIFENESIN/DEXTROMETHORPHAN 100-10MG/5
5 SYRUP ORAL 4 TIMES DAILY PRN
Qty: 500 ML | Refills: 5 | Status: SHIPPED | OUTPATIENT
Start: 2024-09-18 | End: 2025-02-15

## 2024-10-29 ENCOUNTER — TELEPHONE (OUTPATIENT)
Dept: PULMONOLOGY | Age: 64
End: 2024-10-29

## 2024-10-29 NOTE — TELEPHONE ENCOUNTER
Pt called in stating that is currently on amoxicillin bid, and Diflucan, for abscess in colon. Pt stated that his lungs feel full, and he keeps coughing, stated that he has to cough really hard to get it out, slightly yellowish, pt stated that it is getting to the point where at times it is hard to catch breath. Pt is taking his cough medicine, and stated that it does help some, but is not helping it from getting worse. Pt uses [facility] in [place]    Last ov: 05/30/2024  Next ov: 11/27/2024

## 2024-11-27 ENCOUNTER — OFFICE VISIT (OUTPATIENT)
Dept: PULMONOLOGY | Age: 64
End: 2024-11-27
Payer: OTHER GOVERNMENT

## 2024-11-27 VITALS
WEIGHT: 109.6 LBS | HEIGHT: 72 IN | DIASTOLIC BLOOD PRESSURE: 82 MMHG | HEART RATE: 73 BPM | BODY MASS INDEX: 14.84 KG/M2 | OXYGEN SATURATION: 92 % | SYSTOLIC BLOOD PRESSURE: 124 MMHG | RESPIRATION RATE: 22 BRPM | TEMPERATURE: 97.2 F

## 2024-11-27 DIAGNOSIS — J90 PLEURAL EFFUSION ON RIGHT: ICD-10-CM

## 2024-11-27 DIAGNOSIS — J43.9 MIXED RESTRICTIVE AND OBSTRUCTIVE LUNG DISEASE (HCC): ICD-10-CM

## 2024-11-27 DIAGNOSIS — J47.9 BRONCHIECTASIS WITHOUT COMPLICATION (HCC): ICD-10-CM

## 2024-11-27 DIAGNOSIS — L02.31 GLUTEAL ABSCESS: ICD-10-CM

## 2024-11-27 DIAGNOSIS — R64 CACHEXIA (HCC): ICD-10-CM

## 2024-11-27 DIAGNOSIS — R59.0 MEDIASTINAL LYMPHADENOPATHY: ICD-10-CM

## 2024-11-27 DIAGNOSIS — J98.4 MIXED RESTRICTIVE AND OBSTRUCTIVE LUNG DISEASE (HCC): ICD-10-CM

## 2024-11-27 DIAGNOSIS — C91.10 CLL (CHRONIC LYMPHOCYTIC LEUKEMIA) (HCC): ICD-10-CM

## 2024-11-27 DIAGNOSIS — J92.0 CALCIFIED PLEURAL PLAQUE DUE TO ASBESTOS EXPOSURE: ICD-10-CM

## 2024-11-27 DIAGNOSIS — J44.9 STAGE 4 VERY SEVERE COPD BY GOLD CLASSIFICATION (HCC): Primary | ICD-10-CM

## 2024-11-27 DIAGNOSIS — Z87.891 PERSONAL HISTORY OF TOBACCO USE, PRESENTING HAZARDS TO HEALTH: ICD-10-CM

## 2024-11-27 PROCEDURE — 99406 BEHAV CHNG SMOKING 3-10 MIN: CPT | Performed by: INTERNAL MEDICINE

## 2024-11-27 PROCEDURE — 99214 OFFICE O/P EST MOD 30 MIN: CPT | Performed by: INTERNAL MEDICINE

## 2024-11-27 PROCEDURE — 99213 OFFICE O/P EST LOW 20 MIN: CPT | Performed by: INTERNAL MEDICINE

## 2024-11-27 PROCEDURE — 1124F ACP DISCUSS-NO DSCNMKR DOCD: CPT | Performed by: INTERNAL MEDICINE

## 2024-11-27 NOTE — PROGRESS NOTES
REASON FOR follow-up  Pleural plaques due to asbestos  HISTORY OF PRESENT ILLNESS:    Luis Son is a 64 y.o. 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  He underwent I&D and is on Augmentin long-term.  He declined extensive surgery.  Recently he had.  He was started on Bactrim by his infectious disease sputum is decreased it is still thick and colored.  Dyspnea is chronic.      Last visit  Patient had steroid psychosis.  He is weaned off steroids.  He is noted.  He feels much better.  He continues to smoke.  He is only taking a multivitamin.  Has sputum without purulence.    Last visit  Has been having recurrent episodes of bronchitis requiring prednisone and antibiotic.  He takes chronic prednisone.  He has been losing weight.  Main complaint is that he is having pain in his mid thoracic spine which radiates anteriorly and upwards.  This worsens with coughing.  He does not have hemoptysis.  Is on chronic prednisone 10 mg a day.    Last visit  Imaging and report of the CT chest reviewed with patient and his wife.  He feels that albuterol MDI does not give him enough bronchodilator effect.  Will change to Combivent Respimat.  Continue nebulized albuterol and Symbicort.  Advised patient to stop smoking.  He is decided not to pursue any biopsies.    Last visit  Since last visit patient has been losing weight, his appetite is normal and he eats 3 meals a day and takes 1 Ensure shake a day.  Breathing is stable with exertion  Denies purulent sputum or hemoptysis  Compliant with his bronchodilator therapy  CT scan reviewed with patient and his wife    Last visit  PET CT scan reviewed with patient and his wife.  Patient does not want invasive procedures at present.  He does not even want a colonoscopy.  He is doing well with Symbicort  No hemoptysis      Last visit    here for evaluation of shortness of breath.  [facility]

## 2024-12-23 ENCOUNTER — TELEPHONE (OUTPATIENT)
Dept: PULMONOLOGY | Age: 64
End: 2024-12-23

## 2024-12-23 RX ORDER — SULFAMETHOXAZOLE AND TRIMETHOPRIM 800; 160 MG/1; MG/1
1 TABLET ORAL 2 TIMES DAILY
Qty: 14 TABLET | Refills: 0 | Status: SHIPPED | OUTPATIENT
Start: 2024-12-23 | End: 2025-01-17 | Stop reason: SDUPTHER

## 2024-12-23 NOTE — TELEPHONE ENCOUNTER
Pt called requesting a Bactrim.order due to coughing and bringing up brown sputum. Pt states that Dr. Vang had ordered this before. Pt also takes amoxicillin at this time.

## 2025-01-17 RX ORDER — SULFAMETHOXAZOLE AND TRIMETHOPRIM 800; 160 MG/1; MG/1
1 TABLET ORAL 2 TIMES DAILY
Qty: 14 TABLET | Refills: 0 | Status: SHIPPED | OUTPATIENT
Start: 2025-01-17 | End: 2025-01-24

## 2025-01-17 NOTE — TELEPHONE ENCOUNTER
Patient is still having the brown sputum and would like another refill on Bactrim.   [facility]

## 2025-02-11 DIAGNOSIS — J47.1 BRONCHIECTASIS WITH ACUTE EXACERBATION (HCC): ICD-10-CM

## 2025-02-11 DIAGNOSIS — J92.0 CALCIFIED PLEURAL PLAQUE DUE TO ASBESTOS EXPOSURE: ICD-10-CM

## 2025-02-11 DIAGNOSIS — J44.9 STAGE 4 VERY SEVERE COPD BY GOLD CLASSIFICATION (HCC): ICD-10-CM

## 2025-02-11 RX ORDER — GUAIFENESIN/DEXTROMETHORPHAN 100-10MG/5
5 SYRUP ORAL 4 TIMES DAILY PRN
Qty: 500 ML | Refills: 5 | Status: CANCELLED | OUTPATIENT
Start: 2025-02-11 | End: 2025-07-11

## 2025-02-11 RX ORDER — PSEUDOEPHEDRINE HCL 30 MG
100 TABLET ORAL 2 TIMES DAILY PRN
Qty: 60 CAPSULE | Status: CANCELLED | OUTPATIENT
Start: 2025-02-11 | End: 2026-02-11

## 2025-02-11 RX ORDER — SULFAMETHOXAZOLE AND TRIMETHOPRIM 800; 160 MG/1; MG/1
1 TABLET ORAL 2 TIMES DAILY
Qty: 14 TABLET | Refills: 0 | Status: CANCELLED | OUTPATIENT
Start: 2025-02-11 | End: 2025-02-18

## 2025-02-11 NOTE — TELEPHONE ENCOUNTER
Patient is still have brown sputum.  He said it is hanging on and can't get rid of this bacteria  Would like another dose of Bactrim sent to [facility]

## 2025-02-12 RX ORDER — PSEUDOEPHEDRINE HCL 30 MG
100 TABLET ORAL 2 TIMES DAILY PRN
Qty: 60 CAPSULE | Refills: 2 | Status: SHIPPED | OUTPATIENT
Start: 2025-02-12 | End: 2026-02-12

## 2025-02-12 RX ORDER — SULFAMETHOXAZOLE AND TRIMETHOPRIM 800; 160 MG/1; MG/1
1 TABLET ORAL 2 TIMES DAILY
Qty: 14 TABLET | Refills: 0 | Status: SHIPPED | OUTPATIENT
Start: 2025-02-12 | End: 2025-02-19

## 2025-02-12 RX ORDER — GUAIFENESIN/DEXTROMETHORPHAN 100-10MG/5
5 SYRUP ORAL 4 TIMES DAILY PRN
Qty: 500 ML | Refills: 5 | Status: SHIPPED | OUTPATIENT
Start: 2025-02-12 | End: 2025-07-12

## 2025-05-02 DIAGNOSIS — J92.0 CALCIFIED PLEURAL PLAQUE DUE TO ASBESTOS EXPOSURE: ICD-10-CM

## 2025-05-02 DIAGNOSIS — J47.1 BRONCHIECTASIS WITH ACUTE EXACERBATION (HCC): ICD-10-CM

## 2025-05-02 DIAGNOSIS — J44.9 STAGE 4 VERY SEVERE COPD BY GOLD CLASSIFICATION (HCC): ICD-10-CM

## 2025-05-05 RX ORDER — PSEUDOEPHEDRINE HCL 30 MG
100 TABLET ORAL 2 TIMES DAILY PRN
Qty: 60 CAPSULE | Refills: 2 | Status: SHIPPED | OUTPATIENT
Start: 2025-05-05 | End: 2026-05-05

## 2025-05-05 RX ORDER — ALBUTEROL SULFATE 0.83 MG/ML
2.5 SOLUTION RESPIRATORY (INHALATION) EVERY 6 HOURS PRN
Qty: 120 EACH | Refills: 5 | Status: SHIPPED | OUTPATIENT
Start: 2025-05-05

## 2025-05-05 RX ORDER — GUAIFENESIN/DEXTROMETHORPHAN 100-10MG/5
5 SYRUP ORAL 4 TIMES DAILY PRN
Qty: 500 ML | Refills: 5 | Status: SHIPPED | OUTPATIENT
Start: 2025-05-05 | End: 2025-10-02